# Patient Record
Sex: FEMALE | Race: WHITE | NOT HISPANIC OR LATINO | Employment: OTHER | RURAL
[De-identification: names, ages, dates, MRNs, and addresses within clinical notes are randomized per-mention and may not be internally consistent; named-entity substitution may affect disease eponyms.]

---

## 2020-08-02 NOTE — PROGRESS NOTES
Subjective:  Marta Giraldo is a 55 y.o. female who presents for       Patient Active Problem List   Diagnosis   • Class 1 obesity with serious comorbidity and body mass index (BMI) of 32.0 to 32.9 in adult   • Moderate episode of recurrent major depressive disorder (CMS/HCC)           Current Outpatient Medications:   •  amitriptyline (ELAVIL) 25 MG tablet, Take 25 mg by mouth Every Night., Disp: , Rfl:   •  atorvastatin (LIPITOR) 20 MG tablet, Take 20 mg by mouth Every Night., Disp: , Rfl:   •  exenatide er (BYDUREON) 2 MG/0.85ML auto-injector injection, Inject 2 mg under the skin into the appropriate area as directed 1 (One) Time Per Week., Disp: , Rfl:   •  gabapentin (NEURONTIN) 800 MG tablet, Take 800 mg by mouth 3 (Three) Times a Day., Disp: , Rfl:   •  HYDROcodone-acetaminophen (Lortab) 7.5-325 MG per tablet, Take 1 tablet by mouth Every 12 (Twelve) Hours., Disp: , Rfl:   •  insulin detemir (LEVEMIR) 100 UNIT/ML injection, Inject 20 Units under the skin into the appropriate area as directed Every Night., Disp: , Rfl:   •  insulin detemir (LEVEMIR) 100 UNIT/ML injection, Inject 40 Units under the skin into the appropriate area as directed Daily., Disp: , Rfl:   •  lisinopril (PRINIVIL,ZESTRIL) 10 MG tablet, Take 10 mg by mouth Daily., Disp: , Rfl:   •  methocarbamol (ROBAXIN) 750 MG tablet, Take 750 mg by mouth Every 8 (Eight) Hours., Disp: , Rfl:   •  sertraline (ZOLOFT) 100 MG tablet, Take 1 tablet by mouth 2 (two) times a day., Disp: 60 tablet, Rfl: 0    Pt is 56 yo female with management of obesity IDDM, HLP, diabetic neuropathy,  HTN, major depression  type 2, former tobacco smoker, sp below right  knee amputation, sp appendectomy, sp bladder surgery,diabetic retinopathy     8/10/20 Pt is here to establish and has a CMH of obesity and for IDDM type 2 pt is on levemir 40 unites daily and 20 units at bedtime. Pt is vague historian  She moved here from [place] a few months ago.  She lives currently  alone  She also lived in [place]. She is with sister today  She is seeing a PCP out of [place]  That comes once a week. She has been seeing her a few times   Pt also takes bydureon 2 mg subq weekly. For HLP pt is on lipitor 20 mg PO qhs. For HTN pt is on lisinopril 10 mg daily.  Pt also takes gabapenting 800 mg PO TID for diabetic neuropathy and Norco 7.5/325 mg every 12 hours for her chronic pain Pt is . She is a former tobacco smoker . She does not drink alcohol no illict drug She takes Norco for chronic pain. In legs and arms. Do not have records here currently.  She does not check her sugars regularly  Her last hga1c was 9.0. Pt is wheelchair bound      Diabetes   She presents for her initial diabetic visit. She has type 2 diabetes mellitus. Her disease course has been stable. Pertinent negatives for hypoglycemia include no confusion, dizziness, headaches, hunger, sleepiness, speech difficulty, sweats or tremors. Associated symptoms include fatigue and weakness. Pertinent negatives for diabetes include no blurred vision, no chest pain, no foot paresthesias, no foot ulcerations, no polydipsia, no polyphagia and no polyuria. Symptoms are stable.   Obesity   This is a chronic problem. The problem occurs constantly. The problem has been unchanged. Associated symptoms include arthralgias, fatigue, numbness and weakness. Pertinent negatives include no chest pain, chills, congestion, coughing, diaphoresis, fever, headaches, nausea, sore throat or vomiting. Nothing aggravates the symptoms. She has tried nothing for the symptoms. The treatment provided no relief.          Review of Systems  Review of Systems   Constitutional: Positive for activity change and fatigue. Negative for appetite change, chills, diaphoresis and fever.   HENT: Negative for congestion, postnasal drip, rhinorrhea, sinus pressure, sinus pain, sneezing, sore throat, trouble swallowing and voice change.    Eyes: Negative for blurred vision.    Respiratory: Negative for cough, choking, chest tightness, shortness of breath, wheezing and stridor.    Cardiovascular: Negative for chest pain.   Gastrointestinal: Negative for diarrhea, nausea and vomiting.   Endocrine: Negative for polydipsia, polyphagia and polyuria.   Musculoskeletal: Positive for arthralgias.   Neurological: Positive for weakness and numbness. Negative for dizziness, tremors, speech difficulty and headaches.   Psychiatric/Behavioral: Negative for confusion.       Patient Active Problem List   Diagnosis   • Class 1 obesity with serious comorbidity and body mass index (BMI) of 32.0 to 32.9 in adult   • Moderate episode of recurrent major depressive disorder (CMS/HCC)     Past Surgical History:   Procedure Laterality Date   • APPENDECTOMY     • BELOW KNEE AMPUTATION     • BLADDER SURGERY       Social History     Socioeconomic History   • Marital status:      Spouse name: Not on file   • Number of children: Not on file   • Years of education: Not on file   • Highest education level: Not on file   Tobacco Use   • Smoking status: Former Smoker     Last attempt to quit: 2016     Years since quittin.6   • Smokeless tobacco: Never Used   Substance and Sexual Activity   • Alcohol use: Not Currently   • Drug use: Never   • Sexual activity: Defer     Family History   Problem Relation Age of Onset   • Diabetes Other    • Hyperlipidemia Other    • Hypertension Other    • Stroke Other    • Heart disease Other    • Other Other      No results found for any previous visit.      No image results found.    @[email or web address]@    There is no immunization history on file for this patient.    The following portions of the patient's history were reviewed and updated as appropriate: allergies, current medications, past family history, past medical history, past social history, past surgical history and problem list.        Physical Exam  /68 (BP Location: Right arm, Patient Position: Sitting, Cuff Size:  "Large Adult)   Pulse 80   Temp 99 °F (37.2 °C) Comment: per screener  Ht 176.5 cm (69.5\")   Wt 99.8 kg (220 lb) Comment: given by pt.  SpO2 99%   BMI 32.02 kg/m²     Physical Exam   Constitutional: She is oriented to person, place, and time. She appears well-developed and well-nourished.   HENT:   Head: Normocephalic and atraumatic.   Right Ear: External ear normal.   Eyes: Pupils are equal, round, and reactive to light. Conjunctivae and EOM are normal.   Neck: Normal range of motion. Neck supple.   Cardiovascular: Normal rate, regular rhythm and normal heart sounds.   No murmur heard.  Pulmonary/Chest: Effort normal and breath sounds normal. No respiratory distress.   Abdominal: Soft. Bowel sounds are normal. She exhibits no distension. There is no tenderness.   Obese abdomen    Musculoskeletal: Normal range of motion. She exhibits no edema or deformity.   Wheelchair bound    Neurological: She is alert and oriented to person, place, and time. No cranial nerve deficit.   Get up and go test >10 seconds    Gait instability    Skin: Skin is warm. No rash noted. She is not diaphoretic. No erythema. No pallor.   Psychiatric: She has a normal mood and affect. Her behavior is normal.   Nursing note and vitals reviewed.      Assessment/Plan    Diagnosis Plan   1. Encounter for screening colonoscopy  Ambulatory Referral For Screening Colonoscopy   2. Uncontrolled type 2 diabetes mellitus with hyperglycemia (CMS/HCC)  CBC Auto Differential    Comprehensive Metabolic Panel    Hemoglobin A1c    Lipid Panel    TSH    T4, Free    T3, Free    Vitamin D 25 Hydroxy    Vitamin B12    Hepatitis C antibody    Microalbumin / Creatinine Urine Ratio - Urine, Clean Catch   3. General medical examination     4. Annual physical exam     5. Encounter for screening for endocrine disorder     6. Need for hepatitis C screening test     7. Class 1 obesity with serious comorbidity and body mass index (BMI) of 32.0 to 32.9 in adult, " unspecified obesity type     8. Chronic pain of both lower extremities  Ambulatory Referral to Pain Management   9. S/P below knee amputation, right (CMS/HCC)  Ambulatory Referral to Pain Management   10. Screening mammogram, encounter for  Mammo Screening Bilateral With CAD   11. Moderate episode of recurrent major depressive disorder (CMS/HCC)            -recommend labwork  -will get records from previous PCP   -recommend mammogram screening -refer to imaging center   -recommend colonoscopy screening   -DM type 2  - on bydureon 2 mg subq weekly. On levemir 20 units at bedtime and 40 units daily  -HTN - on lisinopril 10 mg daily.   -HLP - on lipitor 20 mg PO qhs.  -major depression - on zoloft 100 mg PO BId   -obesity - counseled weight loss >5 minutes BMI at 32.0   -diabetic neuropathy - on neurontin 800 mg PO TID  -chronic pain on Norco 7.5/325 mg every 12 hours PRN. Will refer to Pain Management.        -recommend pap smear  -annual physical exam today  -advised pt to be safe and call with questions and concerns  -advised pt to go to ER or call 911 if symptoms worrisome or severe  -advised pt to followup with specialist and referrals  -advised pt to be safe during COVID-19 pandemic  -total time with pt >25 minutes   -recheck in 2 weeks         This document has been electronically signed by Gato Crane MD on August 10, 2020 16:08

## 2020-08-10 ENCOUNTER — OFFICE VISIT (OUTPATIENT)
Dept: FAMILY MEDICINE CLINIC | Facility: CLINIC | Age: 55
End: 2020-08-10

## 2020-08-10 VITALS
OXYGEN SATURATION: 99 % | TEMPERATURE: 99 F | WEIGHT: 220 LBS | DIASTOLIC BLOOD PRESSURE: 68 MMHG | SYSTOLIC BLOOD PRESSURE: 130 MMHG | HEART RATE: 80 BPM | HEIGHT: 70 IN | BODY MASS INDEX: 31.5 KG/M2

## 2020-08-10 DIAGNOSIS — E66.9 CLASS 1 OBESITY WITH SERIOUS COMORBIDITY AND BODY MASS INDEX (BMI) OF 32.0 TO 32.9 IN ADULT, UNSPECIFIED OBESITY TYPE: ICD-10-CM

## 2020-08-10 DIAGNOSIS — Z12.11 ENCOUNTER FOR SCREENING COLONOSCOPY: ICD-10-CM

## 2020-08-10 DIAGNOSIS — E11.65 UNCONTROLLED TYPE 2 DIABETES MELLITUS WITH HYPERGLYCEMIA (HCC): ICD-10-CM

## 2020-08-10 DIAGNOSIS — Z89.511 S/P BELOW KNEE AMPUTATION, RIGHT (HCC): ICD-10-CM

## 2020-08-10 DIAGNOSIS — Z12.31 SCREENING MAMMOGRAM, ENCOUNTER FOR: ICD-10-CM

## 2020-08-10 DIAGNOSIS — Z13.29 ENCOUNTER FOR SCREENING FOR ENDOCRINE DISORDER: ICD-10-CM

## 2020-08-10 DIAGNOSIS — Z00.00 GENERAL MEDICAL EXAMINATION: ICD-10-CM

## 2020-08-10 DIAGNOSIS — Z02.83 ENCOUNTER FOR DRUG SCREENING: Primary | ICD-10-CM

## 2020-08-10 DIAGNOSIS — M79.604 CHRONIC PAIN OF BOTH LOWER EXTREMITIES: ICD-10-CM

## 2020-08-10 DIAGNOSIS — Z00.00 ANNUAL PHYSICAL EXAM: ICD-10-CM

## 2020-08-10 DIAGNOSIS — Z11.59 NEED FOR HEPATITIS C SCREENING TEST: ICD-10-CM

## 2020-08-10 DIAGNOSIS — G89.29 CHRONIC PAIN OF BOTH LOWER EXTREMITIES: ICD-10-CM

## 2020-08-10 DIAGNOSIS — M79.605 CHRONIC PAIN OF BOTH LOWER EXTREMITIES: ICD-10-CM

## 2020-08-10 DIAGNOSIS — F33.1 MODERATE EPISODE OF RECURRENT MAJOR DEPRESSIVE DISORDER (HCC): ICD-10-CM

## 2020-08-10 PROCEDURE — 99204 OFFICE O/P NEW MOD 45 MIN: CPT | Performed by: FAMILY MEDICINE

## 2020-08-10 RX ORDER — GABAPENTIN 800 MG/1
800 TABLET ORAL 3 TIMES DAILY
COMMUNITY
End: 2020-08-27 | Stop reason: SDUPTHER

## 2020-08-10 RX ORDER — SERTRALINE HYDROCHLORIDE 100 MG/1
100 TABLET, FILM COATED ORAL 2 TIMES DAILY
Qty: 60 TABLET | Refills: 0 | Status: SHIPPED | OUTPATIENT
Start: 2020-08-10 | End: 2020-08-27 | Stop reason: SDUPTHER

## 2020-08-10 RX ORDER — METHOCARBAMOL 750 MG/1
750 TABLET, FILM COATED ORAL EVERY 8 HOURS
COMMUNITY
End: 2020-08-27 | Stop reason: SDUPTHER

## 2020-08-10 RX ORDER — AMITRIPTYLINE HYDROCHLORIDE 25 MG/1
25 TABLET, FILM COATED ORAL NIGHTLY
COMMUNITY
End: 2020-08-27 | Stop reason: SDUPTHER

## 2020-08-10 RX ORDER — SERTRALINE HYDROCHLORIDE 100 MG/1
100 TABLET, FILM COATED ORAL 2 TIMES DAILY
COMMUNITY
End: 2020-08-10 | Stop reason: SDUPTHER

## 2020-08-10 RX ORDER — HYDROCODONE BITARTRATE AND ACETAMINOPHEN 7.5; 325 MG/1; MG/1
1 TABLET ORAL EVERY 12 HOURS
COMMUNITY
End: 2020-08-24

## 2020-08-10 RX ORDER — ATORVASTATIN CALCIUM 20 MG/1
20 TABLET, FILM COATED ORAL NIGHTLY
COMMUNITY
End: 2020-08-17 | Stop reason: DRUGHIGH

## 2020-08-10 RX ORDER — LISINOPRIL 10 MG/1
10 TABLET ORAL DAILY
COMMUNITY
End: 2020-08-27

## 2020-08-11 ENCOUNTER — LAB (OUTPATIENT)
Dept: LAB | Facility: HOSPITAL | Age: 55
End: 2020-08-11

## 2020-08-11 DIAGNOSIS — E11.65 UNCONTROLLED TYPE 2 DIABETES MELLITUS WITH HYPERGLYCEMIA (HCC): ICD-10-CM

## 2020-08-11 DIAGNOSIS — Z02.83 ENCOUNTER FOR DRUG SCREENING: ICD-10-CM

## 2020-08-11 LAB
25(OH)D3 SERPL-MCNC: 15.4 NG/ML (ref 30–100)
ALBUMIN SERPL-MCNC: 3.8 G/DL (ref 3.5–5.2)
ALBUMIN UR-MCNC: 6 MG/DL
ALBUMIN/GLOB SERPL: 1.2 G/DL
ALP SERPL-CCNC: 64 U/L (ref 39–117)
ALT SERPL W P-5'-P-CCNC: 13 U/L (ref 1–33)
AMPHET+METHAMPHET UR QL: NEGATIVE
AMPHETAMINES UR QL: NEGATIVE
ANION GAP SERPL CALCULATED.3IONS-SCNC: 12 MMOL/L (ref 5–15)
AST SERPL-CCNC: 11 U/L (ref 1–32)
BARBITURATES UR QL SCN: NEGATIVE
BASOPHILS # BLD AUTO: 0.07 10*3/MM3 (ref 0–0.2)
BASOPHILS NFR BLD AUTO: 0.7 % (ref 0–1.5)
BENZODIAZ UR QL SCN: NEGATIVE
BILIRUB SERPL-MCNC: 0.2 MG/DL (ref 0–1.2)
BUN SERPL-MCNC: 19 MG/DL (ref 6–20)
BUN/CREAT SERPL: 27.1 (ref 7–25)
BUPRENORPHINE SERPL-MCNC: NEGATIVE NG/ML
CALCIUM SPEC-SCNC: 8.9 MG/DL (ref 8.6–10.5)
CANNABINOIDS SERPL QL: NEGATIVE
CHLORIDE SERPL-SCNC: 100 MMOL/L (ref 98–107)
CHOLEST SERPL-MCNC: 172 MG/DL (ref 0–200)
CO2 SERPL-SCNC: 21 MMOL/L (ref 22–29)
COCAINE UR QL: NEGATIVE
CREAT SERPL-MCNC: 0.7 MG/DL (ref 0.57–1)
CREAT UR-MCNC: 122.3 MG/DL
DEPRECATED RDW RBC AUTO: 37.9 FL (ref 37–54)
EOSINOPHIL # BLD AUTO: 0.25 10*3/MM3 (ref 0–0.4)
EOSINOPHIL NFR BLD AUTO: 2.5 % (ref 0.3–6.2)
ERYTHROCYTE [DISTWIDTH] IN BLOOD BY AUTOMATED COUNT: 13.2 % (ref 12.3–15.4)
GFR SERPL CREATININE-BSD FRML MDRD: 87 ML/MIN/1.73
GLOBULIN UR ELPH-MCNC: 3.1 GM/DL
GLUCOSE SERPL-MCNC: 267 MG/DL (ref 65–99)
HBA1C MFR BLD: 10 % (ref 4.8–5.6)
HCT VFR BLD AUTO: 30.9 % (ref 34–46.6)
HCV AB SER DONR QL: NORMAL
HDLC SERPL-MCNC: 47 MG/DL (ref 40–60)
HGB BLD-MCNC: 10.3 G/DL (ref 12–15.9)
IMM GRANULOCYTES # BLD AUTO: 0.06 10*3/MM3 (ref 0–0.05)
IMM GRANULOCYTES NFR BLD AUTO: 0.6 % (ref 0–0.5)
LDLC SERPL CALC-MCNC: 105 MG/DL (ref 0–100)
LDLC/HDLC SERPL: 2.23 {RATIO}
LYMPHOCYTES # BLD AUTO: 3.6 10*3/MM3 (ref 0.7–3.1)
LYMPHOCYTES NFR BLD AUTO: 36.7 % (ref 19.6–45.3)
MCH RBC QN AUTO: 26.8 PG (ref 26.6–33)
MCHC RBC AUTO-ENTMCNC: 33.3 G/DL (ref 31.5–35.7)
MCV RBC AUTO: 80.3 FL (ref 79–97)
METHADONE UR QL SCN: NEGATIVE
MICROALBUMIN/CREAT UR: 49.1 MG/G
MONOCYTES # BLD AUTO: 0.41 10*3/MM3 (ref 0.1–0.9)
MONOCYTES NFR BLD AUTO: 4.2 % (ref 5–12)
NEUTROPHILS NFR BLD AUTO: 5.42 10*3/MM3 (ref 1.7–7)
NEUTROPHILS NFR BLD AUTO: 55.3 % (ref 42.7–76)
NRBC BLD AUTO-RTO: 0 /100 WBC (ref 0–0.2)
OPIATES UR QL: POSITIVE
OXYCODONE UR QL SCN: NEGATIVE
PCP UR QL SCN: NEGATIVE
PLATELET # BLD AUTO: 242 10*3/MM3 (ref 140–450)
PMV BLD AUTO: 12.2 FL (ref 6–12)
POTASSIUM SERPL-SCNC: 4.4 MMOL/L (ref 3.5–5.2)
PROPOXYPH UR QL: NEGATIVE
PROT SERPL-MCNC: 6.9 G/DL (ref 6–8.5)
RBC # BLD AUTO: 3.85 10*6/MM3 (ref 3.77–5.28)
SODIUM SERPL-SCNC: 133 MMOL/L (ref 136–145)
T3FREE SERPL-MCNC: 2.22 PG/ML (ref 2–4.4)
T4 FREE SERPL-MCNC: 0.81 NG/DL (ref 0.93–1.7)
TRICYCLICS UR QL SCN: POSITIVE
TRIGL SERPL-MCNC: 102 MG/DL (ref 0–150)
TSH SERPL DL<=0.05 MIU/L-ACNC: 2.31 UIU/ML (ref 0.27–4.2)
VIT B12 BLD-MCNC: 555 PG/ML (ref 211–946)
VLDLC SERPL-MCNC: 20.4 MG/DL (ref 5–40)
WBC # BLD AUTO: 9.81 10*3/MM3 (ref 3.4–10.8)

## 2020-08-11 PROCEDURE — 84481 FREE ASSAY (FT-3): CPT

## 2020-08-11 PROCEDURE — 80061 LIPID PANEL: CPT

## 2020-08-11 PROCEDURE — 82043 UR ALBUMIN QUANTITATIVE: CPT

## 2020-08-11 PROCEDURE — 84439 ASSAY OF FREE THYROXINE: CPT

## 2020-08-11 PROCEDURE — 86803 HEPATITIS C AB TEST: CPT

## 2020-08-11 PROCEDURE — 83036 HEMOGLOBIN GLYCOSYLATED A1C: CPT

## 2020-08-11 PROCEDURE — 80053 COMPREHEN METABOLIC PANEL: CPT

## 2020-08-11 PROCEDURE — 82607 VITAMIN B-12: CPT

## 2020-08-11 PROCEDURE — 82570 ASSAY OF URINE CREATININE: CPT

## 2020-08-11 PROCEDURE — 82306 VITAMIN D 25 HYDROXY: CPT

## 2020-08-11 PROCEDURE — 85025 COMPLETE CBC W/AUTO DIFF WBC: CPT

## 2020-08-11 PROCEDURE — 80306 DRUG TEST PRSMV INSTRMNT: CPT

## 2020-08-11 PROCEDURE — 84443 ASSAY THYROID STIM HORMONE: CPT

## 2020-08-13 NOTE — PROGRESS NOTES
Subjective:  Marta Giraldo is a 55 y.o. female who presents for       Patient Active Problem List   Diagnosis   • Class 1 obesity with serious comorbidity and body mass index (BMI) of 32.0 to 32.9 in adult   • Moderate episode of recurrent major depressive disorder (CMS/HCC)   • Encounter for screening for malignant neoplasm of colon   • Gastroesophageal reflux disease   • Hypothyroidism   • Microcytic anemia   • Vitamin D deficiency   • Uncontrolled type 2 diabetes mellitus with hyperglycemia (CMS/HCC)   • Class 1 obesity with body mass index (BMI) of 32.0 to 32.9 in adult           Current Outpatient Medications:   •  amitriptyline (ELAVIL) 25 MG tablet, Take 25 mg by mouth Every Night., Disp: , Rfl:   •  atorvastatin (Lipitor) 40 MG tablet, Take 1 tablet by mouth Daily., Disp: 30 tablet, Rfl: 3  •  Blood Glucose Monitoring Suppl w/Device kit, Use for E11.65 diabetes to check sugars 4 times a day., Disp: 1 each, Rfl: 0  •  Empagliflozin 25 MG tablet, Take 25 mg by mouth Daily., Disp: 30 tablet, Rfl: 3  •  exenatide er (BYDUREON) 2 MG/0.85ML auto-injector injection, Inject 0.85 mL under the skin into the appropriate area as directed 1 (One) Time Per Week., Disp: 4 pen, Rfl: 3  •  ferrous sulfate (FerrouSul) 325 (65 FE) MG tablet, Take 1 tablet by mouth Daily With Breakfast., Disp: 30 tablet, Rfl: 3  •  gabapentin (NEURONTIN) 800 MG tablet, Take 800 mg by mouth 3 (Three) Times a Day., Disp: , Rfl:   •  glucose blood test strip, Use as instructed, Disp: 200 each, Rfl: 12  •  glucose blood test strip, Use as instructed, Disp: 200 each, Rfl: 12  •  insulin detemir (LEVEMIR) 100 UNIT/ML injection, Inject 20 Units under the skin into the appropriate area as directed Every Night., Disp: , Rfl:   •  insulin detemir (LEVEMIR) 100 UNIT/ML injection, Inject 40 Units under the skin into the appropriate area as directed Daily., Disp: , Rfl:   •  Lancets misc, Use for E11.65 diabetes to check sugars 4 times a day., Disp: 200  each, Rfl: 3  •  levothyroxine (SYNTHROID, LEVOTHROID) 25 MCG tablet, Take 1 tablet by mouth Daily., Disp: 30 tablet, Rfl: 3  •  lisinopril (PRINIVIL,ZESTRIL) 10 MG tablet, Take 10 mg by mouth Daily., Disp: , Rfl:   •  methocarbamol (ROBAXIN) 750 MG tablet, Take 750 mg by mouth Every 8 (Eight) Hours., Disp: , Rfl:   •  omeprazole (PrilOSEC) 40 MG capsule, Take 1 capsule by mouth Daily., Disp: 30 capsule, Rfl: 11  •  sertraline (ZOLOFT) 100 MG tablet, Take 1 tablet by mouth 2 (two) times a day., Disp: 60 tablet, Rfl: 0      Pt is 54 yo female with management of obesity IDDM, HLP, diabetic neuropathy,  HTN, major depression  type 2, former tobacco smoker, sp below right  knee amputation of right leg , sp appendectomy, sp bladder surgery,diabetic retinopathy, vitamin D deficiency, hypothryoidism, microcytic anemia      8/10/20 Pt is here to establish and has a CMH of obesity and for IDDM type 2 pt is on levemir 40 unites daily and 20 units at bedtime. Pt is vague historian  She moved here from [place] a few months ago.  She lives currently alone  She also lived in [place]. She is with sister today  She is seeing a PCP out of [place]  That comes once a week. She has been seeing her a few times   Pt also takes bydureon 2 mg subq weekly. For HLP pt is on lipitor 20 mg PO qhs. For HTN pt is on lisinopril 10 mg daily.  Pt also takes gabapenting 800 mg PO TID for diabetic neuropathy and Norco 7.5/325 mg every 12 hours for her chronic pain Pt is . She is a former tobacco smoker . She does not drink alcohol no illict drug She takes Norco for chronic pain. In legs and arms. Do not have records here currently.  She does not check her sugars regularly  Her last hga1c was 9.0. Pt is wheelchair bound      8/27/20 in office visit for recheck for pt's above medical issues.  Had labwork done on 8/11/20. UDS showed positive for tricyclic antidepressants and opids.  Hep C antibody test negative. Vitamin B12 levels normal.  Vitamin D levels low. On thyroid studies TSH normal T4 at 0.81 and T3 normal lipid panel showed LDL at 105 and total cholesterl at 172.  hga1c at 10.0. CMP shoed glucose at 267 with sodium at 133 and GFR at 87. BUN/CR ratio at 27.1 CBC showed hemoglobin at 10.3. Pt was started on synthroid 25 mcg PO q daily for her hypothryoidsm. In addition pt was started on ferrous sulfate 325 mg PO q daily for microcytic anemia. For DM Type 2 pt was started on jardiance 25 mg Po q daily.      Hypothyroidism   This is a new problem. The current episode started more than 1 year ago. The problem occurs constantly. The problem has been unchanged. Associated symptoms include arthralgias, fatigue, numbness and weakness. Pertinent negatives include no abdominal pain, anorexia, chest pain, chills, congestion, coughing, diaphoresis, fever, headaches, nausea, sore throat or vomiting. Nothing aggravates the symptoms. She has tried nothing for the symptoms. The treatment provided no relief.   Anemia   Presents for initial visit. There has been no abdominal pain, anorexia, bruising/bleeding easily, confusion, fever, leg swelling, light-headedness, malaise/fatigue, pallor, palpitations, paresthesias, pica or weight loss. Signs of blood loss that are not present include hematemesis, hematochezia, melena, menorrhagia and vaginal bleeding. Past medical history includes hypothyroidism. There is no history of alcohol abuse, cancer, chronic liver disease, chronic renal disease, clotting disorder, dementia, heart failure, hemoglobinopathy, HIV/AIDS, inflammatory bowel disease, malabsorption, malnutrition, neuropathy, recent illness, recent surgery, recent trauma or rheumatic disease. There is no past history of bone marrow exam, colonoscopy, EGD or FOBT.   Diabetes   She presents for her initial diabetic visit. She has type 2 diabetes mellitus. Her disease course has been stable. Pertinent negatives for hypoglycemia include no confusion, dizziness,  headaches, hunger, sleepiness, speech difficulty, sweats or tremors. Associated symptoms include fatigue and weakness. Pertinent negatives for diabetes include no blurred vision, no chest pain, no foot paresthesias, no foot ulcerations, no polydipsia, no polyphagia and no polyuria. Symptoms are stable.   Obesity   This is a chronic problem. The problem occurs constantly. The problem has been unchanged. Associated symptoms include arthralgias, fatigue, numbness and weakness. Pertinent negatives include no chest pain, chills, congestion, coughing, diaphoresis, fever, headaches, nausea, sore throat or vomiting. Nothing aggravates the symptoms. She has tried nothing for the symptoms. The treatment provided no relief.          Review of Systems  Review of Systems   Constitutional: Positive for activity change and fatigue. Negative for appetite change, chills, diaphoresis, fever, malaise/fatigue and weight loss.   HENT: Negative for congestion, postnasal drip, rhinorrhea, sinus pressure, sinus pain, sneezing, sore throat, trouble swallowing and voice change.    Respiratory: Negative for cough, choking, chest tightness, shortness of breath, wheezing and stridor.    Cardiovascular: Negative for chest pain and palpitations.   Gastrointestinal: Negative for abdominal pain, anorexia, diarrhea, hematemesis, hematochezia, melena, nausea and vomiting.   Genitourinary: Negative for menorrhagia and vaginal bleeding.   Musculoskeletal: Positive for arthralgias and gait problem.   Skin: Negative for pallor.   Neurological: Positive for weakness and numbness. Negative for light-headedness, headaches and paresthesias.   Hematological: Does not bruise/bleed easily.   Psychiatric/Behavioral: Negative for confusion.       Patient Active Problem List   Diagnosis   • Class 1 obesity with serious comorbidity and body mass index (BMI) of 32.0 to 32.9 in adult   • Moderate episode of recurrent major depressive disorder (CMS/HCC)   • Encounter  for screening for malignant neoplasm of colon   • Gastroesophageal reflux disease   • Hypothyroidism   • Microcytic anemia   • Vitamin D deficiency   • Uncontrolled type 2 diabetes mellitus with hyperglycemia (CMS/HCC)   • Class 1 obesity with body mass index (BMI) of 32.0 to 32.9 in adult     Past Surgical History:   Procedure Laterality Date   • APPENDECTOMY     • BELOW KNEE AMPUTATION     • BLADDER SURGERY       Social History     Socioeconomic History   • Marital status:      Spouse name: Not on file   • Number of children: Not on file   • Years of education: Not on file   • Highest education level: Not on file   Tobacco Use   • Smoking status: Former Smoker     Last attempt to quit: 2016     Years since quittin.6   • Smokeless tobacco: Never Used   Substance and Sexual Activity   • Alcohol use: Not Currently   • Drug use: Never   • Sexual activity: Defer     Family History   Problem Relation Age of Onset   • Diabetes Other    • Hyperlipidemia Other    • Hypertension Other    • Stroke Other    • Heart disease Other    • Other Other      Lab on 2020   Component Date Value Ref Range Status   • WBC 2020 9.81  3.40 - 10.80 10*3/mm3 Final   • RBC 2020 3.85  3.77 - 5.28 10*6/mm3 Final   • Hemoglobin 2020 10.3* 12.0 - 15.9 g/dL Final   • Hematocrit 2020 30.9* 34.0 - 46.6 % Final   • MCV 2020 80.3  79.0 - 97.0 fL Final   • MCH 2020 26.8  26.6 - 33.0 pg Final   • MCHC 2020 33.3  31.5 - 35.7 g/dL Final   • RDW 2020 13.2  12.3 - 15.4 % Final   • RDW-SD 2020 37.9  37.0 - 54.0 fl Final   • MPV 2020 12.2* 6.0 - 12.0 fL Final   • Platelets 2020 242  140 - 450 10*3/mm3 Final   • Neutrophil % 2020 55.3  42.7 - 76.0 % Final   • Lymphocyte % 2020 36.7  19.6 - 45.3 % Final   • Monocyte % 2020 4.2* 5.0 - 12.0 % Final   • Eosinophil % 2020 2.5  0.3 - 6.2 % Final   • Basophil % 2020 0.7  0.0 - 1.5 % Final   • Immature Grans  % 08/11/2020 0.6* 0.0 - 0.5 % Final   • Neutrophils, Absolute 08/11/2020 5.42  1.70 - 7.00 10*3/mm3 Final   • Lymphocytes, Absolute 08/11/2020 3.60* 0.70 - 3.10 10*3/mm3 Final   • Monocytes, Absolute 08/11/2020 0.41  0.10 - 0.90 10*3/mm3 Final   • Eosinophils, Absolute 08/11/2020 0.25  0.00 - 0.40 10*3/mm3 Final   • Basophils, Absolute 08/11/2020 0.07  0.00 - 0.20 10*3/mm3 Final   • Immature Grans, Absolute 08/11/2020 0.06* 0.00 - 0.05 10*3/mm3 Final   • nRBC 08/11/2020 0.0  0.0 - 0.2 /100 WBC Final   • Glucose 08/11/2020 267* 65 - 99 mg/dL Final   • BUN 08/11/2020 19  6 - 20 mg/dL Final   • Creatinine 08/11/2020 0.70  0.57 - 1.00 mg/dL Final   • Sodium 08/11/2020 133* 136 - 145 mmol/L Final   • Potassium 08/11/2020 4.4  3.5 - 5.2 mmol/L Final    Specimen hemolyzed.  Results may be affected.   • Chloride 08/11/2020 100  98 - 107 mmol/L Final   • CO2 08/11/2020 21.0* 22.0 - 29.0 mmol/L Final   • Calcium 08/11/2020 8.9  8.6 - 10.5 mg/dL Final   • Total Protein 08/11/2020 6.9  6.0 - 8.5 g/dL Final   • Albumin 08/11/2020 3.80  3.50 - 5.20 g/dL Final   • ALT (SGPT) 08/11/2020 13  1 - 33 U/L Final   • AST (SGOT) 08/11/2020 11  1 - 32 U/L Final   • Alkaline Phosphatase 08/11/2020 64  39 - 117 U/L Final   • Total Bilirubin 08/11/2020 0.2  0.0 - 1.2 mg/dL Final   • eGFR Non African Amer 08/11/2020 87  >60 mL/min/1.73 Final   • Globulin 08/11/2020 3.1  gm/dL Final   • A/G Ratio 08/11/2020 1.2  g/dL Final   • BUN/Creatinine Ratio 08/11/2020 27.1* 7.0 - 25.0 Final   • Anion Gap 08/11/2020 12.0  5.0 - 15.0 mmol/L Final   • Hemoglobin A1C 08/11/2020 10.00* 4.80 - 5.60 % Final   • Total Cholesterol 08/11/2020 172  0 - 200 mg/dL Final   • Triglycerides 08/11/2020 102  0 - 150 mg/dL Final   • HDL Cholesterol 08/11/2020 47  40 - 60 mg/dL Final   • LDL Cholesterol  08/11/2020 105* 0 - 100 mg/dL Final   • VLDL Cholesterol 08/11/2020 20.4  5 - 40 mg/dL Final   • LDL/HDL Ratio 08/11/2020 2.23   Final   • TSH 08/11/2020 2.310  0.270 -  "4.200 uIU/mL Final   • Free T4 08/11/2020 0.81* 0.93 - 1.70 ng/dL Final   • T3, Free 08/11/2020 2.22  2.00 - 4.40 pg/mL Final   • 25 Hydroxy, Vitamin D 08/11/2020 15.4* 30.0 - 100.0 ng/ml Final   • Vitamin B-12 08/11/2020 555  211 - 946 pg/mL Final   • Hepatitis C Ab 08/11/2020 Non-Reactive  Non-Reactive Final   • Microalbumin/Creatinine Ratio 08/11/2020 49.1  mg/g Final   • Creatinine, Urine 08/11/2020 122.3  mg/dL Final   • Microalbumin, Urine 08/11/2020 6.0  mg/dL Final   • THC, Screen, Urine 08/11/2020 Negative  Negative Final   • Phencyclidine (PCP), Urine 08/11/2020 Negative  Negative Final   • Cocaine Screen, Urine 08/11/2020 Negative  Negative Final   • Methamphetamine, Ur 08/11/2020 Negative  Negative Final   • Opiate Screen 08/11/2020 Positive* Negative Final   • Amphetamine Screen, Urine 08/11/2020 Negative  Negative Final   • Benzodiazepine Screen, Urine 08/11/2020 Negative  Negative Final   • Tricyclic Antidepressants Screen 08/11/2020 Positive* Negative Final   • Methadone Screen, Urine 08/11/2020 Negative  Negative Final   • Barbiturates Screen, Urine 08/11/2020 Negative  Negative Final   • Oxycodone Screen, Urine 08/11/2020 Negative  Negative Final   • Propoxyphene Screen 08/11/2020 Negative  Negative Final   • Buprenorphine, Screen, Urine 08/11/2020 Negative  Negative Final      No image results found.    @[email or web address]@    There is no immunization history on file for this patient.    The following portions of the patient's history were reviewed and updated as appropriate: allergies, current medications, past family history, past medical history, past social history, past surgical history and problem list.        Physical Exam  /66 (BP Location: Right arm, Patient Position: Sitting, Cuff Size: Adult)   Pulse 77   Temp 97.8 °F (36.6 °C) (Infrared)   Ht 175.3 cm (69\")   SpO2 97%   BMI 32.49 kg/m²     Physical Exam   Constitutional: She is oriented to person, place, and time. She appears " well-developed and well-nourished.   HENT:   Head: Normocephalic and atraumatic.   Right Ear: External ear normal.   Eyes: Pupils are equal, round, and reactive to light. Conjunctivae and EOM are normal.   Neck: Normal range of motion. Neck supple.   Cardiovascular: Normal rate, regular rhythm and normal heart sounds.   No murmur heard.  Pulmonary/Chest: Effort normal and breath sounds normal. No respiratory distress.   Abdominal: Soft. Bowel sounds are normal. She exhibits no distension. There is no tenderness.   Obese abdomen    Musculoskeletal: Normal range of motion. She exhibits tenderness. She exhibits no edema or deformity.   Below right knee amputation of right leg    Pt wheelchair bound    Neurological: She is alert and oriented to person, place, and time. No cranial nerve deficit.   Skin: Skin is warm. No rash noted. She is not diaphoretic. No erythema. No pallor.   Psychiatric: She has a normal mood and affect. Her behavior is normal.   Nursing note and vitals reviewed.      Assessment/Plan    Diagnosis Plan   1. Hypothyroidism, unspecified type     2. Microcytic anemia     3. Vitamin D deficiency     4. Moderate episode of recurrent major depressive disorder (CMS/HCC)     5. Uncontrolled type 2 diabetes mellitus with hyperglycemia (CMS/HCC)     6. Class 1 obesity with body mass index (BMI) of 32.0 to 32.9 in adult, unspecified obesity type, unspecified whether serious comorbidity present             -recommend labwork   -recommend influenza vaccination   -will get records from previous PCP   -recommend diabetic ey exam   -recommend mammogram screening -refer to imaging center   -recommend colonoscopy screening - has upcoming EGD/colonoscopy on 9/2/20 with GI at [facility]   -vitamin D deficiency -vitamin D once a week  -hypothyroidism - on synthroid 25 mcg PO q daily.   -DM type 2  - on bydureon 2 mg subq weekly.on levemir  40 units daily and 20 units at bedtime   Start on basaglar 25 units at bedtime. Go up by  3 units every 3 days until morning sugars running     -microcytic anemia - on ferrous sulfate 325 mg PO q daily. Has upcoming appt with Gastroenteorlogy on 9/2/20 for colonoscopy and EGD   -HTN - on lisinopril 10 mg daily go up  On lisinopril from 10 to 20 mg PO q daily.  -HLP - on lipitor 20 mg PO qhs.  -major depression - on zoloft 100 mg PO BID. Will taper off elavil.  Stop zoloft and switch to viibryd starting pack samples given today. Also recommend counseling but pt declined   -obesity - counseled weight loss >5 minutes BMI at 32.0   -diabetic neuropathy - on neurontin 800 mg PO TID stop elavil but taper down every other day for 1 week then every other 2 days for 1 week then stop . Stop neurontin and start on lyrica 50 mg PO TID. UDS completed. MICHAEL printed and on file. Also pt will sign pain contrc  -chronic pain  Was on Norco 7.5/325 mg every 12 hours PRN. Will refer to Pain Management.   on robaxin TID     -recommend pap smear  -annual physical exam today  -advised pt to be safe and call with questions and concerns  -advised pt to go to ER or call 911 if symptoms worrisome or severe  -advised pt to followup with specialist and referrals  -advised pt to be safe during COVID-19 pandemic  -total time with pt >25 minutes   -recheck in 2 weeks         This document has been electronically signed by Gato Crane MD on August 27, 2020 15:55

## 2020-08-14 NOTE — TELEPHONE ENCOUNTER
Patient states that she is out of her diabetes shot and her blood sugar has been high. States that she is needing a refill onexenatide er (BYDUREON) 2 MG/0.85ML auto-injector injection  2 mg, Weekly     To be sent to the  [place] [address] [address] AT [facility] [address] - [phone]  - [phone]   [phone]

## 2020-08-17 ENCOUNTER — TELEPHONE (OUTPATIENT)
Dept: FAMILY MEDICINE CLINIC | Facility: CLINIC | Age: 55
End: 2020-08-17

## 2020-08-17 RX ORDER — FERROUS SULFATE 325(65) MG
325 TABLET ORAL
Qty: 30 TABLET | Refills: 3 | Status: SHIPPED | OUTPATIENT
Start: 2020-08-17 | End: 2020-11-06

## 2020-08-17 RX ORDER — ATORVASTATIN CALCIUM 40 MG/1
40 TABLET, FILM COATED ORAL DAILY
Qty: 30 TABLET | Refills: 3 | Status: SHIPPED | OUTPATIENT
Start: 2020-08-17 | End: 2020-11-06

## 2020-08-17 RX ORDER — LEVOTHYROXINE SODIUM 0.03 MG/1
25 TABLET ORAL DAILY
Qty: 30 TABLET | Refills: 3 | Status: SHIPPED | OUTPATIENT
Start: 2020-08-17 | End: 2020-11-06

## 2020-08-17 NOTE — TELEPHONE ENCOUNTER
----- Message from Gato Crane MD sent at 8/15/2020 11:12 AM CDT -----      Please call pt    On thyroid studies TSH is normal but T4 is low. Recommend pt start taking synthroid 25 mcg daily to help with energy and fatigue give 30 pills and 3 refills     On CMP sugars high at 267 with sodium at 133     Liver enzymes and kidney function stable BUN/CR ratio elevated. Recommend more water intake     On lipid panel shows LDL at 105 and recommend pt go up on lipitor from 20 to 40 mg PO qhs. Give 30 pills and 3 refills. Goal <100. Recommend DASH diet heart healthy diet with less sugar and cars. More vegetables and lean protein    hga1c at 10.00 and goal less than <7.00. Very important that pt bring sugar readings next visit before breakfast and 2 hours after meal. Recommend pt start taking jardiance 25 mg PO q daily. Give 30 pills and 3 refills. Will help pt urinate sugars and reduce risk of cardiovascular death. Needs to drink a lot of water while on medication.  May cause UTI and yeast infection.      On CBC hemoglobin at 10.00 with MCV at 80. Possible iron deficiency anemia.  Recommend pt start taking iron supplement ferrous sulfate 325 mg pO q daily. Give 30 pills and 3 refills may cause constipation and black stools take with vitamin C for better absorption    Recheck on next visit. Thanks

## 2020-08-17 NOTE — TELEPHONE ENCOUNTER
Gave results and recommendations to sister. She voiced understanding and is agreeable to new medications.

## 2020-08-19 ENCOUNTER — TELEPHONE (OUTPATIENT)
Dept: FAMILY MEDICINE CLINIC | Facility: CLINIC | Age: 55
End: 2020-08-19

## 2020-08-19 RX ORDER — LANCETS 30 GAUGE
EACH MISCELLANEOUS
Qty: 200 EACH | Refills: 3 | Status: SHIPPED | OUTPATIENT
Start: 2020-08-19 | End: 2021-04-07 | Stop reason: SDUPTHER

## 2020-08-19 NOTE — TELEPHONE ENCOUNTER
PATIENT CALLED IN REQUESTING NEW GLUCOSE MONITOR. PLEASE SEND TO PHARMACY AND INCLUDE, TESTING STRIPS AND NEEDLES.   CALL BACK NUMBER: [phone]  PHARMACY: [facility] [address] [place] [address] [address] AT [facility] [address] - [phone] [facility] [phone] FX

## 2020-08-24 ENCOUNTER — OFFICE VISIT (OUTPATIENT)
Dept: GASTROENTEROLOGY | Facility: CLINIC | Age: 55
End: 2020-08-24

## 2020-08-24 ENCOUNTER — TELEPHONE (OUTPATIENT)
Dept: FAMILY MEDICINE CLINIC | Facility: CLINIC | Age: 55
End: 2020-08-24

## 2020-08-24 VITALS
DIASTOLIC BLOOD PRESSURE: 60 MMHG | BODY MASS INDEX: 32.49 KG/M2 | HEIGHT: 69 IN | SYSTOLIC BLOOD PRESSURE: 112 MMHG | HEART RATE: 78 BPM

## 2020-08-24 DIAGNOSIS — K21.9 GASTROESOPHAGEAL REFLUX DISEASE, ESOPHAGITIS PRESENCE NOT SPECIFIED: ICD-10-CM

## 2020-08-24 DIAGNOSIS — Z12.11 ENCOUNTER FOR SCREENING FOR MALIGNANT NEOPLASM OF COLON: Primary | ICD-10-CM

## 2020-08-24 PROCEDURE — 99203 OFFICE O/P NEW LOW 30 MIN: CPT | Performed by: INTERNAL MEDICINE

## 2020-08-24 RX ORDER — OMEPRAZOLE 40 MG/1
40 CAPSULE, DELAYED RELEASE ORAL DAILY
Qty: 30 CAPSULE | Refills: 11 | Status: SHIPPED | OUTPATIENT
Start: 2020-08-24 | End: 2021-03-10 | Stop reason: SDUPTHER

## 2020-08-24 RX ORDER — DEXTROSE AND SODIUM CHLORIDE 5; .45 G/100ML; G/100ML
30 INJECTION, SOLUTION INTRAVENOUS CONTINUOUS PRN
Status: CANCELLED | OUTPATIENT
Start: 2020-09-02

## 2020-08-24 NOTE — TELEPHONE ENCOUNTER
----- Message from Gato Crane MD sent at 8/24/2020  9:36 AM CDT -----  Regarding: mammogram screening   Please call pt    Mammogram screening normal    Repeat in 1 year. Thanks

## 2020-08-24 NOTE — PATIENT INSTRUCTIONS

## 2020-08-24 NOTE — PROGRESS NOTES
[facility]      Chief Complaint:   Chief Complaint   Patient presents with   • Heartburn   • Colon Cancer Screening       Subjective     HPI:   Ms. Giraldo is a 55-year-old  female with past medical history of depression, diabetes mellitus, hyperlipidemia, hypertension, neuropathy, CVA, appendectomy presenting for evaluation for colorectal cancer screening.  She has heartburn for past several years.  Has been worse for past month.  Taking Tums on as-needed basis without much help.  Denied abdominal pain, nausea, vomiting, diarrhea, constipation, rectal bleeding or weight loss.  Due for colorectal cancer screening.    Past Medical History:   Past Medical History:   Diagnosis Date   • Depression    • Diabetes mellitus (CMS/HCC)    • Hyperlipidemia    • Hypertension    • Neuropathy    • Stroke (CMS/HCC)        Past Surgical History:  Past Surgical History:   Procedure Laterality Date   • APPENDECTOMY     • BELOW KNEE AMPUTATION     • BLADDER SURGERY         Family History:  Family History   Problem Relation Age of Onset   • Diabetes Other    • Hyperlipidemia Other    • Hypertension Other    • Stroke Other    • Heart disease Other    • Other Other        Social History:   reports that she quit smoking about 4 years ago. She has never used smokeless tobacco. She reports that she drank alcohol. She reports that she does not use drugs.    Medications:   Prior to Admission medications    Medication Sig Start Date End Date Taking? Authorizing Provider   amitriptyline (ELAVIL) 25 MG tablet Take 25 mg by mouth Every Night.   Yes Provider, MD David   atorvastatin (Lipitor) 40 MG tablet Take 1 tablet by mouth Daily. 8/17/20  Yes Gato Crane MD   Blood Glucose Monitoring Suppl w/Device kit Use for E11.65 diabetes to check sugars 4 times a day. 8/19/20  Yes Gato Crane MD   Empagliflozin 25 MG tablet Take 25 mg by mouth Daily. 8/17/20  Yes Gato Crane MD   exenatide er  (BYDUREON) 2 MG/0.85ML auto-injector injection Inject 0.85 mL under the skin into the appropriate area as directed 1 (One) Time Per Week. 8/14/20  Yes Gato Crane MD   ferrous sulfate (FerrouSul) 325 (65 FE) MG tablet Take 1 tablet by mouth Daily With Breakfast. 8/17/20  Yes Gato Crane MD   gabapentin (NEURONTIN) 800 MG tablet Take 800 mg by mouth 3 (Three) Times a Day.   Yes David Vizcaino MD   glucose blood test strip Use as instructed 8/17/20  Yes Gato Crane MD   glucose blood test strip Use as instructed 8/19/20  Yes Gato Crane MD   insulin detemir (LEVEMIR) 100 UNIT/ML injection Inject 20 Units under the skin into the appropriate area as directed Every Night.   Yes David Vizcaino MD   insulin detemir (LEVEMIR) 100 UNIT/ML injection Inject 40 Units under the skin into the appropriate area as directed Daily.   Yes David Vizcaino MD   Lancets misc Use for E11.65 diabetes to check sugars 4 times a day. 8/19/20  Yes Gato Crane MD   levothyroxine (SYNTHROID, LEVOTHROID) 25 MCG tablet Take 1 tablet by mouth Daily. 8/17/20  Yes Gato Crane MD   lisinopril (PRINIVIL,ZESTRIL) 10 MG tablet Take 10 mg by mouth Daily.   Yes David Vizcaino MD   methocarbamol (ROBAXIN) 750 MG tablet Take 750 mg by mouth Every 8 (Eight) Hours.   Yes David Vizcaino MD   sertraline (ZOLOFT) 100 MG tablet Take 1 tablet by mouth 2 (two) times a day. 8/10/20  Yes Gato Crane MD   omeprazole (PrilOSEC) 40 MG capsule Take 1 capsule by mouth Daily. 8/24/20   Ashli Gonzalez MD   HYDROcodone-acetaminophen (Lortab) 7.5-325 MG per tablet Take 1 tablet by mouth Every 12 (Twelve) Hours.  8/24/20  David Vizcaino MD       Allergies:  Compazine [prochlorperazine edisylate] and Penicillins    ROS:    Review of Systems   Constitutional: Negative for chills, fatigue, fever and unexpected weight change.   HENT: Negative for congestion, ear discharge, hearing loss, nosebleeds and  "sore throat.    Eyes: Negative for pain, discharge and redness.   Respiratory: Negative for cough, chest tightness, shortness of breath and wheezing.    Cardiovascular: Negative for chest pain and palpitations.   Gastrointestinal: Negative for abdominal distention, abdominal pain, blood in stool, constipation, diarrhea, nausea and vomiting.   Endocrine: Negative for cold intolerance, polydipsia, polyphagia and polyuria.   Genitourinary: Negative for dysuria, flank pain, frequency, hematuria and urgency.   Musculoskeletal: Negative for arthralgias, back pain, joint swelling and myalgias.   Skin: Negative for color change, pallor and rash.   Neurological: Negative for tremors, seizures, syncope, weakness and headaches.   Hematological: Negative for adenopathy. Does not bruise/bleed easily.   Psychiatric/Behavioral: Negative for behavioral problems, confusion, dysphoric mood, hallucinations and suicidal ideas. The patient is not nervous/anxious.    Has GERD.  Objective     Blood pressure 112/60, pulse 78, height 175.3 cm (69\").    Physical Exam   Constitutional: She is oriented to person, place, and time. She appears well-developed and well-nourished.   HENT:   Head: Normocephalic and atraumatic.   Mouth/Throat: Oropharynx is clear and moist.   Eyes: Pupils are equal, round, and reactive to light. Conjunctivae and EOM are normal.   Neck: Normal range of motion. Neck supple. No thyromegaly present.   Cardiovascular: Normal rate, regular rhythm and normal heart sounds.   No murmur heard.  Pulmonary/Chest: Effort normal and breath sounds normal. She has no wheezes.   Abdominal: Soft. Bowel sounds are normal. She exhibits no distension and no mass. There is no tenderness. No hernia.   Genitourinary:   Genitourinary Comments: No lesions noted   Musculoskeletal: Normal range of motion. She exhibits no edema or tenderness.   Lymphadenopathy:     She has no cervical adenopathy.   Neurological: She is alert and oriented to " person, place, and time. No cranial nerve deficit.   Skin: Skin is warm and dry. No rash noted.   Psychiatric: She has a normal mood and affect. Thought content normal.      Extremities: No edema, cyanosis or clubbing.    Assessment/Plan    1.  Colorectal cancer screening, average risk.  Schedule colonoscopy.  2.  GERD, with continued symptoms off medications.  Taking Tums as needed.  Add Prilosec.  Antireflux lifestyle.  Proceed with EGD for [facility] screening.  3.  Obesity, recommend exercise and diet control.  Marta was seen today for heartburn and colon cancer screening.    Diagnoses and all orders for this visit:    Encounter for screening for malignant neoplasm of colon  -     Case Request; Standing  -     Case Request    Gastroesophageal reflux disease, esophagitis presence not specified  -     Case Request; Standing  -     Case Request    Other orders  -     Follow Anesthesia Guidelines / Standing Orders; Future  -     Obtain Informed Consent; Future  -     omeprazole (PrilOSEC) 40 MG capsule; Take 1 capsule by mouth Daily.        ESOPHAGOGASTRODUODENOSCOPY (N/A), COLONOSCOPY (N/A)     Diagnosis Plan   1. Encounter for screening for malignant neoplasm of colon  Case Request    dextrose 5 % and sodium chloride 0.45 % infusion    Case Request   2. Gastroesophageal reflux disease, esophagitis presence not specified  Case Request    dextrose 5 % and sodium chloride 0.45 % infusion    Case Request       Anticipated Surgical Procedure:  Orders Placed This Encounter   Procedures   • Follow Anesthesia Guidelines / Standing Orders     Standing Status:   Future   • Obtain Informed Consent     Standing Status:   Future     Order Specific Question:   Informed Consent Given For     Answer:   egd and colonoscopy       The risks, benefits, and alternatives of this procedure have been discussed with the patient or the responsible party- the patient understands and agrees to proceed.            This document has been  electronically signed by Ashli Gonzalez MD on August 24, 2020 15:21

## 2020-08-25 PROBLEM — E55.9 VITAMIN D DEFICIENCY: Status: ACTIVE | Noted: 2020-08-25

## 2020-08-25 PROBLEM — D50.9 MICROCYTIC ANEMIA: Status: ACTIVE | Noted: 2020-08-25

## 2020-08-25 PROBLEM — E03.9 HYPOTHYROIDISM: Status: ACTIVE | Noted: 2020-08-25

## 2020-08-27 ENCOUNTER — OFFICE VISIT (OUTPATIENT)
Dept: FAMILY MEDICINE CLINIC | Facility: CLINIC | Age: 55
End: 2020-08-27

## 2020-08-27 VITALS
OXYGEN SATURATION: 97 % | HEIGHT: 69 IN | TEMPERATURE: 97.8 F | DIASTOLIC BLOOD PRESSURE: 66 MMHG | BODY MASS INDEX: 32.49 KG/M2 | SYSTOLIC BLOOD PRESSURE: 138 MMHG | HEART RATE: 77 BPM

## 2020-08-27 DIAGNOSIS — E11.65 UNCONTROLLED TYPE 2 DIABETES MELLITUS WITH HYPERGLYCEMIA (HCC): ICD-10-CM

## 2020-08-27 DIAGNOSIS — E11.49 OTHER DIABETIC NEUROLOGICAL COMPLICATION ASSOCIATED WITH TYPE 2 DIABETES MELLITUS (HCC): Primary | ICD-10-CM

## 2020-08-27 DIAGNOSIS — D50.9 MICROCYTIC ANEMIA: ICD-10-CM

## 2020-08-27 DIAGNOSIS — E03.9 HYPOTHYROIDISM, UNSPECIFIED TYPE: ICD-10-CM

## 2020-08-27 DIAGNOSIS — E55.9 VITAMIN D DEFICIENCY: ICD-10-CM

## 2020-08-27 DIAGNOSIS — E66.9 CLASS 1 OBESITY WITH BODY MASS INDEX (BMI) OF 32.0 TO 32.9 IN ADULT, UNSPECIFIED OBESITY TYPE, UNSPECIFIED WHETHER SERIOUS COMORBIDITY PRESENT: ICD-10-CM

## 2020-08-27 DIAGNOSIS — F33.1 MODERATE EPISODE OF RECURRENT MAJOR DEPRESSIVE DISORDER (HCC): ICD-10-CM

## 2020-08-27 PROCEDURE — 99214 OFFICE O/P EST MOD 30 MIN: CPT | Performed by: FAMILY MEDICINE

## 2020-08-27 RX ORDER — PREGABALIN 50 MG/1
50 CAPSULE ORAL 3 TIMES DAILY
Qty: 90 CAPSULE | Refills: 2 | Status: SHIPPED | OUTPATIENT
Start: 2020-08-27 | End: 2020-10-09

## 2020-08-27 RX ORDER — AMITRIPTYLINE HYDROCHLORIDE 25 MG/1
25 TABLET, FILM COATED ORAL NIGHTLY
Status: CANCELLED | OUTPATIENT
Start: 2020-08-27

## 2020-08-27 RX ORDER — AMITRIPTYLINE HYDROCHLORIDE 25 MG/1
TABLET, FILM COATED ORAL
Qty: 10 TABLET | Refills: 0 | Status: SHIPPED | OUTPATIENT
Start: 2020-08-27 | End: 2020-09-14

## 2020-08-27 RX ORDER — ERGOCALCIFEROL 1.25 MG/1
50000 CAPSULE ORAL
Qty: 4 CAPSULE | Refills: 3 | Status: SHIPPED | OUTPATIENT
Start: 2020-08-27 | End: 2020-11-30 | Stop reason: SDUPTHER

## 2020-08-27 RX ORDER — LISINOPRIL 20 MG/1
20 TABLET ORAL DAILY
Qty: 30 TABLET | Refills: 3 | Status: SHIPPED | OUTPATIENT
Start: 2020-08-27 | End: 2021-03-10 | Stop reason: SDUPTHER

## 2020-08-27 RX ORDER — GABAPENTIN 800 MG/1
800 TABLET ORAL 3 TIMES DAILY
Qty: 90 TABLET | Refills: 2 | Status: SHIPPED | OUTPATIENT
Start: 2020-08-27 | End: 2020-08-27

## 2020-08-27 RX ORDER — LISINOPRIL 10 MG/1
10 TABLET ORAL DAILY
Status: CANCELLED | OUTPATIENT
Start: 2020-08-27

## 2020-08-27 RX ORDER — VILAZODONE HYDROCHLORIDE 20 MG/1
20 TABLET ORAL DAILY
Qty: 30 TABLET | Refills: 3 | Status: SHIPPED | OUTPATIENT
Start: 2020-08-27 | End: 2020-08-31 | Stop reason: SDUPTHER

## 2020-08-27 RX ORDER — METHOCARBAMOL 750 MG/1
750 TABLET, FILM COATED ORAL 3 TIMES DAILY
Qty: 90 TABLET | Refills: 3 | Status: SHIPPED | OUTPATIENT
Start: 2020-08-27 | End: 2020-11-30 | Stop reason: SDUPTHER

## 2020-08-27 RX ORDER — SERTRALINE HYDROCHLORIDE 100 MG/1
100 TABLET, FILM COATED ORAL 2 TIMES DAILY
Qty: 60 TABLET | Refills: 3 | Status: SHIPPED | OUTPATIENT
Start: 2020-08-27 | End: 2020-08-27

## 2020-08-27 NOTE — PATIENT INSTRUCTIONS
For pharamcy  Taper off elavil for 2 weeks  Stop zoloft or setraline start on viibryd for depression  Stop neurontin or gabapentin Start on lyrica 50 mg twice a day   Lisinopril from 10 to 20 mg daily.for blood pressure   Continue on synthroid 25 mcg daily  Continue on jardiance 25 mg dailys  Robaxin TID       Stop elavil/amitriptyline  but take every other day for 1 week then every other 2 days for 1 week then stop  Medication may interact with neurontin and zoloft     Continue on synthroid for thyroid issues    Continue on jardiance for diabetes type 2  Bring sugar readings before breakfast and 2 hours after lunch or dinner. On paper on grid sheet     Vitamin D once a week for deficiency.       For depression stop zoloft or setraline and switch to viibryd starting pack then 20 mg daily.     Go up on lisionpril from 10 to 20 mg daily. For blood pressure.       STOP neurontin/gabapentin and start on lyrica 50 mg three times a day       Iron-Rich Diet    Iron is a mineral that helps your body to produce hemoglobin. Hemoglobin is a protein in red blood cells that carries oxygen to your body's tissues. Eating too little iron may cause you to feel weak and tired, and it can increase your risk of infection. Iron is naturally found in many foods, and many foods have iron added to them (iron-fortified foods).  You may need to follow an iron-rich diet if you do not have enough iron in your body due to certain medical conditions. The amount of iron that you need each day depends on your age, your sex, and any medical conditions you have. Follow instructions from your health care provider or a diet and nutrition specialist (dietitian) about how much iron you should eat each day.  What are tips for following this plan?  Reading food labels  · Check food labels to see how many milligrams (mg) of iron are in each serving.  Cooking  · Cook foods in pots and pans that are made from iron.  · Take these steps to make it easier  for your body to absorb iron from certain foods:  ? Soak beans overnight before cooking.  ? Soak whole grains overnight and drain them before using.  ? Ferment flours before baking, such as by using yeast in bread dough.  Meal planning  · When you eat foods that contain iron, you should eat them with foods that are high in vitamin C. These include oranges, peppers, tomatoes, potatoes, and jerardo. Vitamin C helps your body to absorb iron.  General information  · Take iron supplements only as told by your health care provider. An overdose of iron can be life-threatening. If you were prescribed iron supplements, take them with orange juice or a vitamin C supplement.  · When you eat iron-fortified foods or take an iron supplement, you should also eat foods that naturally contain iron, such as meat, poultry, and fish. Eating naturally iron-rich foods helps your body to absorb the iron that is added to other foods or contained in a supplement.  · Certain foods and drinks prevent your body from absorbing iron properly. Avoid eating these foods in the same meal as iron-rich foods or with iron supplements. These foods include:  ? Coffee, black tea, and red wine.  ? Milk, dairy products, and foods that are high in calcium.  ? Beans and soybeans.  ? Whole grains.  What foods should I eat?  Fruits  Prunes. Raisins.  Eat fruits high in vitamin C, such as oranges, grapefruits, and strawberries, alongside iron-rich foods.  Vegetables  Spinach (cooked). Green peas. Broccoli. Fermented vegetables.  Eat vegetables high in vitamin C, such as leafy greens, potatoes, bell peppers, and tomatoes, alongside iron-rich foods.  Grains  Iron-fortified breakfast cereal. Iron-fortified whole-wheat bread. Enriched rice. Sprouted grains.  Meats and other proteins  Beef liver. Oysters. Beef. Shrimp. Turkey. Chicken. Tuna. Sardines. Chickpeas. Nuts. Tofu. Pumpkin seeds.  Beverages  Tomato juice. Fresh orange juice. Prune juice. Hibiscus tea.  Fortified instant breakfast shakes.  Sweets and desserts  Blackstrap molasses.  Seasonings and condiments  Tahini. Fermented soy sauce.  Other foods  Wheat germ.  The items listed above may not be a complete list of recommended foods and beverages. Contact a dietitian for more information.  What foods should I avoid?  Grains  Whole grains. Bran cereal. Bran flour. Oats.  Meats and other proteins  Soybeans. Products made from soy protein. Black beans. Lentils. Mung beans. Split peas.  Dairy  Milk. Cream. Cheese. Yogurt. Cottage cheese.  Beverages  Coffee. Black tea. Red wine.  Sweets and desserts  Cocoa. Chocolate. Ice cream.  Other foods  Basil. Oregano. Large amounts of parsley.  The items listed above may not be a complete list of foods and beverages to avoid. Contact a dietitian for more information.  Summary  · Iron is a mineral that helps your body to produce hemoglobin. Hemoglobin is a protein in red blood cells that carries oxygen to your body's tissues.  · Iron is naturally found in many foods, and many foods have iron added to them (iron-fortified foods).  · When you eat foods that contain iron, you should eat them with foods that are high in vitamin C. Vitamin C helps your body to absorb iron.  · Certain foods and drinks prevent your body from absorbing iron properly, such as whole grains and dairy products. You should avoid eating these foods in the same meal as iron-rich foods or with iron supplements.  This information is not intended to replace advice given to you by your health care provider. Make sure you discuss any questions you have with your health care provider.  Document Released: 08/01/2006 Document Revised: 11/30/2018 Document Reviewed: 11/13/2018  [employer] Patient Education © 2020 [employer]    Iron Deficiency Anemia, Adult  Iron deficiency anemia is a condition in which the concentration of red blood cells or hemoglobin in the blood is below normal because of too little iron. Hemoglobin  is a substance in red blood cells that carries oxygen to the body's tissues. When the concentration of red blood cells or hemoglobin is too low, not enough oxygen reaches these tissues.  Iron deficiency anemia is usually long-lasting (chronic) and it develops over time. It may or may not cause symptoms. It is a common type of anemia.  What are the causes?  This condition may be caused by:  · Not enough iron in the diet.  · Blood loss caused by bleeding in the intestine.  · Blood loss from a gastrointestinal condition like Crohn disease.  · Frequent blood draws, such as from blood donation.  · Abnormal absorption in the gut.  · Heavy menstrual periods in women.  · Cancers of the gastrointestinal system, such as colon cancer.  What are the signs or symptoms?  Symptoms of this condition may include:  · Fatigue.  · Headache.  · Pale skin, lips, and nail beds.  · Poor appetite.  · Weakness.  · Shortness of breath.  · Dizziness.  · Cold hands and feet.  · Fast or irregular heartbeat.  · Irritability. This is more common in severe anemia.  · Rapid breathing. This is more common in severe anemia.  Mild anemia may not cause any symptoms.  How is this diagnosed?  This condition is diagnosed based on:  · Your medical history.  · A physical exam.  · Blood tests.  You may have additional tests to find the underlying cause of your anemia, such as:  · Testing for blood in the stool (fecal occult blood test).  · A procedure to see inside your colon and rectum (colonoscopy).  · A procedure to see inside your esophagus and stomach (endoscopy).  · A test in which cells are removed from bone marrow (bone marrow aspiration) or fluid is removed from the bone marrow to be examined (biopsy). This is rarely needed.  How is this treated?  This condition is treated by correcting the cause of your iron deficiency. Treatment may involve:  · Adding iron-rich foods to your diet.  · Taking iron supplements. If you are pregnant or breastfeeding,  you may need to take extra iron because your normal diet usually does not provide the amount of iron that you need.  · Increasing vitamin C intake. Vitamin C helps your body absorb iron. Your health care provider may recommend that you take iron supplements along with a glass of orange juice or a vitamin C supplement.  · Medicines to make heavy menstrual flow lighter.  · Surgery.  You may need repeat blood tests to determine whether treatment is working. Depending on the underlying cause, the anemia should be corrected within 2 months of starting treatment. If the treatment does not seem to be working, you may need more testing.  Follow these instructions at home:  Medicines  · Take over-the-counter and prescription medicines only as told by your health care provider. This includes iron supplements and vitamins.  · If you cannot tolerate taking iron supplements by mouth, talk with your health care provider about taking them through a vein (intravenously) or an injection into a muscle.  · For the best iron absorption, you should take iron supplements when your stomach is empty. If you cannot tolerate them on an empty stomach, you may need to take them with food.  · Do not drink milk or take antacids at the same time as your iron supplements. Milk and antacids may interfere with iron absorption.  · Iron supplements can cause constipation. To prevent constipation, include fiber in your diet as told by your health care provider. A stool softener may also be recommended.  Eating and drinking    · Talk with your health care provider before changing your diet. He or she may recommend that you eat foods that contain a lot of iron, such as:  ? Liver.  ? Low-fat (lean) beef.  ? Breads and cereals that have iron added to them (are fortified).  ? Eggs.  ? Dried fruit.  ? Dark green, leafy vegetables.  · To help your body use the iron from iron-rich foods, eat those foods at the same time as fresh fruits and vegetables that are  high in vitamin C. Foods that are high in vitamin C include:  ? Oranges.  ? Peppers.  ? Tomatoes.  ? Mangoes.  · Drink enough fluid to keep your urine clear or pale yellow.  General instructions  · Return to your normal activities as told by your health care provider. Ask your health care provider what activities are safe for you.  · Practice good hygiene. Anemia can make you more prone to illness and infection.  · Keep all follow-up visits as told by your health care provider. This is important.  Contact a health care provider if:  · You feel nauseous or you vomit.  · You feel weak.  · You have unexplained sweating.  · You develop symptoms of constipation, such as:  ? Having fewer than three bowel movements a week.  ? Straining to have a bowel movement.  ? Having stools that are hard, dry, or larger than normal.  ? Feeling full or bloated.  ? Pain in the lower abdomen.  ? Not feeling relief after having a bowel movement.  Get help right away if:  · You faint. If this happens, do not drive yourself to the hospital. Call your local emergency services (911 in the [place]  · You have chest pain.  · You have shortness of breath that:  ? Is severe.  ? Gets worse with physical activity.  · You have a rapid heartbeat.  · You become light-headed when getting up from a sitting or lying down position.  This information is not intended to replace advice given to you by your health care provider. Make sure you discuss any questions you have with your health care provider.  Document Released: 12/15/2001 Document Revised: 11/30/2018 Document Reviewed: 09/06/2017  [employer] Patient Education © 2020 [employer]  Empagliflozin oral tablets  What is this medicine?  EMPAGLIFLOZIN (EM pa gli FLOE zin) helps to treat type 2 diabetes. It helps to control blood sugar. This drug may also reduce the risk of heart attack or stroke if you have type 2 diabetes and risk factors for heart disease. Treatment is combined with diet and  exercise.  This medicine may be used for other purposes; ask your health care provider or pharmacist if you have questions.  COMMON BRAND NAME(S): JARDIANCE  What should I tell my health care provider before I take this medicine?  They need to know if you have any of these conditions:  · dehydration  · diabetic ketoacidosis  · diet low in salt  · eating less due to illness, surgery, dieting, or any other reason  · having surgery  · high cholesterol  · high levels of potassium in the blood  · history of pancreatitis or pancreas problems  · history of yeast infection of the penis or vagina  · if you often drink alcohol  · infections in the bladder, kidneys, or urinary tract  · kidney disease  · liver disease  · low blood pressure  · on hemodialysis  · problems urinating  · type 1 diabetes  · uncircumcised male  · an unusual or allergic reaction to empagliflozin, other medicines, foods, dyes, or preservatives  · pregnant or trying to get pregnant  · breast-feeding  How should I use this medicine?  Take this medicine by mouth with a glass of water. Follow the directions on the prescription label. Take it in the morning, with or without food. Take your dose at the same time each day. Do not take more often than directed. Do not stop taking except on your doctor's advice.  Talk to your pediatrician regarding the use of this medicine in children. Special care may be needed.  Overdosage: If you think you have taken too much of this medicine contact a poison control center or emergency room at once.  NOTE: This medicine is only for you. Do not share this medicine with others.  What if I miss a dose?  If you miss a dose, take it as soon as you can. If it is almost time for your next dose, take only that dose. Do not take double or extra doses.  What may interact with this medicine?  Do not take this medicine with any of the following medications:  · gatifloxacin  This medicine may also interact with the following  medications:  · alcohol  · certain medicines for blood pressure, heart disease  · diuretics  This list may not describe all possible interactions. Give your health care provider a list of all the medicines, herbs, non-prescription drugs, or dietary supplements you use. Also tell them if you smoke, drink alcohol, or use illegal drugs. Some items may interact with your medicine.  What should I watch for while using this medicine?  Visit your doctor or health care professional for regular checks on your progress.  This medicine can cause a serious condition in which there is too much acid in the blood. If you develop nausea, vomiting, stomach pain, unusual tiredness, or breathing problems, stop taking this medicine and call your doctor right away. If possible, use a ketone dipstick to check for ketones in your urine.  A test called the HbA1C (A1C) will be monitored. This is a simple blood test. It measures your blood sugar control over the last 2 to 3 months. You will receive this test every 3 to 6 months.  Learn how to check your blood sugar. Learn the symptoms of low and high blood sugar and how to manage them.  Always carry a quick-source of sugar with you in case you have symptoms of low blood sugar. Examples include hard sugar candy or glucose tablets. Make sure others know that you can choke if you eat or drink when you develop serious symptoms of low blood sugar, such as seizures or unconsciousness. They must get medical help at once.  Tell your doctor or health care professional if you have high blood sugar. You might need to change the dose of your medicine. If you are sick or exercising more than usual, you might need to change the dose of your medicine.  Do not skip meals. Ask your doctor or health care professional if you should avoid alcohol. Many nonprescription cough and cold products contain sugar or alcohol. These can affect blood sugar.  Wear a medical ID bracelet or chain, and carry a card that  describes your disease and details of your medicine and dosage times.  What side effects may I notice from receiving this medicine?  Side effects that you should report to your doctor or health care professional as soon as possible:  · allergic reactions like skin rash, itching or hives, swelling of the face, lips, or tongue  · breathing problems  · dizziness  · feeling faint or lightheaded, falls  · muscle weakness  · nausea, vomiting, unusual stomach upset or pain  · penile discharge, itching, or pain in men  · signs and symptoms of a genital infection, such as fever; tenderness, redness, or swelling in the genitals or area from the genitals to the back of the rectum  · signs and symptoms of low blood sugar such as feeling anxious, confusion, dizziness, increased hunger, unusually weak or tired, sweating, shakiness, cold, irritable, headache, blurred vision, fast heartbeat, loss of consciousness  · signs and symptoms of a urinary tract infection, such as fever, chills, a burning feeling when urinating, blood in the urine, back pain  · trouble passing urine or change in the amount of urine, including an urgent need to urinate more often, in larger amounts, or at night  · unusual tiredness  · vaginal discharge, itching, or odor in women  Side effects that usually do not require medical attention (report to your doctor or health care professional if they continue or are bothersome):  · mild increase in urination  · thirsty  This list may not describe all possible side effects. Call your doctor for medical advice about side effects. You may report side effects to FDA at 3-964-MUQ-2404.  Where should I keep my medicine?  Keep out of the reach of children.  Store at room temperature between 20 and 25 degrees C (68 and 77 degrees F). Throw away any unused medicine after the expiration date.  NOTE: This sheet is a summary. It may not cover all possible information. If you have questions about this medicine, talk to your  doctor, pharmacist, or health care provider.  © 2020 [employer] (2018-08-30 10:25:34)  Insulin Glargine injection  What is this medicine?  INSULIN GLARGINE (IN youssef zia EVA skinner) is a human-made form of insulin. This drug lowers the amount of sugar in your blood. It is a long-acting insulin that is usually given once a day.  This medicine may be used for other purposes; ask your health care provider or pharmacist if you have questions.  COMMON BRAND NAME(S): BASAGLAR, Lantus, Lantus SoloStar, Toujeo Max SoloStar, Toujeo SoloStar  What should I tell my health care provider before I take this medicine?  They need to know if you have any of these conditions:  · episodes of low blood sugar  · eye disease, vision problems  · kidney disease  · liver disease  · an unusual or allergic reaction to insulin, metacresol, other medicines, foods, dyes, or preservatives  · pregnant or trying to get pregnant  · breast-feeding  How should I use this medicine?  This medicine is for injection under the skin. Use this medicine at the same time each day. Use exactly as directed. This insulin should never be mixed in the same syringe with other insulins before injection. Do not vigorously shake before use. You will be taught how to use this medicine and how to adjust doses for activities and illness. Do not use more insulin than prescribed.  Always check the appearance of your insulin before using it. This medicine should be clear and colorless like water. Do not use it if it is cloudy, thickened, colored, or has solid particles in it.  If you use an insulin pen, be sure to take off the outer needle cover before using the dose.  It is important that you put your used needles and syringes in a special sharps container. Do not put them in a trash can. If you do not have a sharps container, call your pharmacist or healthcare provider to get one.  Talk to your pediatrician regarding the use of this medicine in children. While this  drug may be prescribed for children as young as 6 years for selected conditions, precautions do apply.  Overdosage: If you think you have taken too much of this medicine contact a poison control center or emergency room at once.  NOTE: This medicine is only for you. Do not share this medicine with others.  What if I miss a dose?  It is important not to miss a dose. Your health care professional or doctor should discuss a plan for missed doses with you. If you do miss a dose, follow their plan. Do not take double doses.  What may interact with this medicine?  · other medicines for diabetes  Many medications may cause changes in blood sugar, these include:  · alcohol containing beverages  · antiviral medicines for HIV or AIDS  · aspirin and aspirin-like drugs  · certain medicines for blood pressure, heart disease, irregular heart beat  · chromium  · diuretics  · female hormones, such as estrogens or progestins, birth control pills  · fenofibrate  · gemfibrozil  · isoniazid  · lanreotide  · male hormones or anabolic steroids  · MAOIs like Carbex, Eldepryl, Marplan, Nardil, and Parnate  · medicines for weight loss  · medicines for allergies, asthma, cold, or cough  · medicines for depression, anxiety, or psychotic disturbances  · niacin  · nicotine  · NSAIDs, medicines for pain and inflammation, like ibuprofen or naproxen  · octreotide  · pasireotide  · pentamidine  · phenytoin  · probenecid  · quinolone antibiotics such as ciprofloxacin, levofloxacin, ofloxacin  · some herbal dietary supplements  · steroid medicines such as prednisone or cortisone  · sulfamethoxazole; trimethoprim  · thyroid hormones  Some medications can hide the warning symptoms of low blood sugar (hypoglycemia). You may need to monitor your blood sugar more closely if you are taking one of these medications. These include:  · beta-blockers, often used for high blood pressure or heart problems (examples include atenolol, metoprolol,  propranolol)  · clonidine  · guanethidine  · reserpine  This list may not describe all possible interactions. Give your health care provider a list of all the medicines, herbs, non-prescription drugs, or dietary supplements you use. Also tell them if you smoke, drink alcohol, or use illegal drugs. Some items may interact with your medicine.  What should I watch for while using this medicine?  Visit your health care professional or doctor for regular checks on your progress.  Do not drive, use machinery, or do anything that needs mental alertness until you know how this medicine affects you. Alcohol may interfere with the effect of this medicine. Avoid alcoholic drinks.  A test called the HbA1C (A1C) will be monitored. This is a simple blood test. It measures your blood sugar control over the last 2 to 3 months. You will receive this test every 3 to 6 months.  Learn how to check your blood sugar. Learn the symptoms of low and high blood sugar and how to manage them.  Always carry a quick-source of sugar with you in case you have symptoms of low blood sugar. Examples include hard sugar candy or glucose tablets. Make sure others know that you can choke if you eat or drink when you develop serious symptoms of low blood sugar, such as seizures or unconsciousness. They must get medical help at once.  Tell your doctor or health care professional if you have high blood sugar. You might need to change the dose of your medicine. If you are sick or exercising more than usual, you might need to change the dose of your medicine.  Do not skip meals. Ask your doctor or health care professional if you should avoid alcohol. Many nonprescription cough and cold products contain sugar or alcohol. These can affect blood sugar.  Make sure that you have the right kind of syringe for the type of insulin you use. Try not to change the brand and type of insulin or syringe unless your health care professional or doctor tells you to. Switching  insulin brand or type can cause dangerously high or low blood sugar. Always keep an extra supply of insulin, syringes, and needles on hand. Use a syringe one time only. Throw away syringe and needle in a closed container to prevent accidental needle sticks.  Insulin pens and cartridges should never be shared. Even if the needle is changed, sharing may result in passing of viruses like hepatitis or HIV.  Each time you get a new box of pen needles, check to see if they are the same type as the ones you were trained to use. If not, ask your health care professional to show you how to use this new type properly.  Wear a medical ID bracelet or chain, and carry a card that describes your disease and details of your medicine and dosage times.  What side effects may I notice from receiving this medicine?  Side effects that you should report to your doctor or health care professional as soon as possible:  · allergic reactions like skin rash, itching or hives, swelling of the face, lips, or tongue  · breathing problems  · signs and symptoms of high blood sugar such as dizziness, dry mouth, dry skin, fruity breath, nausea, stomach pain, increased hunger or thirst, increased urination  · signs and symptoms of low blood sugar such as feeling anxious, confusion, dizziness, increased hunger, unusually weak or tired, sweating, shakiness, cold, irritable, headache, blurred vision, fast heartbeat, loss of consciousness  Side effects that usually do not require medical attention (report to your doctor or health care professional if they continue or are bothersome):  · increase or decrease in fatty tissue under the skin due to overuse of a particular injection site  · itching, burning, swelling, or rash at site where injected  This list may not describe all possible side effects. Call your doctor for medical advice about side effects. You may report side effects to FDA at 1-914-FDA-3017.  Where should I keep my medicine?  Keep out of  the reach of children.  Unopened Vials:  Lantus vials: Store in a refrigerator between 2 and 8 degrees C (36 and 46 degrees F) or at room temperature below 30 degrees C (86 degrees F). Do not freeze or use if the insulin has been frozen. Protect from light and excessive heat. If stored at room temperature, the vial must be discarded after 28 days. Throw away any unopened and unused medicine that has been stored in the refrigerator after the expiration date.  Unopened Pens:  Basaglar KwikPens: Store in a refrigerator between 2 and 8 degrees C (36 and 46 degrees F) or at room temperature below 30 degrees C (86 degrees F). Do not freeze or use if the insulin has been frozen. Protect from light and excessive heat. If stored at room temperature, the pen must be discarded after 28 days. Throw away any unopened and unused medicine that has been stored in the refrigerator after the expiration date.  Lantus Solostar Pens: Store in a refrigerator between 2 and 8 degrees C (36 and 46 degrees F) or at room temperature below 30 degrees C (86 degrees F). Do not freeze or use if the insulin has been frozen. Protect from light and excessive heat. If stored at room temperature, the pen must be discarded after 28 days. Throw away any unopened and unused medicine that has been stored in the refrigerator after the expiration date.  Toujeo Solostar Pens or Toujeo Max Solostar Pens: Store in a refrigerator between 2 and 8 degrees C (36 and 46 degrees F). Do not freeze or use if the insulin has been frozen. Protect from light and excessive heat. Throw away any unopened and unused medicine that has been stored in the refrigerator after the expiration date.  Vials that you are using:  Lantus vials: Store in a refrigerator or at room temperature below 30 degrees C (86 degrees F). Do not freeze. Keep away from heat and light. Throw the opened vial away after 28 days.  Pens that you are using:  Basaglar KwikPens: Store at room temperature  below 30 degrees C (86 degrees F). Do not refrigerate or freeze. Keep away from heat and light. Throw the pen away after 28 days, even if it still has insulin left in it.  Lantus Solostar Pens: Store at room temperature below 30 degrees C (86 degrees F). Do not refrigerate or freeze. Keep away from heat and light. Throw the pen away after 28 days, even if it still has insulin left in it.  Toujeo Solostar Pens or Toujeo Max Solostar Pens: Store at room temperature below 30 degrees C (86 degrees F). Do not refrigerate or freeze. Keep away from heat and light. Throw the pen away after 56 days, even if it still has insulin left in it.  NOTE: This sheet is a summary. It may not cover all possible information. If you have questions about this medicine, talk to your doctor, pharmacist, or health care provider.  © 2020 [employer] (2020-03-24 12:28:36)    Hypothyroidism    Hypothyroidism is when the thyroid gland does not make enough of certain hormones (it is underactive). The thyroid gland is a small gland located in the lower front part of the neck, just in front of the windpipe (trachea). This gland makes hormones that help control how the body uses food for energy (metabolism) as well as how the heart and brain function. These hormones also play a role in keeping your bones strong. When the thyroid is underactive, it produces too little of the hormones thyroxine (T4) and triiodothyronine (T3).  What are the causes?  This condition may be caused by:  · Hashimoto's disease. This is a disease in which the body's disease-fighting system (immune system) attacks the thyroid gland. This is the most common cause.  · Viral infections.  · Pregnancy.  · Certain medicines.  · Birth defects.  · Past radiation treatments to the head or neck for cancer.  · Past treatment with radioactive iodine.  · Past exposure to radiation in the environment.  · Past surgical removal of part or all of the thyroid.  · Problems with a  gland in the center of the brain (pituitary gland).  · Lack of enough iodine in the diet.  What increases the risk?  You are more likely to develop this condition if:  · You are female.  · You have a family history of thyroid conditions.  · You use a medicine called lithium.  · You take medicines that affect the immune system (immunosuppressants).  What are the signs or symptoms?  Symptoms of this condition include:  · Feeling as though you have no energy (lethargy).  · Not being able to tolerate cold.  · Weight gain that is not explained by a change in diet or exercise habits.  · Lack of appetite.  · Dry skin.  · Coarse hair.  · Menstrual irregularity.  · Slowing of thought processes.  · Constipation.  · Sadness or depression.  How is this diagnosed?  This condition may be diagnosed based on:  · Your symptoms, your medical history, and a physical exam.  · Blood tests.  You may also have imaging tests, such as an ultrasound or MRI.  How is this treated?  This condition is treated with medicine that replaces the thyroid hormones that your body does not make. After you begin treatment, it may take several weeks for symptoms to go away.  Follow these instructions at home:  · Take over-the-counter and prescription medicines only as told by your health care provider.  · If you start taking any new medicines, tell your health care provider.  · Keep all follow-up visits as told by your health care provider. This is important.  ? As your condition improves, your dosage of thyroid hormone medicine may change.  ? You will need to have blood tests regularly so that your health care provider can monitor your condition.  Contact a health care provider if:  · Your symptoms do not get better with treatment.  · You are taking thyroid replacement medicine and you:  ? Sweat a lot.  ? Have tremors.  ? Feel anxious.  ? Lose weight rapidly.  ? Cannot tolerate heat.  ? Have emotional swings.  ? Have diarrhea.  ? Feel weak.  Get help  right away if you have:  · Chest pain.  · An irregular heartbeat.  · A rapid heartbeat.  · Difficulty breathing.  Summary  · Hypothyroidism is when the thyroid gland does not make enough of certain hormones (it is underactive).  · When the thyroid is underactive, it produces too little of the hormones thyroxine (T4) and triiodothyronine (T3).  · The most common cause is Hashimoto's disease, a disease in which the body's disease-fighting system (immune system) attacks the thyroid gland. The condition can also be caused by viral infections, medicine, pregnancy, or past radiation treatment to the head or neck.  · Symptoms may include weight gain, dry skin, constipation, feeling as though you do not have energy, and not being able to tolerate cold.  · This condition is treated with medicine to replace the thyroid hormones that your body does not make.  This information is not intended to replace advice given to you by your health care provider. Make sure you discuss any questions you have with your health care provider.  Document Released: 12/18/2006 Document Revised: 11/30/2018 Document Reviewed: 11/28/2018  [employer] Patient Education © 2020 [employer]    Diabetic Nephropathy    Diabetic nephropathy is kidney disease that is caused by diabetes (diabetes mellitus). Kidneys are organs that filter and clean blood and get rid of body waste products and extra fluid. Diabetes can cause gradual kidney damage over many years. Diabetic nephropathy that continues to get worse can lead to kidney failure.  What are the causes?  This condition is caused by kidney damage from diabetes that is not well controlled with treatment. Having high blood sugar (glucose) for a long time because of diabetes can damage blood vessels in the kidneys and cause them to thicken and become scarred. Those changes prevent the kidneys from functioning normally.  What increases the risk?  This condition is more likely to develop in people with  diabetes who:  · Have had diabetes for many years.  · Have high blood pressure.  · Have high blood glucose levels over a long period of time.  · Have a family history of kidney disease.  · Have a history of tobacco use.  · Have certain genes that are passed from parent to child (inherited).  What are the signs or symptoms?  This condition may not cause symptoms at first. If you do have symptoms, they may include:  · Swelling of your hands, feet, or ankles.  · Weakness.  · Poor appetite.  · Nausea.  · Confusion.  · Tiredness (fatigue).  · Trouble sleeping.  · Dry, itchy skin.  If nephropathy leads to kidney failure, symptoms may include:  · Vomiting.  · Shortness of breath.  · Jerky movements that you cannot control (seizure).  · Coma.  How is this diagnosed?  It is important to diagnose this condition before symptoms develop. You may be screened for diabetic nephropathy at a routine health care visit. Screening tests may include:  · Urine tests. These may be done every year.  · Urine collection over a 24-hour period to measure kidney function.  · Blood tests to measure blood glucose levels and kidney function.  · Regular blood pressure monitoring.  If your health care provider suspects diabetic nephropathy, he or she may:  · Review your medical history and symptoms.  · Do a physical exam.  · Do an ultrasound of your kidneys.  · Perform a procedure to take a sample of kidney tissue for testing (biopsy).  How is this treated?  The goal of treatment is to prevent or slow down any damage to your kidneys by managing your diabetes. To do this, it is important to control:  · Your blood pressure.  ? Your target blood pressure may vary depending on your medical conditions, your age, and other factors.  ? To help control blood pressure, you may be prescribed medicines to lower your blood pressure (ACE inhibitors) or to help your body get rid of excess fluid (diuretics).  · Your A1c (hemoglobin A1c) level. Generally, the goal  of treatment is to maintain an A1c level of less than 7%.  · Your blood glucose level.  · Your blood lipids. If you have high cholesterol, you may need to take lipid-lowering drugs, such as statins.  Other treatments may include:  · Medicines, including insulin injections.  · Lifestyle changes, such as losing weight, quitting smoking, or making changes to your diet.  If your disease progresses to end-stage kidney failure, treatment may include:  · Dialysis. This is a procedure to filter your blood with a machine.  · Kidney transplant.  Follow these instructions at home:  Eating and drinking  · Eat healthy foods, and eat healthy snacks between meals. Follow instructions from your health care provider about eating and drinking restrictions.  · Limit your sodium (salt), protein, or fluid intake as directed.  · If you drink alcohol:  ? Limit how much you use to:  § 0-1 drink a day for nonpregnant women.  § 0-2 drinks a day for men.  ? Be aware of how much alcohol is in your drink. In the [place] one drink equals one 12 oz bottle of beer (355 mL), one 5 oz glass of wine (148 mL), or one 1½ oz glass of hard liquor (44 mL).  Lifestyle  · Maintain a healthy weight. Work with your health care provider to lose weight, if needed.  · Do not use any products that contain nicotine or tobacco, such as cigarettes, e-cigarettes, and chewing tobacco. If you need help quitting, ask your health care provider.  · Be physically active every day. Ask your health care provider what type of exercise is best for you.  · Work with your health care provider to manage your blood pressure.  General instructions         · Follow your diabetes management plan as directed.  ? Check your blood glucose levels as directed by your health care provider.  ? Keep your blood glucose in your target range as directed by your health care provider.  ? Have your A1c level checked two or more times a year, or as often as told by your health care  provider.  · Measure your blood pressure regularly at home, as told by your health care provider.  · Take over-the-counter and prescription medicines only as told by your health care provider. These include insulin and supplements.  · Keep all follow-up visits and routine visits as told by your health care provider. This is important. Make sure you get screening tests as directed.  Where to find more information  American Diabetes Association: www.diabetes.org  Contact a health care provider if:  · You have trouble keeping your blood glucose in your goal range.  · Your blood glucose level is higher than 240 mg/dL (13.3 mmol/L) for 2 days in a row.  · You have swelling in your hands, ankles, or feet.  · You feel weak, tired, or dizzy.  · You have sudden muscle tightening (spasms).  · You have nausea or vomiting.  · You feel tired all the time.  Get help right away if:  · You are very sleepy.  · You faint.  · You have:  ? A seizure.  ? Severe, painful muscle spasms.  ? Shortness of breath.  ? Chest pain.  Summary  · Diabetic nephropathy is kidney disease that is caused by diabetes (diabetes mellitus).  · Keep your blood sugar (glucose) in your target range as directed by your health care provider.  · Work with your health care provider to manage your blood pressure.  · Keep all follow-up visits and routine visits as told by your health care provider. This is important. Make sure you get screening tests as directed.  This information is not intended to replace advice given to you by your health care provider. Make sure you discuss any questions you have with your health care provider.  Document Released: 01/06/2009 Document Revised: 06/01/2020 Document Reviewed: 06/01/2020  [employer] Patient Education © 2020 [employer]  Vilazodone oral tablet  What is this medicine?  VILAZODONE (clarke [place] oh done) is used to treat depression.  This medicine may be used for other purposes; ask your health care provider or pharmacist if  you have questions.  COMMON BRAND NAME(S): VIIBRYD  What should I tell my health care provider before I take this medicine?  They need to know if you have any of these conditions:  · bipolar disorder or a family history of bipolar disorder  · glaucoma  · liver disease  · low levels of sodium in the blood  · receiving electroconvulsive therapy  · seizures (convulsions)  · suicidal thoughts, plans, or attempt by you or a family member  · an unusual or allergic reaction to vilazodone, other medicines, foods, dyes or preservatives  · pregnant or trying to get pregnant  · breast-feeding  How should I use this medicine?  Take this medicine by mouth with a glass of water. Follow the directions on the prescription label. Take this medicine with food. Take your medicine at regular intervals. Do not take your medicine more often than directed. Do not stop taking this medicine suddenly except upon the advice of your doctor. Stopping this medicine too quickly may cause serious side effects or your condition may worsen.  A special MedGuide will be given to you by the pharmacist with each prescription and refill. Be sure to read this information carefully each time.  Overdosage: If you think you have taken too much of this medicine contact a poison control center or emergency room at once.  NOTE: This medicine is only for you. Do not share this medicine with others.  What if I miss a dose?  If you miss a dose, take it as soon as you can. If it is almost time for your next dose, take only that dose. Do not take double or extra doses.  What may interact with this medicine?  Do not take this medicine with any of the following medications:  · linezolid  · MAOIs like Carbex, Eldepryl, Marplan, Nardil, and Parnate  · methylene blue (injected into a vein)  This medicine may also interact with the following medications:  · amphetamines  · aspirin and aspirin-like medicines  · buspirone  · certain diet drugs like dexfenfluramine,  fenfluramine, phentermine, sibutramine  · certain migraine headache medicines like almotriptan, eletriptan, frovatriptan, naratriptan, rizatriptan, sumatriptan, zolmitriptan  · certain medicines that treat or prevent blood clots like warfarin, enoxaparin, and dalteparin  · certain medicines that treat infections like clarithromycin, itraconazole, voriconazole, ketoconazole, rifampin  · certain medicines that treat seizures like carbamazepine and phenytoin  · digoxin  · fentanyl  · lithium  · NSAIDS, medicines for pain and inflammation, like ibuprofen or naproxen  · other medicines for depression, anxiety, or psychotic disturbances  · [place]ort  · tramadol  · tryptophan  This list may not describe all possible interactions. Give your health care provider a list of all the medicines, herbs, non-prescription drugs, or dietary supplements you use. Also tell them if you smoke, drink alcohol, or use illegal drugs. Some items may interact with your medicine.  What should I watch for while using this medicine?  Tell your doctor if your symptoms do not get better or if they get worse. Visit your doctor or health care professional for regular checks on your progress. Because it may take several weeks to see the full effects of this medicine, it is important to continue your treatment as prescribed by your doctor.  Patients and their families should watch out for new or worsening thoughts of suicide or depression. Also watch out for sudden changes in feelings such as feeling anxious, agitated, panicky, irritable, hostile, aggressive, impulsive, severely restless, overly excited and hyperactive, or not being able to sleep. If this happens, especially at the beginning of treatment or after a change in dose, call your health care professional.  You may get drowsy or dizzy. Do not drive, use machinery, or do anything that needs mental alertness until you know how this medicine affects you. Do not stand or sit up quickly,  especially if you are an older patient. This reduces the risk of dizzy or fainting spells. Alcohol may interfere with the effect of this medicine. Avoid alcoholic drinks.  Your mouth may get dry. Chewing sugarless gum or sucking hard candy, and drinking plenty of water may help. Contact your doctor if the problem does not go away or is severe.  What side effects may I notice from receiving this medicine?  Side effects that you should report to your doctor or health care professional as soon as possible:  · allergic reactions like skin rash, itching or hives, swelling of the face, lips, or tongue  · anxious  · black, tarry stools  · changes in vision  · confusion  · elevated mood, decreased need for sleep, racing thoughts, impulsive behavior  · eye pain  · fast, irregular heartbeat  · feeling faint or lightheaded, falls  · feeling agitated, angry, or irritable  · hallucination, loss of contact with reality  · loss of balance or coordination  · loss of memory  · restlessness, pacing, inability to keep still  · seizures  · stiff muscles  · suicidal thoughts or other mood changes  · trouble sleeping  · unusual bleeding or bruising  · unusually weak or tired  · vomiting  Side effects that usually do not require medical attention (report to your doctor or health care professional if they continue or are bothersome):  · change in appetite or weight  · change in sex drive or performance  · diarrhea  · drowsiness  · dry mouth  · increased sweating  · nausea  · tremors  This list may not describe all possible side effects. Call your doctor for medical advice about side effects. You may report side effects to FDA at 5-103-FDA-0649.  Where should I keep my medicine?  Keep out of the reach of children.  Store at room temperature between 15 and 30 degrees C (59 to 86 degrees F). Throw away any unused medicine after the expiration date.  NOTE: This sheet is a summary. It may not cover all possible information. If you have  questions about this medicine, talk to your doctor, pharmacist, or health care provider.  © 2020 [employer] (2017-08-15 12:50:48)  Levothyroxine tablets  What is this medicine?  LEVOTHYROXINE (jose sol KESHIA een) is a thyroid hormone. This medicine can improve symptoms of thyroid deficiency such as slow speech, lack of energy, weight gain, hair loss, dry skin, and feeling cold. It also helps to treat goiter (an enlarged thyroid gland). It is also used to treat some kinds of thyroid cancer along with surgery and other medicines.  This medicine may be used for other purposes; ask your health care provider or pharmacist if you have questions.  COMMON BRAND NAME(S): Estre, Euthyrox, Levo-T, Levothroid, Levoxyl, Synthroid, Thyro-Tabs, Unithroid  What should I tell my health care provider before I take this medicine?  They need to know if you have any of these conditions:  · [place]'s disease or other adrenal gland problem  · angina  · bone problems  · diabetes  · dieting or on a weight loss program  · fertility problems  · heart disease  · pituitary gland problem  · take medicines that treat or prevent blood clots  · an unusual or allergic reaction to levothyroxine, thyroid hormones, other medicines, foods, dyes, or preservatives  · pregnant or trying to get pregnant  · breast-feeding  How should I use this medicine?  Take this medicine by mouth with plenty of water. It is best to take on an empty stomach, at least 30 minutes to one hour before breakfast. Avoid taking antacids containing aluminum or magnesium, simethicone, bile acid sequestrants, calcium carbonate, sodium polystyrene sulfonate, ferrous sulfate, sevelamer, lanthanum, or sucralfate within 4 hours of taking this medicine. Follow the directions on the prescription label. Take at the same time each day. Do not take your medicine more often than directed.  Contact your pediatrician regarding the use of this medicine in children. While this drug  may be prescribed for children and infants as young as a few days of age for selected conditions, precautions do apply. For infants, you may crush the tablet and place in a small amount of (5 to 10 mL or 1 to 2 teaspoonfuls) of water, breast milk, or non-soy based infant formula. Do not mix with soy-based infant formula. Give as directed.  Overdosage: If you think you have taken too much of this medicine contact a poison control center or emergency room at once.  NOTE: This medicine is only for you. Do not share this medicine with others.  What if I miss a dose?  If you miss a dose, take it as soon as you can. If it is almost time for your next dose, take only that dose. Do not take double or extra doses.  What may interact with this medicine?  · amiodarone  · antacids  · anti-thyroid medicines  · calcium supplements  · carbamazepine  · certain medicines for depression  · certain medicines to treat cancer  · cholestyramine  · clofibrate  · colesevelam  · colestipol  · digoxin  · female hormones, like estrogens and birth control pills, patches, rings, or injections  · iron supplements  · ketamine  · lanthanum  · liquid nutrition products like Ensure  · lithium  · medicines for colds and breathing difficulties  · medicines for diabetes  · medicines or dietary supplements for weight loss  · methadone  · niacin  · orlistat  · oxandrolone  · phenobarbital or other barbiturates  · phenytoin  · rifampin  · sevelamer  · simethicone  · sodium polystyrene sulfonate  · soy isoflavones  · steroid medicines like prednisone or cortisone  · sucralfate  · testosterone  · theophylline  · warfarin  This list may not describe all possible interactions. Give your health care provider a list of all the medicines, herbs, non-prescription drugs, or dietary supplements you use. Also tell them if you smoke, drink alcohol, or use illegal drugs. Some items may interact with your medicine.  What should I watch for while using this  medicine?  Be sure to take this medicine with plenty of fluids. Some tablets may cause choking, gagging, or difficulty swallowing from the tablet getting stuck in your throat. Most of these problems disappear if the medicine is taken with the right amount of water or other fluids.  Do not switch brands of this medicine unless your health care professional agrees with the change. Ask questions if you are uncertain.  You will need regular exams and occasional blood tests to check the response to treatment. If you are receiving this medicine for an underactive thyroid, it may be several weeks before you notice an improvement. Check with your doctor or health care professional if your symptoms do not improve.  It may be necessary for you to take this medicine for the rest of your life. Do not stop using this medicine unless your doctor or health care professional advises you to.  This medicine can affect blood sugar levels. If you have diabetes, check your blood sugar as directed.  You may lose some of your hair when you first start treatment. With time, this usually corrects itself.  If you are going to have surgery, tell your doctor or health care professional that you are taking this medicine.  What side effects may I notice from receiving this medicine?  Side effects that you should report to your doctor or health care professional as soon as possible:  · allergic reactions like skin rash, itching or hives, swelling of the face, lips, or tongue  · anxious  · breathing problems  · changes in menstrual periods  · chest pain  · diarrhea  · excessive sweating or intolerance to heat  · fast or irregular heartbeat  · leg cramps  · nervousness  · swelling of ankles, feet, or legs  · tremors  · trouble sleeping  · vomiting  Side effects that usually do not require medical attention (report to your doctor or health care professional if they continue or are bothersome):  · changes in  appetite  · headache  · irritable  · nausea  · weight loss  This list may not describe all possible side effects. Call your doctor for medical advice about side effects. You may report side effects to FDA at 9-651-FTC-1730.  Where should I keep my medicine?  Keep out of the reach of children.  Store at room temperature between 15 and 30 degrees C (59 and 86 degrees F). Protect from light and moisture. Keep container tightly closed. Throw away any unused medicine after the expiration date.  NOTE: This sheet is a summary. It may not cover all possible information. If you have questions about this medicine, talk to your doctor, pharmacist, or health care provider.  © 2020 [employer] (2020-07-24 15:09:06)  Empagliflozin oral tablets  What is this medicine?  EMPAGLIFLOZIN (EM pa gli FLOVIRGIL zin) helps to treat type 2 diabetes. It helps to control blood sugar. This drug may also reduce the risk of heart attack or stroke if you have type 2 diabetes and risk factors for heart disease. Treatment is combined with diet and exercise.  This medicine may be used for other purposes; ask your health care provider or pharmacist if you have questions.  COMMON BRAND NAME(S): JARDIANCE  What should I tell my health care provider before I take this medicine?  They need to know if you have any of these conditions:  · dehydration  · diabetic ketoacidosis  · diet low in salt  · eating less due to illness, surgery, dieting, or any other reason  · having surgery  · high cholesterol  · high levels of potassium in the blood  · history of pancreatitis or pancreas problems  · history of yeast infection of the penis or vagina  · if you often drink alcohol  · infections in the bladder, kidneys, or urinary tract  · kidney disease  · liver disease  · low blood pressure  · on hemodialysis  · problems urinating  · type 1 diabetes  · uncircumcised male  · an unusual or allergic reaction to empagliflozin, other medicines, foods, dyes, or  preservatives  · pregnant or trying to get pregnant  · breast-feeding  How should I use this medicine?  Take this medicine by mouth with a glass of water. Follow the directions on the prescription label. Take it in the morning, with or without food. Take your dose at the same time each day. Do not take more often than directed. Do not stop taking except on your doctor's advice.  Talk to your pediatrician regarding the use of this medicine in children. Special care may be needed.  Overdosage: If you think you have taken too much of this medicine contact a poison control center or emergency room at once.  NOTE: This medicine is only for you. Do not share this medicine with others.  What if I miss a dose?  If you miss a dose, take it as soon as you can. If it is almost time for your next dose, take only that dose. Do not take double or extra doses.  What may interact with this medicine?  Do not take this medicine with any of the following medications:  · gatifloxacin  This medicine may also interact with the following medications:  · alcohol  · certain medicines for blood pressure, heart disease  · diuretics  This list may not describe all possible interactions. Give your health care provider a list of all the medicines, herbs, non-prescription drugs, or dietary supplements you use. Also tell them if you smoke, drink alcohol, or use illegal drugs. Some items may interact with your medicine.  What should I watch for while using this medicine?  Visit your doctor or health care professional for regular checks on your progress.  This medicine can cause a serious condition in which there is too much acid in the blood. If you develop nausea, vomiting, stomach pain, unusual tiredness, or breathing problems, stop taking this medicine and call your doctor right away. If possible, use a ketone dipstick to check for ketones in your urine.  A test called the HbA1C (A1C) will be monitored. This is a simple blood test. It measures  your blood sugar control over the last 2 to 3 months. You will receive this test every 3 to 6 months.  Learn how to check your blood sugar. Learn the symptoms of low and high blood sugar and how to manage them.  Always carry a quick-source of sugar with you in case you have symptoms of low blood sugar. Examples include hard sugar candy or glucose tablets. Make sure others know that you can choke if you eat or drink when you develop serious symptoms of low blood sugar, such as seizures or unconsciousness. They must get medical help at once.  Tell your doctor or health care professional if you have high blood sugar. You might need to change the dose of your medicine. If you are sick or exercising more than usual, you might need to change the dose of your medicine.  Do not skip meals. Ask your doctor or health care professional if you should avoid alcohol. Many nonprescription cough and cold products contain sugar or alcohol. These can affect blood sugar.  Wear a medical ID bracelet or chain, and carry a card that describes your disease and details of your medicine and dosage times.  What side effects may I notice from receiving this medicine?  Side effects that you should report to your doctor or health care professional as soon as possible:  · allergic reactions like skin rash, itching or hives, swelling of the face, lips, or tongue  · breathing problems  · dizziness  · feeling faint or lightheaded, falls  · muscle weakness  · nausea, vomiting, unusual stomach upset or pain  · penile discharge, itching, or pain in men  · signs and symptoms of a genital infection, such as fever; tenderness, redness, or swelling in the genitals or area from the genitals to the back of the rectum  · signs and symptoms of low blood sugar such as feeling anxious, confusion, dizziness, increased hunger, unusually weak or tired, sweating, shakiness, cold, irritable, headache, blurred vision, fast heartbeat, loss of consciousness  · signs  and symptoms of a urinary tract infection, such as fever, chills, a burning feeling when urinating, blood in the urine, back pain  · trouble passing urine or change in the amount of urine, including an urgent need to urinate more often, in larger amounts, or at night  · unusual tiredness  · vaginal discharge, itching, or odor in women  Side effects that usually do not require medical attention (report to your doctor or health care professional if they continue or are bothersome):  · mild increase in urination  · thirsty  This list may not describe all possible side effects. Call your doctor for medical advice about side effects. You may report side effects to FDA at 8-882-LVI-9413.  Where should I keep my medicine?  Keep out of the reach of children.  Store at room temperature between 20 and 25 degrees C (68 and 77 degrees F). Throw away any unused medicine after the expiration date.  NOTE: This sheet is a summary. It may not cover all possible information. If you have questions about this medicine, talk to your doctor, pharmacist, or health care provider.  © 2020 [employer] Standard (2018-08-30 10:25:34)  Pregabalin capsules  What is this medicine?  PREGABALIN (pre SARAHI a zia) is used to treat nerve pain from diabetes, shingles, spinal cord injury, and fibromyalgia. It is also used to control seizures in epilepsy.  This medicine may be used for other purposes; ask your health care provider or pharmacist if you have questions.  COMMON BRAND NAME(S): Lyrica  What should I tell my health care provider before I take this medicine?  They need to know if you have any of these conditions:  · heart disease  · history of drug abuse or alcohol abuse problem  · kidney disease  · lung or breathing disease  · suicidal thoughts, plans, or attempt; a previous suicide attempt by you or a family member  · an unusual or allergic reaction to pregabalin, gabapentin, other medicines, foods, dyes, or preservatives  · pregnant or  trying to get pregnant  · breast-feeding  How should I use this medicine?  Take this medicine by mouth with a glass of water. Follow the directions on the prescription label. You can take it with or without food. If it upsets your stomach, take it with food. Take your medicine at regular intervals. Do not take it more often than directed. Do not stop taking except on your doctor's advice.  A special MedGuide will be given to you by the pharmacist with each prescription and refill. Be sure to read this information carefully each time.  Talk to your pediatrician regarding the use of this medicine in children. While this drug may be prescribed for children as young as 1 month for selected conditions, precautions do apply.  Overdosage: If you think you have taken too much of this medicine contact a poison control center or emergency room at once.  NOTE: This medicine is only for you. Do not share this medicine with others.  What if I miss a dose?  If you miss a dose, take it as soon as you can. If it is almost time for your next dose, take only that dose. Do not take double or extra doses.  What may interact with this medicine?  This medicine may interact with the following medications:  · alcohol  · antihistamines for allergy, cough, and cold  · certain medicines for anxiety or sleep  · certain medicines for depression like amitriptyline, fluoxetine, sertraline  · certain medicines for diabetes  · certain medicines for seizures like phenobarbital, primidone  · general anesthetics like halothane, isoflurane, methoxyflurane, propofol  · local anesthetics like lidocaine, pramoxine, tetracaine  · medicines that relax muscles for surgery  · narcotic medicines for pain  · phenothiazines like chlorpromazine, mesoridazine, prochlorperazine, thioridazine  This list may not describe all possible interactions. Give your health care provider a list of all the medicines, herbs, non-prescription drugs, or dietary supplements you  use. Also tell them if you smoke, drink alcohol, or use illegal drugs. Some items may interact with your medicine.  What should I watch for while using this medicine?  Tell your doctor or healthcare professional if your symptoms do not start to get better or if they get worse. Visit your doctor or health care professional for regular checks on your progress. Do not stop taking except on your doctor's advice. You may develop a severe reaction. Your doctor will tell you how much medicine to take.  Wear a medical identification bracelet or chain if you are taking this medicine for seizures, and carry a card that describes your disease and details of your medicine and dosage times.  You may get drowsy or dizzy. Do not drive, use machinery, or do anything that needs mental alertness until you know how this medicine affects you. Do not stand or sit up quickly, especially if you are an older patient. This reduces the risk of dizzy or fainting spells. Alcohol may interfere with the effect of this medicine. Avoid alcoholic drinks.  If you have a heart condition, like congestive heart failure, and notice that you are retaining water and have swelling in your hands or feet, contact your health care provider immediately.  The use of this medicine may increase the chance of suicidal thoughts or actions. Pay special attention to how you are responding while on this medicine. Any worsening of mood, or thoughts of suicide or dying should be reported to your health care professional right away.  This medicine has caused reduced sperm counts in some men. This may interfere with the ability to father a child. You should talk to your doctor or health care professional if you are concerned about your fertility.  Women who become pregnant while using this medicine for seizures may enroll in the [place] Antiepileptic Drug Pregnancy Registry by calling 1-951.851.5262. This registry collects information about the safety of  antiepileptic drug use during pregnancy.  What side effects may I notice from receiving this medicine?  Side effects that you should report to your doctor or health care professional as soon as possible:  · allergic reactions like skin rash, itching or hives, swelling of the face, lips, or tongue  · breathing problems  · changes in vision  · chest pain  · confusion  · jerking or unusual movements of any part of your body  · loss of memory  · muscle pain, tenderness, or weakness  · suicidal thoughts or other mood changes  · swelling of the ankles, feet, hands  · unusual bruising or bleeding  Side effects that usually do not require medical attention (report to your doctor or health care professional if they continue or are bothersome):  · dizziness  · drowsiness  · dry mouth  · headache  · nausea  · tremors  · trouble sleeping  · weight gain  This list may not describe all possible side effects. Call your doctor for medical advice about side effects. You may report side effects to FDA at 4-565-FDA-7221.  Where should I keep my medicine?  Keep out of the reach of children. This medicine can be abused. Keep your medicine in a safe place to protect it from theft. Do not share this medicine with anyone. Selling or giving away this medicine is dangerous and against the law.  This medicine may cause accidental overdose and death if it taken by other adults, children, or pets. Mix any unused medicine with a substance like cat litter or coffee grounds. Then throw the medicine away in a sealed container like a sealed bag or a coffee can with a lid. Do not use the medicine after the expiration date.  Store at room temperature between 15 and 30 degrees C (59 and 86 degrees F).  NOTE: This sheet is a summary. It may not cover all possible information. If you have questions about this medicine, talk to your doctor, pharmacist, or health care provider.  © 2020 [employer] (2019-12-20 13:15:55)

## 2020-08-28 ENCOUNTER — TELEPHONE (OUTPATIENT)
Dept: FAMILY MEDICINE CLINIC | Facility: CLINIC | Age: 55
End: 2020-08-28

## 2020-08-28 DIAGNOSIS — Z12.31 SCREENING MAMMOGRAM, ENCOUNTER FOR: ICD-10-CM

## 2020-08-28 NOTE — TELEPHONE ENCOUNTER
Patients sister called in stating a PA is needing for the patients pregabalin (Lyrica) 50 MG capsule medication. She requests a call back once complete. Call back number provided is [phone]

## 2020-08-30 ENCOUNTER — LAB (OUTPATIENT)
Dept: LAB | Facility: HOSPITAL | Age: 55
End: 2020-08-30

## 2020-08-30 LAB — SARS-COV-2 N GENE RESP QL NAA+PROBE: NOT DETECTED

## 2020-08-30 PROCEDURE — C9803 HOPD COVID-19 SPEC COLLECT: HCPCS

## 2020-08-30 PROCEDURE — C9803 HOPD COVID-19 SPEC COLLECT: HCPCS | Performed by: INTERNAL MEDICINE

## 2020-08-30 PROCEDURE — 87635 SARS-COV-2 COVID-19 AMP PRB: CPT | Performed by: INTERNAL MEDICINE

## 2020-08-31 RX ORDER — VILAZODONE HYDROCHLORIDE 20 MG/1
20 TABLET ORAL DAILY
Qty: 30 TABLET | Refills: 3 | Status: SHIPPED | OUTPATIENT
Start: 2020-08-31 | End: 2020-10-01 | Stop reason: SDUPTHER

## 2020-08-31 NOTE — TELEPHONE ENCOUNTER
patient calling needs refill on :      insulin detemir (LEVEMIR) 100 UNIT/ML injection  insulin detemir (LEVEMIR) 100 UNIT/ML injection  vilazodone (Viibryd) 20 MG tablet tablet    [place] [address] [address] [facility] [address] - [phone] PH - [phone] FX   P: [phone]    Has just a days worth left

## 2020-09-01 ENCOUNTER — TELEPHONE (OUTPATIENT)
Dept: FAMILY MEDICINE CLINIC | Facility: CLINIC | Age: 55
End: 2020-09-01

## 2020-09-01 NOTE — TELEPHONE ENCOUNTER
Called pharmacy to verify prescriptions were received as faxed in 8/31. Pharmacy was working on the levimir and the viibryd needs a PA.  Let pharmacy know PA would be worked on.  Left voice message for pt as above.

## 2020-09-01 NOTE — TELEPHONE ENCOUNTER
Patient called in stating she had just spoken with the [facility] pharmacy and they advised they have not received any refills for her. Per the patients profile, its appears 3 medications was called in and confirmed by the pharmacy yesterday afternoon. Patient is requesting someone call over to the pharmacy see what the issue may be. Please advise.

## 2020-09-02 ENCOUNTER — HOSPITAL ENCOUNTER (OUTPATIENT)
Facility: HOSPITAL | Age: 55
Setting detail: HOSPITAL OUTPATIENT SURGERY
Discharge: HOME | End: 2020-09-02
Attending: INTERNAL MEDICINE | Admitting: INTERNAL MEDICINE

## 2020-09-02 ENCOUNTER — TELEPHONE (OUTPATIENT)
Dept: FAMILY MEDICINE CLINIC | Facility: CLINIC | Age: 55
End: 2020-09-02

## 2020-09-02 ENCOUNTER — ANESTHESIA EVENT (OUTPATIENT)
Dept: GASTROENTEROLOGY | Facility: HOSPITAL | Age: 55
End: 2020-09-02

## 2020-09-02 ENCOUNTER — ANESTHESIA (OUTPATIENT)
Dept: GASTROENTEROLOGY | Facility: HOSPITAL | Age: 55
End: 2020-09-02

## 2020-09-02 VITALS
SYSTOLIC BLOOD PRESSURE: 144 MMHG | HEIGHT: 69 IN | RESPIRATION RATE: 18 BRPM | BODY MASS INDEX: 33.33 KG/M2 | TEMPERATURE: 96.8 F | OXYGEN SATURATION: 96 % | WEIGHT: 225 LBS | HEART RATE: 76 BPM | DIASTOLIC BLOOD PRESSURE: 59 MMHG

## 2020-09-02 DIAGNOSIS — K21.9 GASTROESOPHAGEAL REFLUX DISEASE, ESOPHAGITIS PRESENCE NOT SPECIFIED: ICD-10-CM

## 2020-09-02 DIAGNOSIS — Z12.11 ENCOUNTER FOR SCREENING FOR MALIGNANT NEOPLASM OF COLON: ICD-10-CM

## 2020-09-02 LAB — GLUCOSE BLDC GLUCOMTR-MCNC: 130 MG/DL (ref 70–130)

## 2020-09-02 PROCEDURE — 82962 GLUCOSE BLOOD TEST: CPT

## 2020-09-02 PROCEDURE — 25010000002 PROPOFOL 10 MG/ML EMULSION: Performed by: NURSE ANESTHETIST, CERTIFIED REGISTERED

## 2020-09-02 PROCEDURE — 25010000002 MIDAZOLAM PER 1 MG: Performed by: NURSE ANESTHETIST, CERTIFIED REGISTERED

## 2020-09-02 PROCEDURE — 43239 EGD BIOPSY SINGLE/MULTIPLE: CPT | Performed by: INTERNAL MEDICINE

## 2020-09-02 PROCEDURE — 45380 COLONOSCOPY AND BIOPSY: CPT | Performed by: INTERNAL MEDICINE

## 2020-09-02 RX ORDER — MIDAZOLAM HYDROCHLORIDE 1 MG/ML
INJECTION INTRAMUSCULAR; INTRAVENOUS AS NEEDED
Status: DISCONTINUED | OUTPATIENT
Start: 2020-09-02 | End: 2020-09-02 | Stop reason: SURG

## 2020-09-02 RX ORDER — LIDOCAINE HYDROCHLORIDE 20 MG/ML
INJECTION, SOLUTION INTRAVENOUS AS NEEDED
Status: DISCONTINUED | OUTPATIENT
Start: 2020-09-02 | End: 2020-09-02 | Stop reason: SURG

## 2020-09-02 RX ORDER — ONDANSETRON 2 MG/ML
4 INJECTION INTRAMUSCULAR; INTRAVENOUS ONCE AS NEEDED
Status: DISCONTINUED | OUTPATIENT
Start: 2020-09-02 | End: 2020-09-02 | Stop reason: HOSPADM

## 2020-09-02 RX ORDER — DEXTROSE AND SODIUM CHLORIDE 5; .45 G/100ML; G/100ML
30 INJECTION, SOLUTION INTRAVENOUS CONTINUOUS PRN
Status: DISCONTINUED | OUTPATIENT
Start: 2020-09-02 | End: 2020-09-02 | Stop reason: HOSPADM

## 2020-09-02 RX ORDER — PROPOFOL 10 MG/ML
VIAL (ML) INTRAVENOUS AS NEEDED
Status: DISCONTINUED | OUTPATIENT
Start: 2020-09-02 | End: 2020-09-02 | Stop reason: SURG

## 2020-09-02 RX ADMIN — LIDOCAINE HYDROCHLORIDE 100 MG: 20 INJECTION, SOLUTION INTRAVENOUS at 11:57

## 2020-09-02 RX ADMIN — DEXTROSE AND SODIUM CHLORIDE 30 ML/HR: 5; 450 INJECTION, SOLUTION INTRAVENOUS at 10:49

## 2020-09-02 RX ADMIN — PROPOFOL 40 MG: 10 INJECTION, EMULSION INTRAVENOUS at 12:02

## 2020-09-02 RX ADMIN — PROPOFOL 20 MG: 10 INJECTION, EMULSION INTRAVENOUS at 12:10

## 2020-09-02 RX ADMIN — MIDAZOLAM HYDROCHLORIDE 2 MG: 2 INJECTION, SOLUTION INTRAMUSCULAR; INTRAVENOUS at 11:54

## 2020-09-02 RX ADMIN — PROPOFOL 30 MG: 10 INJECTION, EMULSION INTRAVENOUS at 12:08

## 2020-09-02 RX ADMIN — PROPOFOL 80 MG: 10 INJECTION, EMULSION INTRAVENOUS at 11:57

## 2020-09-02 RX ADMIN — PROPOFOL 30 MG: 10 INJECTION, EMULSION INTRAVENOUS at 12:05

## 2020-09-02 NOTE — TELEPHONE ENCOUNTER
PATIENT STATES THAT SHE HAS SORE ON HER PANTY LINE AND IS WANTING TO KNOW IF SHE CAN HAVE SOMETHING CALLED IN TO HELP TREAT THIS.

## 2020-09-02 NOTE — NURSING NOTE
Yas Stephens confirmed and updated on pt status. Pt ready for procedure, will continue to monitor.

## 2020-09-02 NOTE — ANESTHESIA POSTPROCEDURE EVALUATION
Patient: Marta Giraldo    Procedure Summary     Date:  09/02/20 Room / Location:  [facility] ENDOSCOPY 4 / [facility] ENDOSCOPY    Anesthesia Start:  1156 Anesthesia Stop:  1214    Procedures:       ESOPHAGOGASTRODUODENOSCOPY (N/A )      COLONOSCOPY (N/A ) Diagnosis:       Encounter for screening for malignant neoplasm of colon      Gastroesophageal reflux disease, esophagitis presence not specified      (Encounter for screening for malignant neoplasm of colon [Z12.11])      (Gastroesophageal reflux disease, esophagitis presence not specified [K21.9])    Surgeon:  Ashli Gonzalez MD Provider:  Nikki Roa CRNA    Anesthesia Type:  MAC ASA Status:  3          Anesthesia Type: MAC    Vitals  No vitals data found for the desired time range.          Post Anesthesia Care and Evaluation    Patient location during evaluation: bedside  Patient participation: complete - patient participated  Level of consciousness: awake  Pain score: 0  Pain management: adequate  Airway patency: patent  Anesthetic complications: No anesthetic complications  PONV Status: none  Cardiovascular status: acceptable  Respiratory status: acceptable  Hydration status: acceptable

## 2020-09-02 NOTE — PROGRESS NOTES
Subjective:  Marta Giraldo is a 55 y.o. female who presents for       Patient Active Problem List   Diagnosis   • Class 1 obesity with serious comorbidity and body mass index (BMI) of 32.0 to 32.9 in adult   • Moderate episode of recurrent major depressive disorder (CMS/HCC)   • Encounter for screening for malignant neoplasm of colon   • Gastroesophageal reflux disease   • Hypothyroidism   • Microcytic anemia   • Vitamin D deficiency   • Uncontrolled type 2 diabetes mellitus with hyperglycemia (CMS/HCC)   • Class 1 obesity with body mass index (BMI) of 32.0 to 32.9 in adult   • Diabetic polyneuropathy associated with type 2 diabetes mellitus (CMS/HCC)   • Gastritis without bleeding   • Class 1 obesity with serious comorbidity and body mass index (BMI) of 33.0 to 33.9 in adult   • At high risk for falls   • Gait instability   • Below knee amputation (CMS/HCC)           Current Outpatient Medications:   •  atorvastatin (Lipitor) 40 MG tablet, Take 1 tablet by mouth Daily., Disp: 30 tablet, Rfl: 3  •  B-D UF III MINI PEN NEEDLES 31G X 5 MM misc, , Disp: , Rfl:   •  Blood Glucose Monitoring Suppl w/Device kit, Use for E11.65 diabetes to check sugars 4 times a day., Disp: 1 each, Rfl: 0  •  Empagliflozin 25 MG tablet, Take 25 mg by mouth Daily., Disp: 30 tablet, Rfl: 3  •  exenatide er (BYDUREON) 2 MG pen-injector injection, Inject 1 pen under the skin into the appropriate area as directed 1 (One) Time Per Week., Disp: 2 pen, Rfl: 3  •  ferrous sulfate (FerrouSul) 325 (65 FE) MG tablet, Take 1 tablet by mouth Daily With Breakfast., Disp: 30 tablet, Rfl: 3  •  glucose blood test strip, Use as instructed, Disp: 200 each, Rfl: 12  •  glucose blood test strip, Use as instructed, Disp: 200 each, Rfl: 12  •  Lancets misc, Use for E11.65 diabetes to check sugars 4 times a day., Disp: 200 each, Rfl: 3  •  levothyroxine (SYNTHROID, LEVOTHROID) 25 MCG tablet, Take 1 tablet by mouth Daily., Disp: 30 tablet, Rfl: 3  •   lisinopril (PRINIVIL,ZESTRIL) 20 MG tablet, Take 1 tablet by mouth Daily., Disp: 30 tablet, Rfl: 3  •  methocarbamol (ROBAXIN) 750 MG tablet, Take 1 tablet by mouth 3 (Three) Times a Day., Disp: 90 tablet, Rfl: 3  •  mupirocin (Bactroban) 2 % cream, Apply  topically to the appropriate area as directed 3 (Three) Times a Day., Disp: 30 g, Rfl: 1  •  mupirocin (BACTROBAN) 2 % ointment, Apply  topically to the appropriate area as directed 3 (Three) Times a Day., Disp: 30 g, Rfl: 1  •  omeprazole (PrilOSEC) 40 MG capsule, Take 1 capsule by mouth Daily., Disp: 30 capsule, Rfl: 11  •  pregabalin (Lyrica) 50 MG capsule, Take 1 capsule by mouth 3 (Three) Times a Day. Chronic pain, Disp: 90 capsule, Rfl: 2  •  vilazodone (Viibryd) 20 MG tablet tablet, Take 1 tablet by mouth Daily., Disp: 30 tablet, Rfl: 3  •  vitamin D (ERGOCALCIFEROL) 1.25 MG (18362 UT) capsule capsule, Take 1 capsule by mouth Every 7 (Seven) Days., Disp: 4 capsule, Rfl: 3  •  Insulin Degludec (Tresiba) 100 UNIT/ML solution injection, Try 30 units at beditme and go up by 3 units every 3 days until mornings sugar ., Disp: 3 vial, Rfl: 3  •  Insulin Pen Needle 32G X 8 MM misc, Use at bedtime, Disp: 100 each, Rfl: 3    HPI     Pt is 54 yo female with management of obesity IDDM, HLP, diabetic neuropathy,  HTN, major depression  type 2, former tobacco smoker, sp below right  knee amputation of right leg , sp appendectomy, sp bladder surgery,diabetic retinopathy, vitamin D deficiency, hypothryoidism, microcytic anemia , colonic polyps, diverticulosis, internal hemorrhoids/GERD with esophagitis, gastritis. Diabetic retinopathy, cataracts    8/27/20 in office visit for recheck for pt's above medical issues.  Had labwork done on 8/11/20. UDS showed positive for tricyclic antidepressants and opids.  Hep C antibody test negative. Vitamin B12 levels normal. Vitamin D levels low. On thyroid studies TSH normal T4 at 0.81 and T3 normal lipid panel showed LDL at 105  and total cholesterl at 172.  hga1c at 10.0. CMP shoed glucose at 267 with sodium at 133 and GFR at 87. BUN/CR ratio at 27.1 CBC showed hemoglobin at 10.3. Pt was started on synthroid 25 mcg PO q daily for her hypothryoidsm. In addition pt was started on ferrous sulfate 325 mg PO q daily for microcytic anemia. For DM Type 2 pt was started on jardiance 25 mg Po q daily.     9/14/20 in office visit for recheck on pt's above medical issues. Since last visit pt had EGD and colonoscopy with Dr. Gonzalez on 9/2/20. Results of procedure showed GERD with esophagitis and gastritis, pt also had colonic polyps, diverticulosis and internal hemorrhoids. Biopsy of rectal polyps were benign. Pt was negative for Willem's esophagus. She continues to take her medications including thyroid medication, ferrous sulfate and jardiance 25 mg daily.  She also is in the process of seeing ophthalmologist. She has history of diabetic retinopathyf and was getting shots in eyes.  Her sugars have been erratic and uncontrolled. It averages around 180- 200.  Pt is also here for a face to face evaluation for power mobility.       Hypothyroidism   This is a new problem. The current episode started more than 1 year ago. The problem occurs constantly. The problem has been unchanged. Associated symptoms include arthralgias, fatigue, numbness and weakness. Pertinent negatives include no abdominal pain, anorexia, chest pain, chills, congestion, coughing, diaphoresis, fever, headaches, nausea, sore throat or vomiting. Nothing aggravates the symptoms. She has tried nothing for the symptoms. The treatment provided no relief.   Anemia   Presents for initial visit. There has been no abdominal pain, anorexia, bruising/bleeding easily, confusion, fever, leg swelling, light-headedness, malaise/fatigue, pallor, palpitations, paresthesias, pica or weight loss. Signs of blood loss that are not present include hematemesis, hematochezia, melena, menorrhagia and vaginal  bleeding. Past medical history includes hypothyroidism. There is no history of alcohol abuse, cancer, chronic liver disease, chronic renal disease, clotting disorder, dementia, heart failure, hemoglobinopathy, HIV/AIDS, inflammatory bowel disease, malabsorption, malnutrition, neuropathy, recent illness, recent surgery, recent trauma or rheumatic disease. There is no past history of bone marrow exam, colonoscopy, EGD or FOBT.   Diabetes   She presents for her initial diabetic visit. She has type 2 diabetes mellitus. Her disease course has been stable. Pertinent negatives for hypoglycemia include no confusion, dizziness, headaches, hunger, sleepiness, speech difficulty, sweats or tremors. Associated symptoms include fatigue and weakness. Pertinent negatives for diabetes include no blurred vision, no chest pain, no foot paresthesias, no foot ulcerations, no polydipsia, no polyphagia and no polyuria. Symptoms are stable.   Obesity   This is a chronic problem. The problem occurs constantly. The problem has been unchanged. Associated symptoms include arthralgias, fatigue, numbness and weakness. Pertinent negatives include no chest pain, chills, congestion, coughing, diaphoresis, fever, headaches, nausea, sore throat or vomiting. Nothing aggravates the symptoms. She has tried nothing for the symptoms. The treatment provided no relief.     Review of Systems  Review of Systems   Constitutional: Positive for activity change and fatigue. Negative for appetite change, chills, diaphoresis and fever.   HENT: Negative for congestion, postnasal drip, rhinorrhea, sinus pressure, sinus pain, sneezing, sore throat, trouble swallowing and voice change.    Respiratory: Negative for cough, choking, chest tightness, shortness of breath, wheezing and stridor.    Cardiovascular: Negative for chest pain.   Gastrointestinal: Negative for diarrhea, nausea and vomiting.   Musculoskeletal: Positive for arthralgias.   Neurological: Positive for  weakness and numbness. Negative for headaches.       Patient Active Problem List   Diagnosis   • Class 1 obesity with serious comorbidity and body mass index (BMI) of 32.0 to 32.9 in adult   • Moderate episode of recurrent major depressive disorder (CMS/HCC)   • Encounter for screening for malignant neoplasm of colon   • Gastroesophageal reflux disease   • Hypothyroidism   • Microcytic anemia   • Vitamin D deficiency   • Uncontrolled type 2 diabetes mellitus with hyperglycemia (CMS/HCC)   • Class 1 obesity with body mass index (BMI) of 32.0 to 32.9 in adult   • Diabetic polyneuropathy associated with type 2 diabetes mellitus (CMS/HCC)   • Gastritis without bleeding   • Class 1 obesity with serious comorbidity and body mass index (BMI) of 33.0 to 33.9 in adult   • At high risk for falls   • Gait instability   • Below knee amputation (CMS/HCC)     Past Surgical History:   Procedure Laterality Date   • APPENDECTOMY     • BELOW KNEE AMPUTATION     • BLADDER SURGERY     • COLONOSCOPY N/A 2020    Procedure: COLONOSCOPY;  Surgeon: Ashli Gonzalez MD;  Location: [facility] ENDOSCOPY;  Service: Gastroenterology;  Laterality: N/A;   • ENDOSCOPY N/A 2020    Procedure: ESOPHAGOGASTRODUODENOSCOPY;  Surgeon: Ashli Gonzalez MD;  Location: [facility] ENDOSCOPY;  Service: Gastroenterology;  Laterality: N/A;     Social History     Socioeconomic History   • Marital status:      Spouse name: Not on file   • Number of children: Not on file   • Years of education: Not on file   • Highest education level: Not on file   Tobacco Use   • Smoking status: Former Smoker     Quit date: 2016     Years since quittin.7   • Smokeless tobacco: Never Used   Substance and Sexual Activity   • Alcohol use: Not Currently   • Drug use: Never   • Sexual activity: Defer     Family History   Problem Relation Age of Onset   • Diabetes Other    • Hyperlipidemia Other    • Hypertension Other    • Stroke Other    • Heart disease Other    • Other  Other      Admission on 09/02/2020, Discharged on 09/02/2020   Component Date Value Ref Range Status   • COVID19 08/30/2020 Not Detected  Not Detected - Ref. Range Final   • Glucose 09/02/2020 130  70 - 130 mg/dL Final    RN NotifiedOperator: [id number] MATTIE Kelly ID: [id number]   • Case Report 09/02/2020    Final                    Value:Surgical Pathology Report                         Case: [id number]                                  Authorizing Provider:  Ashli Gonzalez MD      Collected:           09/02/2020 12:03 PM          Ordering Location:     [facility]             Received:            09/03/2020 07:22 AM                                 [place] [facility]                                                     Pathologist:           Colby Bell MD                                                           Specimens:   1) - Gastric, Antrum, bx                                                                            2) - Esophagus, EG junction bx                                                                      3) - Large Intestine, Rectum, polyps                                                      • Final Diagnosis 09/02/2020    Final                    Value:This result contains rich text formatting which cannot be displayed here.   Lab on 08/11/2020   Component Date Value Ref Range Status   • WBC 08/11/2020 9.81  3.40 - 10.80 10*3/mm3 Final   • RBC 08/11/2020 3.85  3.77 - 5.28 10*6/mm3 Final   • Hemoglobin 08/11/2020 10.3* 12.0 - 15.9 g/dL Final   • Hematocrit 08/11/2020 30.9* 34.0 - 46.6 % Final   • MCV 08/11/2020 80.3  79.0 - 97.0 fL Final   • MCH 08/11/2020 26.8  26.6 - 33.0 pg Final   • MCHC 08/11/2020 33.3  31.5 - 35.7 g/dL Final   • RDW 08/11/2020 13.2  12.3 - 15.4 % Final   • RDW-SD 08/11/2020 37.9  37.0 - 54.0 fl Final   • MPV 08/11/2020 12.2* 6.0 - 12.0 fL Final   • Platelets 08/11/2020 242  140 - 450 10*3/mm3 Final   • Neutrophil % 08/11/2020 55.3  42.7 - 76.0 % Final    • Lymphocyte % 08/11/2020 36.7  19.6 - 45.3 % Final   • Monocyte % 08/11/2020 4.2* 5.0 - 12.0 % Final   • Eosinophil % 08/11/2020 2.5  0.3 - 6.2 % Final   • Basophil % 08/11/2020 0.7  0.0 - 1.5 % Final   • Immature Grans % 08/11/2020 0.6* 0.0 - 0.5 % Final   • Neutrophils, Absolute 08/11/2020 5.42  1.70 - 7.00 10*3/mm3 Final   • Lymphocytes, Absolute 08/11/2020 3.60* 0.70 - 3.10 10*3/mm3 Final   • Monocytes, Absolute 08/11/2020 0.41  0.10 - 0.90 10*3/mm3 Final   • Eosinophils, Absolute 08/11/2020 0.25  0.00 - 0.40 10*3/mm3 Final   • Basophils, Absolute 08/11/2020 0.07  0.00 - 0.20 10*3/mm3 Final   • Immature Grans, Absolute 08/11/2020 0.06* 0.00 - 0.05 10*3/mm3 Final   • nRBC 08/11/2020 0.0  0.0 - 0.2 /100 WBC Final   • Glucose 08/11/2020 267* 65 - 99 mg/dL Final   • BUN 08/11/2020 19  6 - 20 mg/dL Final   • Creatinine 08/11/2020 0.70  0.57 - 1.00 mg/dL Final   • Sodium 08/11/2020 133* 136 - 145 mmol/L Final   • Potassium 08/11/2020 4.4  3.5 - 5.2 mmol/L Final    Specimen hemolyzed.  Results may be affected.   • Chloride 08/11/2020 100  98 - 107 mmol/L Final   • CO2 08/11/2020 21.0* 22.0 - 29.0 mmol/L Final   • Calcium 08/11/2020 8.9  8.6 - 10.5 mg/dL Final   • Total Protein 08/11/2020 6.9  6.0 - 8.5 g/dL Final   • Albumin 08/11/2020 3.80  3.50 - 5.20 g/dL Final   • ALT (SGPT) 08/11/2020 13  1 - 33 U/L Final   • AST (SGOT) 08/11/2020 11  1 - 32 U/L Final   • Alkaline Phosphatase 08/11/2020 64  39 - 117 U/L Final   • Total Bilirubin 08/11/2020 0.2  0.0 - 1.2 mg/dL Final   • eGFR Non African Amer 08/11/2020 87  >60 mL/min/1.73 Final   • Globulin 08/11/2020 3.1  gm/dL Final   • A/G Ratio 08/11/2020 1.2  g/dL Final   • BUN/Creatinine Ratio 08/11/2020 27.1* 7.0 - 25.0 Final   • Anion Gap 08/11/2020 12.0  5.0 - 15.0 mmol/L Final   • Hemoglobin A1C 08/11/2020 10.00* 4.80 - 5.60 % Final   • Total Cholesterol 08/11/2020 172  0 - 200 mg/dL Final   • Triglycerides 08/11/2020 102  0 - 150 mg/dL Final   • HDL Cholesterol  08/11/2020 47  40 - 60 mg/dL Final   • LDL Cholesterol  08/11/2020 105* 0 - 100 mg/dL Final   • VLDL Cholesterol 08/11/2020 20.4  5 - 40 mg/dL Final   • LDL/HDL Ratio 08/11/2020 2.23   Final   • TSH 08/11/2020 2.310  0.270 - 4.200 uIU/mL Final   • Free T4 08/11/2020 0.81* 0.93 - 1.70 ng/dL Final   • T3, Free 08/11/2020 2.22  2.00 - 4.40 pg/mL Final   • 25 Hydroxy, Vitamin D 08/11/2020 15.4* 30.0 - 100.0 ng/ml Final   • Vitamin B-12 08/11/2020 555  211 - 946 pg/mL Final   • Hepatitis C Ab 08/11/2020 Non-Reactive  Non-Reactive Final   • Microalbumin/Creatinine Ratio 08/11/2020 49.1  mg/g Final   • Creatinine, Urine 08/11/2020 122.3  mg/dL Final   • Microalbumin, Urine 08/11/2020 6.0  mg/dL Final   • THC, Screen, Urine 08/11/2020 Negative  Negative Final   • Phencyclidine (PCP), Urine 08/11/2020 Negative  Negative Final   • Cocaine Screen, Urine 08/11/2020 Negative  Negative Final   • Methamphetamine, Ur 08/11/2020 Negative  Negative Final   • Opiate Screen 08/11/2020 Positive* Negative Final   • Amphetamine Screen, Urine 08/11/2020 Negative  Negative Final   • Benzodiazepine Screen, Urine 08/11/2020 Negative  Negative Final   • Tricyclic Antidepressants Screen 08/11/2020 Positive* Negative Final   • Methadone Screen, Urine 08/11/2020 Negative  Negative Final   • Barbiturates Screen, Urine 08/11/2020 Negative  Negative Final   • Oxycodone Screen, Urine 08/11/2020 Negative  Negative Final   • Propoxyphene Screen 08/11/2020 Negative  Negative Final   • Buprenorphine, Screen, Urine 08/11/2020 Negative  Negative Final      No image results found.    [unfilled]  Immunization History   Administered Date(s) Administered   • Flulaval/Fluarix Quad 09/14/2020   • Pneumococcal Polysaccharide (PPSV23) 09/14/2020   • Tdap 09/14/2020       The following portions of the patient's history were reviewed and updated as appropriate: allergies, current medications, past family history, past medical history, past social history, past  "surgical history and problem list.        Physical Exam  /68 (BP Location: Right arm, Patient Position: Sitting, Cuff Size: Large Adult)   Pulse 78   Temp 97.5 °F (36.4 °C)   Ht 175.3 cm (69\")   SpO2 98%   BMI 33.23 kg/m²     Physical Exam   Constitutional: She is oriented to person, place, and time. She appears well-developed.   HENT:   Head: Normocephalic and atraumatic.   Right Ear: External ear normal.   Eyes: Pupils are equal, round, and reactive to light. Conjunctivae are normal.   Neck: Normal range of motion. Neck supple.   Cardiovascular: Normal rate, regular rhythm and normal heart sounds.   No murmur heard.  Pulmonary/Chest: Effort normal and breath sounds normal. No respiratory distress.   Abdominal: Soft. Bowel sounds are normal. She exhibits no distension. There is no abdominal tenderness.   Obese abdomen    Musculoskeletal: Normal range of motion. No deformity.      Comments: Get up and go test >10 seconds    High fall risk    Sp right below knee amputation    Neurological: She is alert and oriented to person, place, and time. No cranial nerve deficit.   Skin: Skin is warm. No rash noted. She is not diaphoretic. No erythema. No pallor.   Psychiatric: Her behavior is normal.   Nursing note and vitals reviewed.      Assessment/Plan    Diagnosis Plan   1. Encounter for Papanicolaou smear of cervix  Ambulatory Referral to Obstetrics / Gynecology   2. Gastroesophageal reflux disease with esophagitis     3. Vitamin D deficiency     4. Uncontrolled type 2 diabetes mellitus with hyperglycemia (CMS/[facility])     5. Hypothyroidism, unspecified type     6. Diabetic polyneuropathy associated with type 2 diabetes mellitus (CMS/[facility])     7. Gastritis without bleeding, unspecified chronicity, unspecified gastritis type     8. Microcytic anemia     9. Class 1 obesity with serious comorbidity and body mass index (BMI) of 33.0 to 33.9 in adult, unspecified obesity type     10. Below knee amputation (CMS/HCC)   "   11. Gait instability     12. At high risk for falls     13. Functional incontinence     14. Need for immunization against influenza  FluLaval Quad >6 Months ([id number])   15. Need for vaccination  Tdap Vaccine Greater Than or Equal To 6yo IM    Pneumococcal Polysaccharide Vaccine 23-Valent (PPSV23) Greater Than or Equal To 1yo Subcutaneous / IM             -went over labwork   -recommend pneumonia vaccination,tdap and shingles vaccination - given tdap and pneumonia vaccination today. 2nd shingles vaccine at pharmacy   -recommend pap smear - refer to OB/GYN   -recommend influenza vaccination -given today   -recommend diabetic eye exam/diabetic retinopathy/catatract  - pt's sister will call Ophthalmologist in [place]   -recommend mammogram screening -schedule [facility]   -gait instability/ high fall risk gave prescription for motorizized  Wheelchair for replacement.  PT has limitation in mobility and cannot walk long distance due to her history of leg amputation.  She cannot walk more than 5 feet without resting.  The patient's home will not accomodate a scotter, pt  Requires assistance  for transfers, use of power wheelchair will improve pt's ability to participate with activities of daily living and give them independence of mobility within their home especially with toileting, grooming making her meals, and self care.  The patient desires to use this and has the mental and physical capabilities to safely use the power wheelchair within their home   -urinary incontinence - wrote prescription for pullups.    -colonic/rectal polyps, internal hemorrhoids/GERD with esophagitis/gastritis  - GI following   -vitamin D deficiency -vitamin D once a week  -hypothyroidism - on synthroid 25 mcg PO q daily.   -DM type 2  - on bydureon 2 mg subq weekly.on levemir  40 units daily and 20 units at bedtime  start on Tresiba 30 unites at bedtime . Go up by 3 units every 3 days until morning sugars running     -microcytic  anemia - on ferrous sulfate 325 mg PO q daily. Has upcoming appt with Gastroenteorlogy on 9/2/20 for colonoscopy and EGD   -HTN - on lisinopril 10 mg daily go up  On lisinopril from 10 to 20 mg PO q daily.  -HLP - on lipitor 20 mg PO qhs.  -major depression - on zoloft 100 mg PO BID. Will taper off elavil.  Stop zoloft and switch to viibryd starting pack samples given today. Also recommend counseling but pt declined   -obesity - counseled weight loss >5 minutes BMI at 33.23   -diabetic neuropathy - on neurontin 800 mg PO TID stop elavil but taper down every other day for 1 week then every other 2 days for 1 week then stop . Stop neurontin and start on lyrica 50 mg PO TID. UDS completed. MICHAEL printed and on file. Also pt will sign pain contrc  -chronic pain  Was on Norco 7.5/325 mg every 12 hours PRN. Will refer to Pain Management.   on robaxin TID     -recommend pap smear  -annual physical exam today  -advised pt to be safe and call with questions and concerns  -advised pt to go to ER or call 911 if symptoms worrisome or severe  -advised pt to followup with specialist and referrals  -advised pt to be safe during COVID-19 pandemic  -total time with pt >25 minutes   -recheck in 2 weeks         This document has been electronically signed by Gato Crane MD on September 15, 2020 07:18 CDT

## 2020-09-02 NOTE — ANESTHESIA PREPROCEDURE EVALUATION
Anesthesia Evaluation     Patient summary reviewed and Nursing notes reviewed   NPO Solid Status: > 8 hours  NPO Liquid Status: > 8 hours           Airway   Mallampati: III  TM distance: >3 FB  Neck ROM: full  No difficulty expected  Dental    (+) edentulous    Pulmonary - normal exam   Cardiovascular - normal exam    (+) hypertension well controlled less than 2 medications, hyperlipidemia,       Neuro/Psych  (+) CVA,     GI/Hepatic/Renal/Endo    (+) morbid obesity, GERD poorly controlled,  diabetes mellitus type 2 well controlled using insulin, thyroid problem hypothyroidism    Musculoskeletal     Abdominal   (+) obese,    Substance History      OB/GYN          Other                        Anesthesia Plan    ASA 3     MAC     intravenous induction     Anesthetic plan, all risks, benefits, and alternatives have been provided, discussed and informed consent has been obtained with: patient.

## 2020-09-02 NOTE — TELEPHONE ENCOUNTER
Please sent bactroban ointment to be applied TID to area.     Give 1 month supply and 1 refill. Thanks     Routing comment

## 2020-09-03 ENCOUNTER — TELEPHONE (OUTPATIENT)
Dept: FAMILY MEDICINE CLINIC | Facility: CLINIC | Age: 55
End: 2020-09-03

## 2020-09-03 RX ORDER — MUPIROCIN CALCIUM 20 MG/G
CREAM TOPICAL 3 TIMES DAILY
Qty: 30 G | Refills: 1 | Status: SHIPPED | OUTPATIENT
Start: 2020-09-03 | End: 2021-04-07 | Stop reason: SDUPTHER

## 2020-09-03 NOTE — TELEPHONE ENCOUNTER
Spoke to sister and made aware of results and that I have not received PA for Viibryd. She voiced understanding.

## 2020-09-03 NOTE — TELEPHONE ENCOUNTER
Patients sister June called in needing to speak with the nurse in regards to PA's needed for the patients medications. Ms. Watkins would also like to discuss the patients mammogram results. Call back number provided is [phone]. Please advise.

## 2020-09-03 NOTE — TELEPHONE ENCOUNTER
Insurance will not cover Bydureon auto injector. Preferred are:    Byetta™ ST, QL   Bydureon® and Bydureon® Pen ST, QL   Victoza® ST, QL

## 2020-09-04 ENCOUNTER — TELEPHONE (OUTPATIENT)
Dept: FAMILY MEDICINE CLINIC | Facility: CLINIC | Age: 55
End: 2020-09-04

## 2020-09-04 LAB
LAB AP CASE REPORT: NORMAL
PATH REPORT.FINAL DX SPEC: NORMAL

## 2020-09-04 NOTE — TELEPHONE ENCOUNTER
Made sister aware that we sent in different medications for the ones that were denied. Made aware that we have not received PA for other medication.

## 2020-09-04 NOTE — TELEPHONE ENCOUNTER
PATIENT CALLING IN TO CHECK THE STATUS OF HER PRIOR AUTHORIZATIONS. STATES THAT SHE NEEDS THESE PRIOR TO HOLIDAY WEEKEND.    PLEASE CONTACT AND ADVISE

## 2020-09-08 ENCOUNTER — TELEPHONE (OUTPATIENT)
Dept: FAMILY MEDICINE CLINIC | Facility: CLINIC | Age: 55
End: 2020-09-08

## 2020-09-08 NOTE — TELEPHONE ENCOUNTER
PATIENT CALLED STATING SHE HAS QUESTIONS ABOUT HER MEDICATIONS.    PLEASE CALL BACK AND ADVISE  [phone]

## 2020-09-14 ENCOUNTER — OFFICE VISIT (OUTPATIENT)
Dept: FAMILY MEDICINE CLINIC | Facility: CLINIC | Age: 55
End: 2020-09-14

## 2020-09-14 VITALS
SYSTOLIC BLOOD PRESSURE: 110 MMHG | BODY MASS INDEX: 33.23 KG/M2 | HEIGHT: 69 IN | OXYGEN SATURATION: 98 % | DIASTOLIC BLOOD PRESSURE: 68 MMHG | HEART RATE: 78 BPM | TEMPERATURE: 97.5 F

## 2020-09-14 DIAGNOSIS — E55.9 VITAMIN D DEFICIENCY: ICD-10-CM

## 2020-09-14 DIAGNOSIS — D50.9 MICROCYTIC ANEMIA: ICD-10-CM

## 2020-09-14 DIAGNOSIS — S88.119A BELOW KNEE AMPUTATION (HCC): ICD-10-CM

## 2020-09-14 DIAGNOSIS — R39.81 FUNCTIONAL INCONTINENCE: ICD-10-CM

## 2020-09-14 DIAGNOSIS — E11.65 UNCONTROLLED TYPE 2 DIABETES MELLITUS WITH HYPERGLYCEMIA (HCC): ICD-10-CM

## 2020-09-14 DIAGNOSIS — K21.00 GASTROESOPHAGEAL REFLUX DISEASE WITH ESOPHAGITIS: ICD-10-CM

## 2020-09-14 DIAGNOSIS — Z23 NEED FOR VACCINATION: ICD-10-CM

## 2020-09-14 DIAGNOSIS — K29.70 GASTRITIS WITHOUT BLEEDING, UNSPECIFIED CHRONICITY, UNSPECIFIED GASTRITIS TYPE: ICD-10-CM

## 2020-09-14 DIAGNOSIS — E66.9 CLASS 1 OBESITY WITH SERIOUS COMORBIDITY AND BODY MASS INDEX (BMI) OF 33.0 TO 33.9 IN ADULT, UNSPECIFIED OBESITY TYPE: ICD-10-CM

## 2020-09-14 DIAGNOSIS — E11.42 DIABETIC POLYNEUROPATHY ASSOCIATED WITH TYPE 2 DIABETES MELLITUS (HCC): ICD-10-CM

## 2020-09-14 DIAGNOSIS — E03.9 HYPOTHYROIDISM, UNSPECIFIED TYPE: ICD-10-CM

## 2020-09-14 DIAGNOSIS — Z12.4 ENCOUNTER FOR PAPANICOLAOU SMEAR OF CERVIX: Primary | ICD-10-CM

## 2020-09-14 DIAGNOSIS — Z91.81 AT HIGH RISK FOR FALLS: ICD-10-CM

## 2020-09-14 DIAGNOSIS — R26.81 GAIT INSTABILITY: ICD-10-CM

## 2020-09-14 DIAGNOSIS — Z23 NEED FOR IMMUNIZATION AGAINST INFLUENZA: ICD-10-CM

## 2020-09-14 PROCEDURE — 90715 TDAP VACCINE 7 YRS/> IM: CPT | Performed by: FAMILY MEDICINE

## 2020-09-14 PROCEDURE — 90471 IMMUNIZATION ADMIN: CPT | Performed by: FAMILY MEDICINE

## 2020-09-14 PROCEDURE — 90472 IMMUNIZATION ADMIN EACH ADD: CPT | Performed by: FAMILY MEDICINE

## 2020-09-14 PROCEDURE — 90686 IIV4 VACC NO PRSV 0.5 ML IM: CPT | Performed by: FAMILY MEDICINE

## 2020-09-14 PROCEDURE — 90732 PPSV23 VACC 2 YRS+ SUBQ/IM: CPT | Performed by: FAMILY MEDICINE

## 2020-09-14 PROCEDURE — 99214 OFFICE O/P EST MOD 30 MIN: CPT | Performed by: FAMILY MEDICINE

## 2020-09-14 RX ORDER — FLURBIPROFEN SODIUM 0.3 MG/ML
SOLUTION/ DROPS OPHTHALMIC
COMMUNITY
Start: 2020-09-09 | End: 2021-04-07 | Stop reason: SDUPTHER

## 2020-09-14 RX ORDER — INSULIN DEGLUDEC INJECTION 100 U/ML
INJECTION, SOLUTION SUBCUTANEOUS
Qty: 3 VIAL | Refills: 3 | Status: SHIPPED | OUTPATIENT
Start: 2020-09-14 | End: 2020-10-01

## 2020-09-14 NOTE — PATIENT INSTRUCTIONS
Continue on jardiance 25 mg daily  Continue on bydureon    Stop levemir 40 units in morning and 20 units at bedtime    Start on tresiba 30 units at bedimet. Can go up by insulin by 3 units eery 3 days until morning sugar running .  2 hours after meal <180    Get shingles vaccination at pharmacy    Call Ophthalmologist in [place] for an appt    Will refer to OB/GYN     Take prilosec 30 minutes separate from other medication     Continue sugar readings before breakfast and 2 hours after meal lunch or dinner    Recheck in 2 weeks

## 2020-09-15 ENCOUNTER — OFFICE VISIT (OUTPATIENT)
Dept: GASTROENTEROLOGY | Facility: CLINIC | Age: 55
End: 2020-09-15

## 2020-09-15 DIAGNOSIS — K21.00 GASTROESOPHAGEAL REFLUX DISEASE WITH ESOPHAGITIS: Primary | ICD-10-CM

## 2020-09-15 PROCEDURE — 99441 PR PHYS/QHP TELEPHONE EVALUATION 5-10 MIN: CPT | Performed by: INTERNAL MEDICINE

## 2020-09-15 NOTE — PROGRESS NOTES
No chief complaint on file.  You have chosen to receive care through a telephone visit. Do you consent to use a telephone visit for your medical care today? Yes    Subjective    Marta Giraldo is a 55 y.o. female.    History of Present Illness    Patient had a 10-minute telephone follow-up visit today due to current pandemic.  She feels well currently.  Heartburn is well controlled.  No GI complaints at this time.  EGD was consistent with esophagitis and gastritis.  Colonoscopy was consistent with rectal polyps and hemorrhoids.  Path was unremarkable.     The following portions of the patient's history were reviewed and updated as appropriate:   Past Medical History:   Diagnosis Date   • Depression    • Diabetes mellitus (CMS/HCC)    • Disease of thyroid gland    • GERD (gastroesophageal reflux disease)    • History of transfusion    • Hyperlipidemia    • Hypertension    • Neuropathy    • Stroke (CMS/HCC)      Past Surgical History:   Procedure Laterality Date   • APPENDECTOMY     • BELOW KNEE AMPUTATION     • BLADDER SURGERY     • COLONOSCOPY N/A 9/2/2020    Procedure: COLONOSCOPY;  Surgeon: Ashli Gonzalez MD;  Location: [facility] ENDOSCOPY;  Service: Gastroenterology;  Laterality: N/A;   • ENDOSCOPY N/A 9/2/2020    Procedure: ESOPHAGOGASTRODUODENOSCOPY;  Surgeon: Ashli Gonzalez MD;  Location: [facility] ENDOSCOPY;  Service: Gastroenterology;  Laterality: N/A;     Family History   Problem Relation Age of Onset   • Diabetes Other    • Hyperlipidemia Other    • Hypertension Other    • Stroke Other    • Heart disease Other    • Other Other      OB History    No obstetric history on file.       Prior to Admission medications    Medication Sig Start Date End Date Taking? Authorizing Provider   atorvastatin (Lipitor) 40 MG tablet Take 1 tablet by mouth Daily. 8/17/20   Gato Crane MD   B-D UF III MINI PEN NEEDLES 31G X 5 MM misc  9/9/20   Provider, MD David   Blood Glucose Monitoring Suppl w/Device kit  Use for E11.65 diabetes to check sugars 4 times a day. 8/19/20   Gato Crane MD   Empagliflozin 25 MG tablet Take 25 mg by mouth Daily. 8/17/20   Gato Crane MD   exenatide er (BYDUREON) 2 MG pen-injector injection Inject 1 pen under the skin into the appropriate area as directed 1 (One) Time Per Week. 9/3/20   Gato Crane MD   ferrous sulfate (FerrouSul) 325 (65 FE) MG tablet Take 1 tablet by mouth Daily With Breakfast. 8/17/20   Gato Crane MD   glucose blood test strip Use as instructed 8/17/20   Gato Crane MD   glucose blood test strip Use as instructed 8/19/20   Gato Crane MD   Insulin Degludec (Tresiba) 100 UNIT/ML solution injection Try 30 units at beditme and go up by 3 units every 3 days until mornings sugar . 9/14/20   Gato Crane MD   Insulin Pen Needle 32G X 8 MM misc Use at bedtime 9/14/20   Gato Crane MD   Lancets misc Use for E11.65 diabetes to check sugars 4 times a day. 8/19/20   Gato Crane MD   levothyroxine (SYNTHROID, LEVOTHROID) 25 MCG tablet Take 1 tablet by mouth Daily. 8/17/20   Gato Crane MD   lisinopril (PRINIVIL,ZESTRIL) 20 MG tablet Take 1 tablet by mouth Daily. 8/27/20   Gato Crane MD   methocarbamol (ROBAXIN) 750 MG tablet Take 1 tablet by mouth 3 (Three) Times a Day. 8/27/20   Gato Crane MD   mupirocin (Bactroban) 2 % cream Apply  topically to the appropriate area as directed 3 (Three) Times a Day. 9/3/20   Gato Crane MD   mupirocin (BACTROBAN) 2 % ointment Apply  topically to the appropriate area as directed 3 (Three) Times a Day. 9/2/20   Gato Craen MD   omeprazole (PrilOSEC) 40 MG capsule Take 1 capsule by mouth Daily. 8/24/20   Ashli Gonzalez MD   pregabalin (Lyrica) 50 MG capsule Take 1 capsule by mouth 3 (Three) Times a Day. Chronic pain 8/27/20   Gato Crane MD   vilazodone (Viibryd) 20 MG tablet tablet Take 1 tablet by mouth Daily. 8/31/20   Gato Crane MD   vitamin D  (ERGOCALCIFEROL) 1.25 MG (88638 UT) capsule capsule Take 1 capsule by mouth Every 7 (Seven) Days. 20   Gato Crane MD     Allergies   Allergen Reactions   • Compazine [Prochlorperazine Edisylate] Unknown - High Severity   • Penicillins Unknown - High Severity     Social History     Socioeconomic History   • Marital status:      Spouse name: Not on file   • Number of children: Not on file   • Years of education: Not on file   • Highest education level: Not on file   Tobacco Use   • Smoking status: Former Smoker     Quit date:      Years since quittin.7   • Smokeless tobacco: Never Used   Substance and Sexual Activity   • Alcohol use: Not Currently   • Drug use: Never   • Sexual activity: Defer       Review of Systems  Review of Systems   Constitutional: Negative for chills, fatigue, fever and unexpected weight change.   HENT: Negative for congestion, ear discharge, hearing loss, nosebleeds and sore throat.    Eyes: Negative for pain, discharge and redness.   Respiratory: Negative for cough, chest tightness, shortness of breath and wheezing.    Cardiovascular: Negative for chest pain and palpitations.   Gastrointestinal: Negative for abdominal distention, abdominal pain, blood in stool, constipation, diarrhea, nausea and vomiting.   Endocrine: Negative for cold intolerance, polydipsia, polyphagia and polyuria.   Genitourinary: Negative for dysuria, flank pain, frequency, hematuria and urgency.   Musculoskeletal: Negative for arthralgias, back pain, joint swelling and myalgias.   Skin: Negative for color change, pallor and rash.   Neurological: Negative for tremors, seizures, syncope, weakness and headaches.   Hematological: Negative for adenopathy. Does not bruise/bleed easily.   Psychiatric/Behavioral: Negative for behavioral problems, confusion, dysphoric mood, hallucinations and suicidal ideas. The patient is not nervous/anxious.         There were no vitals taken for this visit.  Telephone  visit    Objective    Physical Exam telephone visit  Admission on 09/02/2020, Discharged on 09/02/2020   Component Date Value Ref Range Status   • COVID19 08/30/2020 Not Detected  Not Detected - Ref. Range Final   • Glucose 09/02/2020 130  70 - 130 mg/dL Final    RN NotifiedOperator: [id number] MATTIE Kelly ID: [id number]   • Case Report 09/02/2020    Final                    Value:Surgical Pathology Report                         Case: [id number]                                  Authorizing Provider:  Ashli Gonzalez MD      Collected:           09/02/2020 12:03 PM          Ordering Location:     [facility]             Received:            09/03/2020 07:22 AM                                 [place] [facility]                                                     Pathologist:           Colby Bell MD                                                           Specimens:   1) - Gastric, Antrum, bx                                                                            2) - Esophagus, EG junction bx                                                                      3) - Large Intestine, Rectum, polyps                                                      • Final Diagnosis 09/02/2020    Final                    Value:This result contains rich text formatting which cannot be displayed here.     Assessment/Plan    No diagnosis found..   1.  Colorectal cancer screening, repeat colonoscopy in 5 years.  2.  GERD, well controlled with Prilosec.    Continue antireflux lifestyle.    Continue PPI.  3.  Obesity, recommend exercise and diet control.    Orders placed during this encounter include:  No orders of the defined types were placed in this encounter.      * Surgery not found *    Review and/or summary of lab tests, radiology, procedures, medications. Review and summary of old records and obtaining of history. The risks and benefits of my recommendations, as well as other treatment options were  discussed with the patient and family member(s) today. Questions were answered.    No orders of the defined types were placed in this encounter.      Follow-up: No follow-ups on file.               Results for orders placed or performed during the hospital encounter of 09/02/20   COVID-19, [facility] IN-HOUSE, NP SWAB IN TRANSPORT MEDIA 8-10 HR TAT - Swab, Nasopharynx    Specimen: Nasopharynx; Swab   Result Value Ref Range    COVID19 Not Detected Not Detected - Ref. Range   Tissue Pathology Exam    Specimen: A: Gastric, Antrum; Tissue    B: Esophagus; Tissue    C: Large Intestine, Rectum; Polyp   Result Value Ref Range    Case Report       Surgical Pathology Report                         Case: [id number]                                  Authorizing Provider:  Ashli Gonzalez MD      Collected:           09/02/2020 12:03 PM          [facility]             Received:            09/03/2020 07:22 AM                                 [place] [facility]                                                     Pathologist:           Colby Bell MD                                                           Specimens:   1) - Gastric, Antrum, bx                                                                            2) - Esophagus, EG junction bx                                                                      3) - Large Intestine, Rectum, polyps                                                       Final Diagnosis       See Scanned Report       POC Glucose Once    Specimen: Blood   Result Value Ref Range    Glucose 130 70 - 130 mg/dL   Results for orders placed or performed in visit on 08/11/20   CBC Auto Differential    Specimen: Blood   Result Value Ref Range    WBC 9.81 3.40 - 10.80 10*3/mm3    RBC 3.85 3.77 - 5.28 10*6/mm3    Hemoglobin 10.3 (L) 12.0 - 15.9 g/dL    Hematocrit 30.9 (L) 34.0 - 46.6 %    MCV 80.3 79.0 - 97.0 fL    MCH 26.8 26.6 - 33.0 pg    MCHC 33.3 31.5 - 35.7 g/dL     RDW 13.2 12.3 - 15.4 %    RDW-SD 37.9 37.0 - 54.0 fl    MPV 12.2 (H) 6.0 - 12.0 fL    Platelets 242 140 - 450 10*3/mm3    Neutrophil % 55.3 42.7 - 76.0 %    Lymphocyte % 36.7 19.6 - 45.3 %    Monocyte % 4.2 (L) 5.0 - 12.0 %    Eosinophil % 2.5 0.3 - 6.2 %    Basophil % 0.7 0.0 - 1.5 %    Immature Grans % 0.6 (H) 0.0 - 0.5 %    Neutrophils, Absolute 5.42 1.70 - 7.00 10*3/mm3    Lymphocytes, Absolute 3.60 (H) 0.70 - 3.10 10*3/mm3    Monocytes, Absolute 0.41 0.10 - 0.90 10*3/mm3    Eosinophils, Absolute 0.25 0.00 - 0.40 10*3/mm3    Basophils, Absolute 0.07 0.00 - 0.20 10*3/mm3    Immature Grans, Absolute 0.06 (H) 0.00 - 0.05 10*3/mm3    nRBC 0.0 0.0 - 0.2 /100 WBC   Hepatitis C antibody    Specimen: Blood   Result Value Ref Range    Hepatitis C Ab Non-Reactive Non-Reactive   Microalbumin / Creatinine Urine Ratio - Urine, Clean Catch    Specimen: Urine, Clean Catch   Result Value Ref Range    Microalbumin/Creatinine Ratio 49.1 mg/g    Creatinine, Urine 122.3 mg/dL    Microalbumin, Urine 6.0 mg/dL   Urine Drug Screen - Urine, Clean Catch    Specimen: Urine, Clean Catch   Result Value Ref Range    THC, Screen, Urine Negative Negative    Phencyclidine (PCP), Urine Negative Negative    Cocaine Screen, Urine Negative Negative    Methamphetamine, Ur Negative Negative    Opiate Screen Positive (A) Negative    Amphetamine Screen, Urine Negative Negative    Benzodiazepine Screen, Urine Negative Negative    Tricyclic Antidepressants Screen Positive (A) Negative    Methadone Screen, Urine Negative Negative    Barbiturates Screen, Urine Negative Negative    Oxycodone Screen, Urine Negative Negative    Propoxyphene Screen Negative Negative    Buprenorphine, Screen, Urine Negative Negative   Vitamin D 25 Hydroxy    Specimen: Blood   Result Value Ref Range    25 Hydroxy, Vitamin D 15.4 (L) 30.0 - 100.0 ng/ml   T3, Free    Specimen: Blood   Result Value Ref Range    T3, Free 2.22 2.00 - 4.40 pg/mL   TSH    Specimen: Blood   Result  Value Ref Range    TSH 2.310 0.270 - 4.200 uIU/mL   T4, Free    Specimen: Blood   Result Value Ref Range    Free T4 0.81 (L) 0.93 - 1.70 ng/dL   Hemoglobin A1c    Specimen: Blood   Result Value Ref Range    Hemoglobin A1C 10.00 (H) 4.80 - 5.60 %   Vitamin B12    Specimen: Blood   Result Value Ref Range    Vitamin B-12 555 211 - 946 pg/mL   Lipid Panel    Specimen: Blood   Result Value Ref Range    Total Cholesterol 172 0 - 200 mg/dL    Triglycerides 102 0 - 150 mg/dL    HDL Cholesterol 47 40 - 60 mg/dL    LDL Cholesterol  105 (H) 0 - 100 mg/dL    VLDL Cholesterol 20.4 5 - 40 mg/dL    LDL/HDL Ratio 2.23    Comprehensive Metabolic Panel    Specimen: Blood   Result Value Ref Range    Glucose 267 (H) 65 - 99 mg/dL    BUN 19 6 - 20 mg/dL    Creatinine 0.70 0.57 - 1.00 mg/dL    Sodium 133 (L) 136 - 145 mmol/L    Potassium 4.4 3.5 - 5.2 mmol/L    Chloride 100 98 - 107 mmol/L    CO2 21.0 (L) 22.0 - 29.0 mmol/L    Calcium 8.9 8.6 - 10.5 mg/dL    Total Protein 6.9 6.0 - 8.5 g/dL    Albumin 3.80 3.50 - 5.20 g/dL    ALT (SGPT) 13 1 - 33 U/L    AST (SGOT) 11 1 - 32 U/L    Alkaline Phosphatase 64 39 - 117 U/L    Total Bilirubin 0.2 0.0 - 1.2 mg/dL    eGFR Non African Amer 87 >60 mL/min/1.73    Globulin 3.1 gm/dL    A/G Ratio 1.2 g/dL    BUN/Creatinine Ratio 27.1 (H) 7.0 - 25.0    Anion Gap 12.0 5.0 - 15.0 mmol/L         This document has been electronically signed by Ashli Gonzalez MD on September 15, 2020 17:29 CDT

## 2020-09-21 ENCOUNTER — TELEPHONE (OUTPATIENT)
Dept: FAMILY MEDICINE CLINIC | Facility: CLINIC | Age: 55
End: 2020-09-21

## 2020-09-21 NOTE — TELEPHONE ENCOUNTER
Spoke to sister and she was unaware of call and that anything was needed. She stated she will talk to pt and find out what is going on.

## 2020-09-21 NOTE — TELEPHONE ENCOUNTER
PATIENT REQUESTING A CALLBACK    PATIENT ADVISED THAT SHE NEEDS A WHEELCHAIR ORDER AND AN EVALUATION FOR THE WHEELCHAIR    PLEASE CONTACT PATIENT FOR ANY ADDITIONAL QUESTIONS OR CONCERNS.

## 2020-09-23 ENCOUNTER — TELEPHONE (OUTPATIENT)
Dept: FAMILY MEDICINE CLINIC | Facility: CLINIC | Age: 55
End: 2020-09-23

## 2020-09-24 ENCOUNTER — TRANSCRIBE ORDERS (OUTPATIENT)
Dept: PHYSICAL THERAPY | Facility: CLINIC | Age: 55
End: 2020-09-24

## 2020-09-24 DIAGNOSIS — R26.0 STAGGERING GAIT: Primary | ICD-10-CM

## 2020-10-01 ENCOUNTER — OFFICE VISIT (OUTPATIENT)
Dept: FAMILY MEDICINE CLINIC | Facility: CLINIC | Age: 55
End: 2020-10-01

## 2020-10-01 VITALS
SYSTOLIC BLOOD PRESSURE: 130 MMHG | OXYGEN SATURATION: 100 % | BODY MASS INDEX: 33.23 KG/M2 | HEIGHT: 69 IN | DIASTOLIC BLOOD PRESSURE: 70 MMHG | TEMPERATURE: 97.8 F | HEART RATE: 80 BPM

## 2020-10-01 DIAGNOSIS — E55.9 VITAMIN D DEFICIENCY: ICD-10-CM

## 2020-10-01 DIAGNOSIS — E66.9 CLASS 1 OBESITY WITH SERIOUS COMORBIDITY AND BODY MASS INDEX (BMI) OF 33.0 TO 33.9 IN ADULT, UNSPECIFIED OBESITY TYPE: ICD-10-CM

## 2020-10-01 DIAGNOSIS — E11.42 DIABETIC POLYNEUROPATHY ASSOCIATED WITH TYPE 2 DIABETES MELLITUS (HCC): ICD-10-CM

## 2020-10-01 DIAGNOSIS — E11.65 UNCONTROLLED TYPE 2 DIABETES MELLITUS WITH HYPERGLYCEMIA (HCC): Primary | ICD-10-CM

## 2020-10-01 DIAGNOSIS — E03.9 HYPOTHYROIDISM, UNSPECIFIED TYPE: ICD-10-CM

## 2020-10-01 DIAGNOSIS — Z91.81 AT HIGH RISK FOR FALLS: ICD-10-CM

## 2020-10-01 DIAGNOSIS — R26.81 GAIT INSTABILITY: ICD-10-CM

## 2020-10-01 PROCEDURE — 99214 OFFICE O/P EST MOD 30 MIN: CPT | Performed by: FAMILY MEDICINE

## 2020-10-01 RX ORDER — INSULIN DEGLUDEC INJECTION 100 U/ML
INJECTION, SOLUTION SUBCUTANEOUS
Qty: 10 ML | Refills: 3 | Status: SHIPPED | OUTPATIENT
Start: 2020-10-01 | End: 2020-10-06

## 2020-10-01 RX ORDER — VILAZODONE HYDROCHLORIDE 20 MG/1
20 TABLET ORAL DAILY
Qty: 30 TABLET | Refills: 3 | Status: SHIPPED | OUTPATIENT
Start: 2020-10-01 | End: 2020-12-11

## 2020-10-01 NOTE — PATIENT INSTRUCTIONS
INCREASE on lyrica from 50 to 100 mg three times a day can take 2 pills of lyrica 50 mg = 100 mg 3 times a day    START  insulin humalog 5 units to inject 15 minutes before eating meals    Check sugars before breakfast and 2 hours after breakfast lunch and dinner     Goal of sugars before breakfast     2 hours after meal <180     CUT BACKon tresiba from 40 to 35 units  at bedtime      STOP LEVEMIR    Recheck in 2 weeks

## 2020-10-04 NOTE — PROGRESS NOTES
Subjective:  Marta Giraldo is a 55 y.o. female who presents for       Patient Active Problem List   Diagnosis   • Class 1 obesity with serious comorbidity and body mass index (BMI) of 32.0 to 32.9 in adult   • Moderate episode of recurrent major depressive disorder (CMS/HCC)   • Encounter for screening for malignant neoplasm of colon   • Gastroesophageal reflux disease   • Hypothyroidism   • Microcytic anemia   • Vitamin D deficiency   • Uncontrolled type 2 diabetes mellitus with hyperglycemia (CMS/HCC)   • Class 1 obesity with body mass index (BMI) of 32.0 to 32.9 in adult   • Diabetic polyneuropathy associated with type 2 diabetes mellitus (CMS/HCC)   • Gastritis without bleeding   • Class 1 obesity with serious comorbidity and body mass index (BMI) of 33.0 to 33.9 in adult   • At high risk for falls   • Gait instability   • Below knee amputation (CMS/HCC)           Current Outpatient Medications:   •  atorvastatin (Lipitor) 40 MG tablet, Take 1 tablet by mouth Daily., Disp: 30 tablet, Rfl: 3  •  B-D UF III MINI PEN NEEDLES 31G X 5 MM misc, , Disp: , Rfl:   •  Blood Glucose Monitoring Suppl w/Device kit, Use for E11.65 diabetes to check sugars 4 times a day., Disp: 1 each, Rfl: 0  •  clotrimazole-betamethasone (Lotrisone) 1-0.05 % cream, Apply  topically to the appropriate area as directed 2 (Two) Times a Day., Disp: 1 each, Rfl: 3  •  Empagliflozin 25 MG tablet, Take 25 mg by mouth Daily., Disp: 30 tablet, Rfl: 3  •  exenatide er (BYDUREON) 2 MG pen-injector injection, Inject 1 pen under the skin into the appropriate area as directed 1 (One) Time Per Week., Disp: 2 pen, Rfl: 3  •  ferrous sulfate (FerrouSul) 325 (65 FE) MG tablet, Take 1 tablet by mouth Daily With Breakfast., Disp: 30 tablet, Rfl: 3  •  fluconazole (Diflucan) 150 MG tablet, Take 1 tablet by mouth today and repeat in 4 days., Disp: 2 tablet, Rfl: 0  •  glucose (DEX4) 4 GM chewable tablet, Chew 4 tablets As Needed for Low Blood Sugar.,  Disp: 90 tablet, Rfl: 3  •  glucose blood test strip, Use as instructed, Disp: 200 each, Rfl: 12  •  glucose blood test strip, Use as instructed, Disp: 200 each, Rfl: 12  •  Insulin Glargine (BASAGLAR KWIKPEN) 100 UNIT/ML injection pen, Inject 35 Units under the skin into the appropriate area as directed Every Night., Disp: 9 mL, Rfl: 3  •  insulin lispro (HumaLOG) 100 UNIT/ML injection, 5 unit before meals, Disp: 10 mL, Rfl: 3  •  Insulin Pen Needle 32G X 8 MM misc, Use at bedtime, Disp: 100 each, Rfl: 3  •  Insulin Pen Needle 32G X 8 MM misc, Use with humalog TID, Disp: 100 each, Rfl: 3  •  Lancets misc, Use for E11.65 diabetes to check sugars 4 times a day., Disp: 200 each, Rfl: 3  •  levothyroxine (SYNTHROID, LEVOTHROID) 25 MCG tablet, Take 1 tablet by mouth Daily., Disp: 30 tablet, Rfl: 3  •  lisinopril (PRINIVIL,ZESTRIL) 20 MG tablet, Take 1 tablet by mouth Daily., Disp: 30 tablet, Rfl: 3  •  methocarbamol (ROBAXIN) 750 MG tablet, Take 1 tablet by mouth 3 (Three) Times a Day., Disp: 90 tablet, Rfl: 3  •  mupirocin (Bactroban) 2 % cream, Apply  topically to the appropriate area as directed 3 (Three) Times a Day., Disp: 30 g, Rfl: 1  •  mupirocin (BACTROBAN) 2 % ointment, Apply  topically to the appropriate area as directed 3 (Three) Times a Day., Disp: 30 g, Rfl: 1  •  omeprazole (PrilOSEC) 40 MG capsule, Take 1 capsule by mouth Daily., Disp: 30 capsule, Rfl: 11  •  pregabalin (Lyrica) 100 MG capsule, Take 1 capsule by mouth 3 (Three) Times a Day., Disp: 90 capsule, Rfl: 0  •  vilazodone (Viibryd) 20 MG tablet tablet, Take 1 tablet by mouth Daily., Disp: 30 tablet, Rfl: 3  •  vitamin D (ERGOCALCIFEROL) 1.25 MG (63372 UT) capsule capsule, Take 1 capsule by mouth Every 7 (Seven) Days., Disp: 4 capsule, Rfl: 3    HPI     Pt is 56 yo female with management of obesity IDDM, HLP, diabetic neuropathy,  HTN, major depression  type 2, former tobacco smoker, sp below right  knee amputation of right leg , sp appendectomy,  sp bladder surgery,diabetic retinopathy, vitamin D deficiency, hypothryoidism, microcytic anemia , colonic polyps, diverticulosis, internal hemorrhoids/GERD with esophagitis, gastritis. Diabetic retinopathy, cataracts      10/1/20 in office visit for recheck on pt's above medical issues. Since last visit pt saw GI on 9/15/20 for her followup on colonoscopy results and GERD Next colonoscopy to be in 2025. She is doing well overall sugars in morning running  now but on lower side. She is taking Tresiba 40 units at bedtime. Did have some low sugar readings in the morning  Has some high readings on sugar before meals.  She states her neuropathy improved with Lyrica 50 units TID but still has some pain.  No chest pain no dizziness. Did go to [facility] last week after suffering a fall. Pt denies syncope.  She is not hurting currently     10/19/20 in office visit for recheck on pt's above medical issues. On last visit pt had lyrica increassd from 50 to 100 mg PO TID.  Pt had recent Pap Smear that was normal and negatigve HPV next cotesting in 5 years. She is doing well overall. Her sugars have been better with humalog 5 units before meals continue Basaglar 35 unites at bedtime. Also is taking bydureon and jardiance. Also needs diabetic shoes      Diabetes  She presents for her follow-up diabetic visit. She has type 2 diabetes mellitus. Her disease course has been stable. Pertinent negatives for hypoglycemia include no confusion, dizziness, headaches, hunger, mood changes, nervousness/anxiousness, pallor or seizures. Associated symptoms include fatigue and weakness. Pertinent negatives for diabetes include no blurred vision, no chest pain, no foot paresthesias, no foot ulcerations, no polydipsia, no polyphagia and no polyuria. Pertinent negatives for hypoglycemia complications include no blackouts, no hospitalization, no nocturnal hypoglycemia and no required assistance. Symptoms are stable. Pertinent negatives for  diabetic complications include no autonomic neuropathy, CVA, heart disease, impotence, nephropathy or peripheral neuropathy. Risk factors for coronary artery disease include diabetes mellitus and dyslipidemia. Current diabetic treatment includes insulin injections and oral agent (dual therapy). She is compliant with treatment most of the time. Her weight is stable. She has not had a previous visit with a dietitian. She never participates in exercise. Her home blood glucose trend is decreasing steadily. An ACE inhibitor/angiotensin II receptor blocker is being taken. She does not see a podiatrist.Eye exam is not current.   Hypothyroidism   This is a new problem. The current episode started more than 1 year ago. The problem occurs constantly. The problem has been improved Associated symptoms include arthralgias, fatigue, numbness and weakness. Pertinent negatives include no abdominal pain, anorexia, chest pain, chills, congestion, coughing, diaphoresis, fever, headaches, nausea, sore throat or vomiting. Nothing aggravates the symptoms. She has tried nothing for the symptoms. The treatment provided no relief.    Obesity   This is a chronic problem. The problem occurs constantly. The problem has been unchanged. Associated symptoms include arthralgias, fatigue, numbness and weakness. Pertinent negatives include no chest pain, chills, congestion, coughing, diaphoresis, fever, headaches, nausea, sore throat or vomiting. Nothing aggravates the symptoms. She has tried nothing for the symptoms. The treatment provided no relief    Review of Systems  Review of Systems   Constitutional: Positive for activity change and fatigue.   Musculoskeletal: Positive for arthralgias and gait problem.   Neurological: Positive for weakness and numbness.   Psychiatric/Behavioral: The patient is nervous/anxious.         Depressed mood        Patient Active Problem List   Diagnosis   • Class 1 obesity with serious comorbidity and body mass index  (BMI) of 32.0 to 32.9 in adult   • Moderate episode of recurrent major depressive disorder (CMS/HCC)   • Encounter for screening for malignant neoplasm of colon   • Gastroesophageal reflux disease   • Hypothyroidism   • Microcytic anemia   • Vitamin D deficiency   • Uncontrolled type 2 diabetes mellitus with hyperglycemia (CMS/HCC)   • Class 1 obesity with body mass index (BMI) of 32.0 to 32.9 in adult   • Diabetic polyneuropathy associated with type 2 diabetes mellitus (CMS/HCC)   • Gastritis without bleeding   • Class 1 obesity with serious comorbidity and body mass index (BMI) of 33.0 to 33.9 in adult   • At high risk for falls   • Gait instability   • Below knee amputation (CMS/HCC)     Past Surgical History:   Procedure Laterality Date   • APPENDECTOMY     • BELOW KNEE AMPUTATION     • BLADDER SURGERY     • COLONOSCOPY N/A 2020    Procedure: COLONOSCOPY;  Surgeon: Ashli Gonzalez MD;  Location: [facility] ENDOSCOPY;  Service: Gastroenterology;  Laterality: N/A;   • ENDOSCOPY N/A 2020    Procedure: ESOPHAGOGASTRODUODENOSCOPY;  Surgeon: Ashli Gonzalez MD;  Location: [facility] ENDOSCOPY;  Service: Gastroenterology;  Laterality: N/A;     Social History     Socioeconomic History   • Marital status:      Spouse name: Not on file   • Number of children: Not on file   • Years of education: Not on file   • Highest education level: Not on file   Tobacco Use   • Smoking status: Former Smoker     Quit date: 2016     Years since quittin.8   • Smokeless tobacco: Never Used   Substance and Sexual Activity   • Alcohol use: Not Currently   • Drug use: Never   • Sexual activity: Defer     Family History   Problem Relation Age of Onset   • Diabetes Other    • Hyperlipidemia Other    • Hypertension Other    • Stroke Other    • Heart disease Other    • Other Other      Procedure visit on 10/05/2020   Component Date Value Ref Range Status   • Case Report 10/05/2020    Final                    Value:Gynecologic  Cytology Report                       Case: [id number]                                  Authorizing Provider:  Morena Sood APRN   Collected:           10/05/2020 10:27 AM          Ordering Location:     [facility]     Received:            10/05/2020 11:59 AM                                 [facility]                                                                 First Screen:          Alexander Contreras                                                          Specimen:    Sendout to [facility], Cervix                                                                    • Interpretation 10/05/2020 See attached report    Final   • RANDA ALBICANS 10/05/2020 Positive   Final   • GARDNERELLA VAGINALIS 10/05/2020 Negative   Final   • TRICHOMONAS VAGINALIS 10/05/2020 Negative   Final   Admission on 09/02/2020, Discharged on 09/02/2020   Component Date Value Ref Range Status   • COVID19 08/30/2020 Not Detected  Not Detected - Ref. Range Final   • Glucose 09/02/2020 130  70 - 130 mg/dL Final    RN NotifiedOperator: [id number] MATTIE Kelly ID: [id number]   • Case Report 09/02/2020    Final                    Value:Surgical Pathology Report                         Case: [id number]                                  Authorizing Provider:  Ashli Gonzalez MD      Collected:           09/02/2020 12:03 PM          Ordering Location:     [facility]             Received:            09/03/2020 07:22 AM                                 [facility]                                                     Pathologist:           Colby Bell MD                                                           Specimens:   1) - Gastric, Antrum, bx                                                                            2) - Esophagus, EG junction bx                                                                      3) - Large Intestine, Rectum, polyps                                                      •  Final Diagnosis 09/02/2020    Final                    Value:This result contains rich text formatting which cannot be displayed here.   Lab on 08/11/2020   Component Date Value Ref Range Status   • WBC 08/11/2020 9.81  3.40 - 10.80 10*3/mm3 Final   • RBC 08/11/2020 3.85  3.77 - 5.28 10*6/mm3 Final   • Hemoglobin 08/11/2020 10.3* 12.0 - 15.9 g/dL Final   • Hematocrit 08/11/2020 30.9* 34.0 - 46.6 % Final   • MCV 08/11/2020 80.3  79.0 - 97.0 fL Final   • MCH 08/11/2020 26.8  26.6 - 33.0 pg Final   • MCHC 08/11/2020 33.3  31.5 - 35.7 g/dL Final   • RDW 08/11/2020 13.2  12.3 - 15.4 % Final   • RDW-SD 08/11/2020 37.9  37.0 - 54.0 fl Final   • MPV 08/11/2020 12.2* 6.0 - 12.0 fL Final   • Platelets 08/11/2020 242  140 - 450 10*3/mm3 Final   • Neutrophil % 08/11/2020 55.3  42.7 - 76.0 % Final   • Lymphocyte % 08/11/2020 36.7  19.6 - 45.3 % Final   • Monocyte % 08/11/2020 4.2* 5.0 - 12.0 % Final   • Eosinophil % 08/11/2020 2.5  0.3 - 6.2 % Final   • Basophil % 08/11/2020 0.7  0.0 - 1.5 % Final   • Immature Grans % 08/11/2020 0.6* 0.0 - 0.5 % Final   • Neutrophils, Absolute 08/11/2020 5.42  1.70 - 7.00 10*3/mm3 Final   • Lymphocytes, Absolute 08/11/2020 3.60* 0.70 - 3.10 10*3/mm3 Final   • Monocytes, Absolute 08/11/2020 0.41  0.10 - 0.90 10*3/mm3 Final   • Eosinophils, Absolute 08/11/2020 0.25  0.00 - 0.40 10*3/mm3 Final   • Basophils, Absolute 08/11/2020 0.07  0.00 - 0.20 10*3/mm3 Final   • Immature Grans, Absolute 08/11/2020 0.06* 0.00 - 0.05 10*3/mm3 Final   • nRBC 08/11/2020 0.0  0.0 - 0.2 /100 WBC Final   • Glucose 08/11/2020 267* 65 - 99 mg/dL Final   • BUN 08/11/2020 19  6 - 20 mg/dL Final   • Creatinine 08/11/2020 0.70  0.57 - 1.00 mg/dL Final   • Sodium 08/11/2020 133* 136 - 145 mmol/L Final   • Potassium 08/11/2020 4.4  3.5 - 5.2 mmol/L Final    Specimen hemolyzed.  Results may be affected.   • Chloride 08/11/2020 100  98 - 107 mmol/L Final   • CO2 08/11/2020 21.0* 22.0 - 29.0 mmol/L Final   • Calcium 08/11/2020 8.9   8.6 - 10.5 mg/dL Final   • Total Protein 08/11/2020 6.9  6.0 - 8.5 g/dL Final   • Albumin 08/11/2020 3.80  3.50 - 5.20 g/dL Final   • ALT (SGPT) 08/11/2020 13  1 - 33 U/L Final   • AST (SGOT) 08/11/2020 11  1 - 32 U/L Final   • Alkaline Phosphatase 08/11/2020 64  39 - 117 U/L Final   • Total Bilirubin 08/11/2020 0.2  0.0 - 1.2 mg/dL Final   • eGFR Non African Amer 08/11/2020 87  >60 mL/min/1.73 Final   • Globulin 08/11/2020 3.1  gm/dL Final   • A/G Ratio 08/11/2020 1.2  g/dL Final   • BUN/Creatinine Ratio 08/11/2020 27.1* 7.0 - 25.0 Final   • Anion Gap 08/11/2020 12.0  5.0 - 15.0 mmol/L Final   • Hemoglobin A1C 08/11/2020 10.00* 4.80 - 5.60 % Final   • Total Cholesterol 08/11/2020 172  0 - 200 mg/dL Final   • Triglycerides 08/11/2020 102  0 - 150 mg/dL Final   • HDL Cholesterol 08/11/2020 47  40 - 60 mg/dL Final   • LDL Cholesterol  08/11/2020 105* 0 - 100 mg/dL Final   • VLDL Cholesterol 08/11/2020 20.4  5 - 40 mg/dL Final   • LDL/HDL Ratio 08/11/2020 2.23   Final   • TSH 08/11/2020 2.310  0.270 - 4.200 uIU/mL Final   • Free T4 08/11/2020 0.81* 0.93 - 1.70 ng/dL Final   • T3, Free 08/11/2020 2.22  2.00 - 4.40 pg/mL Final   • 25 Hydroxy, Vitamin D 08/11/2020 15.4* 30.0 - 100.0 ng/ml Final   • Vitamin B-12 08/11/2020 555  211 - 946 pg/mL Final   • Hepatitis C Ab 08/11/2020 Non-Reactive  Non-Reactive Final   • Microalbumin/Creatinine Ratio 08/11/2020 49.1  mg/g Final   • Creatinine, Urine 08/11/2020 122.3  mg/dL Final   • Microalbumin, Urine 08/11/2020 6.0  mg/dL Final   • THC, Screen, Urine 08/11/2020 Negative  Negative Final   • Phencyclidine (PCP), Urine 08/11/2020 Negative  Negative Final   • Cocaine Screen, Urine 08/11/2020 Negative  Negative Final   • Methamphetamine, Ur 08/11/2020 Negative  Negative Final   • Opiate Screen 08/11/2020 Positive* Negative Final   • Amphetamine Screen, Urine 08/11/2020 Negative  Negative Final   • Benzodiazepine Screen, Urine 08/11/2020 Negative  Negative Final   • Tricyclic  "Antidepressants Screen 08/11/2020 Positive* Negative Final   • Methadone Screen, Urine 08/11/2020 Negative  Negative Final   • Barbiturates Screen, Urine 08/11/2020 Negative  Negative Final   • Oxycodone Screen, Urine 08/11/2020 Negative  Negative Final   • Propoxyphene Screen 08/11/2020 Negative  Negative Final   • Buprenorphine, Screen, Urine 08/11/2020 Negative  Negative Final      No image results found.    [unfilled]  Immunization History   Administered Date(s) Administered   • Flulaval/Fluarix/Fluzone Quad 09/14/2020   • Pneumococcal Polysaccharide (PPSV23) 09/14/2020   • Shingrix 09/15/2020   • Tdap 09/14/2020       The following portions of the patient's history were reviewed and updated as appropriate: allergies, current medications, past family history, past medical history, past social history, past surgical history and problem list.        Physical Exam  /52 (BP Location: Left arm, Patient Position: Sitting, Cuff Size: Large Adult)   Pulse 76   Temp 97.7 °F (36.5 °C)   Ht 175.3 cm (69\")   SpO2 99%   BMI 33.23 kg/m²     Physical Exam  Vitals signs and nursing note reviewed.   Constitutional:       Appearance: She is well-developed. She is not diaphoretic.   HENT:      Head: Normocephalic and atraumatic.      Right Ear: External ear normal.   Eyes:      Conjunctiva/sclera: Conjunctivae normal.      Pupils: Pupils are equal, round, and reactive to light.   Neck:      Musculoskeletal: Normal range of motion and neck supple.   Cardiovascular:      Rate and Rhythm: Normal rate and regular rhythm.      Heart sounds: Normal heart sounds. No murmur.   Pulmonary:      Effort: Pulmonary effort is normal. No respiratory distress.      Breath sounds: Normal breath sounds.   Abdominal:      General: Bowel sounds are normal. There is no distension.      Palpations: Abdomen is soft.      Tenderness: There is no abdominal tenderness.      Comments: Obese adomen    Musculoskeletal: Normal range of motion.    "      General: Tenderness present. No deformity.      Comments: Get up and go test >10 seconds     Below knee amputation on right leg    Skin:     General: Skin is warm.      Coloration: Skin is not pale.      Findings: No erythema or rash.   Neurological:      Mental Status: She is alert and oriented to person, place, and time.      Cranial Nerves: No cranial nerve deficit.   Psychiatric:         Behavior: Behavior normal.         Assessment/Plan    Diagnosis Plan   1. Hypothyroidism, unspecified type     2. Class 1 obesity with body mass index (BMI) of 32.0 to 32.9 in adult, unspecified obesity type, unspecified whether serious comorbidity present     3. Diabetic polyneuropathy associated with type 2 diabetes mellitus (CMS/HCC)     4. At high risk for falls     5. Below knee amputation (CMS/[facility])     6. Gait instability          -went over labwork   -will get last [facility] ER report   -recommend diabetic eye exam/diabetic retinopathy/catatract  - pt's sister will call Ophthalmologist in [place]   -recommend mammogram screening -schedule maging center   -gait instability/ high fall risk gave prescription for motorizized  Wheelchair for replacement  -urinary incontinence - wrote prescription for pullups.    -colonic/rectal polyps, internal hemorrhoids/GERD with esophagitis/gastritis  - GI following  Next colonsocopy in 2025   -vitamin D deficiency -vitamin D once a week  -hypothyroidism - on synthroid 25 mcg PO q daily.   -DM type 2  - on bydureon 2 mg subq weekly. On jardiance 25 mg daily.  Cut back on Tresiba from 40 to 35 units at bedtime  Start on humalog 5 units before meals. Adivsed sister to check on pt daily since adjusting her insulin.  Continue with checking sugars before breakfast and 2 hours after meal . Gave prescription for diabetic shoes. It is important to use this to prevent any injury to feet and any ulcerations from occurring   -microcytic anemia - on ferrous sulfate 325 mg PO q daily. Has upcoming appt  with Gastroenteorlogy on 9/2/20 for colonoscopy and EGD   -gait instabiliy/high fall risk -refer to PT/OT. Uses walker and wheelchair to ambulate   -HTN -  On lisinopril  20 mg PO q daily.  -HLP - on lipitor 20 mg PO qhs.  -major depression - on zoloft 100 mg PO BID. Will taper off elavil.  Stop zoloft and switch to viibryd starting pack samples given today. Also recommend counseling but pt declined   -obesity - counseled weight loss >5 minutes BMI at 33.23   -diabetic neuropathy - go up on lyrica from 50 to 100 mg three times a day.    -chronic pain  Was on Norco 7.5/325 mg every 12 hours PRN. Will refer to Pain Management.   on robaxin TID     -recommend pap smear  -annual physical exam today  -advised pt to be safe and call with questions and concerns  -advised pt to go to ER or call 911 if symptoms worrisome or severe  -advised pt to followup with specialist and referrals  -advised pt to be safe during COVID-19 pandemic  -total time with pt >25 minutes   -recheck in 6 weeks         This document has been electronically signed by Gato Crane MD on October 19, 2020 16:00 CDT

## 2020-10-05 ENCOUNTER — LAB (OUTPATIENT)
Dept: LAB | Facility: HOSPITAL | Age: 55
End: 2020-10-05

## 2020-10-05 ENCOUNTER — TELEPHONE (OUTPATIENT)
Dept: FAMILY MEDICINE CLINIC | Facility: CLINIC | Age: 55
End: 2020-10-05

## 2020-10-05 ENCOUNTER — PROCEDURE VISIT (OUTPATIENT)
Dept: OBSTETRICS AND GYNECOLOGY | Facility: CLINIC | Age: 55
End: 2020-10-05

## 2020-10-05 VITALS — DIASTOLIC BLOOD PRESSURE: 70 MMHG | HEIGHT: 69 IN | BODY MASS INDEX: 33.23 KG/M2 | SYSTOLIC BLOOD PRESSURE: 124 MMHG

## 2020-10-05 DIAGNOSIS — Z01.411 ENCOUNTER FOR WELL WOMAN EXAM WITH ABNORMAL FINDINGS: Primary | ICD-10-CM

## 2020-10-05 DIAGNOSIS — B37.31 YEAST INFECTION INVOLVING THE VAGINA AND SURROUNDING AREA: ICD-10-CM

## 2020-10-05 DIAGNOSIS — N89.8 VAGINAL IRRITATION: ICD-10-CM

## 2020-10-05 DIAGNOSIS — N84.1 CERVICAL POLYP: ICD-10-CM

## 2020-10-05 DIAGNOSIS — Z12.4 ENCOUNTER FOR PAPANICOLAOU SMEAR FOR CERVICAL CANCER SCREENING: ICD-10-CM

## 2020-10-05 LAB
CANDIDA ALBICANS: POSITIVE
GARDNERELLA VAGINALIS: NEGATIVE
T VAGINALIS DNA VAG QL PROBE+SIG AMP: NEGATIVE

## 2020-10-05 PROCEDURE — 87624 HPV HI-RISK TYP POOLED RSLT: CPT | Performed by: NURSE PRACTITIONER

## 2020-10-05 PROCEDURE — 87510 GARDNER VAG DNA DIR PROBE: CPT | Performed by: NURSE PRACTITIONER

## 2020-10-05 PROCEDURE — 87660 TRICHOMONAS VAGIN DIR PROBE: CPT | Performed by: NURSE PRACTITIONER

## 2020-10-05 PROCEDURE — 99386 PREV VISIT NEW AGE 40-64: CPT | Performed by: NURSE PRACTITIONER

## 2020-10-05 PROCEDURE — 87480 CANDIDA DNA DIR PROBE: CPT | Performed by: NURSE PRACTITIONER

## 2020-10-05 RX ORDER — CLOTRIMAZOLE AND BETAMETHASONE DIPROPIONATE 10; .64 MG/G; MG/G
CREAM TOPICAL 2 TIMES DAILY
Qty: 1 EACH | Refills: 3 | Status: SHIPPED | OUTPATIENT
Start: 2020-10-05

## 2020-10-05 RX ORDER — FLUCONAZOLE 150 MG/1
TABLET ORAL
Qty: 2 TABLET | Refills: 0 | Status: SHIPPED | OUTPATIENT
Start: 2020-10-05 | End: 2020-10-26 | Stop reason: SDUPTHER

## 2020-10-05 NOTE — PROGRESS NOTES
Subjective   Marta Giraldo is a 55 y.o. here for gyn annual and rash    Postmenopausal  Unsure of last Pap smear  Mammo: 2020  Debbie George, Linda Lozano is a  who presents today for a gyn annual and c/o of vaginal rash X 3 days or possibly longer. She has type 2 diabetes. Pt is in a wheelchair with a right leg prosthetic.     Gynecologic Exam  The patient's primary symptoms include genital itching and a genital rash. The patient's pertinent negatives include no genital lesions, genital odor, missed menses, pelvic pain, vaginal bleeding or vaginal discharge. This is a new problem. The current episode started in the past 7 days. The problem occurs constantly. The problem has been unchanged. The pain is mild. Pertinent negatives include no abdominal pain, chills, constipation, diarrhea, dysuria, fever, frequency, nausea, rash or sore throat. Nothing aggravates the symptoms. She has tried nothing for the symptoms. She is not sexually active. She is postmenopausal.       The following portions of the patient's history were reviewed and updated as appropriate: allergies, current medications, past family history, past medical history, past social history, past surgical history and problem list.    Review of Systems   Constitutional: Negative for chills, fatigue, fever, unexpected weight gain and unexpected weight loss.   HENT: Negative for sneezing and sore throat.    Respiratory: Negative for shortness of breath.    Cardiovascular: Negative for chest pain and palpitations.   Gastrointestinal: Negative for abdominal pain, constipation, diarrhea and nausea.   Endocrine: Negative for cold intolerance and heat intolerance.   Genitourinary: Negative for amenorrhea, breast discharge, breast lump, breast pain, difficulty urinating, dysuria, frequency, menstrual problem, missed menses, pelvic pain, pelvic pressure, urinary incontinence, vaginal bleeding, vaginal discharge and vaginal pain.   Skin: Negative for  rash.   Neurological: Negative for weakness and headache.   Psychiatric/Behavioral: Negative for sleep disturbance, depressed mood and stress.       Objective   Physical Exam  Vitals signs and nursing note reviewed. Exam conducted with a chaperone present.   Constitutional:       Appearance: She is well-developed. She is obese.   HENT:      Head: Normocephalic and atraumatic.   Eyes:      General:         Right eye: No discharge.      Conjunctiva/sclera: Conjunctivae normal.   Neck:      Musculoskeletal: Normal range of motion and neck supple.      Thyroid: No thyromegaly.   Cardiovascular:      Rate and Rhythm: Normal rate and regular rhythm.      Heart sounds: Normal heart sounds.   Pulmonary:      Effort: Pulmonary effort is normal. No respiratory distress.      Breath sounds: Normal breath sounds.   Chest:      Breasts:         Right: Normal. No swelling, bleeding, inverted nipple, mass, nipple discharge, skin change or tenderness.         Left: Normal. No swelling, bleeding, inverted nipple, mass, nipple discharge, skin change or tenderness.   Abdominal:      General: There is no distension.      Palpations: Abdomen is soft.      Tenderness: There is no abdominal tenderness.   Genitourinary:     Pubic Area: No rash.       Labia:         Right: Rash and tenderness present. No lesion or injury.         Left: Rash and tenderness present. No lesion or injury.       Urethra: No prolapse, urethral pain or urethral lesion.      Vagina: No signs of injury and foreign body. No vaginal discharge, erythema, tenderness, bleeding, lesions or prolapsed vaginal walls.      Cervix: Friability present. No cervical motion tenderness, discharge, lesion, erythema, cervical bleeding or eversion.      Uterus: Normal.       Rectum: No external hemorrhoid.            Comments: Pap cotesting and Vag panel obtained. Spotting from polyp noted with obtaining Pap test. Adnexa nonpalpable due body habitus.   Musculoskeletal: Normal range of  motion.   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      Comments: Pt with a right leg prosthetic leg.   Lymphadenopathy:      Upper Body:      Right upper body: No supraclavicular or axillary adenopathy.      Left upper body: No supraclavicular or axillary adenopathy.   Skin:     General: Skin is warm and dry.   Neurological:      Mental Status: She is alert and oriented to person, place, and time.   Psychiatric:         Behavior: Behavior normal.           Assessment/Plan   Marta was seen today for gynecologic exam.    Diagnoses and all orders for this visit:    Encounter for well woman exam with abnormal findings    Encounter for Papanicolaou smear for cervical cancer screening  -     Liquid-based Pap Smear, Screening    Vaginal irritation  -     Gardnerella vaginalis, Trichomonas vaginalis, Candida albicans, DNA - Swab, Vagina    Cervical polyp    Yeast infection involving the vagina and surrounding area    Other orders  -     fluconazole (Diflucan) 150 MG tablet; Take 1 tablet by mouth today and repeat in 4 days.  -     clotrimazole-betamethasone (Lotrisone) 1-0.05 % cream; Apply  topically to the appropriate area as directed 2 (Two) Times a Day.        Reviewed cytology and mammogram guidelines.  Informed pt that she has a small, cervical polyp and we don't usually do anything for it unless it is bothering her. Pt has not has any bleeding from it. Vulvar rash appears to be chronic yeast infection likely related to pt's type 2 diabetes.Will try Diflucan and Lotrisone cream; pt instructed to return in 1 month for follow up.

## 2020-10-05 NOTE — TELEPHONE ENCOUNTER
CALLER IS INQUIRING ABOUT STATUS OF INSULIN THAT WAS TO BE CALLED INTO THE PHARMACY ON 10/01/2020.    CALLBACK NUMBER IS   [phone]    [facility] [place] [id number] [address]  [phone]  - [phone] FX

## 2020-10-06 RX ORDER — INSULIN GLARGINE 100 [IU]/ML
35 INJECTION, SOLUTION SUBCUTANEOUS NIGHTLY
Qty: 9 ML | Refills: 3 | Status: SHIPPED | OUTPATIENT
Start: 2020-10-06 | End: 2020-11-06 | Stop reason: SDUPTHER

## 2020-10-07 ENCOUNTER — TREATMENT (OUTPATIENT)
Dept: PHYSICAL THERAPY | Facility: CLINIC | Age: 55
End: 2020-10-07

## 2020-10-07 DIAGNOSIS — E11.42 DIABETIC POLYNEUROPATHY ASSOCIATED WITH TYPE 2 DIABETES MELLITUS (HCC): ICD-10-CM

## 2020-10-07 DIAGNOSIS — Z91.81 AT HIGH RISK FOR FALLS: ICD-10-CM

## 2020-10-07 DIAGNOSIS — S88.119A BELOW KNEE AMPUTATION (HCC): ICD-10-CM

## 2020-10-07 DIAGNOSIS — F33.1 MODERATE EPISODE OF RECURRENT MAJOR DEPRESSIVE DISORDER (HCC): ICD-10-CM

## 2020-10-07 DIAGNOSIS — R26.81 GAIT INSTABILITY: Primary | ICD-10-CM

## 2020-10-07 PROCEDURE — 97162 PT EVAL MOD COMPLEX 30 MIN: CPT | Performed by: PHYSICAL THERAPIST

## 2020-10-07 NOTE — PROGRESS NOTES
Outpatient Physical Therapy Neuro Initial Evaluation/Discharge       Patient Name: Marta Giraldo  : 1965  MRN: [id number]  Today's Date: 10/7/2020      Visit Date: 10/07/2020    Patient Active Problem List   Diagnosis   • Class 1 obesity with serious comorbidity and body mass index (BMI) of 32.0 to 32.9 in adult   • Moderate episode of recurrent major depressive disorder (CMS/HCC)   • Encounter for screening for malignant neoplasm of colon   • Gastroesophageal reflux disease   • Hypothyroidism   • Microcytic anemia   • Vitamin D deficiency   • Uncontrolled type 2 diabetes mellitus with hyperglycemia (CMS/HCC)   • Class 1 obesity with body mass index (BMI) of 32.0 to 32.9 in adult   • Diabetic polyneuropathy associated with type 2 diabetes mellitus (CMS/HCC)   • Gastritis without bleeding   • Class 1 obesity with serious comorbidity and body mass index (BMI) of 33.0 to 33.9 in adult   • At high risk for falls   • Gait instability   • Below knee amputation (CMS/HCC)        Past Medical History:   Diagnosis Date   • Anemia    • Anxiety    • Arthritis    • Cataract    • Depression    • Diabetes mellitus (CMS/HCC)    • Disease of thyroid gland    • GERD (gastroesophageal reflux disease)    • Headache    • History of transfusion    • Hyperlipidemia    • Hypertension    • Neuropathy    • Stroke (CMS/HCC) 2019   • Urinary tract infection         Past Surgical History:   Procedure Laterality Date   • APPENDECTOMY     • BELOW KNEE AMPUTATION     • BLADDER SURGERY     • COLONOSCOPY N/A 2020    Procedure: COLONOSCOPY;  Surgeon: Ashli Gonzalez MD;  Location: [facility] ENDOSCOPY;  Service: Gastroenterology;  Laterality: N/A;   • ENDOSCOPY N/A 2020    Procedure: ESOPHAGOGASTRODUODENOSCOPY;  Surgeon: Ashli Gonzalez MD;  Location: [facility] ENDOSCOPY;  Service: Gastroenterology;  Laterality: N/A;     Functional Questionnaire: LEFS Score     Visit Dx:     ICD-10-CM ICD-9-CM   1. Gait instability  R26.81  781.2   2. Below knee amputation (CMS/[facility])  S88.119A 897.0   3. At high risk for falls  Z91.81 V15.88   4. Diabetic polyneuropathy associated with type 2 diabetes mellitus (CMS/[facility])  E11.42 250.60     357.2   5. Moderate episode of recurrent major depressive disorder (CMS/[facility])  F33.1 296.32       Patient History     Row Name 10/07/20 1000             History    Chief Complaint  Difficulty Walking;Difficulty with daily activities;Falls/history of falls;Fatigue/poor endurance;Muscle weakness;Pain  -AJ      Type of Pain  Lower Extremity / Leg  -AJ      Date Current Problem(s) Began  -- 2018  -AJ      Brief Description of Current Complaint  Patient reprots she has had her current chair for about 2 years. She reports she got iit in [place]. She repotrs she is having issues with it.  R TTA amputation due to diabetes then a stroke a year later with L sided weakness.   -AJ      Patient/Caregiver Goals  Improve mobility  -AJ      Current Tobacco Use  None currently  -AJ      Smoking Status  Former Smoker  -AJ      Patient's Rating of General Health  Fair  -AJ      Hand Dominance  right-handed  -AJ      Occupation/sports/leisure activities  Occupation: Unemployed; Hobbies: watch TV  -AJ      Are you or can you be pregnant  No  -AJ         Pain     Pain Location  Leg;Hand  -AJ      Pain at Present  5  -AJ      Pain at Best  5  -AJ      Pain at Worst  10  -AJ      Pain Frequency  Constant/continuous  -AJ      Pain Description  Pins and needles;Aching;Burning  -AJ         Fall Risk Assessment    Any falls in the past year:  Yes  -AJ      Number of falls reported in the last 12 months  12+ per year, usually about 1-2 /month  -AJ      Factors that contributed to the fall:  Lost balance;Fatigue  -AJ      Does patient have a fear of falling  Yes (comment) Worried about getting hurt  -AJ        User Key  (r) = Recorded By, (t) = Taken By, (c) = Cosigned By    Initials Name Provider Type    AJ hCrista Abbott, PT DPT Physical  Therapist              PT Neuro     Row Name 10/07/20 1000             Precautions and Contraindications    Precautions/Limitations  fall precautions  -AJ         Subjective Pain    Able to rate subjective pain?  yes  -AJ      Pre-Treatment Pain Level  5  -AJ      Post-Treatment Pain Level  5  -AJ         Home Living    Current Living Arrangements  home/apartment/condo Lives alone  -AJ      Home Accessibility  wheelchair accessible  -AJ      Home Equipment  Wheelchair-electric;Walker;Reacher;Grab bars  -AJ         Vision-Basic Assessment    Current Vision  Does not wear glasses  -AJ      Visual History  Cataracts Retinopothy  -AJ      Patient Visual Report  Blurring of print when reading;Unable to keep objects in focus;Balance difficulty  -AJ         Cognition    Overall Cognitive Status  WFL  -AJ      Arousal/Alertness  Appropriate responses to stimuli  -AJ      Memory  Decreased long term memory;Appears intact  -AJ      Orientation Level  Oriented X4  -AJ      Safety Judgment  Good awareness of safety precautions  -AJ      Deficits  Fully aware of deficits  -AJ         Sensation    Light Touch  Partial deficits in the RUE;Partial deficits in the LUE;Partial deficits in the RLE;Partial deficits in the LLE  -AJ      Sharp/Dull  Severe deficits in the LLE;Partial deficits in the RUE;Partial deficits in the LUE  -AJ         Proprioception    Proprioception  Decreased due to amputation and due to diabetic neuropathy  -AJ         Perception    Perception WNL?  WFL  -AJ         Posture/Observations    Forward Head  Mild;Moderate;Increased;Sitting posture  -AJ      Thoracic Kyphosis  Mild;Moderate;Increased;Sitting posture  -AJ      Rounded Shoulders  Bilateral:;Mild;Moderate;Increased;Sitting posture  -AJ      Posture/Observations Comments  Poorly postured in current power wheelchair, seat is too short, armrests too short, seat not wide enough, feet do not fit on footplate, back is too short and headrest not at head at  the back of her shoulders.  -AJ         Coordination    Rapid Alternating  Bilteral:;Intact  -AJ      Finger to Nose Eyes Open  Bilteral:;Intact  -AJ      Finger to Nose Eyes Closed  Bilteral:;Impaired  -AJ      Crossing Midline  Bilteral:;Intact  -AJ      Bilateral Integration  Bilteral:;Intact  -AJ      Dexterity  Bilteral:;Impaired  -AJ         Transfers    Sit-Stand Schwertner (Transfers)  modified independence  -AJ      Stand-Sit Schwertner (Transfers)  modified independence  -AJ      Transfers, Sit-Stand-Sit, Assist Device  rolling walker;prosthesis  -AJ      Transfer, Safety Issues  balance decreased during turns;step length decreased;weight-shifting ability decreased  -AJ      Transfer, Impairments  sensation decreased;strength decreased;impaired balance;sensory feedback impaired;impaired vision;pain  -AJ      Comment (Transfers)  Rewquires use of B UE for sit to/from stand, unable to without  -AJ         Gait/Stairs (Locomotion)    Schwertner Level (Gait)  modified independence  -AJ      Assistive Device (Gait)  walker, front-wheeled  -AJ      Distance in Feet (Gait)  12  -AJ      Pattern (Gait)  3-point;step-to  -AJ      Deviations/Abnormal Patterns (Gait)  right sided deviations;antalgic;base of support, wide;festinating/shuffling;gait speed decreased;stride length decreased;weight shifting decreased  -AJ      Bilateral Gait Deviations  knee buckling, bilateral  -AJ         Balance Skills Training    Sitting-Level of Assistance  Independent  -AJ      Sitting-Balance Support  Feet supported  -AJ      Sitting-Balance Activities  Lateral lean;Forward lean;Reaching for objects;Reaching across midline  -AJ        User Key  (r) = Recorded By, (t) = Taken By, (c) = Cosigned By    Initials Name Provider Type    Christa Duff, PT DPT Physical Therapist          Patient is single.      Medications:          atorvastatin (Lipitor) 40 MG tablet       Take 1 tablet by mouth Daily.   B-D UF III MINI  PEN NEEDLES 31G X 5 MM misc       Blood Glucose Monitoring Suppl w/Device kit       Use for E11.65 diabetes to check sugars 4 times a day.   clotrimazole-betamethasone (Lotrisone) 1-0.05 % cream       Apply  topically to the appropriate area as directed 2 (Two) Times a Day.   Empagliflozin 25 MG tablet       Take 25 mg by mouth Daily.   exenatide er (BYDUREON) 2 MG pen-injector injection       Inject 1 pen under the skin into the appropriate area as directed 1 (One) Time Per Week.   ferrous sulfate (FerrouSul) 325 (65 FE) MG tablet       Take 1 tablet by mouth Daily With Breakfast.   fluconazole (Diflucan) 150 MG tablet       Take 1 tablet by mouth today and repeat in 4 days.   glucose (DEX4) 4 GM chewable tablet       Chew 4 tablets As Needed for Low Blood Sugar.   glucose blood test strip       Use as instructed   glucose blood test strip       Use as instructed   Insulin Glargine (BASAGLAR KWIKPEN) 100 UNIT/ML injection pen       Inject 35 Units under the skin into the appropriate area as directed Every Night.   insulin lispro (HumaLOG) 100 UNIT/ML injection       5 unit before meals   Insulin Pen Needle 32G X 8 MM misc       Use at bedtime   Insulin Pen Needle 32G X 8 MM misc       Use with humalog TID   Lancets misc       Use for E11.65 diabetes to check sugars 4 times a day.   levothyroxine (SYNTHROID, LEVOTHROID) 25 MCG tablet       Take 1 tablet by mouth Daily.   lisinopril (PRINIVIL,ZESTRIL) 20 MG tablet       Take 1 tablet by mouth Daily.   methocarbamol (ROBAXIN) 750 MG tablet       Take 1 tablet by mouth 3 (Three) Times a Day.   mupirocin (Bactroban) 2 % cream       Apply  topically to the appropriate area as directed 3 (Three) Times a Day.   mupirocin (BACTROBAN) 2 % ointment       Apply  topically to the appropriate area as directed 3 (Three) Times a Day.   omeprazole (PrilOSEC) 40 MG capsule       Take 1 capsule by mouth Daily.   pregabalin (Lyrica) 50 MG capsule       Take 1 capsule by mouth 3  (Three) Times a Day. Chronic pain   vilazodone (Viibryd) 20 MG tablet tablet       Take 1 tablet by mouth Daily.   vitamin D (ERGOCALCIFEROL) 1.25 MG (69963 UT) capsule capsule       Take 1 capsule by mouth Every 7 (Seven) Days.         Allergies:   Compazine [Prochlorperazine Edisylate]     Penicillins           Current Wheelchair System:    and Model: Unknown  Age: 1 years old  Accessibility issues with current system: None, just ill fitting, see above.    Has the patient tried any other types of wheelchairs or scooters?: No   If so, what types? N/A   Problems with other types: N/A    Current Cushion:Standard seat cushion  Will the patient be able to properly maintain the cushion Independantly?: Yes  Has the patient tried any other types of cushions?: No  Any issues with other types?: N/A    Cardio-Respiratory Status: Intact    COGNITIVE VISUAL STATUS:   Memory: Impaired, long term memory issues   Problem Solving: Intact Impaired   Judgement: Intact   Attention/Concentration: Intact   Vision: Impaired   Hearing: Impaired    SENSATION:   Impaired  Does the patient have any current pressure sores? Yes  If so, where?: B feet, R foot lead to amputation  Does the patient have a history of pressure sores?: Yes  At Risk?: Yes      ADL/IADL STATUS:  Dressing: Independent with use of bed or wheelchait  Bathing: Independent with use of shower chair/bench  Feeding: Independent   Grooming/Hygeine: Independent  Toileting: Independent   Meal Prep: With assistance  Home management: With assistance from family and wheelchair  Bowel management: Continent  Bladder Management: Independent; Incontinent    Manual W/C propulsion:  Unable due to neuropathy in hands and lack of upper body strength and missing LE limb to be able to use lower body. Also has lack of sensation in the hands due to diabetic neuropathy.    Operate power W/C with standard joystick: Independent   Operate power W/C with alternate controls: Independent  "    Able to perform weight shifts: Independent at times, unable at other times. Some days not able to don leg due to pain and edema and unable to perform weight shifts fully.  Hours spent sitting in Wheelchair each day: 6 hours per day for appointments and for shopping    MISC. HOME  1) Will the patient be driving? No  2) What type of vehicle will be used? Public transportation  3) If a car or truck, will the patient load & unload the w/c independently? No How? Lift on public transportation van  4) Does the patient work or go to school? No  5) Home Accessibility?  a. Ramp into house? Yes  i. Can the patient independently propel w/c on ramps? No  ii. Comments: Unable due to neuropathy in hands and lack of upper body strength and missing LE limb to be able to use lower body. Also has lack of sensation in the hands due to diabetic neuropathy.  b. Doorways accessible? Yes  c. Bathroom accessible? Yes  d. One/Two story?  One  e. Basement? No  f. If two is the patient required to go upstairs/downstairs? N/A  g. Cralitos in the home? Carpet and tile  i. Does/Would the patient have difficulty propelling on carpet? Yes  ii. Comments: Unable due to neuropathy in hands and lack of upper body strength and missing LE limb to be able to use lower body. Also has lack of sensation in the hands due to diabetic neuropathy.  h. Driveway? concrete  i. Does/Would the patient have difficulty propelling in grass/gravel? Yes  ii. Comments: Unable due to neuropathy in hands and lack of upper body strength and missing LE limb to be able to use lower body. Also has lack of sensation in the hands due to diabetic neuropathy.    Client’s Measurements: See attached DME provider handout  Across hips:  Hip to knee:   Knee to Foot  Hip to inferior angle of scapula:  Hip to top of shoulder:  Hip to top of head:  Hip to forearm with elbow bent:  Shoulder width:  Chest width:  Chest Depth:    Shoe size: 10 Women's  Height: 5' 9\"  Weight: " 220#  Orthopedic deformities: R TTA  Does the patient suffer from spasms? No  Will lateral supports be needed: No for the trunk, yes for the LEs  Will a positioning belt needed? No, standard seat belt    MAT/TABLE/CHAIR ASSESSMENT:  Head and Neck: Function and Control: AROM and strength for the neck and head all WFL.    Shoulders and Arms: Function/Control and ROM: AROM for the B shoulders all WFL, patient is able ot reach overhead in both flexion and abduction as well as behind the back and behind the head for IR/ER. R shoulder flexion and abduction strength 4+/5, L 4/5. R bicep/tricep strength 4/5, L 4-/5.    Wrists and Hands: Function/Control and ROM: AROM for B forearms, wrists and hands WFL.  at the #2 setting R 26#, L 32#    Trunk: Increased thoracic kyphosis, normal lumbar lordosis, slumped sitting posture in current power wheelchair    Function and Control: Control and function WFL    Pelvis: No rotation noted   Function and Control: Control and function WFL    Hips: Sits with legs windswept and Abducted   Function and Control: B hips 4/5 in all planes.    Knees and Feet: Function/Control and ROM: AROM for B knees WFL, Full extension B knees, but tightness in B hamstrings. R Quads and hamstrings 4/5, L 4-/5    Foot Positioning: ER at the L LE, Keeps R leg with prosthetic kicked out off footplate due to ill fitting current power wheelchair.    Tone: Normal UE/LE and core tone.    Balance (Sitting and Standing): Good sitting balance with LEs supported, L>R support, tends to keep R LE kicked out. Standing balance very poor not tolerating even small perturbations.    Coordination:  Good eye hand coordination to adequately control a joystick power wheelchair. Does well with her current power wheelchair joystick control.    Other: R TTA, not always able to wear her prostetic during the day due to edema or pain. Some days can wear it all day, other days not at all.    Coverage  Mobility Assessment Basic  Coverage: The patient shows to have limitations of mobility that impair the patient's ability to participate in mobility related activities of daily living and instrumental activities of daily living to include but not limited to feeding, grooming, toileting, dressing, meal preparation, light housekeeping, and bathing either:  o Prevents the patient from accomplishing an ADL/IADL entirely, and  o Patient can accomplish but with risk to safety, and  o Patient can accomplish but not within a reasonable amount of time.    Considering Cane or Walker: Will a cane/walker allow the patient to participate in ADLs/IADLs safely and in a timely manner? No    Considering a manual wheelchair: Does the patient have sufficient upper extremity strength or endurance necessary to self propel an optimally configured manual wheelchair? No    Considering a scooter/POV: Scooter requires the patient to have sufficient trunk strength, hand , and upper extremity function, balance to sit upright, requires ability to stand and pivot and may require more space in the home to maneuver. Given these requirements, is the assessment of the patient and their living environment, is a scooter appropriate? No    SPECIFIC PMD COVERAGE CRITERIA: Considering a power wheelchair?  Is the patient willing or do they have the cognition, judgment, and/or vision to participate in ALD/IALDs? Yes  Does the patient have the functional ability to consistently access a drive control and judgment and visual ability to safely operate a power wheelchair to participate in ADL/IADLs with the home environment? Yes  Is the patient able to safely operate a power wheelchair? Yes  Is the patient’s weight is less than or equal to the weight capacity on the power wheelchair? Yes  Does the patient’s home provides adequate access between rooms, maneuvering space, and surfaces for the operation of the power wheelchair? Yes  Will use of the power wheelchair significantly improve  the patient’s ability to participate in ADLs/IADLs and will the patient use it thin the home? Yes        GOALS: Patient Goals (4 weeks):  1) To increase mobility and independence with ADLs/IADLs.   Short Term Goals (2 weeks):  1) Pt. to come for seating and mobility evaluation  2) Pt to show understanding of POC  3) Pt. to follow up with medical supply company for obtaining a new seating system   Long Term Goal (4 weeks):  1) Pt. to make return phone call if there is a concern or problem obtaining new seating system.  2) Pt. to return for further evaluation &/or care if warranted.           PT Assessment/Plan     Row Name 10/07/20 1000          PT Assessment    Functional Limitations  Decreased safety during functional activities;Impaired gait;Impaired locomotion;Limitation in home management;Limitations in community activities;Performance in leisure activities;Performance in self-care ADL  -     Impairments  Balance;Dexterity;Endurance;Gait;Impaired flexibility;Impaired muscle endurance;Impaired muscle length;Impaired neuromotor development;Impaired sensory integrity;Joint integrity;Locomotion  -     Assessment Comments  Patient has an ill fitting current power wheelchair that is falling apart and not an apporpriate fir for the patient. She would benefit form the recommended chair below.  -     Rehab Potential  Excellent for power wheelchair  -     Patient/caregiver participated in establishment of treatment plan and goals  Yes  -        PT Plan    PT Frequency  One time visit  -AJ     Planned CPT's?  PT EVAL MOD COMPLELITY: 16087  -     Physical Therapy Interventions (Optional Details)  -- Wheelchair/Mobility assessment  -     PT Plan Comments  Patient to follow up with DME provider for obtaining her new power wheelchair.  -       User Key  (r) = Recorded By, (t) = Taken By, (c) = Cosigned By    Initials Name Provider Type    Christa Duff, PT DPT Physical Therapist                    RECOMMENDATIONS AND JUSTIFICATIONS:  The current recommended seating system for this patient is a power wheelchair seating system. Her home is accesable and she can safely and willingly operate a power wheelchair. The use of a power wheelchair will significantly improve ADL/IADL participation and safety. The patient is currently unsafe ambulatory also. Her home will not accomodate a scooter.    She will need a milti-functional wheelchair control to power tilt and recline and is capable of operating multiple functions. It will also need to allow for operational functions.    She will need an upgraded electronic controller and harness to allow input device to communicate between joystick and seating functions.    She will need a 34 gel battery power the motor on the power wheelchair.    She will need power tilt to change positions of gravitational forces on head and shoulders and change in position for pressure redistribution. She is in need of this function due to weakness from her CVA and her TTA which on some days she is unable to don her prostetic due to edema and pain and so is therefore unable to adequately perform weight shifts and is at high risk for pressure ulcers. She has significant UE and LE weakness and is unable to stand safely with an assistive device without her prosthetic donned. She is at high risk for development of a pressure ulcer and this will also be able ot maximize her sitting tolerance.    She is also in need of power recline to bring the chair to a full recline for ADL care, safe transfers, clothing and bladder management. It jose also be good for rest periods and due to the fact she is at high risk for pressure ulcers. She is in need of this function due to weakness from her CVA and her TTA which on some days she is unable to don her prostetic due to edema and pain and so is therefore unable to adequately perform weight shifts and is at high risk for pressure ulcers.    She will need a  power seat elevator to help increase independance in transfers and increase independence in ADL/IADL. The raised height allows for better communication and safety in crowded situations.    She will need a center mount articulating elevating leg rests. The is for obtaining optimal foot positioning while seated and adequate foot support when wearing her prosthetic. It will also provides changes in position in her LEs to help with edema control for says she has difficulty donning her prosthetic due to edema. This will also elevate her legs with in tilt/recline.    She will need the transit option which is required to safely tie down during vehicle/public transportation.    She will need a headrest to provide support to the head/neck and to support the head/neck especially when in tilt/recline.    She will need adjustable or swing away mounting hardware for her joystick, head rest, and knee lateral supports. They need ot be swing away or removable required to allow for transfers.    She will need a matrx back to provide posterior trunk support. It will also provide lumbar/sacral support to aid in proiper positioning in her power wheelchair. It will also provide lateral trunk support again to aid in proper positioning. It will also help keep the trunk in midline to help keep normal spinal alignment and proper positioning. This will also aid in support when in tilt/recline to aid in safety.               Christa Abbott, PT DPT, CSCS  10/7/2020

## 2020-10-08 ENCOUNTER — TELEPHONE (OUTPATIENT)
Dept: FAMILY MEDICINE CLINIC | Facility: CLINIC | Age: 55
End: 2020-10-08

## 2020-10-08 NOTE — TELEPHONE ENCOUNTER
PATIENT'S SISTER IS REQUESTING A NEW PRESCRIPTION FOR pregabalin (Lyrica) capsule ON BEHALF OF PATIENT.    CALLBACK NUMBER IS   [phone]    [place] [address] [address]SE [address]UNA  [phone]  - [phone] FX

## 2020-10-09 DIAGNOSIS — E11.40 TYPE 2 DIABETES MELLITUS WITH DIABETIC NEUROPATHY, WITH LONG-TERM CURRENT USE OF INSULIN (HCC): Primary | ICD-10-CM

## 2020-10-09 DIAGNOSIS — Z79.4 TYPE 2 DIABETES MELLITUS WITH DIABETIC NEUROPATHY, WITH LONG-TERM CURRENT USE OF INSULIN (HCC): Primary | ICD-10-CM

## 2020-10-09 LAB
LAB AP CASE REPORT: NORMAL
PATH INTERP SPEC-IMP: NORMAL

## 2020-10-09 RX ORDER — PREGABALIN 100 MG/1
100 CAPSULE ORAL
Qty: 90 CAPSULE | Refills: 0 | Status: SHIPPED | OUTPATIENT
Start: 2020-10-09 | End: 2020-11-06 | Stop reason: SDUPTHER

## 2020-10-19 ENCOUNTER — OFFICE VISIT (OUTPATIENT)
Dept: FAMILY MEDICINE CLINIC | Facility: CLINIC | Age: 55
End: 2020-10-19

## 2020-10-19 VITALS
OXYGEN SATURATION: 99 % | HEIGHT: 69 IN | HEART RATE: 76 BPM | TEMPERATURE: 97.7 F | BODY MASS INDEX: 33.23 KG/M2 | DIASTOLIC BLOOD PRESSURE: 52 MMHG | SYSTOLIC BLOOD PRESSURE: 112 MMHG

## 2020-10-19 DIAGNOSIS — E66.9 CLASS 1 OBESITY WITH SERIOUS COMORBIDITY AND BODY MASS INDEX (BMI) OF 33.0 TO 33.9 IN ADULT, UNSPECIFIED OBESITY TYPE: ICD-10-CM

## 2020-10-19 DIAGNOSIS — Z91.81 AT HIGH RISK FOR FALLS: ICD-10-CM

## 2020-10-19 DIAGNOSIS — E11.42 DIABETIC POLYNEUROPATHY ASSOCIATED WITH TYPE 2 DIABETES MELLITUS (HCC): ICD-10-CM

## 2020-10-19 DIAGNOSIS — S88.119A BELOW KNEE AMPUTATION (HCC): ICD-10-CM

## 2020-10-19 DIAGNOSIS — E66.9 CLASS 1 OBESITY WITH BODY MASS INDEX (BMI) OF 32.0 TO 32.9 IN ADULT, UNSPECIFIED OBESITY TYPE, UNSPECIFIED WHETHER SERIOUS COMORBIDITY PRESENT: ICD-10-CM

## 2020-10-19 DIAGNOSIS — E03.9 HYPOTHYROIDISM, UNSPECIFIED TYPE: ICD-10-CM

## 2020-10-19 DIAGNOSIS — R26.81 GAIT INSTABILITY: Primary | ICD-10-CM

## 2020-10-19 PROCEDURE — 99214 OFFICE O/P EST MOD 30 MIN: CPT | Performed by: FAMILY MEDICINE

## 2020-10-26 ENCOUNTER — TELEPHONE (OUTPATIENT)
Dept: FAMILY MEDICINE CLINIC | Facility: CLINIC | Age: 55
End: 2020-10-26

## 2020-10-26 DIAGNOSIS — N39.0 URINARY TRACT INFECTION WITHOUT HEMATURIA, SITE UNSPECIFIED: Primary | ICD-10-CM

## 2020-10-26 RX ORDER — FLUCONAZOLE 150 MG/1
TABLET ORAL
Qty: 2 TABLET | Refills: 0 | Status: SHIPPED | OUTPATIENT
Start: 2020-10-26 | End: 2021-01-13

## 2020-10-26 RX ORDER — NITROFURANTOIN 25; 75 MG/1; MG/1
100 CAPSULE ORAL 2 TIMES DAILY
Qty: 14 CAPSULE | Refills: 0 | Status: SHIPPED | OUTPATIENT
Start: 2020-10-26 | End: 2020-11-02

## 2020-10-26 NOTE — TELEPHONE ENCOUNTER
PATIENT CALLED STATING SHE FEELS LIKE SHE HAS A KIDNEY INFECTION AND A YEAST INFECTION.  SHE STARTED FEELING BAD YESTERDAY.  SHE WOULD LIKE A CALL BACK IN REGARDS TO GETTING MEDICATION.    PLEASE CALL AND ADVISE  [phone]    [facility] - [place], [place] - [facility] JOSE DELUNA  [phone] [facility] [phone] FX

## 2020-10-27 ENCOUNTER — LAB (OUTPATIENT)
Dept: LAB | Facility: HOSPITAL | Age: 55
End: 2020-10-27

## 2020-10-27 ENCOUNTER — TELEPHONE (OUTPATIENT)
Dept: FAMILY MEDICINE CLINIC | Facility: CLINIC | Age: 55
End: 2020-10-27

## 2020-10-27 DIAGNOSIS — N39.0 URINARY TRACT INFECTION WITHOUT HEMATURIA, SITE UNSPECIFIED: ICD-10-CM

## 2020-10-27 LAB
BACTERIA UR QL AUTO: ABNORMAL /HPF
BILIRUB UR QL STRIP: ABNORMAL
CLARITY UR: CLEAR
COLOR UR: YELLOW
GLUCOSE UR STRIP-MCNC: ABNORMAL MG/DL
HGB UR QL STRIP.AUTO: NEGATIVE
HYALINE CASTS UR QL AUTO: ABNORMAL /LPF
KETONES UR QL STRIP: ABNORMAL
LEUKOCYTE ESTERASE UR QL STRIP.AUTO: ABNORMAL
NITRITE UR QL STRIP: NEGATIVE
PH UR STRIP.AUTO: 5 [PH] (ref 5–8)
PROT UR QL STRIP: ABNORMAL
RBC # UR: ABNORMAL /HPF
REF LAB TEST METHOD: ABNORMAL
SP GR UR STRIP: 1.02 (ref 1–1.03)
SQUAMOUS #/AREA URNS HPF: ABNORMAL /HPF
UROBILINOGEN UR QL STRIP: ABNORMAL
WBC UR QL AUTO: ABNORMAL /HPF

## 2020-10-27 PROCEDURE — 87086 URINE CULTURE/COLONY COUNT: CPT

## 2020-10-27 PROCEDURE — 81015 MICROSCOPIC EXAM OF URINE: CPT

## 2020-10-27 PROCEDURE — 81003 URINALYSIS AUTO W/O SCOPE: CPT

## 2020-10-27 NOTE — TELEPHONE ENCOUNTER
PATIENT CALLING IN REGARDS TO NEEDING A PACK BUS TO GO TO [facility] AND SHE NEEDS THIS AUTHORIZED TODAY.    PATIENT CALLBACK # [phone]

## 2020-10-27 NOTE — TELEPHONE ENCOUNTER
Spoke to pt and made aware that after we receive the fax from [facility] I will give it to PCP and then fax to them. She voiced understanding.

## 2020-10-28 LAB — BACTERIA SPEC AEROBE CULT: NORMAL

## 2020-10-29 ENCOUNTER — TELEPHONE (OUTPATIENT)
Dept: FAMILY MEDICINE CLINIC | Facility: CLINIC | Age: 55
End: 2020-10-29

## 2020-10-29 NOTE — TELEPHONE ENCOUNTER
----- Message from Gato Crane MD sent at 10/28/2020 10:01 AM CDT -----  Urine culture shows mixed amarilys. Likely needs recollection.. Continue with abx until finished. Have pt call back if symptoms not improving thanks

## 2020-10-29 NOTE — TELEPHONE ENCOUNTER
----- Message from Gato Crane MD sent at 10/28/2020  8:08 AM CDT -----  Has 3-5 WBC in urine to suggest UTI. Awaiting urine culture

## 2020-10-30 ENCOUNTER — TREATMENT (OUTPATIENT)
Dept: PHYSICAL THERAPY | Facility: CLINIC | Age: 55
End: 2020-10-30

## 2020-10-30 DIAGNOSIS — Z91.81 AT HIGH RISK FOR FALLS: ICD-10-CM

## 2020-10-30 DIAGNOSIS — R26.81 GAIT INSTABILITY: Primary | ICD-10-CM

## 2020-10-30 DIAGNOSIS — S88.119A BELOW KNEE AMPUTATION (HCC): ICD-10-CM

## 2020-10-30 DIAGNOSIS — I69.393 ATAXIA DUE TO OLD CEREBROVASCULAR ACCIDENT (CVA): ICD-10-CM

## 2020-10-30 PROCEDURE — 97162 PT EVAL MOD COMPLEX 30 MIN: CPT | Performed by: PHYSICAL THERAPIST

## 2020-10-30 NOTE — PROGRESS NOTES
Physical Therapy Initial Evaluation and Plan of Care      Patient: Marta Giraldo   : 1965  Diagnosis/ICD-10 Code:  Gait instability [R26.81]  Referring practitioner: Gato Crane MD  Date of Initial Visit: 10/30/2020  Today's Date: 10/30/2020  Patient seen for 1 sessions    Next MD appt: 2020.  Recertification: 2020          Subjective Questionnaire: LEFS:       Subjective Evaluation    History of Present Illness  Date of surgery: .    Subjective comment: She reports she only had 1 week of PT after she got her amputation. She reports after she got her leg she had just a little bit of PT only because she was in the hospital for something else. She reports phantom pain in the R leg.  Patient Occupation: Unemployed Pain  Current pain ratin  At best pain rating: 3  At worst pain rating: 10  Location: R leg  Quality: discomfort  Alleviating factors: nothing really.    Social Support  Lives in: one-story house (no steps, no ramp)  Lives with: alone    Hand dominance: right    Treatments  No previous or current treatments  Patient Goals  Patient goal: Patient wishes to be able to walk more.           Objective          Static Posture     Knee   Genu valgus.     Strength/Myotome Testing     Left Hip   Planes of Motion   Flexion: 4  Extension: 4  Abduction: 4  Adduction: 4    Right Hip   Planes of Motion   Flexion: 4  Extension: 4  Abduction: 4  Adduction: 4    Left Knee   Flexion: 4-  Extension: 4+    Right Knee   Flexion: 4-  Extension: 4    Left Ankle/Foot   Dorsiflexion: 5  Plantar flexion: 4    Ambulation   Weight-Bearing Status   Weight-Bearing Status (Left): weight-bearing as tolerated   Assistive device used: wheelchair and front-wheeled walker    Additional Weight-Bearing Status Details  Cmes in in power wheelchair, new power wheelchair is on order.  Utilizes front wheeled RW in clinic for measurements and functional tests.      Ambulation: Level Surfaces   Ambulation  "with assistive device: contact guard assist    Ambulation: Stairs     Additional Stairs Ambulation Details  Not assessed.    Observational Gait   Gait: antalgic and asymmetric   Decreased walking speed, stride length, right stance time, left step length and right step length.   Left foot contact pattern: foot flat  Right foot contact pattern: foot flat  Base of support: normal    Quality of Movement During Gait     Knee    Knee (Right): Positive increased flexion during stance.     Comments   Able to ambulate 70' with RW, CGA today in the clinic with above gait pattern and deviations. Step to gait was utilizes versus step through.    Functional Assessment     Comments  30\" sit to/from stand test: 4 reps, no eccentric control, B UE A, missed initial attempt. Had to have chair and RW steadied.  TU\", 45\"; B UE A sit to stand, utilized RW, no eccentric control stand to sit.  Rhomberg EO: 12\", 14\"  Rhomberg EC: 3-5\"            Assessment & Plan     Assessment  Impairments: abnormal coordination, abnormal gait, activity intolerance, impaired balance, impaired physical strength, lacks appropriate home exercise program and safety issue  Assessment details: Patient has ever rally had any PT for gait, balance, and strength/endurance since her amputations. She has goals of walking but lacks in all of these areas. Fatigued toda with just the evaluation. Poor overall strength and eccentric control which hinders safety also. Lack of overall balance also. Discussed today with the patient about starting to build up endurance by even doing sitting alt. marching, alt LAQ, L toe taps/heel raises. Patient's insurance requires authorization prior to treatment beginning. Small HEP was established verbally as stated above.  Prognosis: fair  Prognosis details: Barriers to Rehab: Include significant or possible arthritic/degenerative changes that have occurred within the joint/spine, The chronicity of this issue, The patient's " obesity  The patient's generally deconditioned state.    Safety Issues: Fall risk    Functional Limitations: carrying objects, lifting, walking, uncomfortable because of pain, standing, stooping and unable to perform repetitive tasks  Goals  Plan Goals: Short Term Goals:  1) Patient I with HEP and have additoins/changes by next recertification.    2) Patient able to ambulate 150 feet with RW with good heel to toe gait and = step lengths, step through gait pattern and no LOB.    3) Patient able to perform sit to/from stand with 1 UE A = WB B LE x5 reps, with good eccentric control in 30 seconds.    4) TUG in <= 30 seconds with RW assistive device, good eccentric control and safely with no LOB.     5) Rhomberg EO >= 30 seconds.    6) Rhomberg EC >= 10 seconds.    7) B hips >= 4/5    8) Patient to show understanding of sock use for proper prosthetic fit.    Long Term Goals:  1) B LE 5/5.    2) Patient able to ascend/descend 3 steps with B hand rail assist, no increase in pain x5 reps.    3) Patient able to ambulate with/without least restrictive assistive device non-antalgicaly >= 300 feet.    4) TUG in <= 25 seconds with/without least restrictive assistive device, good eccentric control and safely with no LOB.     5) patient able to perform 20 sit to/from stand with 1 UE A.    6) Rhomberg EO >= 60 seconds.    7) Rhomberg EC >= 30 seconds.    8) Patient able to perform double limb reaching 12 inches from midline body with arm outstretched with no LOB.    9) Patient to be I with final HEP.      Plan  Therapy options: will be seen for skilled physical therapy services  Planned therapy interventions: ADL retraining, balance/weight-bearing training, body mechanics training, flexibility, functional ROM exercises, gait training, home exercise program, IADL retraining, motor coordination training, neuromuscular re-education, postural training, strengthening, stretching, therapeutic activities and transfer training  Duration  in visits: 20  Treatment plan discussed with: patient  Plan details: Progress overall strength, endurance, balance, gait, and educate in mirror therapy and sock wear for proper prosthetic fit.      Other therapeutic activities and/or exercises will be prescribed depending on the patients progress or lack there of.     Visit Diagnoses:    ICD-10-CM ICD-9-CM   1. Gait instability  R26.81 781.2   2. Below knee amputation (CMS/HCC)  S88.119A 897.0   3. At high risk for falls  Z91.81 V15.88   4. 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)  I69.393 438.84       Timed:  Manual Therapy:         mins  21056;  Therapeutic Exercise:         mins  66287;     Aquatic Ther Ex:         mins  06847;     Neuromuscular Cindi:        mins  27820;    Therapeutic Activity:          mins  00417;     Gait Training:           mins  82995;     Ultrasound:          mins  49158;    Paraffin:        mins  60371;  Electrical Stimulation:         mins  22438 ( );    Untimed:  Electrical Stimulation:         mins  64069 ( );  Mechanical Traction:         mins  28588;     Timed Treatment:      mins   Total Treatment:     40   mins    PT SIGNATURE: Christa Abbott, BOGDAN DPT, [facility]   DATE TREATMENT INITIATED: 10/30/2020    Initial Certification  Certification Period: 1/28/2021  I certify that the therapy services are furnished while this patient is under my care.  The services outlined above are required by this patient, and will be reviewed every 90 days.     PHYSICIAN: Gato Crane MD      DATE:     Please sign and return via fax to  .. Thank you, [facility].

## 2020-11-05 ENCOUNTER — TREATMENT (OUTPATIENT)
Dept: PHYSICAL THERAPY | Facility: CLINIC | Age: 55
End: 2020-11-05

## 2020-11-05 DIAGNOSIS — Z91.81 AT HIGH RISK FOR FALLS: ICD-10-CM

## 2020-11-05 DIAGNOSIS — S88.119A BELOW KNEE AMPUTATION (HCC): ICD-10-CM

## 2020-11-05 DIAGNOSIS — I69.393 ATAXIA DUE TO OLD CEREBROVASCULAR ACCIDENT (CVA): ICD-10-CM

## 2020-11-05 DIAGNOSIS — E11.42 DIABETIC POLYNEUROPATHY ASSOCIATED WITH TYPE 2 DIABETES MELLITUS (HCC): ICD-10-CM

## 2020-11-05 DIAGNOSIS — R26.81 GAIT INSTABILITY: Primary | ICD-10-CM

## 2020-11-05 DIAGNOSIS — F33.1 MODERATE EPISODE OF RECURRENT MAJOR DEPRESSIVE DISORDER (HCC): ICD-10-CM

## 2020-11-05 PROCEDURE — 97110 THERAPEUTIC EXERCISES: CPT | Performed by: PHYSICAL THERAPIST

## 2020-11-05 PROCEDURE — 97116 GAIT TRAINING THERAPY: CPT | Performed by: PHYSICAL THERAPIST

## 2020-11-05 PROCEDURE — 97530 THERAPEUTIC ACTIVITIES: CPT | Performed by: PHYSICAL THERAPIST

## 2020-11-05 NOTE — PROGRESS NOTES
Physical Therapy Daily Progress Note    Patient: Marta Giraldo   : 1965  Diagnosis/ICD-10 Code:     Diagnosis Plan   1. Gait instability     2. Below knee amputation (CMS/HCC)     3. At high risk for falls     4. 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 Diabetic polyneuropathy associated with type 2 diabetes mellitus (CMS/HCC)     6. Moderate episode of recurrent major depressive disorder (CMS/HCC)       Referring practitioner: Gato Crane MD  Date of Initial Visit: Type: THERAPY  Noted: 10/30/2020  Today's Date: 2020  Patient seen for 2 sessions      PT Recheck Due: 2020  PT MD Visit: 2020       Marta Giraldo       Subjective Evaluation    History of Present Illness    Subjective comment: Pt states she is doing okay today.  Having some pain in her feet.  Pain  Current pain ratin             Objective   See Exercise, Manual, and Modality Logs for complete treatment.       Assessment & Plan     Assessment  Assessment details: Pt here at incorrect appointment time.  Education on residual limb care, stump socks and proper ply wear for prosthesis.  Gait with CGA and FWW for safety mostly with turns.  Poor safety awareness with transfers/descent from standing to sit. Pt educated on safety with hand placement during transfers and safety with set up for transfers out of wc.  Pt provided with written HEP and verbal instructions .  Fatigues with activity.  Multiple rest breaks to complete 10 min on Pro II .     Plan  Plan details: Continue with strength and endurance work.  Progress gait.  Add mirror therapy for phantom pain next visit.       Progress per Plan of Care and Progress strengthening /stabilization /functional activity            Timed:  Manual Therapy:         mins  91815;  Therapeutic Exercise:    14     mins  76192;   Aquatic Therex :        mins  48670    Neuromuscular Cindi:        mins  20243;    Therapeutic Activity:     18     mins  93979;     Gait  Trainin     mins  49977;     Ultrasound:          mins  68479;    Electrical Stimulation:         mins  57861 ( );    Untimed:  Electrical Stimulation:         mins  71763 ( );  Mechanical Traction:         mins  56436;   Orthotic fit/train:         mins  78374    Timed Treatment:   46   mins   Total Treatment:     46   mins  Simona Marie PTA  Physical Therapist Assistant

## 2020-11-06 DIAGNOSIS — E11.40 TYPE 2 DIABETES MELLITUS WITH DIABETIC NEUROPATHY, WITH LONG-TERM CURRENT USE OF INSULIN (HCC): ICD-10-CM

## 2020-11-06 DIAGNOSIS — Z79.4 TYPE 2 DIABETES MELLITUS WITH DIABETIC NEUROPATHY, WITH LONG-TERM CURRENT USE OF INSULIN (HCC): ICD-10-CM

## 2020-11-06 RX ORDER — ATORVASTATIN CALCIUM 40 MG/1
TABLET, FILM COATED ORAL
Qty: 30 TABLET | Refills: 3 | Status: SHIPPED | OUTPATIENT
Start: 2020-11-06 | End: 2020-12-13 | Stop reason: SDUPTHER

## 2020-11-06 RX ORDER — INSULIN GLARGINE 100 [IU]/ML
35 INJECTION, SOLUTION SUBCUTANEOUS NIGHTLY
Qty: 9 ML | Refills: 3 | Status: SHIPPED | OUTPATIENT
Start: 2020-11-06 | End: 2020-11-30 | Stop reason: SDUPTHER

## 2020-11-06 RX ORDER — PNV NO.95/FERROUS FUM/FOLIC AC 28MG-0.8MG
TABLET ORAL
Qty: 30 TABLET | Refills: 3 | Status: SHIPPED | OUTPATIENT
Start: 2020-11-06 | End: 2020-11-30 | Stop reason: SDUPTHER

## 2020-11-06 RX ORDER — LEVOTHYROXINE SODIUM 0.03 MG/1
TABLET ORAL
Qty: 30 TABLET | Refills: 3 | Status: SHIPPED | OUTPATIENT
Start: 2020-11-06 | End: 2020-12-13 | Stop reason: SDUPTHER

## 2020-11-09 ENCOUNTER — TELEPHONE (OUTPATIENT)
Dept: FAMILY MEDICINE CLINIC | Facility: CLINIC | Age: 55
End: 2020-11-09

## 2020-11-09 ENCOUNTER — TREATMENT (OUTPATIENT)
Dept: PHYSICAL THERAPY | Facility: CLINIC | Age: 55
End: 2020-11-09

## 2020-11-09 DIAGNOSIS — Z91.81 AT HIGH RISK FOR FALLS: ICD-10-CM

## 2020-11-09 DIAGNOSIS — S88.119A BELOW KNEE AMPUTATION (HCC): ICD-10-CM

## 2020-11-09 DIAGNOSIS — R26.81 GAIT INSTABILITY: Primary | ICD-10-CM

## 2020-11-09 DIAGNOSIS — E11.42 DIABETIC POLYNEUROPATHY ASSOCIATED WITH TYPE 2 DIABETES MELLITUS (HCC): ICD-10-CM

## 2020-11-09 DIAGNOSIS — I69.393 ATAXIA DUE TO OLD CEREBROVASCULAR ACCIDENT (CVA): ICD-10-CM

## 2020-11-09 PROCEDURE — 97530 THERAPEUTIC ACTIVITIES: CPT | Performed by: PHYSICAL THERAPIST

## 2020-11-09 PROCEDURE — 97110 THERAPEUTIC EXERCISES: CPT | Performed by: PHYSICAL THERAPIST

## 2020-11-09 PROCEDURE — 97116 GAIT TRAINING THERAPY: CPT | Performed by: PHYSICAL THERAPIST

## 2020-11-09 RX ORDER — PREGABALIN 100 MG/1
100 CAPSULE ORAL
Qty: 90 CAPSULE | Refills: 0 | Status: SHIPPED | OUTPATIENT
Start: 2020-11-09 | End: 2020-11-30

## 2020-11-11 ENCOUNTER — TREATMENT (OUTPATIENT)
Dept: PHYSICAL THERAPY | Facility: CLINIC | Age: 55
End: 2020-11-11

## 2020-11-11 DIAGNOSIS — Z91.81 AT HIGH RISK FOR FALLS: ICD-10-CM

## 2020-11-11 DIAGNOSIS — R26.81 GAIT INSTABILITY: Primary | ICD-10-CM

## 2020-11-11 DIAGNOSIS — E11.42 DIABETIC POLYNEUROPATHY ASSOCIATED WITH TYPE 2 DIABETES MELLITUS (HCC): ICD-10-CM

## 2020-11-11 DIAGNOSIS — I69.393 ATAXIA DUE TO OLD CEREBROVASCULAR ACCIDENT (CVA): ICD-10-CM

## 2020-11-11 DIAGNOSIS — S88.119A BELOW KNEE AMPUTATION (HCC): ICD-10-CM

## 2020-11-11 PROCEDURE — 97530 THERAPEUTIC ACTIVITIES: CPT | Performed by: PHYSICAL THERAPIST

## 2020-11-11 PROCEDURE — 97116 GAIT TRAINING THERAPY: CPT | Performed by: PHYSICAL THERAPIST

## 2020-11-11 PROCEDURE — 97110 THERAPEUTIC EXERCISES: CPT | Performed by: PHYSICAL THERAPIST

## 2020-11-11 NOTE — PROGRESS NOTES
Physical Therapy Daily Progress Note      Patient: Marta Giraldo   : 1965  Referring practitioner: Gato Crane MD  Date of Initial Visit: Type: THERAPY  Noted: 10/30/2020  Today's Date: 2020  Patient seen for 4 sessions    Recert due:  20  MD followup:  20     Marta Giraldo reports:   Subjective Questionnaire:       Subjective  Pt reports doing very little walking at home.  Does some standing for ADLs.  Has B hip pain with walking.     Objective  Presents via power w/c.  CGA with transfers and gait.    See Exercise, Manual, and Modality Logs for complete treatment.       Assessment & Plan     Assessment  Assessment details: Pt fatigues quickly with gait using FWW.  Requires CGA with turns.  Decreased eccentric control with stand to sit.  Stood in // bars x 5 min with LE exercises.  Doesn't utilize prosthesis much at home and relies on power chair for most mobility.  Will need to increase endurance for functional mobility.      Plan  Plan details: Cont gait training and standing tolerance activities.         Visit Diagnoses:    ICD-10-CM ICD-9-CM   1. Gait instability  R26.81 781.2   2. Below knee amputation (CMS/[facility])  S88.119A 897.0   3. At high risk for falls  Z91.81 V15.88   4. 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 Diabetic polyneuropathy associated with type 2 diabetes mellitus (CMS/[facility])  E11.42 250.60     357.2                  Timed:  Manual Therapy:         mins  01707;  Therapeutic Exercise:       mins  79971;     Neuromuscular Cindi:        mins  62697;    Therapeutic Activity:    20    mins  49974;     Gait Training:     15      mins  08015;     Ultrasound:          mins  45164;    Electrical Stimulation:         mins  42715 ( );    Untimed:  Electrical Stimulation:         mins  77173 ( );  Mechanical Traction:         mins  79672;     Timed Treatment      35   mins   Total Treatment:       35    mins  Neela Black  PTA  Physical Therapist Assistant

## 2020-11-16 ENCOUNTER — TREATMENT (OUTPATIENT)
Dept: PHYSICAL THERAPY | Facility: CLINIC | Age: 55
End: 2020-11-16

## 2020-11-16 DIAGNOSIS — R26.81 GAIT INSTABILITY: Primary | ICD-10-CM

## 2020-11-16 DIAGNOSIS — I69.393 ATAXIA DUE TO OLD CEREBROVASCULAR ACCIDENT (CVA): ICD-10-CM

## 2020-11-16 DIAGNOSIS — S88.119A BELOW KNEE AMPUTATION (HCC): ICD-10-CM

## 2020-11-16 PROCEDURE — 97110 THERAPEUTIC EXERCISES: CPT | Performed by: PHYSICAL THERAPIST

## 2020-11-16 PROCEDURE — 97530 THERAPEUTIC ACTIVITIES: CPT | Performed by: PHYSICAL THERAPIST

## 2020-11-16 PROCEDURE — 97116 GAIT TRAINING THERAPY: CPT | Performed by: PHYSICAL THERAPIST

## 2020-11-16 NOTE — PROGRESS NOTES
Physical Therapy Daily Progress Note    Patient: Marta Giraldo   : 1965  Diagnosis/ICD-10 Code:     Diagnosis Plan   1. Gait instability     2. Below knee amputation (CMS/HCC)     3. 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  Having a little pain in her L foot today.  Pain  Current pain rating: 3             Objective   See Exercise, Manual, and Modality Logs for complete treatment.       Assessment & Plan     Assessment  Assessment details: Pt improving with endurance and safety with transfers.  Continues to fatigue quickly with gait and sit to stands.  Attempts single UE use for sit to stand unsuccessful.  Cues with gait for increased hip flexion and knee extension to improve heel strike on RLE.  Pt provided with contact information for prosthetic company to adjust for improved gait pattern ( articulating ankle) .   Pt reports inability to recall HEP.  States she has to use the paper to remember.  Pt states she keeps them by her chair at home and is doing them daily.  Pt encouraged to  kitchen with small activity (cards/magazine etc) to increase LE strength and endurance.  Pt tolerated standing activity this date with lateral weight shifts without increased pain but does fatigue after one round.  Improved tolerance with PRO II activity with less rest breaks.  Continues to requires lengthy rest breaks between gait and standing activity due to mm fatigue. Less cues required this date for safe hand placement for safety with transfers     Goals  Plan Goals:  Short Term Goals:  1) Patient I with HEP and have  "additoins/changes by next recertification. (ongoing)     2) Patient able to ambulate 150 feet with RW with good heel to toe gait and = step lengths, step through gait pattern and no LOB.    3) Patient able to perform sit to/from stand with 1 UE A = WB B LE x5 reps, with good eccentric control in 30 seconds.    4) TUG in <= 30 seconds with RW assistive device, good eccentric control and safely with no LOB.     5) Rhomberg EO >= 30 seconds.    6) Rhomberg EC >= 10 seconds.    7) B hips >= 4/5    8) Patient to show understanding of sock use for proper prosthetic fit. (ongoing)     Long Term Goals:  1) B LE 5/5.    2) Patient able to ascend/descend 3 steps with B hand rail assist, no increase in pain x5 reps.    3) Patient able to ambulate with/without least restrictive assistive device non-antalgicaly >= 300 feet.    4) TUG in <= 25 seconds with/without least restrictive assistive device, good eccentric control and safely with no LOB.     5) patient able to perform 20 sit to/from stand with 1 UE A.    6) Rhomberg EO >= 60 seconds.    7) Rhomberg EC >= 30 seconds.    8) Patient able to perform double limb reaching 12 inches from midline body with arm outstretched with no LOB.    9) Patient to be I with final HEP.    Plan  Plan details: Next visit add step up to 4\" step, continue with LE strengthening and       Progress per Plan of Care and Progress strengthening /stabilization /functional activity            Timed:  Manual Therapy:         mins  67963;  Therapeutic Exercise:    24     mins  08361;   Aquatic Therex :        mins  78862    Neuromuscular Cindi:        mins  72261;    Therapeutic Activity:   10    mins  14514;     Gait Trainin     mins  59793;     Ultrasound:          mins  04560;    Electrical Stimulation:         mins  33661 ( );    Untimed:  Electrical Stimulation:         mins  51539 ( );  Mechanical Traction:         mins  57689;   Orthotic fit/train:         mins  72049    Timed " Treatment:   53   mins   Total Treatment:     53   mins  Simona Marie PTA  Physical Therapist Assistant

## 2020-11-19 ENCOUNTER — TREATMENT (OUTPATIENT)
Dept: PHYSICAL THERAPY | Facility: CLINIC | Age: 55
End: 2020-11-19

## 2020-11-19 DIAGNOSIS — Z91.81 AT HIGH RISK FOR FALLS: ICD-10-CM

## 2020-11-19 DIAGNOSIS — E11.42 DIABETIC POLYNEUROPATHY ASSOCIATED WITH TYPE 2 DIABETES MELLITUS (HCC): ICD-10-CM

## 2020-11-19 DIAGNOSIS — I69.393 ATAXIA DUE TO OLD CEREBROVASCULAR ACCIDENT (CVA): ICD-10-CM

## 2020-11-19 DIAGNOSIS — S88.119A BELOW KNEE AMPUTATION (HCC): ICD-10-CM

## 2020-11-19 DIAGNOSIS — R26.81 GAIT INSTABILITY: Primary | ICD-10-CM

## 2020-11-19 DIAGNOSIS — F33.1 MODERATE EPISODE OF RECURRENT MAJOR DEPRESSIVE DISORDER (HCC): ICD-10-CM

## 2020-11-19 PROCEDURE — 97116 GAIT TRAINING THERAPY: CPT | Performed by: PHYSICAL THERAPIST

## 2020-11-19 PROCEDURE — 97530 THERAPEUTIC ACTIVITIES: CPT | Performed by: PHYSICAL THERAPIST

## 2020-11-19 PROCEDURE — 97112 NEUROMUSCULAR REEDUCATION: CPT | Performed by: PHYSICAL THERAPIST

## 2020-11-19 PROCEDURE — 97110 THERAPEUTIC EXERCISES: CPT | Performed by: PHYSICAL THERAPIST

## 2020-11-19 NOTE — PROGRESS NOTES
Re-Assessment / Re-Certification      Patient: Marta Giraldo   : 1965  Diagnosis/ICD-10 Code:  Gait instability [R26.81]  Referring practitioner: Gato Crane MD  Date of Initial Visit: Type: THERAPY  Noted: 10/30/2020  Today's Date: 2020  Patient seen for 6 sessions    Next MD appt: 2020.  Recertification: 12/15/2020     Subjective:   Marta Giraldo reports: 60% improvement  Subjective Questionnaire: LEFS:   Clinical Progress: improved  Home Program Compliance: Yes  Treatment has included: therapeutic exercise, neuromuscular re-education, therapeutic activity and gait training    Subjective Evaluation    History of Present Illness    Subjective comment: Patient reports her B legs and foot are hurting today. Pain  Current pain rating: 3         Objective          Static Posture     Knee   Genu valgus.     Strength/Myotome Testing     Left Hip   Planes of Motion   Flexion: 4  Extension: 4  Abduction: 4  Adduction: 4    Right Hip   Planes of Motion   Flexion: 4  Extension: 4  Abduction: 4  Adduction: 4    Left Knee   Flexion: 4  Extension: 4+    Right Knee   Flexion: 4  Extension: 4    Left Ankle/Foot   Dorsiflexion: 5  Plantar flexion: 4    Ambulation   Weight-Bearing Status   Weight-Bearing Status (Left): weight-bearing as tolerated   Assistive device used: wheelchair and front-wheeled walker    Additional Weight-Bearing Status Details  Cmes in in new power wheelchair.  Utilizes front wheeled RW in clinic for measurements and functional tests.      Ambulation: Level Surfaces   Ambulation with assistive device: SBA.    Ambulation: Stairs     Additional Stairs Ambulation Details  Not assessed.    Observational Gait   Gait: antalgic and asymmetric   Decreased walking speed, stride length, right stance time, left step length and right step length.   Left foot contact pattern: heel to toe  Right foot contact pattern: toe to heel  Base of support: normal    Quality of Movement During  "Gait     Knee    Knee (Right): Positive increased flexion during stance.     Comments   Able to ambulate 58' and 50'' with RW, SBA today in the clinic with above gait pattern and deviations. Step to gait was utilizes versus step through.    Functional Assessment     Comments  30\" sit to/from stand test: x3.5 reps, fair eccentric control after cueing, RW in front of patient to balance once standing.  TU\", 43\"; B UE A sit to stand, utilized RW, good eccentric control stand to sit.  Stand stance with no UE support: 28\", 28\", 26\"  Rhomberg EO: 13\", 12\", 13\"  Rhomberg EC: not tested        Assessment & Plan     Assessment  Impairments: abnormal coordination, abnormal gait, activity intolerance, impaired balance, impaired physical strength, lacks appropriate home exercise program and safety issue  Assessment details: Patient has been encouraged to contact the prosthetist for examination of prosthetic and possible adjustments as indicated. Patient has full PROM extension of the R knee, but ambulated with knee flexed and toe to heel gait versus heel to toe. Patient still has lack of overall endurance and strength. Still not sure on sock understanding for the patient either. Slowly making progress towards goals.  Prognosis: fair  Prognosis details: Barriers to Rehab: Include significant or possible arthritic/degenerative changes that have occurred within the joint/spine, The chronicity of this issue, The patient's obesity  The patient's generally deconditioned state.    Safety Issues: Fall risk    Functional Limitations: carrying objects, lifting, walking, uncomfortable because of pain, standing, stooping and unable to perform repetitive tasks  Goals  Plan Goals:  Short Term Goals:  1) Patient I with HEP and have additoins/changes by next recertification. (met/ongoing)     2) Patient able to ambulate 150 feet with RW with good heel to toe gait and = step lengths, step through gait pattern and no LOB. " (ongoing/progressing)    3) Patient able to perform sit to/from stand with 1 UE A = WB B LE x5 reps, with good eccentric control in 30 seconds. (ongoing/progressing)    4) TUG in <= 30 seconds with RW assistive device, good eccentric control and safely with no LOB. (ongoing/progressing)    5) Rhomberg EO >= 30 seconds. (ongoing)    6) Rhomberg EC >= 10 seconds. (ongoing)    7) B hips >= 4/5 (partially met/ongoing)    8) Patient to show understanding of sock use for proper prosthetic fit. (ongoing)     9) B quads 5/5. (new)    10) B hamstrings >= 4+/5. (new)      Long Term Goals:  1) B LE 5/5, excepy hips >= 4+/5 (revised)    2) Patient able to ascend/descend 3 steps with B hand rail assist, no increase in pain x5 reps.    3) Patient able to ambulate with/without least restrictive assistive device non-antalgicaly >= 300 feet.    4) TUG in <= 25 seconds with/without least restrictive assistive device, good eccentric control and safely with no LOB.     5) patient able to perform 20 sit to/from stand with 1 UE A.    6) Rhomberg EO >= 30 seconds. (revised)    7) Rhomberg EC >= 15 seconds. (revised)    8) Patient able to perform double limb reaching 12 inches from midline body with arm outstretched with no LOB.    9) Patient to be I with final HEP.    10) Standard Stance no UE support for 60 seconds. (new)    Plan  Therapy options: will be seen for skilled physical therapy services  Planned therapy interventions: ADL retraining, balance/weight-bearing training, body mechanics training, flexibility, functional ROM exercises, gait training, home exercise program, IADL retraining, motor coordination training, neuromuscular re-education, postural training, strengthening, stretching, therapeutic activities and transfer training  Duration in visits: 20  Treatment plan discussed with: patient  Plan details: Progress original POC. Continue to work on standing endurance, overall strength, and gait. Work on pre-gait activities to  see is can encourage a more heel to toe and step through gait versus step to.      Other therapeutic activities and/or exercises will be prescribed depending on the patients progress or lack there of.     Visit Diagnoses:    ICD-10-CM ICD-9-CM   1. Gait instability  R26.81 781.2   2. Below knee amputation (CMS/[facility])  S88.119A 897.0   3. 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 At high risk for falls  Z91.81 V15.88   5. Diabetic polyneuropathy associated with type 2 diabetes mellitus (CMS/[facility])  E11.42 250.60     357.2   6. Moderate episode of recurrent major depressive disorder (CMS/[facility])  F33.1 296.32       Progress toward previous goals: Partially Met        Recommendations: Continue as planned  Timeframe: 2 months  Prognosis to achieve goals: fair    PT Signature: Christa Abbott, PT DPT, CSCS      Based upon review of the patient's progress and continued therapy plan, it is my medical opinion that Marta Giraldo should continue physical therapy treatment at [facility]  [address] [place] [address] [phone].    Signature: __________________________________  Gato Crane MD    Timed:  Manual Therapy:         mins  20841;  Therapeutic Exercise:    15     mins  42902;     Aquatic Ther Ex:         mins  74033;     Neuromuscular Cindi:   10     mins  69262;    Therapeutic Activity:     9     mins  67493;     Gait Training:      15     mins  24357;     Ultrasound:          mins  90740;    Paraffin:        mins  00792;  Electrical Stimulation:         mins  50035 ( );    Untimed:  Electrical Stimulation:         mins  77172 ( );  Mechanical Traction:         mins  02200;     Timed Treatment:   54   mins   Total Treatment:     54   mins

## 2020-11-21 NOTE — PROGRESS NOTES
Subjective:  Marta Giraldo is a 55 y.o. female who presents for diabetes       Patient Active Problem List   Diagnosis   • Class 1 obesity with serious comorbidity and body mass index (BMI) of 32.0 to 32.9 in adult   • Moderate episode of recurrent major depressive disorder (CMS/HCC)   • Encounter for screening for malignant neoplasm of colon   • Gastroesophageal reflux disease   • Hypothyroidism   • Microcytic anemia   • Vitamin D deficiency   • Uncontrolled type 2 diabetes mellitus with hyperglycemia (CMS/HCC)   • Class 1 obesity with body mass index (BMI) of 32.0 to 32.9 in adult   • Diabetic polyneuropathy associated with type 2 diabetes mellitus (CMS/HCC)   • Gastritis without bleeding   • Class 1 obesity with serious comorbidity and body mass index (BMI) of 33.0 to 33.9 in adult   • At high risk for falls   • Gait instability   • Below knee amputation (CMS/HCC)           Current Outpatient Medications:   •  atorvastatin (LIPITOR) 40 MG tablet, TAKE 1 TABLET DAILY., Disp: 30 tablet, Rfl: 3  •  B-D UF III MINI PEN NEEDLES 31G X 5 MM misc, , Disp: , Rfl:   •  Blood Glucose Monitoring Suppl w/Device kit, Use for E11.65 diabetes to check sugars 4 times a day., Disp: 1 each, Rfl: 0  •  clotrimazole-betamethasone (Lotrisone) 1-0.05 % cream, Apply  topically to the appropriate area as directed 2 (Two) Times a Day., Disp: 1 each, Rfl: 3  •  Empagliflozin 25 MG tablet, Take 25 mg by mouth Daily., Disp: 30 tablet, Rfl: 3  •  exenatide er (BYDUREON) 2 MG pen-injector injection, Inject 1 pen under the skin into the appropriate area as directed 1 (One) Time Per Week., Disp: 2 pen, Rfl: 3  •  ferrous sulfate 325 (65 Fe) MG tablet, TAKE 1 TABLET DAILY WITH BREAKFAST, Disp: 30 tablet, Rfl: 3  •  fluconazole (Diflucan) 150 MG tablet, Take 1 tablet by mouth at the onset of symptoms and repeat in 3 days., Disp: 2 tablet, Rfl: 0  •  glucose (DEX4) 4 GM chewable tablet, Chew 4 tablets As Needed for Low Blood Sugar.,  Disp: 90 tablet, Rfl: 3  •  glucose blood test strip, Use as instructed, Disp: 200 each, Rfl: 12  •  glucose blood test strip, Use as instructed, Disp: 200 each, Rfl: 12  •  Insulin Glargine (BASAGLAR KWIKPEN) 100 UNIT/ML injection pen, Inject 35 Units under the skin into the appropriate area as directed Every Night., Disp: 9 mL, Rfl: 3  •  insulin lispro (HumaLOG) 100 UNIT/ML injection, 5 unit before meals, Disp: 10 mL, Rfl: 3  •  Insulin Pen Needle 32G X 8 MM misc, Use at bedtime, Disp: 100 each, Rfl: 3  •  Insulin Pen Needle 32G X 8 MM misc, Use with humalog TID, Disp: 100 each, Rfl: 3  •  Lancets misc, Use for E11.65 diabetes to check sugars 4 times a day., Disp: 200 each, Rfl: 3  •  levothyroxine (SYNTHROID, LEVOTHROID) 25 MCG tablet, TAKE 1 TABLET DAILY., Disp: 30 tablet, Rfl: 3  •  lisinopril (PRINIVIL,ZESTRIL) 20 MG tablet, Take 1 tablet by mouth Daily., Disp: 30 tablet, Rfl: 3  •  methocarbamol (ROBAXIN) 750 MG tablet, Take 1 tablet by mouth 3 (Three) Times a Day., Disp: 90 tablet, Rfl: 3  •  mupirocin (Bactroban) 2 % cream, Apply  topically to the appropriate area as directed 3 (Three) Times a Day., Disp: 30 g, Rfl: 1  •  mupirocin (BACTROBAN) 2 % ointment, Apply  topically to the appropriate area as directed 3 (Three) Times a Day., Disp: 30 g, Rfl: 1  •  omeprazole (PrilOSEC) 40 MG capsule, Take 1 capsule by mouth Daily., Disp: 30 capsule, Rfl: 11  •  pregabalin (Lyrica) 100 MG capsule, Take 1 capsule by mouth 3 (Three) Times a Day., Disp: 90 capsule, Rfl: 0  •  vilazodone (Viibryd) 20 MG tablet tablet, Take 1 tablet by mouth Daily., Disp: 30 tablet, Rfl: 3  •  vitamin D (ERGOCALCIFEROL) 1.25 MG (25076 UT) capsule capsule, Take 1 capsule by mouth Every 7 (Seven) Days., Disp: 4 capsule, Rfl: 3    HPI        Pt is 54 yo female with management of obesity IDDM, HLP, diabetic neuropathy,  HTN, major depression  type 2, former tobacco smoker, sp below right  knee amputation of right leg , sp appendectomy, sp  bladder surgery,diabetic retinopathy, vitamin D deficiency, hypothryoidism, microcytic anemia , colonic polyps, diverticulosis, internal hemorrhoids/GERD with esophagitis, gastritis. Diabetic retinopathy, cataracts     10/1/20 in office visit for recheck on pt's above medical issues. Since last visit pt saw GI on 9/15/20 for her followup on colonoscopy results and GERD Next colonoscopy to be in 2025. She is doing well overall sugars in morning running  now but on lower side. She is taking Tresiba 40 units at bedtime. Did have some low sugar readings in the morning  Has some high readings on sugar before meals.  She states her neuropathy improved with Lyrica 50 units TID but still has some pain.  No chest pain no dizziness. Did go to [facility] ER last week after suffering a fall. Pt denies syncope.  She is not hurting currently     11/30/20 in office visit for recheck on pt's above medical issues. Pot has yet to get labwork ordered on 10/29/20 she is alone today. Her sister is ill with COVID-19 infection. Pt has not been exposed to sister. Doing well on medications. Sugars stable. States that lyrica 100 mg pO TID is not helping and would like to go up on dosage         Diabetes  She presents for her follow-up diabetic visit. She has type 2 diabetes mellitus. Her disease course has been stable. Pertinent negatives for hypoglycemia include no confusion, dizziness, headaches, hunger, mood changes, nervousness/anxiousness, pallor or seizures. Associated symptoms include fatigue and weakness. Pertinent negatives for diabetes include no blurred vision, no chest pain, no foot paresthesias, no foot ulcerations, no polydipsia, no polyphagia and no polyuria. Pertinent negatives for hypoglycemia complications include no blackouts, no hospitalization, no nocturnal hypoglycemia and no required assistance. Symptoms are stable. Pertinent negatives for diabetic complications include no autonomic neuropathy, CVA, heart disease,  impotence, nephropathy or peripheral neuropathy. Risk factors for coronary artery disease include diabetes mellitus and dyslipidemia. Current diabetic treatment includes insulin injections and oral agent (dual therapy). She is compliant with treatment most of the time. Her weight is stable. She has not had a previous visit with a dietitian. She never participates in exercise. Her home blood glucose trend is decreasing steadily. An ACE inhibitor/angiotensin II receptor blocker is being taken. She does not see a podiatrist.Eye exam is not current.   Hypothyroidism   This is a new problem. The current episode started more than 1 year ago. The problem occurs constantly. The problem has been improved Associated symptoms include arthralgias, fatigue, numbness and weakness. Pertinent negatives include no abdominal pain, anorexia, chest pain, chills, congestion, coughing, diaphoresis, fever, headaches, nausea, sore throat or vomiting. Nothing aggravates the symptoms. She has tried nothing for the symptoms. The treatment provided no relief.    Obesity   This is a chronic problem. The problem occurs constantly. The problem has been unchanged. Associated symptoms include arthralgias, fatigue, numbness and weakness. Pertinent negatives include no chest pain, chills, congestion, coughing, diaphoresis, fever, headaches, nausea, sore throat or vomiting. Nothing aggravates the symptoms. She has tried nothing for the symptoms. The treatment provided no relief    Review of Systems  Review of Systems   Constitutional: Positive for activity change and fatigue. Negative for appetite change, chills, diaphoresis and fever.   HENT: Negative for congestion, postnasal drip, rhinorrhea, sinus pressure, sinus pain, sneezing, sore throat, trouble swallowing and voice change.    Respiratory: Negative for cough, choking, chest tightness, shortness of breath, wheezing and stridor.    Cardiovascular: Negative for chest pain.   Gastrointestinal:  Negative for diarrhea, nausea and vomiting.   Musculoskeletal: Positive for arthralgias.   Neurological: Positive for weakness and numbness. Negative for headaches.       Patient Active Problem List   Diagnosis   • Class 1 obesity with serious comorbidity and body mass index (BMI) of 32.0 to 32.9 in adult   • Moderate episode of recurrent major depressive disorder (CMS/HCC)   • Encounter for screening for malignant neoplasm of colon   • Gastroesophageal reflux disease   • Hypothyroidism   • Microcytic anemia   • Vitamin D deficiency   • Uncontrolled type 2 diabetes mellitus with hyperglycemia (CMS/HCC)   • Class 1 obesity with body mass index (BMI) of 32.0 to 32.9 in adult   • Diabetic polyneuropathy associated with type 2 diabetes mellitus (CMS/HCC)   • Gastritis without bleeding   • Class 1 obesity with serious comorbidity and body mass index (BMI) of 33.0 to 33.9 in adult   • At high risk for falls   • Gait instability   • Below knee amputation (CMS/HCC)     Past Surgical History:   Procedure Laterality Date   • APPENDECTOMY     • BELOW KNEE AMPUTATION     • BLADDER SURGERY     • COLONOSCOPY N/A 2020    Procedure: COLONOSCOPY;  Surgeon: Ashli Gonzalez MD;  Location: [facility] ENDOSCOPY;  Service: Gastroenterology;  Laterality: N/A;   • ENDOSCOPY N/A 2020    Procedure: ESOPHAGOGASTRODUODENOSCOPY;  Surgeon: Ashli Gonzalez MD;  Location: [facility] ENDOSCOPY;  Service: Gastroenterology;  Laterality: N/A;     Social History     Socioeconomic History   • Marital status:      Spouse name: Not on file   • Number of children: Not on file   • Years of education: Not on file   • Highest education level: Not on file   Tobacco Use   • Smoking status: Former Smoker     Quit date: 2016     Years since quittin.9   • Smokeless tobacco: Never Used   Substance and Sexual Activity   • Alcohol use: Not Currently   • Drug use: Never   • Sexual activity: Defer     Family History   Problem Relation Age of Onset   •  Diabetes Other    • Hyperlipidemia Other    • Hypertension Other    • Stroke Other    • Heart disease Other    • Other Other      Lab on 10/27/2020   Component Date Value Ref Range Status   • Urine Culture 10/27/2020 50,000 CFU/mL Mixed Cassidy Isolated   Final   • Color, UA 10/27/2020 Yellow  Yellow, Straw Final   • Appearance, UA 10/27/2020 Clear  Clear Final   • pH, UA 10/27/2020 5.0  5.0 - 8.0 Final   • Specific Gravity, UA 10/27/2020 1.020  1.005 - 1.030 Final   • Glucose, UA 10/27/2020 >=1000 mg/dL (3+)* Negative Final   • Ketones, UA 10/27/2020 40 mg/dL (2+)* Negative Final   • Bilirubin, UA 10/27/2020 Moderate (2+)* Negative Final   • Blood, UA 10/27/2020 Negative  Negative Final   • Protein, UA 10/27/2020 Trace* Negative Final   • Leuk Esterase, UA 10/27/2020 Small (1+)* Negative Final   • Nitrite, UA 10/27/2020 Negative  Negative Final   • Urobilinogen, UA 10/27/2020 1.0 E.U./dL  0.2 - 1.0 E.U./dL Final   • RBC, UA 10/27/2020 None Seen  None Seen, 0-2 /HPF Final   • WBC, UA 10/27/2020 3-5* None Seen, 0-2 /HPF Final   • Bacteria, UA 10/27/2020 None Seen  None Seen /HPF Final   • Squamous Epithelial Cells, UA 10/27/2020 0-2  None Seen, 0-2 /HPF Final   • Hyaline Casts, UA 10/27/2020 None Seen  None Seen /LPF Final   • Methodology 10/27/2020 Manual Light Microscopy   Final   Procedure visit on 10/05/2020   Component Date Value Ref Range Status   • Case Report 10/05/2020    Final                    Value:Gynecologic Cytology Report                       Case: [id number]                                  Authorizing Provider:  Morena Sood APRN   Collected:           10/05/2020 10:27 AM          Ordering Location:     [facility]     Received:            10/05/2020 11:59 AM                                 [facility]                                                                 First Screen:          Alexander Contreras                                                          Specimen:     Sendout to [facility], Cervix                                                                    • Interpretation 10/05/2020 See attached report    Final   • RANDA ALBICANS 10/05/2020 Positive   Final   • GARDNERELLA VAGINALIS 10/05/2020 Negative   Final   • TRICHOMONAS VAGINALIS 10/05/2020 Negative   Final   Admission on 09/02/2020, Discharged on 09/02/2020   Component Date Value Ref Range Status   • COVID19 08/30/2020 Not Detected  Not Detected - Ref. Range Final   • Glucose 09/02/2020 130  70 - 130 mg/dL Final    RN NotifiedOperator: [id number] MATTIE Kelly ID: [id number]   • Case Report 09/02/2020    Final                    Value:Surgical Pathology Report                         Case: [id number]                                  Authorizing Provider:  Ashli Gonzalez MD      Collected:           09/02/2020 12:03 PM          Ordering Location:     [facility]             Received:            09/03/2020 07:22 AM                                 [place]                                                     Pathologist:           Colby Bell MD                                                           Specimens:   1) - Gastric, Antrum, bx                                                                            2) - Esophagus, EG junction bx                                                                      3) - Large Intestine, Rectum, polyps                                                      • Final Diagnosis 09/02/2020    Final                    Value:This result contains rich text formatting which cannot be displayed here.   Lab on 08/11/2020   Component Date Value Ref Range Status   • WBC 08/11/2020 9.81  3.40 - 10.80 10*3/mm3 Final   • RBC 08/11/2020 3.85  3.77 - 5.28 10*6/mm3 Final   • Hemoglobin 08/11/2020 10.3* 12.0 - 15.9 g/dL Final   • Hematocrit 08/11/2020 30.9* 34.0 - 46.6 % Final   • MCV 08/11/2020 80.3  79.0 - 97.0 fL Final   • MCH 08/11/2020 26.8  26.6 - 33.0 pg Final    • MCHC 08/11/2020 33.3  31.5 - 35.7 g/dL Final   • RDW 08/11/2020 13.2  12.3 - 15.4 % Final   • RDW-SD 08/11/2020 37.9  37.0 - 54.0 fl Final   • MPV 08/11/2020 12.2* 6.0 - 12.0 fL Final   • Platelets 08/11/2020 242  140 - 450 10*3/mm3 Final   • Neutrophil % 08/11/2020 55.3  42.7 - 76.0 % Final   • Lymphocyte % 08/11/2020 36.7  19.6 - 45.3 % Final   • Monocyte % 08/11/2020 4.2* 5.0 - 12.0 % Final   • Eosinophil % 08/11/2020 2.5  0.3 - 6.2 % Final   • Basophil % 08/11/2020 0.7  0.0 - 1.5 % Final   • Immature Grans % 08/11/2020 0.6* 0.0 - 0.5 % Final   • Neutrophils, Absolute 08/11/2020 5.42  1.70 - 7.00 10*3/mm3 Final   • Lymphocytes, Absolute 08/11/2020 3.60* 0.70 - 3.10 10*3/mm3 Final   • Monocytes, Absolute 08/11/2020 0.41  0.10 - 0.90 10*3/mm3 Final   • Eosinophils, Absolute 08/11/2020 0.25  0.00 - 0.40 10*3/mm3 Final   • Basophils, Absolute 08/11/2020 0.07  0.00 - 0.20 10*3/mm3 Final   • Immature Grans, Absolute 08/11/2020 0.06* 0.00 - 0.05 10*3/mm3 Final   • nRBC 08/11/2020 0.0  0.0 - 0.2 /100 WBC Final   • Glucose 08/11/2020 267* 65 - 99 mg/dL Final   • BUN 08/11/2020 19  6 - 20 mg/dL Final   • Creatinine 08/11/2020 0.70  0.57 - 1.00 mg/dL Final   • Sodium 08/11/2020 133* 136 - 145 mmol/L Final   • Potassium 08/11/2020 4.4  3.5 - 5.2 mmol/L Final    Specimen hemolyzed.  Results may be affected.   • Chloride 08/11/2020 100  98 - 107 mmol/L Final   • CO2 08/11/2020 21.0* 22.0 - 29.0 mmol/L Final   • Calcium 08/11/2020 8.9  8.6 - 10.5 mg/dL Final   • Total Protein 08/11/2020 6.9  6.0 - 8.5 g/dL Final   • Albumin 08/11/2020 3.80  3.50 - 5.20 g/dL Final   • ALT (SGPT) 08/11/2020 13  1 - 33 U/L Final   • AST (SGOT) 08/11/2020 11  1 - 32 U/L Final   • Alkaline Phosphatase 08/11/2020 64  39 - 117 U/L Final   • Total Bilirubin 08/11/2020 0.2  0.0 - 1.2 mg/dL Final   • eGFR Non African Amer 08/11/2020 87  >60 mL/min/1.73 Final   • Globulin 08/11/2020 3.1  gm/dL Final   • A/G Ratio 08/11/2020 1.2  g/dL Final   •  BUN/Creatinine Ratio 08/11/2020 27.1* 7.0 - 25.0 Final   • Anion Gap 08/11/2020 12.0  5.0 - 15.0 mmol/L Final   • Hemoglobin A1C 08/11/2020 10.00* 4.80 - 5.60 % Final   • Total Cholesterol 08/11/2020 172  0 - 200 mg/dL Final   • Triglycerides 08/11/2020 102  0 - 150 mg/dL Final   • HDL Cholesterol 08/11/2020 47  40 - 60 mg/dL Final   • LDL Cholesterol  08/11/2020 105* 0 - 100 mg/dL Final   • VLDL Cholesterol 08/11/2020 20.4  5 - 40 mg/dL Final   • LDL/HDL Ratio 08/11/2020 2.23   Final   • TSH 08/11/2020 2.310  0.270 - 4.200 uIU/mL Final   • Free T4 08/11/2020 0.81* 0.93 - 1.70 ng/dL Final   • T3, Free 08/11/2020 2.22  2.00 - 4.40 pg/mL Final   • 25 Hydroxy, Vitamin D 08/11/2020 15.4* 30.0 - 100.0 ng/ml Final   • Vitamin B-12 08/11/2020 555  211 - 946 pg/mL Final   • Hepatitis C Ab 08/11/2020 Non-Reactive  Non-Reactive Final   • Microalbumin/Creatinine Ratio 08/11/2020 49.1  mg/g Final   • Creatinine, Urine 08/11/2020 122.3  mg/dL Final   • Microalbumin, Urine 08/11/2020 6.0  mg/dL Final   • THC, Screen, Urine 08/11/2020 Negative  Negative Final   • Phencyclidine (PCP), Urine 08/11/2020 Negative  Negative Final   • Cocaine Screen, Urine 08/11/2020 Negative  Negative Final   • Methamphetamine, Ur 08/11/2020 Negative  Negative Final   • Opiate Screen 08/11/2020 Positive* Negative Final   • Amphetamine Screen, Urine 08/11/2020 Negative  Negative Final   • Benzodiazepine Screen, Urine 08/11/2020 Negative  Negative Final   • Tricyclic Antidepressants Screen 08/11/2020 Positive* Negative Final   • Methadone Screen, Urine 08/11/2020 Negative  Negative Final   • Barbiturates Screen, Urine 08/11/2020 Negative  Negative Final   • Oxycodone Screen, Urine 08/11/2020 Negative  Negative Final   • Propoxyphene Screen 08/11/2020 Negative  Negative Final   • Buprenorphine, Screen, Urine 08/11/2020 Negative  Negative Final      No image results found.    [email or web address]  Immunization History   Administered Date(s) Administered   •  "Flulaval/Fluarix/Fluzone Quad 09/14/2020   • Pneumococcal Polysaccharide (PPSV23) 09/14/2020   • Shingrix 09/15/2020   • Tdap 09/14/2020       The following portions of the patient's history were reviewed and updated as appropriate: allergies, current medications, past family history, past medical history, past social history, past surgical history and problem list.        Physical Exam  /60 (BP Location: Right arm, Patient Position: Sitting, Cuff Size: Large Adult)   Pulse 78   Temp 97.3 °F (36.3 °C)   Ht 175.3 cm (69\")   SpO2 98%   BMI 33.23 kg/m²     Physical Exam  Vitals signs and nursing note reviewed.   Constitutional:       Appearance: She is well-developed. She is not diaphoretic.   HENT:      Head: Normocephalic and atraumatic.      Right Ear: External ear normal.   Eyes:      Conjunctiva/sclera: Conjunctivae normal.      Pupils: Pupils are equal, round, and reactive to light.   Neck:      Musculoskeletal: Normal range of motion and neck supple.   Cardiovascular:      Rate and Rhythm: Normal rate and regular rhythm.      Heart sounds: Normal heart sounds. No murmur.   Pulmonary:      Effort: Pulmonary effort is normal. No respiratory distress.      Breath sounds: Normal breath sounds.   Abdominal:      General: Bowel sounds are normal. There is no distension.      Palpations: Abdomen is soft.      Tenderness: There is no abdominal tenderness.      Comments: Obese abdomen    Musculoskeletal: Normal range of motion.         General: No deformity.      Comments: Right below knee amputation    Get up and go test >10 seconds    Wheelchair bound    Skin:     General: Skin is warm.      Coloration: Skin is not pale.      Findings: No erythema or rash.   Neurological:      Mental Status: She is alert and oriented to person, place, and time.      Cranial Nerves: No cranial nerve deficit.   Psychiatric:         Behavior: Behavior normal.         Assessment/Plan    Diagnosis Plan   1. At high risk for falls "     2. Gait instability     3. Uncontrolled type 2 diabetes mellitus with hyperglycemia (CMS/HCC)     4. Vitamin D deficiency     5. Hypothyroidism, unspecified type     6. Below knee amputation (CMS/HCC)     7. Diabetic polyneuropathy associated with type 2 diabetes mellitus (CMS/HCC)     8. Class 1 obesity with serious comorbidity and body mass index (BMI) of 33.0 to 33.9 in adult, unspecified obesity type          -recommend labwork   -will get last [facility] ER report   -recommend pneumonia vaccination,tdap and shingles vaccination - given tdap and pneumonia vaccination today. 2nd shingles vaccine at pharmacy   -recommend pap smear - refer to OB/GYN   -recommend diabetic eye exam/diabetic retinopathy/catatract  - pt's sister will call Ophthalmologist in [place]   -recommend mammogram screening -schedule maging center   -wrote prescirption for bedside commode   -gait instability/ high fall risk gave prescription for motorizized  Wheelchair for replacement  -urinary incontinence - wrote prescription for pullups.    -colonic/rectal polyps, internal hemorrhoids/GERD with esophagitis/gastritis  - GI following  Next colonsocopy in 2025   -vitamin D deficiency -vitamin D once a week  -hypothyroidism - on synthroid 25 mcg PO q daily.   -DM type 2  - on bydureon 2 mg subq weekly. On jardiance 25 mg daily.  Cut back on Tresiba from 40 to 35 units at bedtime  Start on humalog 5 units before meals. Adivsed sister to check on pt daily since adjusting her insulin.  Continue with checking sugars before breakfast and 2 hours after meal    -microcytic anemia - on ferrous sulfate 325 mg PO q daily. Has upcoming appt with Gastroenteorlogy on 9/2/20 for colonoscopy and EGD   -HTN -  On lisinopril  20 mg PO q daily.  -HLP - on lipitor 20 mg PO qhs.  -major depression - on zoloft 100 mg PO BID. Will taper off elavil.  Stop zoloft and switch to viibryd starting pack samples given today. Also recommend counseling but pt declined    -obesity - counseled weight loss >5 minutes BMI at 33.23   -diabetic neuropathy - go up on lyrica from 100 to 150 mg PO TID     -chronic pain  Was on Norco 7.5/325 mg every 12 hours PRN. Will refer to Pain Management.   on robaxin TID     -recommend pap smear  -advised pt to be safe and call with questions and concerns  -advised pt to go to ER or call 911 if symptoms worrisome or severe  -advised pt to followup with specialist and referrals  -advised pt to be safe during COVID-19 pandemic  -total time with pt >25 minutes   Addendum 12/16/20 - the patient reports that the ankle portion of her prosthetic is not working. it is supposed to articulate so it was either robotic or mechanical and it no longer allows for any movement. is completely fixed. we are also concerned that the prosthetic is not fitting appropriately, possibly too small for her at this time. Referred pt to Prosthetics. In or near [place]   -recheck in 6 weeks         This document has been electronically signed by Gato Crane MD on November 30, 2020 16:14 CST

## 2020-11-30 ENCOUNTER — TREATMENT (OUTPATIENT)
Dept: PHYSICAL THERAPY | Facility: CLINIC | Age: 55
End: 2020-11-30

## 2020-11-30 ENCOUNTER — OFFICE VISIT (OUTPATIENT)
Dept: FAMILY MEDICINE CLINIC | Facility: CLINIC | Age: 55
End: 2020-11-30

## 2020-11-30 VITALS
OXYGEN SATURATION: 98 % | SYSTOLIC BLOOD PRESSURE: 110 MMHG | BODY MASS INDEX: 33.23 KG/M2 | HEART RATE: 78 BPM | DIASTOLIC BLOOD PRESSURE: 60 MMHG | HEIGHT: 69 IN | TEMPERATURE: 97.3 F

## 2020-11-30 DIAGNOSIS — I69.393 ATAXIA DUE TO OLD CEREBROVASCULAR ACCIDENT (CVA): ICD-10-CM

## 2020-11-30 DIAGNOSIS — E03.9 HYPOTHYROIDISM, UNSPECIFIED TYPE: ICD-10-CM

## 2020-11-30 DIAGNOSIS — E55.9 VITAMIN D DEFICIENCY: ICD-10-CM

## 2020-11-30 DIAGNOSIS — E11.42 DIABETIC POLYNEUROPATHY ASSOCIATED WITH TYPE 2 DIABETES MELLITUS (HCC): ICD-10-CM

## 2020-11-30 DIAGNOSIS — E11.65 UNCONTROLLED TYPE 2 DIABETES MELLITUS WITH HYPERGLYCEMIA (HCC): ICD-10-CM

## 2020-11-30 DIAGNOSIS — E66.9 CLASS 1 OBESITY WITH SERIOUS COMORBIDITY AND BODY MASS INDEX (BMI) OF 33.0 TO 33.9 IN ADULT, UNSPECIFIED OBESITY TYPE: ICD-10-CM

## 2020-11-30 DIAGNOSIS — Z91.81 AT HIGH RISK FOR FALLS: ICD-10-CM

## 2020-11-30 DIAGNOSIS — E61.1 IRON DEFICIENCY: Primary | ICD-10-CM

## 2020-11-30 DIAGNOSIS — S88.119A BELOW KNEE AMPUTATION (HCC): ICD-10-CM

## 2020-11-30 DIAGNOSIS — R26.81 GAIT INSTABILITY: ICD-10-CM

## 2020-11-30 DIAGNOSIS — R26.81 GAIT INSTABILITY: Primary | ICD-10-CM

## 2020-11-30 PROCEDURE — 97110 THERAPEUTIC EXERCISES: CPT | Performed by: PHYSICAL THERAPIST

## 2020-11-30 PROCEDURE — 99214 OFFICE O/P EST MOD 30 MIN: CPT | Performed by: FAMILY MEDICINE

## 2020-11-30 PROCEDURE — 97116 GAIT TRAINING THERAPY: CPT | Performed by: PHYSICAL THERAPIST

## 2020-11-30 PROCEDURE — 97530 THERAPEUTIC ACTIVITIES: CPT | Performed by: PHYSICAL THERAPIST

## 2020-11-30 RX ORDER — PNV NO.95/FERROUS FUM/FOLIC AC 28MG-0.8MG
1 TABLET ORAL
Qty: 30 TABLET | Refills: 3 | Status: SHIPPED | OUTPATIENT
Start: 2020-11-30 | End: 2021-03-10 | Stop reason: SDUPTHER

## 2020-11-30 RX ORDER — METHOCARBAMOL 750 MG/1
750 TABLET, FILM COATED ORAL 3 TIMES DAILY
Qty: 90 TABLET | Refills: 3 | Status: SHIPPED | OUTPATIENT
Start: 2020-11-30 | End: 2021-01-13 | Stop reason: SDUPTHER

## 2020-11-30 RX ORDER — PREGABALIN 150 MG/1
150 CAPSULE ORAL 3 TIMES DAILY
Qty: 90 CAPSULE | Refills: 0 | Status: SHIPPED | OUTPATIENT
Start: 2020-11-30 | End: 2021-01-07

## 2020-11-30 RX ORDER — ERGOCALCIFEROL 1.25 MG/1
50000 CAPSULE ORAL
Qty: 4 CAPSULE | Refills: 3 | Status: SHIPPED | OUTPATIENT
Start: 2020-11-30 | End: 2020-12-29 | Stop reason: SDUPTHER

## 2020-11-30 RX ORDER — INSULIN GLARGINE 100 [IU]/ML
35 INJECTION, SOLUTION SUBCUTANEOUS NIGHTLY
Qty: 9 ML | Refills: 3 | Status: SHIPPED | OUTPATIENT
Start: 2020-11-30 | End: 2020-12-13 | Stop reason: SDUPTHER

## 2020-11-30 NOTE — PROGRESS NOTES
Physical Therapy Daily Progress Note    Patient: Marta Giraldo   : 1965  Diagnosis/ICD-10 Code:     Diagnosis Plan   1. Gait instability     2. Below knee amputation (CMS/HCC)     3. 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 At high risk for falls       Referring practitioner: Gato Crane MD  Date of Initial Visit: Type: THERAPY  Noted: 10/30/2020  Today's Date: 2020  Patient seen for 7 sessions      PT Recheck Due: 12-  PT MD Visit: 2020       Marta Giraldo      Subjective Evaluation    History of Present Illness  Mechanism of injury: Subjective continued:  Wears her prosthetic about 2-3 hours, maybe sometimes longer if she has gone outside the home    Subjective comment: both legs are hurting. states that she did not bring her socks for her prosthetic today. states that she has not been contacted by prosthetic place and she has not called them because she cannot read the number. the number was printed off very large print today so that she would be able to read the number and she verbalizes that the size of print was visible for her. states that she has vision problems. states that she wears her prosthetic when she goes out. but does wear it some at home too. wears it aboutPain  Current pain ratin             Objective   See Exercise, Manual, and Modality Logs for complete treatment.       Assessment & Plan     Assessment  Assessment details: Patient still has not brought her prosthetic socks with her to treatment. Limited wear of prosthetic per patient verbal report. States that her prosthetic is messed up right now. Ambulates with absent heel strike, foot hits flat and there is no toe off with gait. Patient has poor safety awareness with sit to stand transfers and requires max cueing throughout. At end of treatment patient sits with significant safety concerns by not getting to surface she was going to sit on, not turning to have her legs touching surface,  and her hind end was not over seat. Patient legs go of walker and plops down while turning her body. Patient is instructed in why this is an unsafe way to sit and appropriate outcomes from continued poor safety awareness. Patient is instructed to bring her socks for her prosthetic every visit as well as wearing clothing that gives access to donning and doffing her prosthetic while she is in the clinic. Patient does not achieve TKE with pregait activities and does not appear to get TKE with ambulation. Patient is observed ambulating with right knee slightly remaining in flexed position.     Goals  Plan Goals: Short Term Goals:  1) Patient I with HEP and have additoins/changes by next recertification. (met/ongoing)     2) Patient able to ambulate 150 feet with RW with good heel to toe gait and = step lengths, step through gait pattern and no LOB. (ongoing/progressing)    3) Patient able to perform sit to/from stand with 1 UE A = WB B LE x5 reps, with good eccentric control in 30 seconds. (ongoing/progressing)    4) TUG in <= 30 seconds with RW assistive device, good eccentric control and safely with no LOB. (ongoing/progressing)    5) Rhomberg EO >= 30 seconds. (ongoing)    6) Rhomberg EC >= 10 seconds. (ongoing)    7) B hips >= 4/5 (partially met/ongoing)    8) Patient to show understanding of sock use for proper prosthetic fit. (ongoing)     9) B quads 5/5. (new)    10) B hamstrings >= 4+/5. (new)      Long Term Goals:  1) B LE 5/5, excepy hips >= 4+/5 (revised)    2) Patient able to ascend/descend 3 steps with B hand rail assist, no increase in pain x5 reps.    3) Patient able to ambulate with/without least restrictive assistive device non-antalgicaly >= 300 feet.    4) TUG in <= 25 seconds with/without least restrictive assistive device, good eccentric control and safely with no LOB.     5) patient able to perform 20 sit to/from stand with 1 UE A.    6) Rhomberg EO >= 30 seconds. (revised)    7) Rhomberg EC >= 15  seconds. (revised)    8) Patient able to perform double limb reaching 12 inches from midline body with arm outstretched with no LOB.    9) Patient to be I with final HEP.    10) Standard Stance no UE support for 60 seconds. (new)    Plan  Plan details: Assess AROM right knee next visit for extension, if unable to achieve 0° may consider heel prop for extension      Progress per Plan of Care and Progress strengthening /stabilization /functional activity            Timed:  Manual Therapy:         mins  48837;  Therapeutic Exercise:    20     mins  53480;   Aquatic Therex :        mins  84941    Neuromuscular Cindi:        mins  72885;    Therapeutic Activity:    15      mins  34518;     Gait Training:     15      mins  23026;     Ultrasound:          mins  90152;    Electrical Stimulation:         mins  23769 ( );    Untimed:  Electrical Stimulation:         mins  33537 ( );  Mechanical Traction:         mins  86017;   Orthotic fit/train:         mins  27018    Timed Treatment:   50   mins   Total Treatment:     50   mins  Kayla Castillo PTA  Physical Therapist Assistant

## 2020-11-30 NOTE — PATIENT INSTRUCTIONS
Get labwork fasting next week    Will go up on lyrica from 100 to 150 mg three times a day    Gave prescription for bedside commode    Recheck in 6 weeks

## 2020-12-01 ENCOUNTER — TELEPHONE (OUTPATIENT)
Dept: FAMILY MEDICINE CLINIC | Facility: CLINIC | Age: 55
End: 2020-12-01

## 2020-12-01 NOTE — TELEPHONE ENCOUNTER
PATIENT CALLING IN REQUESTING A CALL BACK, WANTING TO KNOW ABOUT PROSTHETICS.      CALL BACK NUMBER: [phone]

## 2020-12-02 ENCOUNTER — TELEPHONE (OUTPATIENT)
Dept: FAMILY MEDICINE CLINIC | Facility: CLINIC | Age: 55
End: 2020-12-02

## 2020-12-02 NOTE — TELEPHONE ENCOUNTER
PATIENT CALLING IN TO REQUEST WE CALL IN AN AT HOME COMMODE.       [phone]

## 2020-12-04 ENCOUNTER — TELEPHONE (OUTPATIENT)
Dept: FAMILY MEDICINE CLINIC | Facility: CLINIC | Age: 55
End: 2020-12-04

## 2020-12-04 NOTE — TELEPHONE ENCOUNTER
PATIENT CALLED AND SAID THAT SHE NEEDS HER PROSTHETIC LEG WORKED ON. SHE SAID THAT SHE CONTACTED THE FOLLOWING CENTER AND THEY NEED TO SPEAK WITH A DOCTOR BEFORE THEY CAN PROCEED.     [facility]  [phone]

## 2020-12-07 NOTE — TELEPHONE ENCOUNTER
Spoke to them and they need a order faxed along with OV, demographics, and insurance to [phone]. Order should stated evaluate and treat for adjustment of prosthetics.

## 2020-12-09 ENCOUNTER — TREATMENT (OUTPATIENT)
Dept: PHYSICAL THERAPY | Facility: CLINIC | Age: 55
End: 2020-12-09

## 2020-12-09 DIAGNOSIS — S88.119A BELOW KNEE AMPUTATION (HCC): ICD-10-CM

## 2020-12-09 DIAGNOSIS — R26.81 GAIT INSTABILITY: Primary | ICD-10-CM

## 2020-12-09 DIAGNOSIS — Z91.81 AT HIGH RISK FOR FALLS: ICD-10-CM

## 2020-12-09 DIAGNOSIS — I69.393 ATAXIA DUE TO OLD CEREBROVASCULAR ACCIDENT (CVA): ICD-10-CM

## 2020-12-09 PROCEDURE — 97110 THERAPEUTIC EXERCISES: CPT | Performed by: PHYSICAL THERAPIST

## 2020-12-09 PROCEDURE — 97116 GAIT TRAINING THERAPY: CPT | Performed by: PHYSICAL THERAPIST

## 2020-12-09 NOTE — PROGRESS NOTES
Physical Therapy Daily Progress Note    Patient: Marta Giraldo   : 1965  Diagnosis/ICD-10 Code:     Diagnosis Plan   1. Gait instability     2. Below knee amputation (CMS/HCC)     3. 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 At high risk for falls       Referring practitioner: Gato Crane MD  Date of Initial Visit: Type: THERAPY  Noted: 10/30/2020  Today's Date: 2020  Patient seen for 8 sessions      PT Recheck Due: 12-  PT MD Visit: 2021       Marta Giraldo        Subjective Evaluation    History of Present Illness    Subjective comment: states that she was not able to get into see a prosthetist yet, but that she has to have a referral from her family physician and has been uable to contact him at this time. states that she wears her leg daily but unsure for how long.Pain  Current pain ratin             Objective   See Exercise, Manual, and Modality Logs for complete treatment.       Assessment & Plan     Assessment  Assessment details: A message was sent to patient's provider in [employer] (in house doctor) requesting that he sends a referral for her to see a prosthetist regarding fit and function of her current prosthetic. Patient is unable to provide the name of the company/place that she will be going, but is able to provide the phone number which was also provided to her family provider. Patient is presented with written copies in bold and extra large print of current HEP issued all consisting of all exercises completed in treatment today. Patient verbalizes that this is easier to see secondary to her sight issues.  Patient brings her socks for her prosthetic. Has a difficult time getting her prosthetic off secondary to it being tight fitting. Is not currently wearing any socks with her sleeve. Has difficulty getting her leg back on. There are no reddened areas or broken skin noted along residual limb. Leg appears to be ill fitting and too tight with legs  appearing different lengths when prosthetic is donned.   Patient has difficulty with prone TKE's with the R LE not being able to lift from the bed when completing , though a slight contraction of the quad is palpated.   With gait patient is unable to obtain knee extension and is unable to heel strike during gait despite multiple cueing. Patient tends to place foot flat with a minimal knee flexion during gait.     Goals  Plan Goals: Short Term Goals:  1) Patient I with HEP and have additoins/changes by next recertification. (met/ongoing)     2) Patient able to ambulate 150 feet with RW with good heel to toe gait and = step lengths, step through gait pattern and no LOB. (ongoing/progressing)    3) Patient able to perform sit to/from stand with 1 UE A = WB B LE x5 reps, with good eccentric control in 30 seconds. (ongoing/progressing)    4) TUG in <= 30 seconds with RW assistive device, good eccentric control and safely with no LOB. (ongoing/progressing)    5) Rhomberg EO >= 30 seconds. (ongoing)    6) Rhomberg EC >= 10 seconds. (ongoing)    7) B hips >= 4/5 (partially met/ongoing)    8) Patient to show understanding of sock use for proper prosthetic fit. (ongoing)     9) B quads 5/5. (new)    10) B hamstrings >= 4+/5. (new)      Long Term Goals:  1) B LE 5/5, excepy hips >= 4+/5 (revised)    2) Patient able to ascend/descend 3 steps with B hand rail assist, no increase in pain x5 reps.    3) Patient able to ambulate with/without least restrictive assistive device non-antalgicaly >= 300 feet.    4) TUG in <= 25 seconds with/without least restrictive assistive device, good eccentric control and safely with no LOB.     5) patient able to perform 20 sit to/from stand with 1 UE A.    6) Rhomberg EO >= 30 seconds. (revised)    7) Rhomberg EC >= 15 seconds. (revised)    8) Patient able to perform double limb reaching 12 inches from midline body with arm outstretched with no LOB.    9) Patient to be I with final HEP.    10)  Standard Stance no UE support for 60 seconds. (new)    Plan  Plan details: Continue to focus on LE strengthening as well as endurance while patient is attempting to obtain an appointment with prosthetist in order to get a better fitting and better operational prosthetic limb.       Progress per Plan of Care and Progress strengthening /stabilization /functional activity            Timed:  Manual Therapy:         mins  85316;  Therapeutic Exercise:    45     mins  19048;   Aquatic Therex :        mins  25846    Neuromuscular Cindi:        mins  11074;    Therapeutic Activity:          mins  99353;     Gait Training:      10     mins  96098;     Ultrasound:          mins  30422;    Electrical Stimulation:         mins  14669 ( );    Untimed:  Electrical Stimulation:         mins  48199 ( );  Mechanical Traction:         mins  13576;   Orthotic fit/train:         mins  85118    Timed Treatment:   55   mins   Total Treatment:     55   mins  Kayla Castillo PTA  Physical Therapist Assistant

## 2020-12-11 RX ORDER — VILAZODONE HYDROCHLORIDE 20 MG/1
TABLET ORAL
Qty: 30 TABLET | Refills: 0 | Status: SHIPPED | OUTPATIENT
Start: 2020-12-11 | End: 2021-02-12

## 2020-12-14 ENCOUNTER — LAB (OUTPATIENT)
Dept: LAB | Facility: HOSPITAL | Age: 55
End: 2020-12-14

## 2020-12-14 DIAGNOSIS — R26.81 GAIT INSTABILITY: ICD-10-CM

## 2020-12-14 DIAGNOSIS — S88.119A BELOW-KNEE AMPUTATION (HCC): Primary | ICD-10-CM

## 2020-12-14 DIAGNOSIS — E66.9 CLASS 1 OBESITY WITH BODY MASS INDEX (BMI) OF 32.0 TO 32.9 IN ADULT, UNSPECIFIED OBESITY TYPE, UNSPECIFIED WHETHER SERIOUS COMORBIDITY PRESENT: ICD-10-CM

## 2020-12-14 DIAGNOSIS — E11.42 DIABETIC POLYNEUROPATHY ASSOCIATED WITH TYPE 2 DIABETES MELLITUS (HCC): ICD-10-CM

## 2020-12-14 DIAGNOSIS — E61.1 IRON DEFICIENCY: ICD-10-CM

## 2020-12-14 DIAGNOSIS — S88.119A BELOW KNEE AMPUTATION (HCC): ICD-10-CM

## 2020-12-14 DIAGNOSIS — E03.9 HYPOTHYROIDISM, UNSPECIFIED TYPE: ICD-10-CM

## 2020-12-14 DIAGNOSIS — Z91.81 AT HIGH RISK FOR FALLS: ICD-10-CM

## 2020-12-14 DIAGNOSIS — E66.9 CLASS 1 OBESITY WITH SERIOUS COMORBIDITY AND BODY MASS INDEX (BMI) OF 33.0 TO 33.9 IN ADULT, UNSPECIFIED OBESITY TYPE: ICD-10-CM

## 2020-12-14 LAB
25(OH)D3 SERPL-MCNC: 28.5 NG/ML (ref 30–100)
ALBUMIN SERPL-MCNC: 4.2 G/DL (ref 3.5–5.2)
ALBUMIN/GLOB SERPL: 1.3 G/DL
ALP SERPL-CCNC: 71 U/L (ref 39–117)
ALT SERPL W P-5'-P-CCNC: 16 U/L (ref 1–33)
ANION GAP SERPL CALCULATED.3IONS-SCNC: 7.5 MMOL/L (ref 5–15)
AST SERPL-CCNC: 15 U/L (ref 1–32)
BILIRUB SERPL-MCNC: 0.2 MG/DL (ref 0–1.2)
BUN SERPL-MCNC: 14 MG/DL (ref 6–20)
BUN/CREAT SERPL: 14.7 (ref 7–25)
CALCIUM SPEC-SCNC: 9.3 MG/DL (ref 8.6–10.5)
CHLORIDE SERPL-SCNC: 103 MMOL/L (ref 98–107)
CHOLEST SERPL-MCNC: 149 MG/DL (ref 0–200)
CO2 SERPL-SCNC: 27.5 MMOL/L (ref 22–29)
CREAT SERPL-MCNC: 0.95 MG/DL (ref 0.57–1)
DEPRECATED RDW RBC AUTO: 38.7 FL (ref 37–54)
EOSINOPHIL # BLD MANUAL: 0.39 10*3/MM3 (ref 0–0.4)
EOSINOPHIL NFR BLD MANUAL: 3 % (ref 0.3–6.2)
ERYTHROCYTE [DISTWIDTH] IN BLOOD BY AUTOMATED COUNT: 13.2 % (ref 12.3–15.4)
FERRITIN SERPL-MCNC: 61.6 NG/ML (ref 13–150)
GFR SERPL CREATININE-BSD FRML MDRD: 61 ML/MIN/1.73
GLOBULIN UR ELPH-MCNC: 3.3 GM/DL
GLUCOSE SERPL-MCNC: 133 MG/DL (ref 65–99)
HBA1C MFR BLD: 9.6 % (ref 4.8–5.6)
HCT VFR BLD AUTO: 41.3 % (ref 34–46.6)
HDLC SERPL-MCNC: 46 MG/DL (ref 40–60)
HGB BLD-MCNC: 13.1 G/DL (ref 12–15.9)
IRON 24H UR-MRATE: 45 MCG/DL (ref 37–145)
IRON SATN MFR SERPL: 12 % (ref 20–50)
LDLC SERPL CALC-MCNC: 86 MG/DL (ref 0–100)
LDLC/HDLC SERPL: 1.85 {RATIO}
LYMPHOCYTES # BLD MANUAL: 4.66 10*3/MM3 (ref 0.7–3.1)
LYMPHOCYTES NFR BLD MANUAL: 36 % (ref 19.6–45.3)
LYMPHOCYTES NFR BLD MANUAL: 6 % (ref 5–12)
MCH RBC QN AUTO: 25.9 PG (ref 26.6–33)
MCHC RBC AUTO-ENTMCNC: 31.7 G/DL (ref 31.5–35.7)
MCV RBC AUTO: 81.8 FL (ref 79–97)
MONOCYTES # BLD AUTO: 0.78 10*3/MM3 (ref 0.1–0.9)
NEUTROPHILS # BLD AUTO: 7.12 10*3/MM3 (ref 1.7–7)
NEUTROPHILS NFR BLD MANUAL: 55 % (ref 42.7–76)
PLAT MORPH BLD: NORMAL
PLATELET # BLD AUTO: 266 10*3/MM3 (ref 140–450)
PMV BLD AUTO: 12.4 FL (ref 6–12)
POTASSIUM SERPL-SCNC: 4.1 MMOL/L (ref 3.5–5.2)
PROT SERPL-MCNC: 7.5 G/DL (ref 6–8.5)
RBC # BLD AUTO: 5.05 10*6/MM3 (ref 3.77–5.28)
RBC MORPH BLD: NORMAL
SODIUM SERPL-SCNC: 138 MMOL/L (ref 136–145)
T3FREE SERPL-MCNC: 2.56 PG/ML (ref 2–4.4)
T4 FREE SERPL-MCNC: 0.98 NG/DL (ref 0.93–1.7)
TIBC SERPL-MCNC: 380 MCG/DL (ref 298–536)
TRANSFERRIN SERPL-MCNC: 255 MG/DL (ref 200–360)
TRIGL SERPL-MCNC: 89 MG/DL (ref 0–150)
TSH SERPL DL<=0.05 MIU/L-ACNC: 3.49 UIU/ML (ref 0.27–4.2)
VLDLC SERPL-MCNC: 17 MG/DL (ref 5–40)
WBC # BLD AUTO: 12.94 10*3/MM3 (ref 3.4–10.8)
WBC MORPH BLD: NORMAL

## 2020-12-14 PROCEDURE — 82306 VITAMIN D 25 HYDROXY: CPT

## 2020-12-14 PROCEDURE — 80050 GENERAL HEALTH PANEL: CPT

## 2020-12-14 PROCEDURE — 85007 BL SMEAR W/DIFF WBC COUNT: CPT

## 2020-12-14 PROCEDURE — 82728 ASSAY OF FERRITIN: CPT

## 2020-12-14 PROCEDURE — 80061 LIPID PANEL: CPT

## 2020-12-14 PROCEDURE — 84439 ASSAY OF FREE THYROXINE: CPT

## 2020-12-14 PROCEDURE — 84466 ASSAY OF TRANSFERRIN: CPT

## 2020-12-14 PROCEDURE — 83036 HEMOGLOBIN GLYCOSYLATED A1C: CPT

## 2020-12-14 PROCEDURE — 83540 ASSAY OF IRON: CPT

## 2020-12-14 PROCEDURE — 84481 FREE ASSAY (FT-3): CPT

## 2020-12-14 RX ORDER — INSULIN GLARGINE 100 [IU]/ML
35 INJECTION, SOLUTION SUBCUTANEOUS NIGHTLY
Qty: 9 ML | Refills: 3 | Status: SHIPPED | OUTPATIENT
Start: 2020-12-14 | End: 2021-01-13 | Stop reason: SDUPTHER

## 2020-12-14 RX ORDER — LEVOTHYROXINE SODIUM 0.03 MG/1
25 TABLET ORAL DAILY
Qty: 30 TABLET | Refills: 3 | Status: SHIPPED | OUTPATIENT
Start: 2020-12-14 | End: 2021-03-10 | Stop reason: SDUPTHER

## 2020-12-14 RX ORDER — ATORVASTATIN CALCIUM 40 MG/1
40 TABLET, FILM COATED ORAL DAILY
Qty: 30 TABLET | Refills: 3 | Status: SHIPPED | OUTPATIENT
Start: 2020-12-14 | End: 2021-03-10 | Stop reason: SDUPTHER

## 2020-12-16 ENCOUNTER — TELEPHONE (OUTPATIENT)
Dept: FAMILY MEDICINE CLINIC | Facility: CLINIC | Age: 55
End: 2020-12-16

## 2020-12-16 ENCOUNTER — TREATMENT (OUTPATIENT)
Dept: PHYSICAL THERAPY | Facility: CLINIC | Age: 55
End: 2020-12-16

## 2020-12-16 DIAGNOSIS — S88.119A BELOW KNEE AMPUTATION (HCC): ICD-10-CM

## 2020-12-16 DIAGNOSIS — R26.81 GAIT INSTABILITY: Primary | ICD-10-CM

## 2020-12-16 DIAGNOSIS — I69.393 ATAXIA DUE TO OLD CEREBROVASCULAR ACCIDENT (CVA): ICD-10-CM

## 2020-12-16 DIAGNOSIS — E11.42 DIABETIC POLYNEUROPATHY ASSOCIATED WITH TYPE 2 DIABETES MELLITUS (HCC): ICD-10-CM

## 2020-12-16 DIAGNOSIS — Z91.81 AT HIGH RISK FOR FALLS: ICD-10-CM

## 2020-12-16 DIAGNOSIS — F33.1 MODERATE EPISODE OF RECURRENT MAJOR DEPRESSIVE DISORDER (HCC): ICD-10-CM

## 2020-12-16 PROCEDURE — 97530 THERAPEUTIC ACTIVITIES: CPT | Performed by: PHYSICAL THERAPIST

## 2020-12-16 PROCEDURE — 97116 GAIT TRAINING THERAPY: CPT | Performed by: PHYSICAL THERAPIST

## 2020-12-16 PROCEDURE — 97110 THERAPEUTIC EXERCISES: CPT | Performed by: PHYSICAL THERAPIST

## 2020-12-16 NOTE — PROGRESS NOTES
Re-Assessment / Re-Certification      Patient: Marta Giraldo   : 1965  Diagnosis/ICD-10 Code:  Gait instability [R26.81]  Referring practitioner: Gato Crane MD  Date of Initial Visit: Type: THERAPY  Noted: 10/30/2020  Today's Date: 2020  Patient seen for 9 sessions    Next MD appt: 2021.  Recertification: 2021     Subjective:   Marta Giraldo reports: 50% improvement.  Subjective Questionnaire: LEFS:   Clinical Progress: improved  Home Program Compliance: Yes  Treatment has included: therapeutic exercise, neuromuscular re-education, therapeutic activity and gait training    Subjective Evaluation    History of Present Illness    Subjective comment: patient reports she is trying ot get in touche with the prostetist. She rpeorts her B legs are hurting her today.Pain  Current pain ratin         Objective          Static Posture     Knee   Genu valgus.     Strength/Myotome Testing     Left Hip   Planes of Motion   Flexion: 4+  Extension: 4+  Abduction: 4+  Adduction: 4+    Right Hip   Planes of Motion   Flexion: 4+  Extension: 4+  Abduction: 4+  Adduction: 4+    Left Knee   Flexion: 4+  Extension: 5    Right Knee   Flexion: 4+  Extension: 5    Left Ankle/Foot   Dorsiflexion: 5  Plantar flexion: 4+    Ambulation   Weight-Bearing Status   Weight-Bearing Status (Left): weight-bearing as tolerated   Assistive device used: wheelchair and front-wheeled walker    Additional Weight-Bearing Status Details  Cmes in in new power wheelchair.  Utilizes front wheeled RW in clinic for measurements and functional tests and gait      Ambulation: Level Surfaces   Ambulation with assistive device: independent    Ambulation: Stairs     Additional Stairs Ambulation Details  Not assessed.    Observational Gait   Gait: antalgic and asymmetric   Decreased walking speed, stride length, right stance time, left step length and right step length.   Left foot contact pattern: heel to toe  Right foot  "contact pattern: toe to heel  Base of support: normal    Quality of Movement During Gait     Knee    Knee (Right): Positive increased flexion during stance.     Comments   Able to ambulate 50' and 30' with RW, SBA today in the clinic with above gait pattern and deviations. Step to gait was utilizes versus step through.    Functional Assessment     Comments  30\" sit to/from stand test: x3 reps, fair eccentric control after cueing, RW in front of patient to balance once standing.  TU\", 49\"; B UE A sit to stand, utilized RW, good eccentric control stand to sit.  Stand stance with no UE support: 28\", 28\", 26\"  Rhomberg EO: 18\", 22\", 22\"  Rhomberg EC: 6\", 8\", 11\"        Assessment & Plan     Assessment  Impairments: abnormal coordination, abnormal gait, activity intolerance, impaired balance, impaired physical strength, lacks appropriate home exercise program and safety issue  Assessment details: Patient still has very poor overall endurance. Strength improved some. Fatigues easily with standing there ex. Complains of fatigues in quads with shuttle but able to complete all time limit. Patient was given a graph/chart to willis in for log wear so we can establish baseline wearing for this patient. Patient is still trying to get in with a prosthetist.  Prognosis: fair  Prognosis details: Barriers to Rehab: Include significant or possible arthritic/degenerative changes that have occurred within the joint/spine, The chronicity of this issue, The patient's obesity  The patient's generally deconditioned state.    Safety Issues: Fall risk    Functional Limitations: carrying objects, lifting, walking, uncomfortable because of pain, standing, stooping and unable to perform repetitive tasks  Goals  Plan Goals: Short Term Goals:  1) Patient I with HEP and have additoins/changes by next recertification. (met)     2) Patient able to ambulate 150 feet with RW with good heel to toe gait and = step lengths, step through gait pattern " and no LOB. (ongoing/progressing)    3) Patient able to perform sit to/from stand with 1 UE A = WB B LE x5 reps, with good eccentric control in 30 seconds. (ongoing/progressing)    4) TUG in <= 30 seconds with RW assistive device, good eccentric control and safely with no LOB. (ongoing/progressing)    5) Rhomberg EO >= 30 seconds. (ongoing/progressing)    6) Rhomberg EC >= 10 seconds. (ongoing/progressing)    7) B hips >= 4/5 (met)    8) Patient to show understanding of sock use for proper prosthetic fit. (partially met/ongoing)     9) B quads 5/5. (met)    10) B hamstrings >= 4+/5. (met)      Long Term Goals:  1) B LE 5/5, excepy hips >= 4+/5 (revised)    2) Patient able to ascend/descend 3 steps with B hand rail assist, no increase in pain x5 reps.    3) Patient able to ambulate with/without least restrictive assistive device non-antalgicaly >= 300 feet.    4) TUG in <= 25 seconds with/without least restrictive assistive device, good eccentric control and safely with no LOB.     5) patient able to perform 20 sit to/from stand with 1 UE A.    6) Rhomberg EO >= 30 seconds. (revised)    7) Rhomberg EC >= 15 seconds. (revised)    8) Patient able to perform double limb reaching 12 inches from midline body with arm outstretched with no LOB.    9) Patient to be I with final HEP.    10) Standard Stance no UE support for 60 seconds. (new)    Plan  Therapy options: will be seen for skilled physical therapy services  Planned therapy interventions: ADL retraining, balance/weight-bearing training, body mechanics training, flexibility, functional ROM exercises, gait training, home exercise program, IADL retraining, motor coordination training, neuromuscular re-education, postural training, strengthening, stretching, therapeutic activities and transfer training  Treatment plan discussed with: patient  Plan details: Continue to focus on LE strengthening and endurance, while patient is attempting to obtain an appointment with  prosthetist in order to get a better fitting and better operational prosthetic limb. Also begin step ups next session F/L.      Other therapeutic activities and/or exercises will be prescribed depending on the patients progress or lack there of.     Visit Diagnoses:    ICD-10-CM ICD-9-CM   1. Gait instability  R26.81 781.2   2. Below knee amputation (CMS/[facility])  S88.119A 897.0   3. 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 At high risk for falls  Z91.81 V15.88   5. Diabetic polyneuropathy associated with type 2 diabetes mellitus (CMS/[facility])  E11.42 250.60     357.2   6. Moderate episode of recurrent major depressive disorder (CMS/[facility])  F33.1 296.32       Progress toward previous goals: Partially Met        Recommendations: Continue as planned  Timeframe: 3 months  Prognosis to achieve goals: fair    PT Signature: Christa Abbott, PT DPT, CSCS      Based upon review of the patient's progress and continued therapy plan, it is my medical opinion that Marta Giraldo should continue physical therapy treatment at [facility]  [address] [place] [address] [phone].    Signature: __________________________________  Gato Crane MD    Timed:  Manual Therapy:         mins  51344;  Therapeutic Exercise:    20     mins  72536;     Aquatic Ther Ex:         mins  89228;     Neuromuscular Cindi:        mins  56637;    Therapeutic Activity:     8     mins  18851;     Gait Trainin     mins  27472;     Ultrasound:          mins  09501;    Paraffin:        mins  07912;  Electrical Stimulation:         mins  01306 ( );    Untimed:  Electrical Stimulation:         mins  58991 ( );  Mechanical Traction:         mins  94172;     Timed Treatment:   55   mins   Total Treatment:     55   mins

## 2020-12-16 NOTE — TELEPHONE ENCOUNTER
----- Message from Kayla Castillo PTA sent at 12/14/2020  4:56 PM CST -----  Regarding: RE: Referral Needed  Oh ok, thanks!! We really really appreciate this!! I'll let the patient know when she comes in this week that you guys have faxed the order to the number they provided you and she can follow up with them.     Kayla Castillo PTA    ----- Message -----  From: Yunior Duval MA  Sent: 12/14/2020   4:41 PM CST  To: Kayla Castillo PTA  Subject: RE: Referral Needed                              That is the phone number I called and was given that [place] fax number. The lady stated they had multiple locations. I guess I can call and see if she has a different number to fax it to.   ----- Message -----  From: Kayla Castillo PTA  Sent: 12/14/2020   4:35 PM CST  To: Yunior Duval MA  Subject: RE: Referral Needed                              Upon reviewing her chart, it looks like it was sent to [facility] who has told her that they cannot see her due to insurance. She instead found a prosthetist in [place] ([facility]) who has said that they needed the order, etc.... their phone number is [phone] but we do not have a fax number for them    Kayla Castillo PTA    ----- Message -----  From: Yunior Duval MA  Sent: 12/14/2020   4:11 PM CST  To: Kayla Castillo PTA  Subject: RE: Referral Needed                              We have already faxed the prescription to them on 12/07.  ----- Message -----  From: Gato Crane MD  Sent: 12/14/2020   2:03 PM CST  To: Yunior Obrien MA  Subject: FW: Referral Needed                              Referred pt to Prosthetist in [place] or nearby    Please schedule if one found. Thanks     ----- Message -----  From: Kayla Castillo PTA  Sent: 12/9/2020   2:10 PM CST  To: Gato Crane MD  Subject: Referral Needed                                  Dr. Crane  Our Physical Therapist Christa Abbott, PT, DPT  has  suggested to Ms. Giraldo that she goes back to see a Prosthetist regarding fit and function of her current prosthetic. She has called the prosthetist of her choice and was instructed that she will need an order from you referring her to see a prosthetist as well as her current diagnosis of R TTA and previous CVA affecting right side. We are writing to see if you can write this order as soon as possible and get it faxed to their office. She is unable to tell me who or what the prosthetists name or company is but she has told me that they are in [place] and their telephone number is [phone]. Please let us know if we need to advise her differently.    Thank You  Kayla Castillo, PTA

## 2020-12-16 NOTE — TELEPHONE ENCOUNTER
----- Message from Gato Crane MD sent at 12/15/2020  5:00 PM CST -----  Please call pt    Vitamin D is low and please make sure pt is taking Vitamin D once a week give 4 pills and 3 refills    hga1c is slightly low at 9.60 but goal <7.0.  Please verify all diabetic medications pt is taking should be on jardiance bydureon, insulin long acting and short acting before meals. Needs to verify and bring blood sugars and medications on next visit.   Needs to bring blood sugars before breakfast in am and 2 hours after meal    Thyroid studies normal     Lipid panel stable. LDL improved     On CMP kidney function shows GFR down from 87 to 61. Needs to drink more water.  Continue to  Monitor. Pt likely had CKD stage 2     Liver enzymes stable     Iron profile stable and continue with iron pill once a day. Continue iron rich diet     CBC shows stable hemoglobin but WBC elevated with elevated neutrophil count. If pt reports any active infection such as UTI or dyspnea/fever let me know      Recheck on next visit. Thanks

## 2020-12-18 ENCOUNTER — TREATMENT (OUTPATIENT)
Dept: PHYSICAL THERAPY | Facility: CLINIC | Age: 55
End: 2020-12-18

## 2020-12-18 DIAGNOSIS — Z91.81 AT HIGH RISK FOR FALLS: ICD-10-CM

## 2020-12-18 DIAGNOSIS — F33.1 MODERATE EPISODE OF RECURRENT MAJOR DEPRESSIVE DISORDER (HCC): ICD-10-CM

## 2020-12-18 DIAGNOSIS — R26.81 GAIT INSTABILITY: Primary | ICD-10-CM

## 2020-12-18 DIAGNOSIS — E11.42 DIABETIC POLYNEUROPATHY ASSOCIATED WITH TYPE 2 DIABETES MELLITUS (HCC): ICD-10-CM

## 2020-12-18 DIAGNOSIS — I69.393 ATAXIA DUE TO OLD CEREBROVASCULAR ACCIDENT (CVA): ICD-10-CM

## 2020-12-18 DIAGNOSIS — S88.119A BELOW KNEE AMPUTATION (HCC): ICD-10-CM

## 2020-12-18 PROCEDURE — 97116 GAIT TRAINING THERAPY: CPT | Performed by: PHYSICAL THERAPIST

## 2020-12-18 PROCEDURE — 97110 THERAPEUTIC EXERCISES: CPT | Performed by: PHYSICAL THERAPIST

## 2020-12-18 NOTE — PROGRESS NOTES
"Physical Therapy Daily Progress Note    Patient: Marta Giraldo   : 1965  Diagnosis/ICD-10 Code:     Diagnosis Plan   1. Gait instability     2. Below knee amputation (CMS/HCC)     3. 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 At high risk for falls     5. Diabetic polyneuropathy associated with type 2 diabetes mellitus (CMS/HCC)     6. Moderate episode of recurrent major depressive disorder (CMS/HCC)       Referring practitioner: Gato Crane MD  Date of Initial Visit: Type: THERAPY  Noted: 10/30/2020  Today's Date: 2020  Patient seen for 10 sessions      PT Recheck Due: 2020  PT MD Visit: 2021       Marta Giraldo reports: 50% improvement at recheck      Subjective Evaluation    History of Present Illness    Subjective comment: states that she has been filling in the chart for prosthetic wear but forgot to bring it today. reports that her right knee is hurting today. Pain  Current pain rating: 10             Objective   See Exercise, Manual, and Modality Logs for complete treatment.       Assessment & Plan     Assessment  Assessment details: Patient is instructed in attempting heel strikes with gait for more appropriate kinematics, patient continuously reports that she can't and when PTA discusses the motion at the knee and to extend the knee to facilitate heel strike, patient again states that she can't. She uses the phrase \"I can't\" frequently when given instruction.   Utilized varied surface under L LE to facilitate increased use of R LE with transfers for sit to stand and sit secondary to patient kicks R LE out every time she goes to stand or sit. Patient is able to improve gait to get a slight heel strike during gait with consistent and constant cueing. Patient can achieve TKE during gait with consistent and constant cueing.     Goals  Plan Goals: Short Term Goals:  1) Patient I with HEP and have additoins/changes by next recertification. (met)     2) Patient " able to ambulate 150 feet with RW with good heel to toe gait and = step lengths, step through gait pattern and no LOB. (ongoing/progressing)    3) Patient able to perform sit to/from stand with 1 UE A = WB B LE x5 reps, with good eccentric control in 30 seconds. (ongoing/progressing)    4) TUG in <= 30 seconds with RW assistive device, good eccentric control and safely with no LOB. (ongoing/progressing)    5) Rhomberg EO >= 30 seconds. (ongoing/progressing)    6) Rhomberg EC >= 10 seconds. (ongoing/progressing)    7) B hips >= 4/5 (met)    8) Patient to show understanding of sock use for proper prosthetic fit. (partially met/ongoing)     9) B quads 5/5. (met)    10) B hamstrings >= 4+/5. (met)      Long Term Goals:  1) B LE 5/5, excepy hips >= 4+/5 (revised)    2) Patient able to ascend/descend 3 steps with B hand rail assist, no increase in pain x5 reps.    3) Patient able to ambulate with/without least restrictive assistive device non-antalgicaly >= 300 feet.    4) TUG in <= 25 seconds with/without least restrictive assistive device, good eccentric control and safely with no LOB.     5) patient able to perform 20 sit to/from stand with 1 UE A.    6) Rhomberg EO >= 30 seconds. (revised)    7) Rhomberg EC >= 15 seconds. (revised)    8) Patient able to perform double limb reaching 12 inches from midline body with arm outstretched with no LOB.    9) Patient to be I with final HEP.    10) Standard Stance no UE support for 60 seconds. (new)    Plan  Plan details: Continue to work on endurance, strength, and gait      Progress per Plan of Care and Progress strengthening /stabilization /functional activity            Timed:  Manual Therapy:         mins  83766;  Therapeutic Exercise:    44     mins  99563;   Aquatic Therex :        mins  36192    Neuromuscular Cindi:        mins  87893;    Therapeutic Activity:          mins  43547;     Gait Training:      10     mins  53709;     Ultrasound:          mins  65879;     Electrical Stimulation:         mins  97224 ( );    Untimed:  Electrical Stimulation:         mins  21861 ( );  Mechanical Traction:         mins  53580;   Orthotic fit/train:         mins  50589    Timed Treatment:   54   mins   Total Treatment:     54   mins  Kayla Castillo PTA  Physical Therapist Assistant

## 2020-12-22 ENCOUNTER — TREATMENT (OUTPATIENT)
Dept: PHYSICAL THERAPY | Facility: CLINIC | Age: 55
End: 2020-12-22

## 2020-12-22 DIAGNOSIS — Z91.81 AT HIGH RISK FOR FALLS: ICD-10-CM

## 2020-12-22 DIAGNOSIS — I69.393 ATAXIA DUE TO OLD CEREBROVASCULAR ACCIDENT (CVA): ICD-10-CM

## 2020-12-22 DIAGNOSIS — R26.81 GAIT INSTABILITY: Primary | ICD-10-CM

## 2020-12-22 DIAGNOSIS — S88.119A BELOW KNEE AMPUTATION (HCC): ICD-10-CM

## 2020-12-22 PROCEDURE — 97110 THERAPEUTIC EXERCISES: CPT | Performed by: PHYSICAL THERAPIST

## 2020-12-22 PROCEDURE — 97116 GAIT TRAINING THERAPY: CPT | Performed by: PHYSICAL THERAPIST

## 2020-12-22 NOTE — PROGRESS NOTES
Physical Therapy Daily Progress Note    Patient: Marta Giraldo   : 1965  Diagnosis/ICD-10 Code:     Diagnosis Plan   1. Gait instability     2. Below knee amputation (CMS/HCC)     3. 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 At high risk for falls       Referring practitioner: Gato Crane MD  Date of Initial Visit: Type: THERAPY  Noted: 10/30/2020  Today's Date: 2020  Patient seen for 11 sessions      PT Recheck Due: 2021  PT MD Visit: 2021       Marta Giraldo      Subjective Evaluation    History of Present Illness    Subjective comment: reports that she still hasnt heard from the prosthetist yet. reports that she brought her chart for her prosthetic wear schedule. states that she uses her walker at home about 50% of the time. Pain  Current pain ratin             Objective   See Exercise, Manual, and Modality Logs for complete treatment.       Assessment & Plan     Assessment  Assessment details: No carryover of skill with heel strike from last treatment to this treatment. Consistent cueing during gait for heel strike, equal step length. Patient has difficulty shifting weight to R LE and needs cues for weight shift    Goals  Plan Goals: Short Term Goals:  1) Patient I with HEP and have additoins/changes by next recertification. (met)     2) Patient able to ambulate 150 feet with RW with good heel to toe gait and = step lengths, step through gait pattern and no LOB. (ongoing/progressing)    3) Patient able to perform sit to/from stand with 1 UE A = WB B LE x5 reps, with good eccentric control in 30 seconds. (ongoing/progressing)    4) TUG in <= 30 seconds with RW assistive device, good eccentric control and safely with no LOB. (ongoing/progressing)    5) Rhomberg EO >= 30 seconds. (ongoing/progressing)    6) Rhomberg EC >= 10 seconds. (ongoing/progressing)    7) B hips >= 4/5 (met)    8) Patient to show understanding of sock use for proper prosthetic fit.  (partially met/ongoing)     9) B quads 5/5. (met)    10) B hamstrings >= 4+/5. (met)      Long Term Goals:  1) B LE 5/5, excepy hips >= 4+/5 (revised)    2) Patient able to ascend/descend 3 steps with B hand rail assist, no increase in pain x5 reps.    3) Patient able to ambulate with/without least restrictive assistive device non-antalgicaly >= 300 feet.    4) TUG in <= 25 seconds with/without least restrictive assistive device, good eccentric control and safely with no LOB.     5) patient able to perform 20 sit to/from stand with 1 UE A.    6) Rhomberg EO >= 30 seconds. (revised)    7) Rhomberg EC >= 15 seconds. (revised)    8) Patient able to perform double limb reaching 12 inches from midline body with arm outstretched with no LOB.    9) Patient to be I with final HEP.    10) Standard Stance no UE support for 60 seconds. (new)    Plan  Plan details: Weight shifting activities next visit      Progress per Plan of Care and Progress strengthening /stabilization /functional activity            Timed:  Manual Therapy:         mins  04620;  Therapeutic Exercise:    36     mins  00319;   Aquatic Therex :        mins  57903    Neuromuscular Cindi:        mins  37148;    Therapeutic Activity:          mins  79548;     Gait Trainin     mins  92111;     Ultrasound:          mins  37471;    Electrical Stimulation:         mins  36295 ( );    Untimed:  Electrical Stimulation:         mins  35670 ( );  Mechanical Traction:         mins  54778;   Orthotic fit/train:         mins  43710    Timed Treatment:   60   mins   Total Treatment:     70   mins  Kayla Castillo PTA  Physical Therapist Assistant

## 2020-12-28 ENCOUNTER — TELEPHONE (OUTPATIENT)
Dept: FAMILY MEDICINE CLINIC | Facility: CLINIC | Age: 55
End: 2020-12-28

## 2020-12-28 ENCOUNTER — TREATMENT (OUTPATIENT)
Dept: PHYSICAL THERAPY | Facility: CLINIC | Age: 55
End: 2020-12-28

## 2020-12-28 DIAGNOSIS — S88.119A BELOW KNEE AMPUTATION (HCC): ICD-10-CM

## 2020-12-28 DIAGNOSIS — R26.81 GAIT INSTABILITY: Primary | ICD-10-CM

## 2020-12-28 DIAGNOSIS — I69.393 ATAXIA DUE TO OLD CEREBROVASCULAR ACCIDENT (CVA): ICD-10-CM

## 2020-12-28 PROCEDURE — 97116 GAIT TRAINING THERAPY: CPT | Performed by: PHYSICAL THERAPIST

## 2020-12-28 PROCEDURE — 97110 THERAPEUTIC EXERCISES: CPT | Performed by: PHYSICAL THERAPIST

## 2020-12-28 NOTE — PROGRESS NOTES
Physical Therapy Daily Progress Note      Patient: Marta Giraldo   : 1965  Referring practitioner: Gato Crane MD  Date of Initial Visit: Type: THERAPY  Noted: 10/30/2020  Today's Date: 2020  Patient seen for 12 sessions    Recert due:  21  MD followup:  21     Marta Giraldo reports:   Subjective Questionnaire:       Subjective   Pt reports wearing prosthesis and ambulating with RW approx 50% of the time now.  Still awaiting prosthetic consultation for adjustment.  Is not going down far enough into socket and R LE is longer as result.     Objective   Presents to facility via power w/c.  Ambulates with RW and SBA.     See Exercise, Manual, and Modality Logs for complete treatment.       Assessment & Plan     Assessment  Assessment details: Pt has difficulty getting proper gait pattern due to LLD of prosthetic leg.  Fatigues easily with therex.     Goals  Plan Goals: Plan Goals: Short Term Goals:  1) Patient I with HEP and have additoins/changes by next recertification. (met)     2) Patient able to ambulate 150 feet with RW with good heel to toe gait and = step lengths, step through gait pattern and no LOB. (ongoing/progressing)    3) Patient able to perform sit to/from stand with 1 UE A = WB B LE x5 reps, with good eccentric control in 30 seconds. (ongoing/progressing)    4) TUG in <= 30 seconds with RW assistive device, good eccentric control and safely with no LOB. (ongoing/progressing)    5) Rhomberg EO >= 30 seconds. (ongoing/progressing)    6) Rhomberg EC >= 10 seconds. (ongoing/progressing)    7) B hips >= 4/5 (met)    8) Patient to show understanding of sock use for proper prosthetic fit. (partially met/ongoing)     9) B quads 5/5. (met)    10) B hamstrings >= 4+/5. (met)      Long Term Goals:  1) B LE 5/5, excepy hips >= 4+/5 (revised)    2) Patient able to ascend/descend 3 steps with B hand rail assist, no increase in pain x5 reps.    3) Patient able to ambulate  with/without least restrictive assistive device non-antalgicaly >= 300 feet.    4) TUG in <= 25 seconds with/without least restrictive assistive device, good eccentric control and safely with no LOB.     5) patient able to perform 20 sit to/from stand with 1 UE A.    6) Rhomberg EO >= 30 seconds. (revised)    7) Rhomberg EC >= 15 seconds. (revised)    8) Patient able to perform double limb reaching 12 inches from midline body with arm outstretched with no LOB.    9) Patient to be I with final HEP.    10) Standard Stance no UE support for 60 seconds. (new)                   Plan  Plan details: Standing weight shifting activities.         Visit Diagnoses:    ICD-10-CM ICD-9-CM   1. Gait instability  R26.81 781.2   2. Below knee amputation (CMS/HCC)  S88.119A 897.0   3. 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

## 2020-12-28 NOTE — TELEPHONE ENCOUNTER
PATIENT CALLED AND WOULD LIKE AN UPDATE ON THE REFERRAL FOR TO ORTHOPEDICS     PLEASE CALL BACK AT : [phone]

## 2020-12-29 RX ORDER — ERGOCALCIFEROL 1.25 MG/1
50000 CAPSULE ORAL
Qty: 4 CAPSULE | Refills: 3 | Status: SHIPPED | OUTPATIENT
Start: 2020-12-29 | End: 2021-03-10 | Stop reason: SDUPTHER

## 2021-01-04 ENCOUNTER — TELEPHONE (OUTPATIENT)
Dept: FAMILY MEDICINE CLINIC | Facility: CLINIC | Age: 56
End: 2021-01-04

## 2021-01-04 NOTE — TELEPHONE ENCOUNTER
PATIENT CALLED IN REQUESTING A CALL BACK ABOUT A REFERRAL THAT HAS BEEN SENT IN     PATIENT ALSO REQUEST THAT THE REFERRAL BE RESENT SHE IS BEING TOLD THEY HAVENT RECEIVED ANYTHING    GOOD CALL BACK WITH ANY QUESTIONS  [phone]

## 2021-01-04 NOTE — TELEPHONE ENCOUNTER
Spoke with company and they stated they found the information that was faxed and will be in contact with the pt. Made pt aware and told her if she does not hear from the today then call their office tomorrow. Pt voiced understanding.

## 2021-01-05 ENCOUNTER — TELEPHONE (OUTPATIENT)
Dept: FAMILY MEDICINE CLINIC | Facility: CLINIC | Age: 56
End: 2021-01-05

## 2021-01-06 ENCOUNTER — TELEPHONE (OUTPATIENT)
Dept: FAMILY MEDICINE CLINIC | Facility: CLINIC | Age: 56
End: 2021-01-06

## 2021-01-06 NOTE — TELEPHONE ENCOUNTER
Insurance will not cover Jardiance. Alternatives are aloglipitn 25 mg, metformin 500 mg, and basaglar. Please advise.

## 2021-01-07 ENCOUNTER — TELEPHONE (OUTPATIENT)
Dept: FAMILY MEDICINE CLINIC | Facility: CLINIC | Age: 56
End: 2021-01-07

## 2021-01-07 DIAGNOSIS — E11.42 DIABETIC POLYNEUROPATHY ASSOCIATED WITH TYPE 2 DIABETES MELLITUS (HCC): ICD-10-CM

## 2021-01-07 RX ORDER — PREGABALIN 150 MG/1
CAPSULE ORAL
Qty: 90 CAPSULE | Refills: 0 | Status: SHIPPED | OUTPATIENT
Start: 2021-01-07 | End: 2021-01-27

## 2021-01-12 ENCOUNTER — TELEPHONE (OUTPATIENT)
Dept: FAMILY MEDICINE CLINIC | Facility: CLINIC | Age: 56
End: 2021-01-12

## 2021-01-13 ENCOUNTER — OFFICE VISIT (OUTPATIENT)
Dept: FAMILY MEDICINE CLINIC | Facility: CLINIC | Age: 56
End: 2021-01-13

## 2021-01-13 VITALS
SYSTOLIC BLOOD PRESSURE: 112 MMHG | BODY MASS INDEX: 33.23 KG/M2 | OXYGEN SATURATION: 97 % | TEMPERATURE: 96.2 F | HEIGHT: 69 IN | HEART RATE: 80 BPM | DIASTOLIC BLOOD PRESSURE: 60 MMHG

## 2021-01-13 DIAGNOSIS — E55.9 VITAMIN D DEFICIENCY: ICD-10-CM

## 2021-01-13 DIAGNOSIS — E11.65 UNCONTROLLED TYPE 2 DIABETES MELLITUS WITH HYPERGLYCEMIA (HCC): Primary | ICD-10-CM

## 2021-01-13 DIAGNOSIS — S88.119A BELOW KNEE AMPUTATION (HCC): ICD-10-CM

## 2021-01-13 DIAGNOSIS — E03.9 HYPOTHYROIDISM, UNSPECIFIED TYPE: ICD-10-CM

## 2021-01-13 DIAGNOSIS — Z91.81 AT HIGH RISK FOR FALLS: ICD-10-CM

## 2021-01-13 DIAGNOSIS — E11.42 DIABETIC POLYNEUROPATHY ASSOCIATED WITH TYPE 2 DIABETES MELLITUS (HCC): ICD-10-CM

## 2021-01-13 DIAGNOSIS — E66.9 CLASS 1 OBESITY WITH SERIOUS COMORBIDITY AND BODY MASS INDEX (BMI) OF 32.0 TO 32.9 IN ADULT, UNSPECIFIED OBESITY TYPE: ICD-10-CM

## 2021-01-13 DIAGNOSIS — R26.81 GAIT INSTABILITY: ICD-10-CM

## 2021-01-13 PROCEDURE — 99214 OFFICE O/P EST MOD 30 MIN: CPT | Performed by: FAMILY MEDICINE

## 2021-01-13 RX ORDER — METHOCARBAMOL 750 MG/1
750 TABLET, FILM COATED ORAL 3 TIMES DAILY
Qty: 90 TABLET | Refills: 3 | Status: SHIPPED | OUTPATIENT
Start: 2021-01-13 | End: 2021-03-10 | Stop reason: SDUPTHER

## 2021-01-13 RX ORDER — INSULIN GLARGINE 100 [IU]/ML
35 INJECTION, SOLUTION SUBCUTANEOUS NIGHTLY
Qty: 9 ML | Refills: 3 | Status: SHIPPED | OUTPATIENT
Start: 2021-01-13 | End: 2021-02-24 | Stop reason: SDUPTHER

## 2021-01-14 ENCOUNTER — TELEPHONE (OUTPATIENT)
Dept: FAMILY MEDICINE CLINIC | Facility: CLINIC | Age: 56
End: 2021-01-14

## 2021-01-14 NOTE — TELEPHONE ENCOUNTER
HUB CAN READ. MEDICATIONS FROM APPOINTMENT THIS WEEK WERE SENT TO [facility]. Tried to call but phone kept hanging up after one ring.

## 2021-01-14 NOTE — TELEPHONE ENCOUNTER
PATIENT HAS CALLED TO INFORM THAT HER SCRIPTS WERE SUPPOSED TO BE SENT IN TO [facility] - [place], [place] - [facility] [address] - [phone]  - [phone]   [phone]      I TRIED CONFIRMING WHICH SCRIPTS SHE NEEDED REFILLED BUT PATIENT SAID SHE DID NOT KNOW WHICH ONES.     PATIENT'S CALL BACK [phone]

## 2021-01-15 ENCOUNTER — TELEPHONE (OUTPATIENT)
Dept: FAMILY MEDICINE CLINIC | Facility: CLINIC | Age: 56
End: 2021-01-15

## 2021-01-27 ENCOUNTER — TELEPHONE (OUTPATIENT)
Dept: FAMILY MEDICINE CLINIC | Facility: CLINIC | Age: 56
End: 2021-01-27

## 2021-01-27 DIAGNOSIS — E11.42 DIABETIC POLYNEUROPATHY ASSOCIATED WITH TYPE 2 DIABETES MELLITUS (HCC): Primary | ICD-10-CM

## 2021-01-27 RX ORDER — PREGABALIN 300 MG/1
300 CAPSULE ORAL 2 TIMES DAILY
Qty: 60 CAPSULE | Refills: 2 | Status: SHIPPED | OUTPATIENT
Start: 2021-01-27 | End: 2021-02-24 | Stop reason: SDUPTHER

## 2021-01-27 NOTE — TELEPHONE ENCOUNTER
PATIENT CALLED IN STATING SHE NEEDS A NEW PRESCRIPTION FOR HER LYRICA   THE CURRENT ONE IS FOR 3 TIMES A DAY PATIENT STATES IT WAS INCREASED TO FOUR TIMES A DAY AT LAST VISIT     PATIENT STATES SHE NEEDS BY Friday     GOOD CALL BACK WHEN PRESCRIPTION IS READY TO    [phone]

## 2021-02-10 ENCOUNTER — DOCUMENTATION (OUTPATIENT)
Dept: ENDOCRINOLOGY | Facility: CLINIC | Age: 56
End: 2021-02-10

## 2021-02-10 ENCOUNTER — OFFICE VISIT (OUTPATIENT)
Dept: ENDOCRINOLOGY | Facility: CLINIC | Age: 56
End: 2021-02-10

## 2021-02-10 ENCOUNTER — OFFICE VISIT (OUTPATIENT)
Dept: FAMILY MEDICINE CLINIC | Facility: CLINIC | Age: 56
End: 2021-02-10

## 2021-02-10 VITALS
OXYGEN SATURATION: 97 % | DIASTOLIC BLOOD PRESSURE: 62 MMHG | TEMPERATURE: 97.3 F | SYSTOLIC BLOOD PRESSURE: 110 MMHG | BODY MASS INDEX: 33.23 KG/M2 | HEIGHT: 69 IN | HEART RATE: 82 BPM

## 2021-02-10 VITALS — OXYGEN SATURATION: 96 % | HEART RATE: 76 BPM | SYSTOLIC BLOOD PRESSURE: 108 MMHG | DIASTOLIC BLOOD PRESSURE: 64 MMHG

## 2021-02-10 DIAGNOSIS — M54.2 NECK PAIN: Primary | ICD-10-CM

## 2021-02-10 DIAGNOSIS — S88.119A BELOW KNEE AMPUTATION (HCC): ICD-10-CM

## 2021-02-10 DIAGNOSIS — Z79.4 TYPE 2 DIABETES MELLITUS WITH HYPERGLYCEMIA, WITH LONG-TERM CURRENT USE OF INSULIN (HCC): Primary | ICD-10-CM

## 2021-02-10 DIAGNOSIS — E11.42 DIABETIC POLYNEUROPATHY ASSOCIATED WITH TYPE 2 DIABETES MELLITUS (HCC): ICD-10-CM

## 2021-02-10 DIAGNOSIS — E78.2 MIXED HYPERLIPIDEMIA: ICD-10-CM

## 2021-02-10 DIAGNOSIS — R26.81 GAIT INSTABILITY: ICD-10-CM

## 2021-02-10 DIAGNOSIS — E55.9 VITAMIN D DEFICIENCY: ICD-10-CM

## 2021-02-10 DIAGNOSIS — Z91.81 AT HIGH RISK FOR FALLS: ICD-10-CM

## 2021-02-10 DIAGNOSIS — E11.65 UNCONTROLLED TYPE 2 DIABETES MELLITUS WITH HYPERGLYCEMIA (HCC): ICD-10-CM

## 2021-02-10 DIAGNOSIS — I10 ESSENTIAL HYPERTENSION: ICD-10-CM

## 2021-02-10 DIAGNOSIS — E11.65 TYPE 2 DIABETES MELLITUS WITH HYPERGLYCEMIA, WITH LONG-TERM CURRENT USE OF INSULIN (HCC): Primary | ICD-10-CM

## 2021-02-10 DIAGNOSIS — R35.0 URINARY FREQUENCY: ICD-10-CM

## 2021-02-10 PROCEDURE — 99214 OFFICE O/P EST MOD 30 MIN: CPT | Performed by: FAMILY MEDICINE

## 2021-02-10 PROCEDURE — 95250 CONT GLUC MNTR PHYS/QHP EQP: CPT | Performed by: NURSE PRACTITIONER

## 2021-02-10 PROCEDURE — 99214 OFFICE O/P EST MOD 30 MIN: CPT | Performed by: NURSE PRACTITIONER

## 2021-02-10 RX ORDER — PROCHLORPERAZINE 25 MG/1
1 SUPPOSITORY RECTAL
Qty: 1 EACH | Refills: 3 | Status: SHIPPED | OUTPATIENT
Start: 2021-02-10 | End: 2021-08-19

## 2021-02-10 RX ORDER — PROCHLORPERAZINE 25 MG/1
1 SUPPOSITORY RECTAL AS NEEDED
Qty: 9 EACH | Refills: 3 | Status: SHIPPED | OUTPATIENT
Start: 2021-02-10 | End: 2021-05-19

## 2021-02-10 RX ORDER — PROCHLORPERAZINE 25 MG/1
1 SUPPOSITORY RECTAL ONCE
Qty: 1 EACH | Refills: 0 | Status: SHIPPED | OUTPATIENT
Start: 2021-02-10 | End: 2021-02-10

## 2021-02-10 RX ORDER — FLUCONAZOLE 150 MG/1
TABLET ORAL
Qty: 2 TABLET | Refills: 0 | Status: SHIPPED | OUTPATIENT
Start: 2021-02-10 | End: 2021-04-07

## 2021-02-10 NOTE — PATIENT INSTRUCTIONS
"Taking Jardiance 25 mg daily-keep     Taking bydureon  2 mg once weekly--keep     Taking Basaglar--- take 40 units at night --keep     Taking Humalog-- takes 5 units--change to the following       Take humalog before meals     If a small meal give 5 units     If a medium meal give 8 units     If a large meal give 10 units     Goals for sugar    Fasting and before meals 80 to 130    2 hours after meals 180 or less      Aim for 45 grams of carbohydrate per meal    Aim for 15 grams of carbohydrate per snack         Carbohydrate Counting for Diabetes Mellitus, Adult    Carbohydrate counting is a method of keeping track of how many carbohydrates you eat. Eating carbohydrates naturally increases the amount of sugar (glucose) in the blood. Counting how many carbohydrates you eat helps keep your blood glucose within normal limits, which helps you manage your diabetes (diabetes mellitus).  It is important to know how many carbohydrates you can safely have in each meal. This is different for every person. A diet and nutrition specialist (registered dietitian) can help you make a meal plan and calculate how many carbohydrates you should have at each meal and snack.  Carbohydrates are found in the following foods:  · Grains, such as breads and cereals.  · Dried beans and soy products.  · Starchy vegetables, such as potatoes, peas, and corn.  · Fruit and fruit juices.  · Milk and yogurt.  · Sweets and snack foods, such as cake, cookies, candy, chips, and soft drinks.  How do I count carbohydrates?  There are two ways to count carbohydrates in food. You can use either of the methods or a combination of both.  Reading \"Nutrition Facts\" on packaged food  The \"Nutrition Facts\" list is included on the labels of almost all packaged foods and beverages in the [place] It includes:  · The serving size.  · Information about nutrients in each serving, including the grams (g) of carbohydrate per serving.  To use the “Nutrition " "Facts\":  · Decide how many servings you will have.  · Multiply the number of servings by the number of carbohydrates per serving.  · The resulting number is the total amount of carbohydrates that you will be having.  Learning standard serving sizes of other foods  When you eat carbohydrate foods that are not packaged or do not include \"Nutrition Facts\" on the label, you need to measure the servings in order to count the amount of carbohydrates:  · Measure the foods that you will eat with a food scale or measuring cup, if needed.  · Decide how many standard-size servings you will eat.  · Multiply the number of servings by 15. Most carbohydrate-rich foods have about 15 g of carbohydrates per serving.  ? For example, if you eat 8 oz (170 g) of strawberries, you will have eaten 2 servings and 30 g of carbohydrates (2 servings x 15 g = 30 g).  · For foods that have more than one food mixed, such as soups and casseroles, you must count the carbohydrates in each food that is included.  The following list contains standard serving sizes of common carbohydrate-rich foods. Each of these servings has about 15 g of carbohydrates:  · ½ hamburger bun or ½ English muffin.  · ½ oz (15 mL) syrup.  · ½ oz (14 g) jelly.  · 1 slice of bread.  · 1 six-inch tortilla.  · 3 oz (85 g) cooked rice or pasta.  · 4 oz (113 g) cooked dried beans.  · 4 oz (113 g) starchy vegetable, such as peas, corn, or potatoes.  · 4 oz (113 g) hot cereal.  · 4 oz (113 g) mashed potatoes or ¼ of a large baked potato.  · 4 oz (113 g) canned or frozen fruit.  · 4 oz (120 mL) fruit juice.  · 4-6 crackers.  · 6 chicken nuggets.  · 6 oz (170 g) unsweetened dry cereal.  · 6 oz (170 g) plain fat-free yogurt or yogurt sweetened with artificial sweeteners.  · 8 oz (240 mL) milk.  · 8 oz (170 g) fresh fruit or one small piece of fruit.  · 24 oz (680 g) popped popcorn.  Example of carbohydrate counting  Sample meal  · 3 oz (85 g) chicken breast.  · 6 oz (170 g) brown " rice.  · 4 oz (113 g) corn.  · 8 oz (240 mL) milk.  · 8 oz (170 g) strawberries with sugar-free whipped topping.  Carbohydrate calculation  1. Identify the foods that contain carbohydrates:  ? Rice.  ? Corn.  ? Milk.  ? Strawberries.  2. Calculate how many servings you have of each food:  ? 2 servings rice.  ? 1 serving corn.  ? 1 serving milk.  ? 1 serving strawberries.  3. Multiply each number of servings by 15 g:  ? 2 servings rice x 15 g = 30 g.  ? 1 serving corn x 15 g = 15 g.  ? 1 serving milk x 15 g = 15 g.  ? 1 serving strawberries x 15 g = 15 g.  4. Add together all of the amounts to find the total grams of carbohydrates eaten:  ? 30 g + 15 g + 15 g + 15 g = 75 g of carbohydrates total.  Summary  · Carbohydrate counting is a method of keeping track of how many carbohydrates you eat.  · Eating carbohydrates naturally increases the amount of sugar (glucose) in the blood.  · Counting how many carbohydrates you eat helps keep your blood glucose within normal limits, which helps you manage your diabetes.  · A diet and nutrition specialist (registered dietitian) can help you make a meal plan and calculate how many carbohydrates you should have at each meal and snack.  This information is not intended to replace advice given to you by your health care provider. Make sure you discuss any questions you have with your health care provider.  Document Revised: 07/12/2018 Document Reviewed: 05/31/2017  [employer] Patient Education © 2020 [employer]

## 2021-02-10 NOTE — PROGRESS NOTES
Chief Complaint  Diabetes    Subjective          Marta Giraldo presents to [facility]  History of Present Illness     In office visit     Referring provider Gato Crane MD     56-year-old female presents for consultation    Reason diabetes mellitus type 2    Timing constant    Duration--- diagnosed in 1980 s     Quality not controlled    Severity-- high     Context -- presented with symptoms of diabetes     Macrovascular complications--CVA     Microvascular complications--neuropathy , diabetic retinopathy    Current symptoms problems today--- burning feet pain     Blood glucose monitoring fingersticks    Checks 4 times daily    Am fasting 200 and up       Daytime 200 and up     Occasional hypoglycemia      Alleviating factors-- compliance with medication     Aggravating factors-- none    Diet-- regular               Review of Systems   Constitutional: Positive for fatigue.   Neurological: Positive for numbness.     Objective   Vital Signs:   /64   Pulse 76   SpO2 96%     Physical Exam  Constitutional:       Appearance: Normal appearance.   HENT:      Head: Normocephalic and atraumatic.      Right Ear: External ear normal.      Left Ear: External ear normal.      Nose: Nose normal.   Eyes:      Extraocular Movements: Extraocular movements intact.      Conjunctiva/sclera: Conjunctivae normal.      Pupils: Pupils are equal, round, and reactive to light.   Neck:      Musculoskeletal: Normal range of motion and neck supple.   Cardiovascular:      Rate and Rhythm: Normal rate and regular rhythm.   Musculoskeletal: Normal range of motion.      Comments: In a wheelchair   Skin:     Coloration: Skin is not pale.   Neurological:      General: No focal deficit present.      Mental Status: She is alert.   Psychiatric:         Mood and Affect: Mood normal.         Thought Content: Thought content normal.         Judgment: Judgment normal.        Result Review :                  Assessment and Plan    Diagnoses and all orders for this visit:    1. Type 2 diabetes mellitus with hyperglycemia, with long-term current use of insulin (CMS/[facility]) (Primary)    2. Vitamin D deficiency    3. Essential hypertension    4. Mixed hyperlipidemia    Other orders  -     Continuous Blood Gluc Transmit (Dexcom G6 Transmitter) misc; 1 each Every 3 (Three) Months.  Dispense: 1 each; Refill: 3  -     Continuous Blood Gluc Sensor (Dexcom G6 Sensor); 1 each As Needed (glucose control). Every 10 days  Dispense: 9 each; Refill: 3  -     Continuous Blood Gluc  (Dexcom G6 ) device; 1 each 1 (One) Time for 1 dose.  Dispense: 1 each; Refill: 0       In summary Mrs. Giraldo is a pleasant 56 year old female with diabetes that is not controlled and has been referred for further evaluation.           Glycemic management    Diabetes mellitus type 2    Current regimen    Taking Jardiance 25 mg daily-keep     Taking bydureon  2 mg once weekly--keep     Taking Basaglar--- take 40 units at night --keep     Taking Humalog-- takes 5 units--change to the following       Take humalog before meals     If a small meal give 5 units     If a medium meal give 8 units     If a large meal give 10 units             Goals for sugar    Fasting and before meals 80 to 130    2 hours after meals 180 or less      Aim for 45 grams of carbohydrate per meal    Aim for 15 grams of carbohydrate per snack         Uncontrolled diabetes  Hyperglycemia    Insertion of continuous glucose monitor to define patter    The continuous glucose monitor that was inserted is a nigel glucose monitor            approve dexcom       1. Patient is diabetic, insulin dependent  2. He checks his sugars 4 times daily with variability from 50 up to 400  3, requires basal insulin and prandial insulin 3 times daily for a total of 4 injections per day  4. Based on glucose readings we make adjustments to the insulin  5. We have not achieved ideal outcomes with  more information with a continuous  glucose sensor we should be able to do so  6. We see him every 2 to 3 months for diabetes management  7. Patient has hypoglycemia with unawareness  8. Patient has completed diabetes education  9. Patient has day to day variation in mealtime which confounds the degree of insulin dosing with multiple injections unless we have the CGMS that allows us to better  insulin dosing            Lipid management    Taking Lipitor 40 mg 1 at night        Blood pressure management    Taking lisinopril 20 mg 1 daily            Microvascular monitoring    Last eye exam--- Jan. 2021 ,  , monthly injection     Neuropathy    Taking Lyrica          Thyroid    Hypothyroidism    Taking levothyroxine 25 mcg daily        Bone health    Vitamin D deficiency    Taking vitamin D 50,000 units weekly    Weight management            Decrease daily caloric intake by 500 garth/day          Records received from Dr. Crane  from 2021 and reviewed    Thank you for this consultation          Follow Up   Return in about 9 days (around 2/19/2021) for Recheck.  Patient was given instructions and counseling regarding her condition or for health maintenance advice. Please see specific information pulled into the AVS if appropriate.

## 2021-02-11 NOTE — PROGRESS NOTES
Chief Complaint  Follow-up    Subjective          Marta Giraldo presents to [facility] PRIMARY CARE       Pt is 57 yo female with management of obesity IDDM, HLP, diabetic neuropathy,  HTN, major depression  type 2, former tobacco smoker, sp below right  knee amputation of right leg , sp appendectomy, sp bladder surgery,diabetic retinopathy, vitamin D deficiency, hypothryoidism, microcytic anemia , colonic polyps, diverticulosis, internal hemorrhoids/GERD with esophagitis, gastritis. Diabetic retinopathy, cataracts, iron deficiency, CKD stage 2, chronic neck pain (DDD cervical spine,cervical spondylosis at C5-C6) cervical radiculopathy         2/10/21 in office visit for recheck on pt's above medical issues . On last visit her lyrica was adjusted to 300 mg PO BID. She did see Endocrinology today on 2/10/21 . She was ordered dexcom continuous glucose monitor. She still has issues with vision and has history of DM retinopathy. Her diabetic neuropathy is better with  Lyrica 300 mg PO BID. Her pain is better controlled. She does state that she has had increased urinary frequency for past month. Denies any fever or chills. She is on jardiance.  Also has vaginal irritation. She is also having pain  Both arms that radiate from neck to arms.  She also has intermittent neck pain. Denies any fall.       2/24/21 in office visit for recheck on pt's above medical issues. She had x-ray of her cervical spine on 2/10/21 that showed degenerative changes and spondylosis of cervical spine at C5-C6.  She was having pain in both arms.  She has yet to get urine studies. She was having issues with increased urinary frequency.  Pt continues to take her medications for HTN, HLP, DM type 2, hypothyroidism.   She stil lhas pain in her neck that radiates down both her arms. She states it feels like a stabbing sensation. Lyrica does help to some degree. BP stable        Arm Pain   The incident occurred more than 1 week  ago. The incident occurred at home. There was no injury mechanism. The pain is present in the left forearm and right forearm. The quality of the pain is described as cramping. The pain is at a severity of 0/10. The patient is experiencing no pain. The pain has been constant since the incident. Associated symptoms include numbness and tingling. Nothing aggravates the symptoms. She has tried nothing for the symptoms. The treatment provided no relief.   Pain  This is a chronic problem. The current episode started more than 1 year ago. The problem occurs constantly. The problem has been waxing and waning. Associated symptoms include arthralgias. Pertinent negatives include no abdominal pain, anorexia, change in bowel habit, chest pain, chills, congestion, coughing, diaphoresis, fatigue, fever, headaches, joint swelling, myalgias, nausea, neck pain, numbness, rash, sore throat, swollen glands, urinary symptoms, vertigo, visual change, vomiting or weakness. The symptoms are aggravated by exertion and standing. Treatments tried: lyrica  The treatment provided moderate relief.   Female  Problem  The patient's pertinent negatives include no genital lesions, genital odor, genital rash, missed menses, pelvic pain, vaginal bleeding or vaginal discharge. This is a recurrent problem. The current episode started more than 1 month ago. The problem occurs constantly. The problem has been waxing and waning. The patient is experiencing no pain. She is not pregnant. Pertinent negatives include no abdominal pain, anorexia, back pain, chills, constipation, diarrhea, discolored urine, dysuria, fever, flank pain, frequency, headaches, hematuria, joint pain, joint swelling, nausea, painful intercourse, rash, sore throat, urgency or vomiting. She has tried nothing for the symptoms. She is not sexually active. No, her partner does not have an STD. She uses nothing for contraception. There is no history of an abdominal surgery, a   section, an ectopic pregnancy, endometriosis, a gynecological surgery, herpes simplex, menorrhagia, metrorrhagia, miscarriage, ovarian cysts, perineal abscess, PID, an STD, a terminated pregnancy or vaginosis.   Neck Pain   This is a recurrent problem. The current episode started more than 1 month ago. The problem occurs constantly. The problem has been unchanged. The pain is present in the left side and right side. The quality of the pain is described as aching. The pain is at a severity of 7/10. Nothing aggravates the symptoms. The pain is same all the time. Pertinent negatives include no chest pain, fever, headaches, leg pain, numbness, pain with swallowing, paresis, photophobia, syncope, tingling, trouble swallowing, visual change, weakness or weight loss. She has tried nothing (lyrica) for the symptoms. The treatment provided no relief.   Diabetes  She presents for her follow-up diabetic visit. She has type 2 diabetes mellitus. Her disease course has been stable. Pertinent negatives for hypoglycemia include no confusion, dizziness, headaches, hunger, mood changes, nervousness/anxiousness, pallor or seizures. Associated symptoms include fatigue and weakness. Pertinent negatives for diabetes include no blurred vision, no chest pain, no foot paresthesias, no foot ulcerations, no polydipsia, no polyphagia and no polyuria. Pertinent negatives for hypoglycemia complications include no blackouts, no hospitalization, no nocturnal hypoglycemia and no required assistance. Symptoms are stable. Pertinent negatives for diabetic complications include no autonomic neuropathy, CVA, heart disease, impotence, nephropathy or peripheral neuropathy. Risk factors for coronary artery disease include diabetes mellitus and dyslipidemia. Current diabetic treatment includes insulin injections and oral agent (dual therapy). She is compliant with treatment most of the time. Her weight is stable. She has not had a previous visit with a  "dietitian. She never participates in exercise. Her home blood glucose trend is decreasing steadily. An ACE inhibitor/angiotensin II receptor blocker is being taken. She does not see a podiatrist.Eye exam is not current.   Hypothyroidism   This is a new problem. The current episode started more than 1 year ago. The problem occurs constantly. The problem has been improved Associated symptoms include arthralgias, fatigue, numbness and weakness. Pertinent negatives include no abdominal pain, anorexia, chest pain, chills, congestion, coughing, diaphoresis, fever, headaches, nausea, sore throat or vomiting. Nothing aggravates the symptoms. She has tried nothing for the symptoms. The treatment provided no relief.    Obesity   This is a chronic problem. The problem occurs constantly. The problem has been unchanged. Associated symptoms include arthralgias, fatigue, numbness and weakness. Pertinent negatives include no chest pain, chills, congestion, coughing, diaphoresis, fever, headaches, nausea, sore throat or vomiting. Nothing aggravates the symptoms. She has tried nothing for the symptoms. The treatment provided no relief  Review of Systems   Constitutional: Positive for activity change and fatigue.   Musculoskeletal: Positive for arthralgias, gait problem and neck stiffness.   Neurological: Positive for tingling and numbness.     Objective   Vital Signs:   /74 (BP Location: Right arm, Patient Position: Sitting, Cuff Size: Large Adult)   Pulse 90   Temp 98.9 °F (37.2 °C)   Ht 175.3 cm (69\")   SpO2 96%   BMI 33.23 kg/m²     /74 (BP Location: Right arm, Patient Position: Sitting, Cuff Size: Large Adult)   Pulse 90   Temp 98.9 °F (37.2 °C)   Ht 175.3 cm (69\")   SpO2 96%   BMI 33.23 kg/m²     Physical Exam  Vitals signs and nursing note reviewed.   Constitutional:       Appearance: She is well-developed. She is not diaphoretic.   HENT:      Head: Normocephalic and atraumatic.      Right Ear: External " ear normal.   Eyes:      Conjunctiva/sclera: Conjunctivae normal.      Pupils: Pupils are equal, round, and reactive to light.   Neck:      Musculoskeletal: Normal range of motion and neck supple.   Cardiovascular:      Rate and Rhythm: Normal rate and regular rhythm.      Heart sounds: Normal heart sounds. No murmur.   Pulmonary:      Effort: Pulmonary effort is normal. No respiratory distress.      Breath sounds: Normal breath sounds.   Abdominal:      General: Bowel sounds are normal. There is no distension.      Palpations: Abdomen is soft.      Tenderness: There is no abdominal tenderness.   Musculoskeletal:         General: Tenderness present. No deformity.      Cervical back: She exhibits decreased range of motion, tenderness, bony tenderness, pain and spasm.      Comments: Gait instability    Get up and go test >10 seconds    Skin:     General: Skin is warm.      Coloration: Skin is not pale.      Findings: No erythema or rash.   Neurological:      Mental Status: She is alert and oriented to person, place, and time.      Cranial Nerves: No cranial nerve deficit.   Psychiatric:         Behavior: Behavior normal.        Result Review :                 Assessment and Plan    Diagnoses and all orders for this visit:    1. Uncontrolled type 2 diabetes mellitus with hyperglycemia (CMS/HCC) (Primary)  -     CBC Auto Differential; Future  -     Comprehensive Metabolic Panel; Future  -     Hemoglobin A1c; Future  -     Lipid Panel; Future  -     TSH; Future  -     T4, Free; Future  -     T3, Free; Future  -     Vitamin D 25 Hydroxy; Future    2. Vitamin D deficiency  -     CBC Auto Differential; Future  -     Comprehensive Metabolic Panel; Future  -     Hemoglobin A1c; Future  -     Lipid Panel; Future  -     TSH; Future  -     T4, Free; Future  -     T3, Free; Future  -     Vitamin D 25 Hydroxy; Future    3. Mixed hyperlipidemia  -     CBC Auto Differential; Future  -     Comprehensive Metabolic Panel; Future  -      Hemoglobin A1c; Future  -     Lipid Panel; Future  -     TSH; Future  -     T4, Free; Future  -     T3, Free; Future  -     Vitamin D 25 Hydroxy; Future    4. Essential hypertension  -     CBC Auto Differential; Future  -     Comprehensive Metabolic Panel; Future  -     Hemoglobin A1c; Future  -     Lipid Panel; Future  -     TSH; Future  -     T4, Free; Future  -     T3, Free; Future  -     Vitamin D 25 Hydroxy; Future    5. At high risk for falls  -     CBC Auto Differential; Future  -     Comprehensive Metabolic Panel; Future  -     Hemoglobin A1c; Future  -     Lipid Panel; Future  -     TSH; Future  -     T4, Free; Future  -     T3, Free; Future  -     Vitamin D 25 Hydroxy; Future    6. Gait instability  -     CBC Auto Differential; Future  -     Comprehensive Metabolic Panel; Future  -     Hemoglobin A1c; Future  -     Lipid Panel; Future  -     TSH; Future  -     T4, Free; Future  -     T3, Free; Future  -     Vitamin D 25 Hydroxy; Future    7. Hypothyroidism, unspecified type  -     CBC Auto Differential; Future  -     Comprehensive Metabolic Panel; Future  -     Hemoglobin A1c; Future  -     Lipid Panel; Future  -     TSH; Future  -     T4, Free; Future  -     T3, Free; Future  -     Vitamin D 25 Hydroxy; Future    8. Chronic neck pain  -     CBC Auto Differential; Future  -     Comprehensive Metabolic Panel; Future  -     Hemoglobin A1c; Future  -     Lipid Panel; Future  -     TSH; Future  -     T4, Free; Future  -     T3, Free; Future  -     Vitamin D 25 Hydroxy; Future    9. DDD (degenerative disc disease), cervical  -     CBC Auto Differential; Future  -     Comprehensive Metabolic Panel; Future  -     Hemoglobin A1c; Future  -     Lipid Panel; Future  -     TSH; Future  -     T4, Free; Future  -     T3, Free; Future  -     Vitamin D 25 Hydroxy; Future    10. Cervical radiculopathy  -     CBC Auto Differential; Future  -     Comprehensive Metabolic Panel; Future  -     Hemoglobin A1c; Future  -      Lipid Panel; Future  -     TSH; Future  -     T4, Free; Future  -     T3, Free; Future  -     Vitamin D 25 Hydroxy; Future    11. Diabetic polyneuropathy associated with type 2 diabetes mellitus (CMS/HCC)  -     pregabalin (Lyrica) 300 MG capsule; Take 1 capsule by mouth 2 (Two) Times a Day.  Dispense: 60 capsule; Refill: 2  -     CBC Auto Differential; Future  -     Comprehensive Metabolic Panel; Future  -     Hemoglobin A1c; Future  -     Lipid Panel; Future  -     TSH; Future  -     T4, Free; Future  -     T3, Free; Future  -     Vitamin D 25 Hydroxy; Future    12. Class 1 obesity with serious comorbidity and body mass index (BMI) of 32.0 to 32.9 in adult, unspecified obesity type    Other orders  -     Insulin Glargine (BASAGLAR KWIKPEN) 100 UNIT/ML injection pen; Inject 35 Units under the skin into the appropriate area as directed Every Night.  Dispense: 9 mL; Refill: 3         -recommend labwork   --dysuria-  Will get urine studies.  Start on diflucan 150 mg  Now and in 72 hours. If UTI start on abx. Consider ditropan XL for urinary incontinence. Consider Urology evaluation   -chronic neck pain/DDD cervical spine/cervical radiculopathy -reviewed x-ray of neck. Recommend Neurology referral for possible EMG study. Will get MRI of cervical spine   -recommend diabetic eye exam/diabetic retinopathy/catatract  - pt's sister will call Ophthalmologist in [place]   -recommend mammogram screening -schedule maging center   -wrote prescirption for bedside commode   -gait instability/ high fall risk gave prescription for motorizized  Wheelchair for replacement  -urinary incontinence - wrote prescription for pullups.    -colonic/rectal polyps, internal hemorrhoids/GERD with esophagitis/gastritis  - GI following  Next colonsocopy in 2025   -vitamin D deficiency -vitamin D once a week  -hypothyroidism - on synthroid 25 mcg PO q daily.   -DM type 2  - on bydureon 2 mg subq weekly. On jardiance 25 mg daily.   on Tresiba  35  units at bedtime.  on humalog 5 units before meals. Endocrinology following. Has Dexcom monitor ordered   -microcytic anemia - on ferrous sulfate 325 mg PO q daily. Has upcoming appt with Gastroenteorlogy on 9/2/20 for colonoscopy and EGD   -HTN -  Stable  On lisinopril  20 mg PO q daily.   -HLP - on lipitor 20 mg PO qhs.  -major depression - on viibryd 20 mg Po q daily.    -obesity - counseled weight loss >5 minutes BMI at 33.23   -diabetic neuropathy - on lyrica 300 PO BID will go up to QID. She will finish bottle first before refilling prescription   -chronic pain  Was on Norco 7.5/325 mg every 12 hours PRN. Will refer to Pain Management.   on robaxin TID     -recommend pap smear  -advised pt to be safe and call with questions and concerns  -advised pt to go to ER or call 911 if symptoms worrisome or severe  -advised pt to followup with specialist and referrals  -advised pt to be safe during COVID-19 pandemic  -total time with pt >25 minutes   -recheck in 6 weeks         This document has been electronically signed by Gato Crane MD on February 24, 2021 16:01 CST        Follow Up   No follow-ups on file.  Patient was given instructions and counseling regarding her condition or for health maintenance advice. Please see specific information pulled into the AVS if appropriate.

## 2021-02-12 ENCOUNTER — TELEPHONE (OUTPATIENT)
Dept: FAMILY MEDICINE CLINIC | Facility: CLINIC | Age: 56
End: 2021-02-12

## 2021-02-12 RX ORDER — VILAZODONE HYDROCHLORIDE 20 MG/1
TABLET ORAL
Qty: 30 TABLET | Refills: 0 | Status: SHIPPED | OUTPATIENT
Start: 2021-02-12 | End: 2021-02-12 | Stop reason: SDUPTHER

## 2021-02-12 RX ORDER — VILAZODONE HYDROCHLORIDE 20 MG/1
20 TABLET ORAL DAILY
Qty: 30 TABLET | Refills: 3 | Status: SHIPPED | OUTPATIENT
Start: 2021-02-12 | End: 2021-03-10 | Stop reason: SDUPTHER

## 2021-02-12 RX ORDER — VILAZODONE HYDROCHLORIDE 20 MG/1
20 TABLET ORAL DAILY
Qty: 30 TABLET | Refills: 3 | Status: SHIPPED | OUTPATIENT
Start: 2021-02-12 | End: 2021-02-12 | Stop reason: SDUPTHER

## 2021-02-12 NOTE — TELEPHONE ENCOUNTER
PATIENT CALLED STATING THE MEDICATION THAT HAS BEEN SENT IN FOR HER TODAY TO BE PICKED UP NEED A PRIOR AUTHORIZATION BEFORE SHE CAN PICK IT UP.    GOOD CALL BACK   [phone]

## 2021-02-12 NOTE — TELEPHONE ENCOUNTER
Caller: BEAVER CAROL    Relationship: Emergency Contact    Best call back number: [phone]    Medication needed:   Requested Prescriptions     Pending Prescriptions Disp Refills   • vilazodone (Viibryd) 20 MG tablet tablet 30 tablet 0     Sig: Take 1 tablet by mouth Daily.       When do you need the refill by: ASAP    What details did the patient provide when requesting the medication: Needing to be sent to attached pharmacy  .  Does the patient have less than a 3 day supply:  [x] Yes  [] No    What is the patient's preferred pharmacy: [address] [phone] [facility] [phone]

## 2021-02-12 NOTE — TELEPHONE ENCOUNTER
Patient sates she found a pharmacy. [facility] has the medicine he discussed. (siva?)    She asked that you call it in today.

## 2021-02-12 NOTE — TELEPHONE ENCOUNTER
----- Message from Gato Crane MD sent at 2/10/2021  8:10 PM CST -----  Please let pt know she has degenerative changes of cervical spine that may be contributing to arm pain.  Will need to discuss about possible PT/OT or MRI of spine.     Recheck on next visit thanks

## 2021-02-12 NOTE — TELEPHONE ENCOUNTER
Spoke to pharmacy and made aware medication has a PA and they stated it was out of stock. Made pt aware and she is going to call around and see what pharmacy has it in stock.

## 2021-02-12 NOTE — TELEPHONE ENCOUNTER
----- Message from Marta Giraldo sent at 2/12/2021  3:46 PM CST -----  Regarding: Prescription Question  Contact: [phone]  [facility] on [address] has Víctor cameron in stock. They have 25 of them.     Thanks  June

## 2021-02-18 ENCOUNTER — DOCUMENTATION (OUTPATIENT)
Dept: ENDOCRINOLOGY | Facility: CLINIC | Age: 56
End: 2021-02-18

## 2021-02-18 NOTE — PROGRESS NOTES
LAST PA DENIED DUE TO DUAL PLAN - SECONDARY. FAXED INFO TO PHARMACY - THEY FIXED AND BILLED TO CORRECT PLAN.  NEW PA FOR DEXCOM G6 SENSOR AND  SENT TODAY TO [facility]   SENSOR KEY : [id number]   KEY: [id number]

## 2021-02-19 ENCOUNTER — OFFICE VISIT (OUTPATIENT)
Dept: ENDOCRINOLOGY | Facility: CLINIC | Age: 56
End: 2021-02-19

## 2021-02-19 VITALS
HEART RATE: 75 BPM | HEIGHT: 69 IN | BODY MASS INDEX: 33.33 KG/M2 | OXYGEN SATURATION: 99 % | DIASTOLIC BLOOD PRESSURE: 82 MMHG | SYSTOLIC BLOOD PRESSURE: 118 MMHG | WEIGHT: 225 LBS

## 2021-02-19 DIAGNOSIS — I10 ESSENTIAL HYPERTENSION: ICD-10-CM

## 2021-02-19 DIAGNOSIS — Z79.4 TYPE 2 DIABETES MELLITUS WITH HYPOGLYCEMIA WITHOUT COMA, WITH LONG-TERM CURRENT USE OF INSULIN (HCC): ICD-10-CM

## 2021-02-19 DIAGNOSIS — E78.2 MIXED HYPERLIPIDEMIA: Primary | ICD-10-CM

## 2021-02-19 DIAGNOSIS — E11.649 TYPE 2 DIABETES MELLITUS WITH HYPOGLYCEMIA WITHOUT COMA, WITH LONG-TERM CURRENT USE OF INSULIN (HCC): ICD-10-CM

## 2021-02-19 DIAGNOSIS — E55.9 VITAMIN D DEFICIENCY: ICD-10-CM

## 2021-02-19 PROCEDURE — 99214 OFFICE O/P EST MOD 30 MIN: CPT | Performed by: NURSE PRACTITIONER

## 2021-02-19 PROCEDURE — 95251 CONT GLUC MNTR ANALYSIS I&R: CPT | Performed by: NURSE PRACTITIONER

## 2021-02-19 NOTE — PROGRESS NOTES
"Chief Complaint  Diabetes    Subjective          Marta Giraldo presents to [facility] ENDOCRINOLOGY  History of Present Illness         In office visit     56-year-old female presents for follow up      Reason diabetes mellitus type 2     Timing constant     Duration--- diagnosed in 1980 s      Quality not controlled     Severity-- high      Context -- presented with symptoms of diabetes      Macrovascular complications--CVA      Microvascular complications--neuropathy , diabetic retinopathy     Current symptoms problems today--- burning feet pain      Blood glucose monitoring fingersticks     Checks 4 times daily     Am fasting 200 and up         Tae office use downloaded and reviewed    Dated from February 10 to February 19, 2021    Average blood glucose 191    Time in target 38%    High 38%    Very high 19%    Low 4%    Very low 1%          Alleviating factors-- compliance with medication      Aggravating factors-- none     Diet-- regular                          Review of Systems   Constitutional: Positive for fatigue.     Objective   Vital Signs:   /82   Pulse 75   Ht 175.3 cm (69\")   Wt 102 kg (225 lb)   SpO2 99%   BMI 33.23 kg/m²     Physical Exam  Constitutional:       Appearance: Normal appearance.   HENT:      Head: Normocephalic.      Nose: Nose normal.   Eyes:      Conjunctiva/sclera: Conjunctivae normal.      Pupils: Pupils are equal, round, and reactive to light.   Neck:      Musculoskeletal: Normal range of motion and neck supple.   Cardiovascular:      Rate and Rhythm: Normal rate and regular rhythm.      Heart sounds: Normal heart sounds.   Pulmonary:      Breath sounds: Normal breath sounds.   Musculoskeletal: Normal range of motion.   Skin:     Coloration: Skin is not pale.   Neurological:      General: No focal deficit present.      Mental Status: She is alert.   Psychiatric:         Mood and Affect: Mood normal.         Thought Content: Thought content " normal.         Judgment: Judgment normal.        Result Review :   The following data was reviewed by: CONNOR Concepcion on 02/19/2021:  Common labs    Common Labsle 8/11/20 8/11/20 8/11/20 8/11/20 8/11/20 12/14/20 12/14/20 12/14/20 12/14/20    0820 0820 0820 0820 0820 0812 0812 0812 0812   Glucose    267 (A)   133 (A)     BUN    19   14     Creatinine    0.70   0.95     eGFR Non  Am    87   61     Sodium    133 (A)   138     Potassium    4.4   4.1     Chloride    100   103     Calcium    8.9   9.3     Albumin    3.80   4.20     Total Bilirubin    0.2   0.2     Alkaline Phosphatase    64   71     AST (SGOT)    11   15     ALT (SGPT)    13   16     WBC 9.81     12.94 (A)      Hemoglobin 10.3 (A)     13.1      Hematocrit 30.9 (A)     41.3      Platelets 242     266      Total Cholesterol     172   149    Triglycerides     102   89    HDL Cholesterol     47   46    LDL Cholesterol      105 (A)   86    Hemoglobin A1C  10.00 (A)       9.60 (A)   Microalbumin, Urine   6.0         (A) Abnormal value       Comments are available for some flowsheets but are not being displayed.                     Assessment and Plan    Diagnoses and all orders for this visit:    1. Mixed hyperlipidemia (Primary)    2. Type 2 diabetes mellitus with hypoglycemia without coma, with long-term current use of insulin (CMS/[facility])    3. Essential hypertension    4. Vitamin D deficiency             Glycemic management     Diabetes mellitus type 2      Tae office use downloaded and reviewed    Dated from February 10 to February 19, 2021    Average blood glucose 191    Time in target 38%    High 38%    Very high 19%    Low 4%    Very low 1%    Lows are occurring in the early morning    She has postprandial hyperglycemia after supper             Current regimen     Taking Jardiance 25 mg daily-keep      Taking bydureon  2 mg once weekly--keep      Taking Basaglar---40 units  -decrease to 36 units      Taking Humalog-- takes 5  units--    Please give for supper         Take humalog before meals      If a small meal give 5 units      If a medium meal give 8 units      If a large meal give 10 units                  Goals for sugar     Fasting and before meals 80 to 130     2 hours after meals 180 or less        Aim for 45 grams of carbohydrate per meal     Aim for 15 grams of carbohydrate per snack                    approve dexcom         1. Patient is diabetic, insulin dependent  2. He checks his sugars 4 times daily with variability from 50 up to 400  3, requires basal insulin and prandial insulin 3 times daily for a total of 4 injections per day  4. Based on glucose readings we make adjustments to the insulin  5. We have not achieved ideal outcomes with more information with a continuous  glucose sensor we should be able to do so  6. We see him every 2 to 3 months for diabetes management  7. Patient has hypoglycemia with unawareness  8. Patient has completed diabetes education  9. Patient has day to day variation in mealtime which confounds the degree of insulin dosing with multiple injections unless we have the CGMS that allows us to better  insulin dosing              Lipid management     Taking Lipitor 40 mg 1 at night           Blood pressure management     Taking lisinopril 20 mg 1 daily                 Microvascular monitoring     Last eye exam--- Jan. 2021 ,  , monthly injection      Neuropathy     Taking Lyrica              Thyroid     Hypothyroidism     Taking levothyroxine 25 mcg daily           Bone health     Vitamin D deficiency     Taking vitamin D 50,000 units weekly     Weight management                 Decrease daily caloric intake by 500 garth/day                Follow Up   Return in about 3 months (around 5/19/2021) for Recheck.  Patient was given instructions and counseling regarding her condition or for health maintenance advice. Please see specific information pulled into the AVS if appropriate.

## 2021-02-22 ENCOUNTER — DOCUMENTATION (OUTPATIENT)
Dept: ENDOCRINOLOGY | Facility: CLINIC | Age: 56
End: 2021-02-22

## 2021-02-23 ENCOUNTER — TELEPHONE (OUTPATIENT)
Dept: FAMILY MEDICINE CLINIC | Facility: CLINIC | Age: 56
End: 2021-02-23

## 2021-02-24 ENCOUNTER — OFFICE VISIT (OUTPATIENT)
Dept: FAMILY MEDICINE CLINIC | Facility: CLINIC | Age: 56
End: 2021-02-24

## 2021-02-24 ENCOUNTER — LAB (OUTPATIENT)
Dept: LAB | Facility: HOSPITAL | Age: 56
End: 2021-02-24

## 2021-02-24 VITALS
SYSTOLIC BLOOD PRESSURE: 132 MMHG | DIASTOLIC BLOOD PRESSURE: 74 MMHG | OXYGEN SATURATION: 96 % | TEMPERATURE: 98.9 F | HEIGHT: 69 IN | BODY MASS INDEX: 33.23 KG/M2 | HEART RATE: 90 BPM

## 2021-02-24 DIAGNOSIS — M54.12 CERVICAL RADICULOPATHY: Primary | ICD-10-CM

## 2021-02-24 DIAGNOSIS — E11.42 DIABETIC POLYNEUROPATHY ASSOCIATED WITH TYPE 2 DIABETES MELLITUS (HCC): ICD-10-CM

## 2021-02-24 DIAGNOSIS — E78.2 MIXED HYPERLIPIDEMIA: ICD-10-CM

## 2021-02-24 DIAGNOSIS — M50.30 DDD (DEGENERATIVE DISC DISEASE), CERVICAL: ICD-10-CM

## 2021-02-24 DIAGNOSIS — E11.65 UNCONTROLLED TYPE 2 DIABETES MELLITUS WITH HYPERGLYCEMIA (HCC): ICD-10-CM

## 2021-02-24 DIAGNOSIS — M54.2 CHRONIC NECK PAIN: ICD-10-CM

## 2021-02-24 DIAGNOSIS — E03.9 HYPOTHYROIDISM, UNSPECIFIED TYPE: ICD-10-CM

## 2021-02-24 DIAGNOSIS — M79.602 BILATERAL ARM PAIN: ICD-10-CM

## 2021-02-24 DIAGNOSIS — R26.81 GAIT INSTABILITY: ICD-10-CM

## 2021-02-24 DIAGNOSIS — M79.601 BILATERAL ARM PAIN: ICD-10-CM

## 2021-02-24 DIAGNOSIS — I10 ESSENTIAL HYPERTENSION: ICD-10-CM

## 2021-02-24 DIAGNOSIS — E55.9 VITAMIN D DEFICIENCY: ICD-10-CM

## 2021-02-24 DIAGNOSIS — Z91.81 AT HIGH RISK FOR FALLS: ICD-10-CM

## 2021-02-24 DIAGNOSIS — R35.0 URINARY FREQUENCY: ICD-10-CM

## 2021-02-24 DIAGNOSIS — E66.9 CLASS 1 OBESITY WITH SERIOUS COMORBIDITY AND BODY MASS INDEX (BMI) OF 32.0 TO 32.9 IN ADULT, UNSPECIFIED OBESITY TYPE: ICD-10-CM

## 2021-02-24 DIAGNOSIS — G89.29 CHRONIC NECK PAIN: ICD-10-CM

## 2021-02-24 PROCEDURE — 81003 URINALYSIS AUTO W/O SCOPE: CPT

## 2021-02-24 PROCEDURE — 81015 MICROSCOPIC EXAM OF URINE: CPT

## 2021-02-24 PROCEDURE — 99214 OFFICE O/P EST MOD 30 MIN: CPT | Performed by: FAMILY MEDICINE

## 2021-02-24 PROCEDURE — 87086 URINE CULTURE/COLONY COUNT: CPT

## 2021-02-24 RX ORDER — PREGABALIN 300 MG/1
300 CAPSULE ORAL 2 TIMES DAILY
Qty: 60 CAPSULE | Refills: 2 | Status: SHIPPED | OUTPATIENT
Start: 2021-02-24 | End: 2021-04-07 | Stop reason: SDUPTHER

## 2021-02-24 RX ORDER — INSULIN GLARGINE 100 [IU]/ML
35 INJECTION, SOLUTION SUBCUTANEOUS NIGHTLY
Qty: 9 ML | Refills: 3 | Status: SHIPPED | OUTPATIENT
Start: 2021-02-24 | End: 2021-03-10 | Stop reason: SDUPTHER

## 2021-02-24 NOTE — PATIENT INSTRUCTIONS
Get labwork before next visit    Will get MRI of cervical spine    Will refer to Dr. Lal with Neurology for her arm pain and neck pain issues

## 2021-02-25 ENCOUNTER — DOCUMENTATION (OUTPATIENT)
Dept: ENDOCRINOLOGY | Facility: CLINIC | Age: 56
End: 2021-02-25

## 2021-02-25 LAB
BACTERIA UR QL AUTO: NORMAL /HPF
BILIRUB UR QL STRIP: NEGATIVE
CLARITY UR: CLEAR
COLOR UR: YELLOW
GLUCOSE UR STRIP-MCNC: ABNORMAL MG/DL
HGB UR QL STRIP.AUTO: NEGATIVE
HYALINE CASTS UR QL AUTO: NORMAL /LPF
KETONES UR QL STRIP: NEGATIVE
LEUKOCYTE ESTERASE UR QL STRIP.AUTO: NEGATIVE
NITRITE UR QL STRIP: NEGATIVE
PH UR STRIP.AUTO: 6 [PH] (ref 5–8)
PROT UR QL STRIP: NEGATIVE
RBC # UR: NORMAL /HPF
REF LAB TEST METHOD: NORMAL
SP GR UR STRIP: 1.03 (ref 1–1.03)
SQUAMOUS #/AREA URNS HPF: NORMAL /HPF
UROBILINOGEN UR QL STRIP: ABNORMAL
WBC UR QL AUTO: NORMAL /HPF

## 2021-02-26 ENCOUNTER — TELEPHONE (OUTPATIENT)
Dept: FAMILY MEDICINE CLINIC | Facility: CLINIC | Age: 56
End: 2021-02-26

## 2021-02-26 LAB — BACTERIA SPEC AEROBE CULT: NORMAL

## 2021-02-26 RX ORDER — OXYBUTYNIN CHLORIDE 10 MG/1
10 TABLET, EXTENDED RELEASE ORAL DAILY
Qty: 30 TABLET | Refills: 3 | Status: SHIPPED | OUTPATIENT
Start: 2021-02-26 | End: 2021-03-10 | Stop reason: SDUPTHER

## 2021-02-26 NOTE — TELEPHONE ENCOUNTER
----- Message from Gato Crane MD sent at 2/26/2021  7:20 AM CST -----  Please  Let pt know she does not have active UTI    If she continues to have increased urinary frequency recommend starting on ditropan XL 25 mg PO q daily. Give 30 pills and 3 refills if insurance willing to cover. Will see if this improves before pt's next appt    Recheck on next visit. Thanks

## 2021-03-02 ENCOUNTER — DOCUMENTATION (OUTPATIENT)
Dept: ENDOCRINOLOGY | Facility: CLINIC | Age: 56
End: 2021-03-02

## 2021-03-05 ENCOUNTER — TELEPHONE (OUTPATIENT)
Dept: ENDOCRINOLOGY | Facility: CLINIC | Age: 56
End: 2021-03-05

## 2021-03-05 NOTE — TELEPHONE ENCOUNTER
Polly with [facility]  Called said fax that they received was cut off and needs refaxed was for CMN & progress notes for CGM- Freestyle nigel     Fax [phone]

## 2021-03-09 ENCOUNTER — TELEPHONE (OUTPATIENT)
Dept: FAMILY MEDICINE CLINIC | Facility: CLINIC | Age: 56
End: 2021-03-09

## 2021-03-09 NOTE — TELEPHONE ENCOUNTER
Pt's primary insurance approved MRI but secondary denied it. They stated PCP would need to write something out stating why it is medically neccessary for pt to have this done and fax along with records. Please advise.

## 2021-03-10 RX ORDER — LISINOPRIL 20 MG/1
20 TABLET ORAL DAILY
Qty: 30 TABLET | Refills: 3 | Status: SHIPPED | OUTPATIENT
Start: 2021-03-10 | End: 2021-06-15

## 2021-03-10 RX ORDER — PNV NO.95/FERROUS FUM/FOLIC AC 28MG-0.8MG
1 TABLET ORAL
Qty: 30 TABLET | Refills: 3 | Status: SHIPPED | OUTPATIENT
Start: 2021-03-10 | End: 2021-06-15

## 2021-03-10 RX ORDER — ERGOCALCIFEROL 1.25 MG/1
50000 CAPSULE ORAL
Qty: 4 CAPSULE | Refills: 3 | Status: SHIPPED | OUTPATIENT
Start: 2021-03-10 | End: 2021-06-15

## 2021-03-10 RX ORDER — METHOCARBAMOL 750 MG/1
750 TABLET, FILM COATED ORAL 3 TIMES DAILY
Qty: 90 TABLET | Refills: 3 | Status: SHIPPED | OUTPATIENT
Start: 2021-03-10 | End: 2021-06-15

## 2021-03-10 RX ORDER — OXYBUTYNIN CHLORIDE 10 MG/1
10 TABLET, EXTENDED RELEASE ORAL DAILY
Qty: 30 TABLET | Refills: 3 | Status: SHIPPED | OUTPATIENT
Start: 2021-03-10 | End: 2021-08-09

## 2021-03-10 RX ORDER — LEVOTHYROXINE SODIUM 0.03 MG/1
25 TABLET ORAL DAILY
Qty: 30 TABLET | Refills: 3 | Status: SHIPPED | OUTPATIENT
Start: 2021-03-10 | End: 2021-06-15

## 2021-03-10 RX ORDER — INSULIN GLARGINE 100 [IU]/ML
35 INJECTION, SOLUTION SUBCUTANEOUS NIGHTLY
Qty: 9 ML | Refills: 3 | Status: SHIPPED | OUTPATIENT
Start: 2021-03-10 | End: 2021-06-09 | Stop reason: SDUPTHER

## 2021-03-10 RX ORDER — ATORVASTATIN CALCIUM 40 MG/1
40 TABLET, FILM COATED ORAL DAILY
Qty: 30 TABLET | Refills: 3 | Status: SHIPPED | OUTPATIENT
Start: 2021-03-10 | End: 2021-06-15

## 2021-03-10 RX ORDER — VILAZODONE HYDROCHLORIDE 20 MG/1
20 TABLET ORAL DAILY
Qty: 30 TABLET | Refills: 3 | Status: SHIPPED | OUTPATIENT
Start: 2021-03-10 | End: 2021-06-15

## 2021-03-11 RX ORDER — OMEPRAZOLE 40 MG/1
40 CAPSULE, DELAYED RELEASE ORAL DAILY
Qty: 30 CAPSULE | Refills: 11 | Status: SHIPPED | OUTPATIENT
Start: 2021-03-11 | End: 2022-04-29 | Stop reason: SDUPTHER

## 2021-03-25 ENCOUNTER — LAB (OUTPATIENT)
Dept: LAB | Facility: HOSPITAL | Age: 56
End: 2021-03-25

## 2021-03-25 DIAGNOSIS — G89.29 CHRONIC NECK PAIN: ICD-10-CM

## 2021-03-25 DIAGNOSIS — E03.9 HYPOTHYROIDISM, UNSPECIFIED TYPE: ICD-10-CM

## 2021-03-25 DIAGNOSIS — M54.12 CERVICAL RADICULOPATHY: ICD-10-CM

## 2021-03-25 DIAGNOSIS — M54.2 CHRONIC NECK PAIN: ICD-10-CM

## 2021-03-25 DIAGNOSIS — E11.42 DIABETIC POLYNEUROPATHY ASSOCIATED WITH TYPE 2 DIABETES MELLITUS (HCC): ICD-10-CM

## 2021-03-25 DIAGNOSIS — Z91.81 AT HIGH RISK FOR FALLS: ICD-10-CM

## 2021-03-25 DIAGNOSIS — E11.65 UNCONTROLLED TYPE 2 DIABETES MELLITUS WITH HYPERGLYCEMIA (HCC): ICD-10-CM

## 2021-03-25 DIAGNOSIS — I10 ESSENTIAL HYPERTENSION: ICD-10-CM

## 2021-03-25 DIAGNOSIS — E55.9 VITAMIN D DEFICIENCY: ICD-10-CM

## 2021-03-25 DIAGNOSIS — E78.2 MIXED HYPERLIPIDEMIA: ICD-10-CM

## 2021-03-25 DIAGNOSIS — M50.30 DDD (DEGENERATIVE DISC DISEASE), CERVICAL: ICD-10-CM

## 2021-03-25 DIAGNOSIS — R26.81 GAIT INSTABILITY: ICD-10-CM

## 2021-03-25 LAB
25(OH)D3 SERPL-MCNC: 27.9 NG/ML
ALBUMIN SERPL-MCNC: 4.1 G/DL (ref 3.5–5.2)
ALBUMIN/GLOB SERPL: 1.4 G/DL
ALP SERPL-CCNC: 72 U/L (ref 39–117)
ALT SERPL W P-5'-P-CCNC: 17 U/L (ref 1–33)
ANION GAP SERPL CALCULATED.3IONS-SCNC: 9.4 MMOL/L (ref 5–15)
AST SERPL-CCNC: 16 U/L (ref 1–32)
BASOPHILS # BLD AUTO: 0.09 10*3/MM3 (ref 0–0.2)
BASOPHILS NFR BLD AUTO: 0.7 % (ref 0–1.5)
BILIRUB SERPL-MCNC: 0.2 MG/DL (ref 0–1.2)
BUN SERPL-MCNC: 37 MG/DL (ref 6–20)
BUN/CREAT SERPL: 27.2 (ref 7–25)
CALCIUM SPEC-SCNC: 8.7 MG/DL (ref 8.6–10.5)
CHLORIDE SERPL-SCNC: 104 MMOL/L (ref 98–107)
CHOLEST SERPL-MCNC: 137 MG/DL (ref 0–200)
CO2 SERPL-SCNC: 25.6 MMOL/L (ref 22–29)
CREAT SERPL-MCNC: 1.36 MG/DL (ref 0.57–1)
DEPRECATED RDW RBC AUTO: 37.8 FL (ref 37–54)
EOSINOPHIL # BLD AUTO: 0.3 10*3/MM3 (ref 0–0.4)
EOSINOPHIL NFR BLD AUTO: 2.3 % (ref 0.3–6.2)
ERYTHROCYTE [DISTWIDTH] IN BLOOD BY AUTOMATED COUNT: 13 % (ref 12.3–15.4)
GFR SERPL CREATININE-BSD FRML MDRD: 40 ML/MIN/1.73
GLOBULIN UR ELPH-MCNC: 3 GM/DL
GLUCOSE SERPL-MCNC: 181 MG/DL (ref 65–99)
HBA1C MFR BLD: 11.3 % (ref 4.8–5.6)
HCT VFR BLD AUTO: 35.9 % (ref 34–46.6)
HDLC SERPL-MCNC: 35 MG/DL (ref 40–60)
HGB BLD-MCNC: 12.3 G/DL (ref 12–15.9)
IMM GRANULOCYTES # BLD AUTO: 0.06 10*3/MM3 (ref 0–0.05)
IMM GRANULOCYTES NFR BLD AUTO: 0.5 % (ref 0–0.5)
LDLC SERPL CALC-MCNC: 79 MG/DL (ref 0–100)
LDLC/HDLC SERPL: 2.18 {RATIO}
LYMPHOCYTES # BLD AUTO: 3.53 10*3/MM3 (ref 0.7–3.1)
LYMPHOCYTES NFR BLD AUTO: 27.4 % (ref 19.6–45.3)
MCH RBC QN AUTO: 27.9 PG (ref 26.6–33)
MCHC RBC AUTO-ENTMCNC: 34.3 G/DL (ref 31.5–35.7)
MCV RBC AUTO: 81.4 FL (ref 79–97)
MONOCYTES # BLD AUTO: 0.58 10*3/MM3 (ref 0.1–0.9)
MONOCYTES NFR BLD AUTO: 4.5 % (ref 5–12)
NEUTROPHILS NFR BLD AUTO: 64.6 % (ref 42.7–76)
NEUTROPHILS NFR BLD AUTO: 8.33 10*3/MM3 (ref 1.7–7)
NRBC BLD AUTO-RTO: 0 /100 WBC (ref 0–0.2)
PLATELET # BLD AUTO: 226 10*3/MM3 (ref 140–450)
PMV BLD AUTO: 13 FL (ref 6–12)
POTASSIUM SERPL-SCNC: 4.6 MMOL/L (ref 3.5–5.2)
PROT SERPL-MCNC: 7.1 G/DL (ref 6–8.5)
RBC # BLD AUTO: 4.41 10*6/MM3 (ref 3.77–5.28)
SODIUM SERPL-SCNC: 139 MMOL/L (ref 136–145)
T3FREE SERPL-MCNC: 2.54 PG/ML (ref 2–4.4)
T4 FREE SERPL-MCNC: 1.13 NG/DL (ref 0.93–1.7)
TRIGL SERPL-MCNC: 129 MG/DL (ref 0–150)
TSH SERPL DL<=0.05 MIU/L-ACNC: 2.73 UIU/ML (ref 0.27–4.2)
VLDLC SERPL-MCNC: 23 MG/DL (ref 5–40)
WBC # BLD AUTO: 12.89 10*3/MM3 (ref 3.4–10.8)

## 2021-03-25 PROCEDURE — 85025 COMPLETE CBC W/AUTO DIFF WBC: CPT

## 2021-03-25 PROCEDURE — 82306 VITAMIN D 25 HYDROXY: CPT

## 2021-03-25 PROCEDURE — 84439 ASSAY OF FREE THYROXINE: CPT

## 2021-03-25 PROCEDURE — 80061 LIPID PANEL: CPT

## 2021-03-25 PROCEDURE — 80053 COMPREHEN METABOLIC PANEL: CPT

## 2021-03-25 PROCEDURE — 84481 FREE ASSAY (FT-3): CPT

## 2021-03-25 PROCEDURE — 83036 HEMOGLOBIN GLYCOSYLATED A1C: CPT

## 2021-03-25 PROCEDURE — 84443 ASSAY THYROID STIM HORMONE: CPT

## 2021-03-26 ENCOUNTER — TELEPHONE (OUTPATIENT)
Dept: FAMILY MEDICINE CLINIC | Facility: CLINIC | Age: 56
End: 2021-03-26

## 2021-03-26 NOTE — TELEPHONE ENCOUNTER
----- Message from Gato Crane MD sent at 3/26/2021  2:19 PM CDT -----  Please let pt know that hga1c worse at >11.0. She will need to followup with Endocrinologist    Also kidney function worse with GFR in 60s to 50s.  Needs to see Nephrologist. If pt agreeable to referral let me know and I will order     On CBC WBC count continues to be high at 12.89.  if pt reports any fever, weight loss, or signs of infection have her let us know    Recheck on next visit. Thanks

## 2021-03-29 ENCOUNTER — TELEPHONE (OUTPATIENT)
Dept: ENDOCRINOLOGY | Facility: CLINIC | Age: 56
End: 2021-03-29

## 2021-03-29 DIAGNOSIS — E11.65 UNCONTROLLED TYPE 2 DIABETES MELLITUS WITH HYPERGLYCEMIA (HCC): ICD-10-CM

## 2021-03-29 DIAGNOSIS — N18.31 STAGE 3A CHRONIC KIDNEY DISEASE (HCC): Primary | ICD-10-CM

## 2021-04-05 ENCOUNTER — DOCUMENTATION (OUTPATIENT)
Dept: PHYSICAL THERAPY | Facility: CLINIC | Age: 56
End: 2021-04-05

## 2021-04-05 ENCOUNTER — OFFICE VISIT (OUTPATIENT)
Dept: FAMILY MEDICINE CLINIC | Facility: CLINIC | Age: 56
End: 2021-04-05

## 2021-04-05 VITALS
TEMPERATURE: 97.7 F | BODY MASS INDEX: 33.23 KG/M2 | OXYGEN SATURATION: 94 % | SYSTOLIC BLOOD PRESSURE: 114 MMHG | HEART RATE: 96 BPM | DIASTOLIC BLOOD PRESSURE: 62 MMHG | HEIGHT: 69 IN

## 2021-04-05 DIAGNOSIS — I69.393 ATAXIA DUE TO OLD CEREBROVASCULAR ACCIDENT (CVA): ICD-10-CM

## 2021-04-05 DIAGNOSIS — Z00.00 MEDICARE WELCOME EXAM: Primary | ICD-10-CM

## 2021-04-05 DIAGNOSIS — S88.119A BELOW KNEE AMPUTATION (HCC): ICD-10-CM

## 2021-04-05 DIAGNOSIS — E11.42 DIABETIC POLYNEUROPATHY ASSOCIATED WITH TYPE 2 DIABETES MELLITUS (HCC): ICD-10-CM

## 2021-04-05 DIAGNOSIS — Z91.81 AT HIGH RISK FOR FALLS: ICD-10-CM

## 2021-04-05 DIAGNOSIS — R26.81 GAIT INSTABILITY: Primary | ICD-10-CM

## 2021-04-05 PROCEDURE — 1125F AMNT PAIN NOTED PAIN PRSNT: CPT | Performed by: FAMILY MEDICINE

## 2021-04-05 PROCEDURE — 1170F FXNL STATUS ASSESSED: CPT | Performed by: FAMILY MEDICINE

## 2021-04-05 PROCEDURE — 1160F RVW MEDS BY RX/DR IN RCRD: CPT | Performed by: FAMILY MEDICINE

## 2021-04-05 PROCEDURE — G0402 INITIAL PREVENTIVE EXAM: HCPCS | Performed by: FAMILY MEDICINE

## 2021-04-05 RX ORDER — DULOXETIN HYDROCHLORIDE 30 MG/1
CAPSULE, DELAYED RELEASE ORAL
COMMUNITY
Start: 2021-03-17 | End: 2021-04-21 | Stop reason: ALTCHOICE

## 2021-04-05 NOTE — PROGRESS NOTES
Discharge Summary  Discharge Summary from Physical Therapy Report        Number of Visits: 12 visits     Discharge Status of Patient: See PT Note dated 12/28/2020    Goals: Partially Met    Discharge Plan: Continue with current home exercise program as instructed  Refer to other services (specify):prosthetist    Comments: patient to see prosthetist and return after getting adjustments.     Date of Discharge 02/20/2020        Christa Abbott, PT DPT, CSCS  Physical Therapist      This document has been electronically signed by Christa Abbott PT DPT on April 5, 2021 10:50 CDT

## 2021-04-05 NOTE — PATIENT INSTRUCTIONS
Medicare Wellness  Personal Prevention Plan of Service     Date of Office Visit:  2021  Encounter Provider:  Gato Crane MD  Place of Service:  [facility]  Patient Name: Marta Giraldo  :  1965    As part of the Medicare Wellness portion of your visit today, we are providing you with this personalized preventive plan of services (PPPS). This plan is based upon recommendations of the [place] Preventive Services Task Force (USPSTF) and the Advisory Committee on Immunization Practices (ACIP).    This lists the preventive care services that should be considered, and provides dates of when you are due. Items listed as completed are up-to-date and do not require any further intervention.    Health Maintenance   Topic Date Due   • Hepatitis B (1 of 3 - Risk 3-dose series) 2021 (Originally 1984)   • INFLUENZA VACCINE  2021   • URINE MICROALBUMIN  2021   • DIABETIC FOOT EXAM  2021   • HEMOGLOBIN A1C  2021   • DIABETIC EYE EXAM  10/22/2021   • LIPID PANEL  2022   • ANNUAL WELLNESS VISIT  2022   • MAMMOGRAM  2022   • PAP SMEAR  10/05/2023   • COLONOSCOPY  2025   • TDAP/TD VACCINES (2 - Td) 2030   • HEPATITIS C SCREENING  Completed   • MENINGOCOCCAL VACCINE  Aged Out   • Pneumococcal Vaccine 0-64  Discontinued   • ZOSTER VACCINE  Discontinued       No orders of the defined types were placed in this encounter.      Return in about 1 year (around 2022) for Medicare Wellness.

## 2021-04-05 NOTE — PROGRESS NOTES
The ABCs of the Annual Wellness Visit  Welcome to Medicare Visit    Chief Complaint   Patient presents with   • Annual Exam     Welcome to Medicare       Subjective   History of Present Illness:  Marta Giraldo is a 56 y.o. female who presents for a  Welcome to Medicare Visit.    HEALTH RISK ASSESSMENT    Recent Hospitalizations:  No hospitalization(s) within the last year.    Current Medical Providers:  Patient Care Team:  Gato Crane MD as PCP - General (Family Medicine)    Smoking Status:  Social History     Tobacco Use   Smoking Status Former Smoker   • Quit date:    • Years since quittin.2   Smokeless Tobacco Never Used       Alcohol Consumption:  Social History     Substance and Sexual Activity   Alcohol Use Not Currently       Depression Screen:   PHQ-2/PHQ-9 Depression Screening 2/10/2021   Little interest or pleasure in doing things 0   Feeling down, depressed, or hopeless 0   Trouble falling or staying asleep, or sleeping too much -   Feeling tired or having little energy -   Poor appetite or overeating -   Feeling bad about yourself - or that you are a failure or have let yourself or your family down -   Trouble concentrating on things, such as reading the newspaper or watching television -   Moving or speaking so slowly that other people could have noticed. Or the opposite - being so fidgety or restless that you have been moving around a lot more than usual -   Thoughts that you would be better off dead, or of hurting yourself in some way -   Total Score 0   If you checked off any problems, how difficult have these problems made it for you to do your work, take care of things at home, or get along with other people? -       Fall Risk Screen:  STEADI Fall Risk Assessment has not been completed.    Health Habits and Functional and Cognitive Screening:  No flowsheet data found.      Does the patient have evidence of cognitive impairment? No    Asprin use counseling:Start ASA 81 mg daily      Visual Acuity:    No exam data present    Age-appropriate Screening Schedule:  Refer to the list below for future screening recommendations based on patient's age, sex and/or medical conditions. Orders for these recommended tests are listed in the plan section. The patient has been provided with a written plan.    Health Maintenance   Topic Date Due   • INFLUENZA VACCINE  08/01/2021   • URINE MICROALBUMIN  08/11/2021   • DIABETIC FOOT EXAM  08/27/2021   • HEMOGLOBIN A1C  09/25/2021   • DIABETIC EYE EXAM  10/22/2021   • LIPID PANEL  03/25/2022   • MAMMOGRAM  08/20/2022   • PAP SMEAR  10/05/2023   • COLONOSCOPY  09/02/2025   • TDAP/TD VACCINES (2 - Td) 09/14/2030   • ZOSTER VACCINE  Discontinued          The following portions of the patient's history were reviewed and updated as appropriate: allergies, current medications, past family history, past medical history, past social history, past surgical history and problem list.    Outpatient Medications Prior to Visit   Medication Sig Dispense Refill   • atorvastatin (LIPITOR) 40 MG tablet Take 1 tablet by mouth Daily. 30 tablet 3   • B-D UF III MINI PEN NEEDLES 31G X 5 MM misc      • Blood Glucose Monitoring Suppl w/Device kit Use for E11.65 diabetes to check sugars 4 times a day. 1 each 0   • clotrimazole-betamethasone (Lotrisone) 1-0.05 % cream Apply  topically to the appropriate area as directed 2 (Two) Times a Day. 1 each 3   • Continuous Blood Gluc Sensor (Dexcom G6 Sensor) 1 each As Needed (glucose control). Every 10 days 9 each 3   • Continuous Blood Gluc Transmit (Dexcom G6 Transmitter) misc 1 each Every 3 (Three) Months. 1 each 3   • Empagliflozin 25 MG tablet Take 25 mg by mouth Daily. 30 tablet 3   • exenatide er (BYDUREON) 2 MG pen-injector injection Inject 1 pen under the skin into the appropriate area as directed 1 (One) Time Per Week. 2 pen 3   • ferrous sulfate 325 (65 Fe) MG tablet Take 1 tablet by mouth Daily With Breakfast. 30 tablet 3   •  fluconazole (Diflucan) 150 MG tablet Take one tablet now and in 72 hours. 2 tablet 0   • glucose (DEX4) 4 GM chewable tablet Chew 4 tablets As Needed for Low Blood Sugar. 90 tablet 3   • glucose blood test strip Use as instructed 200 each 12   • glucose blood test strip Use as instructed 200 each 12   • Insulin Glargine (BASAGLAR KWIKPEN) 100 UNIT/ML injection pen Inject 35 Units under the skin into the appropriate area as directed Every Night. 9 mL 3   • insulin lispro (HumaLOG) 100 UNIT/ML injection 5 unit before meals 10 mL 3   • Insulin Pen Needle 32G X 8 MM misc Use at bedtime 100 each 3   • Insulin Pen Needle 32G X 8 MM misc Use with humalog  each 3   • Lancets misc Use for E11.65 diabetes to check sugars 4 times a day. 200 each 3   • levothyroxine (SYNTHROID, LEVOTHROID) 25 MCG tablet Take 1 tablet by mouth Daily. 30 tablet 3   • lisinopril (PRINIVIL,ZESTRIL) 20 MG tablet Take 1 tablet by mouth Daily. 30 tablet 3   • methocarbamol (ROBAXIN) 750 MG tablet Take 1 tablet by mouth 3 (Three) Times a Day. 90 tablet 3   • mupirocin (Bactroban) 2 % cream Apply  topically to the appropriate area as directed 3 (Three) Times a Day. 30 g 1   • mupirocin (BACTROBAN) 2 % ointment Apply  topically to the appropriate area as directed 3 (Three) Times a Day. 30 g 1   • omeprazole (priLOSEC) 40 MG capsule Take 1 capsule by mouth Daily. 30 capsule 11   • oxybutynin XL (DITROPAN-XL) 10 MG 24 hr tablet Take 1 tablet by mouth Daily. 30 tablet 3   • pregabalin (Lyrica) 300 MG capsule Take 1 capsule by mouth 2 (Two) Times a Day. 60 capsule 2   • vilazodone (Viibryd) 20 MG tablet tablet Take 1 tablet by mouth Daily. 30 tablet 3   • vitamin D (ERGOCALCIFEROL) 1.25 MG (41743 UT) capsule capsule Take 1 capsule by mouth Every 7 (Seven) Days. 4 capsule 3     No facility-administered medications prior to visit.       Patient Active Problem List   Diagnosis   • Class 1 obesity with serious comorbidity and body mass index (BMI) of 32.0  to 32.9 in adult   • Moderate episode of recurrent major depressive disorder (CMS/HCC)   • Encounter for screening for malignant neoplasm of colon   • Gastroesophageal reflux disease   • Hypothyroidism   • Microcytic anemia   • Vitamin D deficiency   • Uncontrolled type 2 diabetes mellitus with hyperglycemia (CMS/HCC)   • Class 1 obesity with body mass index (BMI) of 32.0 to 32.9 in adult   • Diabetic polyneuropathy associated with type 2 diabetes mellitus (CMS/HCC)   • Gastritis without bleeding   • Class 1 obesity with serious comorbidity and body mass index (BMI) of 33.0 to 33.9 in adult   • At high risk for falls   • Gait instability   • Below knee amputation (CMS/HCC)   • Type 2 diabetes mellitus with hyperglycemia, with long-term current use of insulin (CMS/[facility])   • Essential hypertension   • Mixed hyperlipidemia   • Cervical radiculopathy   • DDD (degenerative disc disease), cervical   • Chronic neck pain       Advanced Care Planning:  ACP discussion was held with the patient during this visit. Patient has an advance directive (not in EMR), copy requested.    Review of Systems    Compared to one year ago, the patient feels her physical health is better.  Compared to one year ago, the patient feels her mental health is the same.    Reviewed chart for potential of high risk medication in the elderly: yes  Reviewed chart for potential of harmful drug interactions in the elderly:yes    Objective       There were no vitals filed for this visit.    There is no height or weight on file to calculate BMI.  Discussed the patient's BMI with her. The BMI is above average; BMI management plan is completed.    Physical Exam    Lab Results   Component Value Date    TRIG 129 03/25/2021    HDL 35 (L) 03/25/2021    LDL 79 03/25/2021    VLDL 23 03/25/2021    HGBA1C 11.30 (H) 03/25/2021        Procedures    Assessment/Plan   Medicare Risks and Personalized Health Plan  CMS Preventative Services Quick Reference  Abdominal Aortic  Aneurysm Screening  Advance Directive Discussion  Alcohol Misuse  Breast Cancer/Mammogram Screening  Cardiovascular risk  Chronic Pain   Colon Cancer Screening  Dementia/Memory   Depression/Dysphoria  Diabetic Lab Screening   Fall Risk  Glaucoma Risk  Hearing Problem  Inadequate Social Support, Isolation, Loneliness, Lack of Transportation, Financial Difficulties, or Caregiver Stress   Inactivity/Sedentary  Lung Cancer Risk  Obesity/Overweight   Osteoprorosis Risk  Polypharmacy  Urinary Incontinence    The above risks/problems have been discussed with the patient.  Pertinent information has been shared with the patient in the After Visit Summary.  Follow up plans and orders are seen below in the Assessment/Plan Section.    Diagnoses and all orders for this visit:    1. Medicare welcome exam (Primary)        Follow Up:  Return in about 1 year (around 4/5/2022) for Medicare Wellness.     An After Visit Summary and PPPS were given to the patient.     -Action plan discussed please see scanned documents  -repeat in 1 year        This document has been electronically signed by Gato Crane MD on April 5, 2021 11:11 CDT

## 2021-04-06 ENCOUNTER — TELEPHONE (OUTPATIENT)
Dept: FAMILY MEDICINE CLINIC | Facility: CLINIC | Age: 56
End: 2021-04-06

## 2021-04-06 ENCOUNTER — TRANSCRIBE ORDERS (OUTPATIENT)
Dept: PHYSICAL THERAPY | Facility: CLINIC | Age: 56
End: 2021-04-06

## 2021-04-06 DIAGNOSIS — M54.12 RADICULOPATHY, CERVICAL: ICD-10-CM

## 2021-04-06 DIAGNOSIS — M54.2 CERVICALGIA: Primary | ICD-10-CM

## 2021-04-07 ENCOUNTER — OFFICE VISIT (OUTPATIENT)
Dept: FAMILY MEDICINE CLINIC | Facility: CLINIC | Age: 56
End: 2021-04-07

## 2021-04-07 VITALS
TEMPERATURE: 98.2 F | DIASTOLIC BLOOD PRESSURE: 60 MMHG | OXYGEN SATURATION: 94 % | SYSTOLIC BLOOD PRESSURE: 108 MMHG | HEART RATE: 88 BPM | HEIGHT: 69 IN | BODY MASS INDEX: 33.23 KG/M2

## 2021-04-07 DIAGNOSIS — D72.829 LEUKOCYTOSIS, UNSPECIFIED TYPE: Primary | ICD-10-CM

## 2021-04-07 DIAGNOSIS — I10 ESSENTIAL HYPERTENSION: ICD-10-CM

## 2021-04-07 DIAGNOSIS — E11.42 DIABETIC POLYNEUROPATHY ASSOCIATED WITH TYPE 2 DIABETES MELLITUS (HCC): ICD-10-CM

## 2021-04-07 DIAGNOSIS — E11.65 UNCONTROLLED TYPE 2 DIABETES MELLITUS WITH HYPERGLYCEMIA (HCC): ICD-10-CM

## 2021-04-07 DIAGNOSIS — M50.30 DDD (DEGENERATIVE DISC DISEASE), CERVICAL: ICD-10-CM

## 2021-04-07 DIAGNOSIS — E55.9 VITAMIN D DEFICIENCY: ICD-10-CM

## 2021-04-07 DIAGNOSIS — E78.2 MIXED HYPERLIPIDEMIA: ICD-10-CM

## 2021-04-07 DIAGNOSIS — F33.1 MODERATE EPISODE OF RECURRENT MAJOR DEPRESSIVE DISORDER (HCC): ICD-10-CM

## 2021-04-07 DIAGNOSIS — E66.9 CLASS 1 OBESITY WITH SERIOUS COMORBIDITY AND BODY MASS INDEX (BMI) OF 32.0 TO 32.9 IN ADULT, UNSPECIFIED OBESITY TYPE: ICD-10-CM

## 2021-04-07 PROCEDURE — 99214 OFFICE O/P EST MOD 30 MIN: CPT | Performed by: FAMILY MEDICINE

## 2021-04-07 RX ORDER — LANCETS 30 GAUGE
EACH MISCELLANEOUS
Qty: 200 EACH | Refills: 3 | Status: SHIPPED | OUTPATIENT
Start: 2021-04-07

## 2021-04-07 RX ORDER — FLURBIPROFEN SODIUM 0.3 MG/ML
SOLUTION/ DROPS OPHTHALMIC
Qty: 100 EACH | Refills: 3 | Status: SHIPPED | OUTPATIENT
Start: 2021-04-07 | End: 2021-08-31 | Stop reason: SDUPTHER

## 2021-04-07 RX ORDER — MUPIROCIN CALCIUM 20 MG/G
CREAM TOPICAL 3 TIMES DAILY
Qty: 30 G | Refills: 1 | Status: SHIPPED | OUTPATIENT
Start: 2021-04-07 | End: 2021-11-16 | Stop reason: SDUPTHER

## 2021-04-07 RX ORDER — PREGABALIN 300 MG/1
300 CAPSULE ORAL 2 TIMES DAILY
Qty: 60 CAPSULE | Refills: 2 | Status: SHIPPED | OUTPATIENT
Start: 2021-04-07 | End: 2021-08-24

## 2021-04-07 NOTE — PATIENT INSTRUCTIONS
For DM Type 2     jardiance 25 mg daily for sugars - pill you take with a lot of water    basaglar insulin  35 units at bedtime    humalog before meals 5-10 units before meals    STOP bydureon on saturdays.  START on trulicity 3 mg this Saturday. This is once a week    Bring all medications next visit     Will refer to Hematology Dr. Coyle regarding her elevated white blood cell count    Will refer to Nephrology for chronic kidney disesae     Recheck in 4 weeks Dulaglutide injection  What is this medicine?  DULAGLUTIDE (DOO la GLOO tide) is used to improve blood sugar control in adults with type 2 diabetes. This medicine may be used with other oral diabetes medicines. This drug may also reduce the risk of heart attack or stroke if you have type 2 diabetes and risk factors for heart disease.  This medicine may be used for other purposes; ask your health care provider or pharmacist if you have questions.  COMMON BRAND NAME(S): Trulicity  What should I tell my health care provider before I take this medicine?  They need to know if you have any of these conditions:  · endocrine tumors (MEN 2) or if someone in your family had these tumors  · eye disease, vision problems  · history of pancreatitis  · kidney disease  · liver disease  · stomach problems  · thyroid cancer or if someone in your family had thyroid cancer  · an unusual or allergic reaction to dulaglutide, other medicines, foods, dyes, or preservatives  · pregnant or trying to get pregnant  · breast-feeding  How should I use this medicine?  This medicine is for injection under the skin of your upper leg (thigh), stomach area, or upper arm. It is usually given once every week (every 7 days). You will be taught how to prepare and give this medicine. Use exactly as directed. Take your medicine at regular intervals. Do not take it more often than directed.  If you use this medicine with insulin, you should inject this medicine and the insulin separately. Do not mix  them together. Do not give the injections right next to each other. Change (rotate) injection sites with each injection.  It is important that you put your used needles and syringes in a special sharps container. Do not put them in a trash can. If you do not have a sharps container, call your pharmacist or healthcare provider to get one.  A special MedGuide will be given to you by the pharmacist with each prescription and refill. Be sure to read this information carefully each time.  This drug comes with INSTRUCTIONS FOR USE. Ask your pharmacist for directions on how to use this drug. Read the information carefully. Talk to your pharmacist or health care provider if you have questions.  Talk to your pediatrician regarding the use of this medicine in children. Special care may be needed.  Overdosage: If you think you have taken too much of this medicine contact a poison control center or emergency room at once.  NOTE: This medicine is only for you. Do not share this medicine with others.  What if I miss a dose?  If you miss a dose, take it as soon as you can within 3 days after the missed dose. Then take your next dose at your regular weekly time. If it has been longer than 3 days after the missed dose, do not take the missed dose. Take the next dose at your regular time. Do not take double or extra doses. If you have questions about a missed dose, contact your health care provider for advice.  What may interact with this medicine?  · other medicines for diabetes  Many medications may cause changes in blood sugar, these include:  · alcohol containing beverages  · antiviral medicines for HIV or AIDS  · aspirin and aspirin-like drugs  · certain medicines for blood pressure, heart disease, irregular heart beat  · chromium  · diuretics  · female hormones, such as estrogens or progestins, birth control pills  · fenofibrate  · gemfibrozil  · isoniazid  · lanreotide  · male hormones or anabolic steroids  · MAOIs like  Carbex, Eldepryl, Marplan, Nardil, and Parnate  · medicines for weight loss  · medicines for allergies, asthma, cold, or cough  · medicines for depression, anxiety, or psychotic disturbances  · niacin  · nicotine  · NSAIDs, medicines for pain and inflammation, like ibuprofen or naproxen  · octreotide  · pasireotide  · pentamidine  · phenytoin  · probenecid  · quinolone antibiotics such as ciprofloxacin, levofloxacin, ofloxacin  · some herbal dietary supplements  · steroid medicines such as prednisone or cortisone  · sulfamethoxazole; trimethoprim  · thyroid hormones  Some medications can hide the warning symptoms of low blood sugar (hypoglycemia). You may need to monitor your blood sugar more closely if you are taking one of these medications. These include:  · beta-blockers, often used for high blood pressure or heart problems (examples include atenolol, metoprolol, propranolol)  · clonidine  · guanethidine  · reserpine  This list may not describe all possible interactions. Give your health care provider a list of all the medicines, herbs, non-prescription drugs, or dietary supplements you use. Also tell them if you smoke, drink alcohol, or use illegal drugs. Some items may interact with your medicine.  What should I watch for while using this medicine?  Visit your doctor or health care professional for regular checks on your progress.  Drink plenty of fluids while taking this medicine. Check with your doctor or health care professional if you get an attack of severe diarrhea, nausea, and vomiting. The loss of too much body fluid can make it dangerous for you to take this medicine.  A test called the HbA1C (A1C) will be monitored. This is a simple blood test. It measures your blood sugar control over the last 2 to 3 months. You will receive this test every 3 to 6 months.  Learn how to check your blood sugar. Learn the symptoms of low and high blood sugar and how to manage them.  Always carry a quick-source of sugar  with you in case you have symptoms of low blood sugar. Examples include hard sugar candy or glucose tablets. Make sure others know that you can choke if you eat or drink when you develop serious symptoms of low blood sugar, such as seizures or unconsciousness. They must get medical help at once.  Tell your doctor or health care professional if you have high blood sugar. You might need to change the dose of your medicine. If you are sick or exercising more than usual, you might need to change the dose of your medicine.  Do not skip meals. Ask your doctor or health care professional if you should avoid alcohol. Many nonprescription cough and cold products contain sugar or alcohol. These can affect blood sugar.  Pens should never be shared. Even if the needle is changed, sharing may result in passing of viruses like hepatitis or HIV.  Wear a medical ID bracelet or chain, and carry a card that describes your disease and details of your medicine and dosage times.  What side effects may I notice from receiving this medicine?  Side effects that you should report to your doctor or health care professional as soon as possible:  · allergic reactions like skin rash, itching or hives, swelling of the face, lips, or tongue  · breathing problems  · changes in vision  · diarrhea that continues or is severe  · lump or swelling on the neck  · severe nausea  · signs and symptoms of infection like fever or chills; cough; sore throat; pain or trouble passing urine  · signs and symptoms of low blood sugar such as feeling anxious, confusion, dizziness, increased hunger, unusually weak or tired, sweating, shakiness, cold, irritable, headache, blurred vision, fast heartbeat, loss of consciousness  · signs and symptoms of kidney injury like trouble passing urine or change in the amount of urine  · trouble swallowing  · unusual stomach upset or pain  · vomiting  Side effects that usually do not require medical attention (report to your  doctor or health care professional if they continue or are bothersome):  · diarrhea  · loss of appetite  · nausea  · pain, redness, or irritation at site where injected  · stomach upset  This list may not describe all possible side effects. Call your doctor for medical advice about side effects. You may report side effects to FDA at 1-366-OSQ-9229.  Where should I keep my medicine?  Keep out of the reach of children.  Store unopened pens in a refrigerator between 2 and 8 degrees C (36 and 46 degrees F). Do not freeze or use if the medicine has been frozen. Protect from light and excessive heat. Store in the carton until use. Each single-dose pen can be kept at room temperature, not to exceed 30 degrees C (86 degrees F) for a total of 14 days, if needed. Throw away any unused medicine after the expiration date on the label.  NOTE: This sheet is a summary. It may not cover all possible information. If you have questions about this medicine, talk to your doctor, pharmacist, or health care provider.  © 2021 [employer]vier/Gold Standard (2020-09-01 09:34:53)    Leukocytosis  Leukocytosis means that a person has more white blood cells than normal. White blood cells are made in the bone marrow. Bone marrow is the spongy tissue inside bones. The main job of white blood cells is to fight infection.  Having too many white blood cells is a common condition. It can develop as a result of many types of medical problems.  What are the causes?  Leukocytosis may be caused by various conditions. In some cases, the bone marrow is normal but is still making too many white blood cells. This can be due to:  · Infection.  · Injury.  · Physical stress.  · Emotional stress.  · Surgery.  · Allergic reactions.  · Tumors that do not start in the blood or bone marrow.  · An inherited disease.  · Certain medicines.  · Pregnancy and labor.  In other cases, a person may have a bone marrow disorder that is causing the body to make too many white blood  cells. Bone marrow disorders include:  · Leukemia. This is a type of blood cancer.  · Myeloproliferative disorders. These disorders cause blood cells to grow abnormally.  What are the signs or symptoms?  Often, this condition causes no symptoms. Some people may have symptoms due to the medical condition that is causing their leukocytosis. These symptoms may include:  · Bleeding.  · Bruising.  · Fever.  · Night sweats.  · Swollen lymph nodes.  · An enlarged spleen.  · Repeated infections.  · Weakness.  · Weight loss.  How is this diagnosed?  This condition is diagnosed with blood tests. It is often found when blood is tested as part of a routine physical exam. You may have other tests to help determine why you have too many white blood cells. These tests may include:  · A complete blood count (CBC). This test measures all the types of blood cells in your body.  · Chest X-rays, urine tests, or other tests to look for signs of infection.  · Bone marrow aspiration. For this test, a needle is put into your bone. Cells from the bone marrow are removed through the needle and examined under a microscope.  · Other tests on the blood or bone marrow sample.  · CT scan, bone scan, or other imaging tests.  How is this treated?  Usually, treatment is not needed for leukocytosis. However, if an infection, cancer, bone marrow disorder, or other serious problem is causing your leukocytosis, it will need to be treated. Treatment may include:  · Regular monitoring of your white blood cell count to look for changes.  · Antibiotic medicine if you have a bacterial infection.  · Bone marrow transplant. This treatment replaces your diseased bone marrow with healthy cells that will grow new bone marrow.  · Chemotherapy or biological therapies such as the use of antibodies. These treatments may be used to kill cancer cells or to decrease the number of white blood cells.  Follow these instructions at home:  Medicines  · Take over-the-counter  and prescription medicines only as told by your health care provider.  · If you were prescribed an antibiotic medicine, take it as told by your health care provider. Do not stop taking the antibiotic even if you start to feel better.  Eating and drinking    · Eat foods that are low in saturated fats and high in fiber. Eat plenty of fruits and vegetables.  · Drink enough fluid to keep your urine pale yellow.  · Limit your intake of caffeine and alcohol.  General instructions  · Maintain a healthy weight. Ask your health care provider what weight is best for you.  · Do 30 minutes of exercise at least 5 times each week. Check with your health care provider before you start a new exercise routine.  · Follow any safety precautions as told by your health care provider. This may be needed if you are at increased risk for infection or bleeding because of your condition.  · Do not use any products that contain nicotine or tobacco, such as cigarettes, e-cigarettes, and chewing tobacco. If you need help quitting, ask your health care provider.  · Keep all follow-up visits as told by your health care provider. This is important.  Contact a health care provider if you:  · Feel weak or more tired than usual.  · Develop chills, a cough, or nasal congestion.  · Have a fever.  · Lose weight without trying.  · Have night sweats.  · Bruise easily.  · Have new or worsening symptoms.  Get help right away if you:  · Bleed more than normal.  · Have chest pain.  · Have trouble breathing.  · Have uncontrolled nausea or vomiting.  · Feel dizzy or light-headed.  Summary  · Leukocytosis means that a person has more white blood cells than normal.  · This condition often causes no symptoms.  · This condition may be caused by various conditions.  · If an infection, cancer, bone marrow disorder, or other serious problem is causing your leukocytosis, it will need to be treated.  · Keep all follow-up visits as told by your health care provider. This  is important.  This information is not intended to replace advice given to you by your health care provider. Make sure you discuss any questions you have with your health care provider.  Document Revised: 09/12/2019 Document Reviewed: 09/12/2019  Elsevier Patient Education © 2021 [employer]

## 2021-04-09 ENCOUNTER — TREATMENT (OUTPATIENT)
Dept: PHYSICAL THERAPY | Facility: CLINIC | Age: 56
End: 2021-04-09

## 2021-04-09 DIAGNOSIS — M54.2 CERVICALGIA: Primary | ICD-10-CM

## 2021-04-09 DIAGNOSIS — M54.12 RADICULOPATHY, CERVICAL: ICD-10-CM

## 2021-04-09 PROCEDURE — 97162 PT EVAL MOD COMPLEX 30 MIN: CPT | Performed by: PHYSICAL THERAPIST

## 2021-04-09 PROCEDURE — 97110 THERAPEUTIC EXERCISES: CPT | Performed by: PHYSICAL THERAPIST

## 2021-04-09 NOTE — PROGRESS NOTES
Physical Therapy Initial Evaluation and Plan of Care      Patient: Marta Giraldo   : 1965  Diagnosis/ICD-10 Code:  Cervicalgia [M54.2]  Referring practitioner: Kofi RAWLS MD  Date of Initial Visit: 2021  Today's Date: 2021  Patient seen for 1 sessions    Recertification: 2021   Next MD appt: 2021.         Subjective Questionnaire: NDI:27/50 = 54%      Subjective Evaluation    History of Present Illness  Onset date: ~1 month.    Subjective comment: Ptient reprorts she has had about 1 month onst of neck and B arm pain. She reports both arms bother her. She reports the arm pain is come and go and goes almost to the elbow.  She reports it is on the fornt side of the amr when it occurs. She reports the arm pain occurs pretty much every jay nd will last through the night. She reports she mainly has it at night.  Patient Occupation: Unemployed Quality of life: fair    Pain  Current pain ratin  At best pain ratin  At worst pain ratin  Location: neck, mainly in the arms  Quality: radiating and knife-like  Relieving factors: medications (pain gel/cream )  Aggravating factors: sleeping  Progression: no change    Hand dominance: right    Diagnostic Tests  X-ray: abnormal    Treatments  No previous or current treatments  Patient Goals  Patient goals for therapy: decreased pain             Objective          Static Posture     Head  Forward.    Cervical Spine  Tilted left.    Shoulders  Depressed and rounded.    Thoracic Spine  Hyperkyphosis.    Postural Observations  Seated posture: poor  Correction of posture: makes symptoms better        Tenderness   Cervical Spine   Tenderness in the facet joint.     Additional Tenderness Details  C5/C6    Neurological Testing     Sensation   Cervical/Thoracic   Left   Intact: light touch  Paresthesia: light touch    Right   Intact: light touch  Paresthesia: light touch    Additional Neurological Details  Reports on/off rad pain down the  bicep area to the elbow at times.    Active Range of Motion   Cervical/Thoracic Spine   Cervical    Subcranial retraction: restricted   Flexion: 40 degrees   Extension: 44 degrees   Left lateral flexion: 30 (slight rotational component) degrees   Right lateral flexion: 20 (slight rotational component) degrees   Left rotation: 67 degrees   Right rotation: 55 degrees     Strength/Myotome Testing   Cervical Spine   Neck extension: 4-  Neck flexion: 4-    Left   Neck lateral flexion (C3): 4-    Right   Neck lateral flexion (C3): 4-    Left Shoulder     Planes of Motion   Flexion: 4+   Abduction: 5     Right Shoulder     Planes of Motion   Flexion: 4+   Abduction: 5     Left Elbow   Flexion: 4+  Extension: 5    Right Elbow   Flexion: 5  Extension: 5    Tests   Cervical     Left   Positive active compression ([place]).   Negative cervical distraction and Spurling's sign.     Right   Negative active compression ([place]), cervical distraction and Spurling's sign.     Ambulation   Weight-Bearing Status   Assistive device used: wheelchair    Additional Weight-Bearing Status Details  Comes in in custom power wheelchair. Slumped sitting posture. R TTA, no prosthetic on, dressing on top of R knee. Patient reports she burned herself.          Assessment & Plan     Assessment  Impairments: abnormal or restricted ROM, activity intolerance, impaired physical strength, lacks appropriate home exercise program and pain with function  Assessment details: This is a obese 57yo female with history of R TTA as well as a CVA. Patient uses power wheelchair as primary mode of getting around. Patient has a custom chair with custom back but still has significantly slumped sitting posture which puts her into significant forward head posture and increases her thoracic kyphosis. She has tightness noted in posterior cervical spine. Patient had significant difficulty with posterior shoulder rolls and motor control for that action. Did well with  other there ex with some verbal cueing. Patient did well with all HEP exercises today. Patient was given written copies of HEP.  Prognosis: fair  Prognosis details: Barriers to Rehab: Include significant or possible arthritic/degenerative changes that have occurred within the spine, The chronicity of this issue, The patient's obesity, The patient's generally deconditioned state.    Safety Issues: Fall risk  Functional Limitations: carrying objects, lifting, sleeping, pulling, pushing, uncomfortable because of pain, sitting and reaching overhead  Goals  Plan Goals: Short Term Goals:  1) I with HEP and have additions/changes by next re-certification.    2) Patient to be more aware of posture and posture correction technique.    3) AROM cervical flexion/extension >=50°    4) AROM B cervical SB >= 35° with no rotational component..    5) AROM B cervical ROT >=75°.    6) C-spine >= 4/5.      Long Term Goals:  1) AROM for the cervical spine all WNL, no increase in pain.    2) B UE 5/5, no increase in pain.    3) C-spine 5/5, no increase in pain.    5) Patient able to perform 10 minutes of scapular stabilization exercises with good posture and no cueing to correct.    6) NDI score of <= 40%.    7) I with final HEP.        Plan  Therapy options: will be seen for skilled physical therapy services  Planned modality interventions: cryotherapy and thermotherapy (hydrocollator packs)  Planned therapy interventions: flexibility, body mechanics training, home exercise program, joint mobilization, manual therapy, neuromuscular re-education, postural training, soft tissue mobilization, spinal/joint mobilization, strengthening, stretching and therapeutic activities  Duration in visits: 10  Treatment plan discussed with: patient  Plan details: Progress overall ROM, strength, posture, scap stab, neural extensibility, seated posture and ergonomics for s/s management.      Other therapeutic activities and/or exercises will be prescribed  depending on the patients progress or lack there of.     Visit Diagnoses:    ICD-10-CM ICD-9-CM   1. Cervicalgia  M54.2 723.1   2. Radiculopathy, cervical  M54.12 723.4       Timed:  Manual Therapy:         mins  38499;  Therapeutic Exercise:    24     mins  16387;      Neuromuscular Cindi:        mins  04458;    Therapeutic Activity:          mins  37719;     Gait Training:           mins  57288;     Ultrasound:          mins  82086;    Paraffin:        mins  12489;  Electrical Stimulation:         mins  28417 ( );    Untimed:  Electrical Stimulation:         mins  91687 ( );  Mechanical Traction:         mins  75141;     Timed Treatment:   24   mins   Total Treatment:     46   mins    PT SIGNATURE: Christa Abbott PT DPT, CSCS   DATE TREATMENT INITIATED: 4/9/2021    Initial Certification  Certification Period: 7/8/2021  I certify that the therapy services are furnished while this patient is under my care.  The services outlined above are required by this patient, and will be reviewed every 90 days.     PHYSICIAN: Kofi Mccormick MD      DATE:     Please sign and return via fax to  .. Thank you, [facility].        This document has been electronically signed by Christa Abbott PT DPT, CSCS on April 9, 2021 10:24 CDT

## 2021-04-15 ENCOUNTER — TREATMENT (OUTPATIENT)
Dept: PHYSICAL THERAPY | Facility: CLINIC | Age: 56
End: 2021-04-15

## 2021-04-15 DIAGNOSIS — M54.12 RADICULOPATHY, CERVICAL: ICD-10-CM

## 2021-04-15 DIAGNOSIS — M54.2 CERVICALGIA: Primary | ICD-10-CM

## 2021-04-15 PROCEDURE — 97110 THERAPEUTIC EXERCISES: CPT | Performed by: PHYSICAL THERAPIST

## 2021-04-15 NOTE — PROGRESS NOTES
Physical Therapy Daily Progress Note    Patient: Marta Giraldo   : 1965  Diagnosis/ICD-10 Code:     Diagnosis Plan   1. Cervicalgia     2. Radiculopathy, cervical       Referring practitioner: Kofi RAWLS MD  Date of Initial Visit: Type: THERAPY  Noted: 2021  Today's Date: 4/15/2021  Patient seen for 2 sessions      PT Recheck Due: 2021  PT MD Visit:        Marta Giraldo reports:         Subjective Evaluation    History of Present Illness    Subjective comment: states that she has a little pain today. Pain  Current pain rating: 3             Objective   See Exercise, Manual, and Modality Logs for complete treatment.       Assessment & Plan     Assessment  Assessment details: Patient needs postural cues throughout treatment today. Gives good effort. Does need cueing for previous exercises. Added cspine isometrics to HEP and is given written HEP to complete.     Goals  Plan Goals: Short Term Goals:  1) I with HEP and have additions/changes by next re-certification.    2) Patient to be more aware of posture and posture correction technique.    3) AROM cervical flexion/extension >=50°    4) AROM B cervical SB >= 35° with no rotational component..    5) AROM B cervical ROT >=75°.    6) C-spine >= 4/5.      Long Term Goals:  1) AROM for the cervical spine all WNL, no increase in pain.    2) B UE 5/5, no increase in pain.    3) C-spine 5/5, no increase in pain.    5) Patient able to perform 10 minutes of scapular stabilization exercises with good posture and no cueing to correct.    6) NDI score of <= 40%.    7) I with final HEP.    Plan  Plan details: Next visit tband rows      Progress per Plan of Care and Progress strengthening /stabilization /functional activity            Timed:  Manual Therapy:         mins  32179;  Therapeutic Exercise:    44     mins  92391;   Aquatic Therex :        mins  44719    Neuromuscular Cindi:        mins  31250;    Therapeutic Activity:          mins   21823;     Gait Training:           mins  13432;     Ultrasound:          mins  89918;    Electrical Stimulation:         mins  72285 ( );    Untimed:  Electrical Stimulation:         mins  79007 ( );  Mechanical Traction:         mins  42652;   Paraffin:         mins  55194;  Orthotic fit/train:         mins  96082  Iontophoresis:          mins  22308    Timed Treatment:   44   mins   Total Treatment:     44   mins  Kayla Castillo PTA  Physical Therapist Assistant      This document has been electronically signed by Kayla Castillo PTA on April 15, 2021 11:14 CDT

## 2021-04-16 ENCOUNTER — TREATMENT (OUTPATIENT)
Dept: PHYSICAL THERAPY | Facility: CLINIC | Age: 56
End: 2021-04-16

## 2021-04-16 DIAGNOSIS — M54.12 RADICULOPATHY, CERVICAL: ICD-10-CM

## 2021-04-16 DIAGNOSIS — M54.2 CERVICALGIA: Primary | ICD-10-CM

## 2021-04-16 PROCEDURE — 97110 THERAPEUTIC EXERCISES: CPT | Performed by: PHYSICAL THERAPIST

## 2021-04-16 NOTE — PROGRESS NOTES
Physical Therapy Daily Progress Note    Patient: Marta Giraldo   : 1965  Diagnosis/ICD-10 Code:     Diagnosis Plan   1. Cervicalgia     2. Radiculopathy, cervical       Referring practitioner: Kofi RAWLS MD  Date of Initial Visit: Type: THERAPY  Noted: 2021  Today's Date: 2021  Patient seen for 3 sessions      PT Recheck Due: 2021  PT MD Visit:        Marta Giraldo        Subjective Evaluation    History of Present Illness    Subjective comment: not really hurting today. Pain  Current pain ratin             Objective   See Exercise, Manual, and Modality Logs for complete treatment.       Assessment & Plan     Assessment  Assessment details: Patient needs postural cueing and significant cueing with technique of current HEP. Patient reports doing her exercises but has to use her sheets at home every time she does them. Patient has no increase in complaints while she is here.     Goals  Plan Goals: Short Term Goals:  1) I with HEP and have additions/changes by next re-certification. (partially met)    2) Patient to be more aware of posture and posture correction technique.    3) AROM cervical flexion/extension >=50°    4) AROM B cervical SB >= 35° with no rotational component..    5) AROM B cervical ROT >=75°.    6) C-spine >= 4/5.      Long Term Goals:  1) AROM for the cervical spine all WNL, no increase in pain.    2) B UE 5/5, no increase in pain.    3) C-spine 5/5, no increase in pain.    5) Patient able to perform 10 minutes of scapular stabilization exercises with good posture and no cueing to correct.    6) NDI score of <= 40%.    7) I with final HEP.    Plan  Plan details: Next visit tband chest pulls      Progress per Plan of Care and Progress strengthening /stabilization /functional activity            Timed:  Manual Therapy:         mins  12130;  Therapeutic Exercise:    41     mins  01977;   Aquatic Therex :        mins  88599    Neuromuscular Cindi:        mins   38377;    Therapeutic Activity:          mins  23142;     Gait Training:           mins  05415;     Ultrasound:          mins  87536;    Electrical Stimulation:         mins  76781 ( );    Untimed:  Electrical Stimulation:         mins  48350 ( );  Mechanical Traction:         mins  15256;   Paraffin:         mins  56182;  Orthotic fit/train:         mins  21478  Iontophoresis:          mins  34212    Timed Treatment:   41   mins   Total Treatment:     41   mins  Kayla Castillo PTA  Physical Therapist Assistant      This document has been electronically signed by Kayla Castillo PTA on April 16, 2021 10:22 CDT

## 2021-04-20 ENCOUNTER — APPOINTMENT (OUTPATIENT)
Dept: ONCOLOGY | Facility: HOSPITAL | Age: 56
End: 2021-04-20

## 2021-04-20 ENCOUNTER — APPOINTMENT (OUTPATIENT)
Dept: ONCOLOGY | Facility: CLINIC | Age: 56
End: 2021-04-20

## 2021-04-21 ENCOUNTER — CONSULT (OUTPATIENT)
Dept: ONCOLOGY | Facility: CLINIC | Age: 56
End: 2021-04-21

## 2021-04-21 ENCOUNTER — LAB (OUTPATIENT)
Dept: ONCOLOGY | Facility: HOSPITAL | Age: 56
End: 2021-04-21

## 2021-04-21 VITALS
SYSTOLIC BLOOD PRESSURE: 120 MMHG | DIASTOLIC BLOOD PRESSURE: 62 MMHG | BODY MASS INDEX: 33.23 KG/M2 | HEIGHT: 69 IN | HEART RATE: 85 BPM | TEMPERATURE: 97.9 F | RESPIRATION RATE: 18 BRPM

## 2021-04-21 DIAGNOSIS — E61.1 IRON DEFICIENCY: ICD-10-CM

## 2021-04-21 DIAGNOSIS — D72.829 LEUKOCYTOSIS, UNSPECIFIED TYPE: ICD-10-CM

## 2021-04-21 DIAGNOSIS — D72.829 LEUKOCYTOSIS, UNSPECIFIED TYPE: Primary | ICD-10-CM

## 2021-04-21 LAB
BASOPHILS # BLD AUTO: 0.06 10*3/MM3 (ref 0–0.2)
BASOPHILS NFR BLD AUTO: 0.5 % (ref 0–1.5)
CRP SERPL-MCNC: 0.33 MG/DL (ref 0–0.5)
DEPRECATED RDW RBC AUTO: 42.9 FL (ref 37–54)
EOSINOPHIL # BLD AUTO: 0.22 10*3/MM3 (ref 0–0.4)
EOSINOPHIL NFR BLD AUTO: 1.7 % (ref 0.3–6.2)
ERYTHROCYTE [DISTWIDTH] IN BLOOD BY AUTOMATED COUNT: 13.7 % (ref 12.3–15.4)
FERRITIN SERPL-MCNC: 85.95 NG/ML (ref 13–150)
HCT VFR BLD AUTO: 37.3 % (ref 34–46.6)
HGB BLD-MCNC: 11.6 G/DL (ref 12–15.9)
IMM GRANULOCYTES # BLD AUTO: 0.04 10*3/MM3 (ref 0–0.05)
IMM GRANULOCYTES NFR BLD AUTO: 0.3 % (ref 0–0.5)
IRON 24H UR-MRATE: 43 MCG/DL (ref 37–145)
IRON SATN MFR SERPL: 13 % (ref 20–50)
LDH SERPL-CCNC: 207 U/L (ref 135–214)
LYMPHOCYTES # BLD AUTO: 1.88 10*3/MM3 (ref 0.7–3.1)
LYMPHOCYTES NFR BLD AUTO: 14.8 % (ref 19.6–45.3)
MCH RBC QN AUTO: 26.4 PG (ref 26.6–33)
MCHC RBC AUTO-ENTMCNC: 31.1 G/DL (ref 31.5–35.7)
MCV RBC AUTO: 85 FL (ref 79–97)
MONOCYTES # BLD AUTO: 0.48 10*3/MM3 (ref 0.1–0.9)
MONOCYTES NFR BLD AUTO: 3.8 % (ref 5–12)
NEUTROPHILS NFR BLD AUTO: 10.02 10*3/MM3 (ref 1.7–7)
NEUTROPHILS NFR BLD AUTO: 78.9 % (ref 42.7–76)
NRBC BLD AUTO-RTO: 0 /100 WBC (ref 0–0.2)
PLATELET # BLD AUTO: 264 10*3/MM3 (ref 140–450)
PMV BLD AUTO: 11.6 FL (ref 6–12)
RBC # BLD AUTO: 4.39 10*6/MM3 (ref 3.77–5.28)
TIBC SERPL-MCNC: 320 MCG/DL (ref 298–536)
TRANSFERRIN SERPL-MCNC: 215 MG/DL (ref 200–360)
WBC # BLD AUTO: 12.7 10*3/MM3 (ref 3.4–10.8)

## 2021-04-21 PROCEDURE — 85025 COMPLETE CBC W/AUTO DIFF WBC: CPT | Performed by: INTERNAL MEDICINE

## 2021-04-21 PROCEDURE — 84466 ASSAY OF TRANSFERRIN: CPT | Performed by: INTERNAL MEDICINE

## 2021-04-21 PROCEDURE — 82728 ASSAY OF FERRITIN: CPT | Performed by: INTERNAL MEDICINE

## 2021-04-21 PROCEDURE — 83540 ASSAY OF IRON: CPT | Performed by: INTERNAL MEDICINE

## 2021-04-21 PROCEDURE — G0463 HOSPITAL OUTPT CLINIC VISIT: HCPCS | Performed by: INTERNAL MEDICINE

## 2021-04-21 PROCEDURE — G9903 PT SCRN TBCO ID AS NON USER: HCPCS | Performed by: INTERNAL MEDICINE

## 2021-04-21 PROCEDURE — 83615 LACTATE (LD) (LDH) ENZYME: CPT | Performed by: INTERNAL MEDICINE

## 2021-04-21 PROCEDURE — 1126F AMNT PAIN NOTED NONE PRSNT: CPT | Performed by: INTERNAL MEDICINE

## 2021-04-21 PROCEDURE — 86140 C-REACTIVE PROTEIN: CPT | Performed by: INTERNAL MEDICINE

## 2021-04-21 PROCEDURE — 1157F ADVNC CARE PLAN IN RCRD: CPT | Performed by: INTERNAL MEDICINE

## 2021-04-21 PROCEDURE — 82607 VITAMIN B-12: CPT | Performed by: INTERNAL MEDICINE

## 2021-04-21 PROCEDURE — 99204 OFFICE O/P NEW MOD 45 MIN: CPT | Performed by: INTERNAL MEDICINE

## 2021-04-21 PROCEDURE — 82746 ASSAY OF FOLIC ACID SERUM: CPT | Performed by: INTERNAL MEDICINE

## 2021-04-21 RX ORDER — DULOXETIN HYDROCHLORIDE 60 MG/1
CAPSULE, DELAYED RELEASE ORAL
COMMUNITY
Start: 2021-04-16 | End: 2021-04-21

## 2021-04-21 RX ORDER — PREDNISOLONE ACETATE 10 MG/ML
SUSPENSION/ DROPS OPHTHALMIC
Status: ON HOLD | COMMUNITY
Start: 2021-04-12 | End: 2022-01-27

## 2021-04-21 RX ORDER — POLYMYXIN B SULFATE AND TRIMETHOPRIM 1; 10000 MG/ML; [USP'U]/ML
SOLUTION OPHTHALMIC
COMMUNITY
Start: 2021-04-12 | End: 2021-10-20
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
## 2021-04-22 ENCOUNTER — TELEPHONE (OUTPATIENT)
Dept: ONCOLOGY | Facility: HOSPITAL | Age: 56
End: 2021-04-22

## 2021-04-22 LAB
FOLATE SERPL-MCNC: 17.6 NG/ML (ref 4.78–24.2)
VIT B12 BLD-MCNC: 407 PG/ML (ref 211–946)

## 2021-04-22 RX ORDER — LANOLIN ALCOHOL/MO/W.PET/CERES
1000 CREAM (GRAM) TOPICAL DAILY
Qty: 90 TABLET | Refills: 1 | Status: SHIPPED | OUTPATIENT
Start: 2021-04-22 | End: 2021-08-16 | Stop reason: SDUPTHER

## 2021-04-22 NOTE — TELEPHONE ENCOUNTER
Pt sister returns call, lab results given along with instructions for taking medications as ordered by Dr. Bates, v/u obtained.

## 2021-04-22 NOTE — TELEPHONE ENCOUNTER
----- Message from Adalid Bates MD sent at 4/22/2021  6:43 AM CDT -----  Please let patient know, her white blood cell count was again little elevated at 12.6, hemoglobin is 11.6.  She needs to continue taking ferrous sulfate 1 tablet p.o. daily.  She needs to start taking B12 1000 mcg p.o. daily.  I have sent prescription for B12 to her pharmacy.  Flow cytometry still pending.  Thank you

## 2021-04-23 ENCOUNTER — TREATMENT (OUTPATIENT)
Dept: PHYSICAL THERAPY | Facility: CLINIC | Age: 56
End: 2021-04-23

## 2021-04-23 DIAGNOSIS — M54.12 RADICULOPATHY, CERVICAL: ICD-10-CM

## 2021-04-23 DIAGNOSIS — M54.2 CERVICALGIA: Primary | ICD-10-CM

## 2021-04-23 LAB — REF LAB TEST METHOD: NORMAL

## 2021-04-23 PROCEDURE — 97110 THERAPEUTIC EXERCISES: CPT | Performed by: PHYSICAL THERAPIST

## 2021-04-23 NOTE — PROGRESS NOTES
Physical Therapy Daily Progress Note    Patient: Marta Giraldo   : 1965  Diagnosis/ICD-10 Code:     Diagnosis Plan   1. Cervicalgia     2. Radiculopathy, cervical       Referring practitioner: Kofi RAWLS MD  Date of Initial Visit: Type: THERAPY  Noted: 2021  Today's Date: 2021  Patient seen for 4 sessions      PT Recheck Due: 2021  PT MD Visit:        Marta Giraldo reports: 70% improvement        Subjective Evaluation    History of Present Illness    Subjective comment: doing ok today. a little painPain  Current pain ratin             Objective          Active Range of Motion   Cervical/Thoracic Spine   Cervical    Flexion: 62 degrees   Extension: 52 degrees   Left lateral flexion: 30 degrees   Right lateral flexion: 30 degrees   Left rotation: 77 degrees   Right rotation: 80 degrees     Strength/Myotome Testing   Cervical Spine   Neck extension: 5  Neck flexion: 5    Left   Neck lateral flexion (C3): 5    Right   Neck lateral flexion (C3): 5    Left Shoulder     Planes of Motion   Flexion: 4+   Abduction: 4+     Right Shoulder     Planes of Motion   Flexion: 4+   Abduction: 4+       See Exercise, Manual, and Modality Logs for complete treatment.       Assessment & Plan     Assessment  Assessment details: Patient has overall improved AROM at this time. Gives good effort. Patient has improved cspine strength. Still needs postural cueing throughout. Does have difficulty with mid trap wall slides today with right arm.     Goals  Plan Goals: Short Term Goals:  1) I with HEP and have additions/changes by next re-certification. (met)    2) Patient to be more aware of posture and posture correction technique.    3) AROM cervical flexion/extension >=50° (met)    4) AROM B cervical SB >= 35° with no rotational component.. (progressing)    5) AROM B cervical ROT >=75°. (met)    6) C-spine >= 4/5. (met)      Long Term Goals:  1) AROM for the cervical spine all WNL, no increase in  pain. (met except for Lateral SB B)    2) B UE 5/5, no increase in pain. (progressing)    3) C-spine 5/5, no increase in pain. (met)    5) Patient able to perform 10 minutes of scapular stabilization exercises with good posture and no cueing to correct.    6) NDI score of <= 40%.    7) I with final HEP.    Plan  Plan details: Next visit resume mid trap wall slides      Progress per Plan of Care and Progress strengthening /stabilization /functional activity            Timed:  Manual Therapy:         mins  34333;  Therapeutic Exercise:    45     mins  91926;   Aquatic Therex :        mins  95257    Neuromuscular Cindi:        mins  00400;    Therapeutic Activity:          mins  81220;     Gait Training:           mins  40869;     Ultrasound:          mins  06378;    Electrical Stimulation:         mins  96066 ( );    Untimed:  Electrical Stimulation:         mins  77970 ( );  Mechanical Traction:         mins  69200;   Paraffin:         mins  33975;  Orthotic fit/train:         mins  30849  Iontophoresis:          mins  92874    Timed Treatment:   45   mins   Total Treatment:     45   mins  Kayla Castillo PTA  Physical Therapist Assistant      This document has been electronically signed by Kayla Castillo PTA on April 23, 2021 10:03 CDT

## 2021-04-26 ENCOUNTER — TREATMENT (OUTPATIENT)
Dept: PHYSICAL THERAPY | Facility: CLINIC | Age: 56
End: 2021-04-26

## 2021-04-26 DIAGNOSIS — M54.2 CERVICALGIA: Primary | ICD-10-CM

## 2021-04-26 DIAGNOSIS — M54.12 RADICULOPATHY, CERVICAL: ICD-10-CM

## 2021-04-26 PROCEDURE — 97110 THERAPEUTIC EXERCISES: CPT | Performed by: PHYSICAL THERAPIST

## 2021-04-26 NOTE — PROGRESS NOTES
Physical Therapy Daily Progress Note    Patient: Marta Giraldo   : 1965  Diagnosis/ICD-10 Code:     Diagnosis Plan   1. Cervicalgia     2. Radiculopathy, cervical       Referring practitioner: Kofi RAWLS MD  Date of Initial Visit: Type: THERAPY  Noted: 2021  Today's Date: 2021  Patient seen for 5 sessions      PT Recheck Due: 2021  PT MD Visit:        Marta Giraldo reports: 70% improvement      Subjective Evaluation    History of Present Illness    Subjective comment: has very little pain today.,          Objective   See Exercise, Manual, and Modality Logs for complete treatment.       Assessment & Plan     Assessment  Assessment details: Patient needs some cueing for appropriate technique with therex as well as appropriate posture. Patient fatigues with UE exercises. And has some complaints of right shoulder pain.    Goals  Plan Goals: Short Term Goals:  1) I with HEP and have additions/changes by next re-certification. (met)    2) Patient to be more aware of posture and posture correction technique.    3) AROM cervical flexion/extension >=50° (met)    4) AROM B cervical SB >= 35° with no rotational component.. (progressing)    5) AROM B cervical ROT >=75°. (met)    6) C-spine >= 4/5. (met)      Long Term Goals:  1) AROM for the cervical spine all WNL, no increase in pain. (met except for Lateral SB B)    2) B UE 5/5, no increase in pain. (progressing)    3) C-spine 5/5, no increase in pain. (met)    5) Patient able to perform 10 minutes of scapular stabilization exercises with good posture and no cueing to correct. (partially met)    6) NDI score of <= 40%.    7) I with final HEP.    Plan  Plan details: Next visit tband resisted abd with shoulder flexion      Progress per Plan of Care and Progress strengthening /stabilization /functional activity            Timed:  Manual Therapy:         mins  99949;  Therapeutic Exercise:    45     mins  46451;   Aquatic Therex :         mins  75194    Neuromuscular Cindi:        mins  08654;    Therapeutic Activity:          mins  29698;     Gait Training:           mins  30321;     Ultrasound:          mins  18444;    Electrical Stimulation:         mins  27639 ( );    Untimed:  Electrical Stimulation:         mins  97041 ( );  Mechanical Traction:         mins  72393;   Paraffin:         mins  98295;  Orthotic fit/train:         mins  24035  Iontophoresis:          mins  71138    Timed Treatment:   45   mins   Total Treatment:     45   mins  Kayla Castillo PTA  Physical Therapist Assistant      This document has been electronically signed by Kayla Castillo PTA on April 26, 2021 11:28 CDT

## 2021-04-30 ENCOUNTER — TREATMENT (OUTPATIENT)
Dept: PHYSICAL THERAPY | Facility: CLINIC | Age: 56
End: 2021-04-30

## 2021-04-30 DIAGNOSIS — M54.2 CERVICALGIA: Primary | ICD-10-CM

## 2021-04-30 DIAGNOSIS — M54.12 RADICULOPATHY, CERVICAL: ICD-10-CM

## 2021-04-30 PROCEDURE — 97110 THERAPEUTIC EXERCISES: CPT | Performed by: PHYSICAL THERAPIST

## 2021-04-30 NOTE — PROGRESS NOTES
Physical Therapy Daily Treatment Note      Patient: Marta Giraldo   : 1965  Referring practitioner: Kofi RAWLS MD  Date of Initial Visit: Type: THERAPY  Noted: 2021  Today's Date: 2021  Patient seen for 6 sessions    PT Recheck Due: 2021  PT MD Visit:      Marta Giraldo reports: 70% improvement        Subjective Evaluation    History of Present Illness    Subjective comment: pt states that she is huting some today but not too bad. States that she is seeing improvement coming to therapyPain  Current pain ratin           Objective    Max verbal cues for posture         See Exercise, Manual, and Modality Logs for complete treatment.       Assessment & Plan     Assessment  Assessment details: Verbal cues for correct form with every exercise/stretch. Also requires max verbal cues for posture throughout treatment. Complains of pain in RUE > L with PREs and with tband exercises.     Goals  Plan Goals: Short Term Goals:  1) I with HEP and have additions/changes by next re-certification. (met)    2) Patient to be more aware of posture and posture correction technique.    3) AROM cervical flexion/extension >=50° (met)    4) AROM B cervical SB >= 35° with no rotational component.. (progressing)    5) AROM B cervical ROT >=75°. (met)    6) C-spine >= 4/5. (met)      Long Term Goals:  1) AROM for the cervical spine all WNL, no increase in pain. (met except for Lateral SB B)    2) B UE 5/5, no increase in pain. (progressing)    3) C-spine 5/5, no increase in pain. (met)    5) Patient able to perform 10 minutes of scapular stabilization exercises with good posture and no cueing to correct. (partially met)    6) NDI score of <= 40%.    7) I with final HEP.      Plan  Plan details: Wand press out and up next visit        Visit Diagnoses:    ICD-10-CM ICD-9-CM   1. Cervicalgia  M54.2 723.1   2. Radiculopathy, cervical  M54.12 723.4       Progress per Plan of Care and Progress  strengthening /stabilization /functional activity           Timed:  Manual Therapy:         mins  66094;  Therapeutic Exercise:    45     mins  72661;     Neuromuscular Cindi:        mins  88922;    Therapeutic Activity:          mins  45013;     Gait Training:           mins  18142;     Ultrasound:          mins  72533;    Electrical Stimulation:         mins  44796 ( );    Untimed:  Electrical Stimulation:         mins  06115 ( );  Mechanical Traction:         mins  03223;     Timed Treatment:   45   mins   Total Treatment:     45   mins  [facility] in [place]  Physical Therapist

## 2021-05-04 ENCOUNTER — TELEPHONE (OUTPATIENT)
Dept: FAMILY MEDICINE CLINIC | Facility: CLINIC | Age: 56
End: 2021-05-04

## 2021-05-04 NOTE — PROGRESS NOTES
Chief Complaint  Neck Pain, Depression, Heartburn, Diabetes, Vitamin D Deficiency, Hypothyroidism, and Hypertension    Subjective          Marta Giraldo presents to [facility]       Pt is 55 yo female with management of obesity IDDM type 2 , HLP, diabetic neuropathy,  HTN, major depression  type 2, former tobacco smoker, sp below right knee amputation of right leg , sp appendectomy, sp bladder surgery,diabetic retinopathy of right eye, vitamin D deficiency, hypothryoidism, microcytic anemia , colonic polyps, diverticulosis, internal hemorrhoids/GERD with esophagitis, gastritis. Diabetic retinopathy, cataracts, iron deficiency, CKD stage 2, chronic neck pain (DDD cervical spine,cervical spondylosis at C5-C6) cervical radiculopathy, leukocytosis      4/7/21 in office visit for recheck on pt's above medical issues. Pt had labwork done on 3/25/21 that showed normal Vitamin D levels. Thyroid studies normal lipid panel showed HDL at 35 with LDL at 79.  hga1c at 11.30. CMP  Showed GFR at 40 from 61 and CR at 1.36 and BUN at 37.  CBC showed WBC at 12.89 and on last visit it was 12.94. pt continues to take her medication for IDDM type 2, HTN, vitamin D deficiency, HLP, major depression, diabetic neuropathy. She has tried to change her diet.  She also has been referred to Nephrology for CKD stage 3a.       5/5/21 in office visit for recheck on pt's above medical issues. Pt saw Hematology on 4/21/21 for iron deficiency anemia, leukocytosis likely secondary to iron deficiency anemia, underlying bone marrow problem vs inflammation from arthritis.  labwork was ordered.  She was started on b12 supplements . Pt has also been doing PT/OT for her neck pain. She did have labwork done on 4/21/21 thyat showed CRP was normal Ferritin normal LDH normal CBC showed  WBC at 12.70 with hemoglobin at 11.6 Vitamin B12 and folate normal Flow cytometry was normal . Pt continues to take her medications for  HLP, HTN, IDDM type 2, diabetic neuropathy, chronic neck pain,  Vitamin D deficiency.  She is feeling better since doing PT/OT for her neck pian. She has upcoming appt with Hematology Endocrinolog and Neurology later this Monday and Nephrology.  She states she does have a rash on her elbows for about 2 weeks. She also had a burn injury on her right leg while handling food on oven about 2 weeks ago.        Rash  This is a chronic problem. The current episode started more than 1 year ago. The problem is unchanged. The rash is diffuse. The rash is characterized by blistering. She was exposed to nothing. Pertinent negatives include no anorexia, congestion, cough, diarrhea, eye pain, facial edema, fatigue, fever, joint pain, nail changes, shortness of breath or sore throat. The treatment provided moderate relief. There is no history of allergies, asthma, eczema or varicella.   Chronic Kidney Disease  This is a chronic problem. The current episode started more than 1 year ago. The problem occurs constantly. The problem has been unchanged. Pertinent negatives include no abdominal pain, anorexia, arthralgias, change in bowel habit, chest pain, chills, congestion, coughing, diaphoresis, fatigue, fever, headaches, joint swelling, myalgias, nausea, neck pain, numbness, rash, sore throat, swollen glands, urinary symptoms, vertigo, visual change, vomiting or weakness. Nothing aggravates the symptoms. She has tried nothing for the symptoms. The treatment provided no relief.   Neck Pain   This is a recurrent problem. The current episode started more than 1 month ago. The problem occurs constantly. The problem has been unchanged. The pain is present in the left side and right side. The quality of the pain is described as aching. The pain is at a severity of 7/10. Nothing aggravates the symptoms. The pain is same all the time. Pertinent negatives include no chest pain, fever, headaches, leg pain, numbness, pain with  swallowing, paresis, photophobia, syncope, tingling, trouble swallowing, visual change, weakness or weight loss. She has tried nothing (lyrica) for the symptoms. The treatment provided no relief.   Diabetes  She presents for her follow-up diabetic visit. She has type 2 diabetes mellitus. Her disease course has been waxing and waning . Pertinent negatives for hypoglycemia include no confusion, dizziness, headaches, hunger, mood changes, nervousness/anxiousness, pallor or seizures. Associated symptoms include fatigue and weakness. Pertinent negatives for diabetes include no blurred vision, no chest pain, no foot paresthesias, no foot ulcerations, no polydipsia, no polyphagia and no polyuria. Pertinent negatives for hypoglycemia complications include no blackouts, no hospitalization, no nocturnal hypoglycemia and no required assistance. Symptoms are stable. Pertinent negatives for diabetic complications include no autonomic neuropathy, CVA, heart disease, impotence, nephropathy or peripheral neuropathy. Risk factors for coronary artery disease include diabetes mellitus and dyslipidemia. Current diabetic treatment includes insulin injections and oral agent (dual therapy). She is compliant with treatment most of the time. Her weight is stable. She has not had a previous visit with a dietitian. She never participates in exercise. Her home blood glucose trend is decreasing steadily. An ACE inhibitor/angiotensin II receptor blocker is being taken. She does not see a podiatrist.Eye exam is not current.   Hypothyroidism   This is a new problem. The current episode started more than 1 year ago. The problem occurs constantly. The problem has been improved Associated symptoms include arthralgias, fatigue, numbness and weakness. Pertinent negatives include no abdominal pain, anorexia, chest pain, chills, congestion, coughing, diaphoresis, fever, headaches, nausea, sore throat or vomiting. Nothing aggravates the symptoms. She has  "tried nothing for the symptoms. The treatment provided no relief.    Obesity   This is a chronic problem. The problem occurs constantly. The problem has been unchanged. Associated symptoms include arthralgias, fatigue, numbness and weakness. Pertinent negatives include no chest pain, chills, congestion, coughing, diaphoresis, fever, headaches, nausea, sore throat or vomiting. Nothing aggravates the symptoms. She has tried nothing for the symptoms. The treatment provided no relief  Review of Systems   Constitutional: Positive for activity change and fatigue.   Musculoskeletal: Positive for arthralgias, gait problem and neck stiffness.   Neurological: Positive for tingling and numbness.      Objective   Vital Signs:   /66 (BP Location: Right arm, Patient Position: Sitting, Cuff Size: Large Adult)   Pulse 88   Temp 96.2 °F (35.7 °C)   Ht 175.3 cm (69\")   SpO2 95%   BMI 33.23 kg/m²        Physical Exam  Vitals and nursing note reviewed.   Constitutional:       Appearance: She is well-developed. She is not diaphoretic.   HENT:      Head: Normocephalic and atraumatic.      Right Ear: External ear normal.   Eyes:      Conjunctiva/sclera: Conjunctivae normal.      Pupils: Pupils are equal, round, and reactive to light.   Cardiovascular:      Rate and Rhythm: Normal rate and regular rhythm.      Heart sounds: Normal heart sounds. No murmur heard.     Pulmonary:      Effort: Pulmonary effort is normal. No respiratory distress.      Breath sounds: Normal breath sounds.   Abdominal:      General: Bowel sounds are normal. There is no distension.      Palpations: Abdomen is soft.      Tenderness: There is no abdominal tenderness.      Comments: Obese abdomen    Musculoskeletal:         General: Tenderness present. No deformity. Normal range of motion.      Cervical back: Normal range of motion. Rigidity, spasms, tenderness and bony tenderness present. Pain with movement present.      Comments: Wheelchair bound  "   Skin:     General: Skin is warm.      Coloration: Skin is not pale.      Findings: Rash present. No erythema.          Neurological:      Mental Status: She is alert and oriented to person, place, and time.      Cranial Nerves: No cranial nerve deficit.   Psychiatric:         Behavior: Behavior normal.        Result Review :                 Assessment and Plan    Diagnoses and all orders for this visit:    1. Uncontrolled type 2 diabetes mellitus with hyperglycemia (CMS/[facility]) (Primary)  -     CBC Auto Differential; Future  -     Comprehensive Metabolic Panel; Future  -     Hemoglobin A1c; Future  -     Lipid Panel; Future  -     TSH; Future  -     T4, Free; Future  -     T3, Free; Future  -     Vitamin D 25 Hydroxy; Future  -     Vitamin B12; Future  -     Microalbumin / Creatinine Urine Ratio - Urine, Clean Catch; Future  -     Ambulatory Referral to Home Health    2. Mixed hyperlipidemia  -     CBC Auto Differential; Future  -     Comprehensive Metabolic Panel; Future  -     Hemoglobin A1c; Future  -     Lipid Panel; Future  -     TSH; Future  -     T4, Free; Future  -     T3, Free; Future  -     Vitamin D 25 Hydroxy; Future  -     Vitamin B12; Future  -     Microalbumin / Creatinine Urine Ratio - Urine, Clean Catch; Future  -     Ambulatory Referral to Home Health    3. Essential hypertension  -     CBC Auto Differential; Future  -     Comprehensive Metabolic Panel; Future  -     Hemoglobin A1c; Future  -     Lipid Panel; Future  -     TSH; Future  -     T4, Free; Future  -     T3, Free; Future  -     Vitamin D 25 Hydroxy; Future  -     Vitamin B12; Future  -     Microalbumin / Creatinine Urine Ratio - Urine, Clean Catch; Future  -     Ambulatory Referral to Home Health    4. Vitamin D deficiency  -     CBC Auto Differential; Future  -     Comprehensive Metabolic Panel; Future  -     Hemoglobin A1c; Future  -     Lipid Panel; Future  -     TSH; Future  -     T4, Free; Future  -     T3, Free; Future  -      Vitamin D 25 Hydroxy; Future  -     Vitamin B12; Future  -     Microalbumin / Creatinine Urine Ratio - Urine, Clean Catch; Future  -     Ambulatory Referral to Home Health    5. Moderate episode of recurrent major depressive disorder (CMS/[facility])  -     CBC Auto Differential; Future  -     Comprehensive Metabolic Panel; Future  -     Hemoglobin A1c; Future  -     Lipid Panel; Future  -     TSH; Future  -     T4, Free; Future  -     T3, Free; Future  -     Vitamin D 25 Hydroxy; Future  -     Vitamin B12; Future  -     Microalbumin / Creatinine Urine Ratio - Urine, Clean Catch; Future  -     Ambulatory Referral to Home Health    6. Diabetic polyneuropathy associated with type 2 diabetes mellitus (CMS/[facility])  -     CBC Auto Differential; Future  -     Comprehensive Metabolic Panel; Future  -     Hemoglobin A1c; Future  -     Lipid Panel; Future  -     TSH; Future  -     T4, Free; Future  -     T3, Free; Future  -     Vitamin D 25 Hydroxy; Future  -     Vitamin B12; Future  -     Microalbumin / Creatinine Urine Ratio - Urine, Clean Catch; Future  -     Ambulatory Referral to Home Health    7. DDD (degenerative disc disease), cervical  -     CBC Auto Differential; Future  -     Comprehensive Metabolic Panel; Future  -     Hemoglobin A1c; Future  -     Lipid Panel; Future  -     TSH; Future  -     T4, Free; Future  -     T3, Free; Future  -     Vitamin D 25 Hydroxy; Future  -     Vitamin B12; Future  -     Microalbumin / Creatinine Urine Ratio - Urine, Clean Catch; Future  -     Ambulatory Referral to Home Health    8. Chronic neck pain  -     CBC Auto Differential; Future  -     Comprehensive Metabolic Panel; Future  -     Hemoglobin A1c; Future  -     Lipid Panel; Future  -     TSH; Future  -     T4, Free; Future  -     T3, Free; Future  -     Vitamin D 25 Hydroxy; Future  -     Vitamin B12; Future  -     Microalbumin / Creatinine Urine Ratio - Urine, Clean Catch; Future  -     Ambulatory Referral to Home Health    9.  Cervical radiculopathy  -     CBC Auto Differential; Future  -     Comprehensive Metabolic Panel; Future  -     Hemoglobin A1c; Future  -     Lipid Panel; Future  -     TSH; Future  -     T4, Free; Future  -     T3, Free; Future  -     Vitamin D 25 Hydroxy; Future  -     Vitamin B12; Future  -     Microalbumin / Creatinine Urine Ratio - Urine, Clean Catch; Future  -     Ambulatory Referral to Home Health    10. Wheelchair bound  -     CBC Auto Differential; Future  -     Comprehensive Metabolic Panel; Future  -     Hemoglobin A1c; Future  -     Lipid Panel; Future  -     TSH; Future  -     T4, Free; Future  -     T3, Free; Future  -     Vitamin D 25 Hydroxy; Future  -     Vitamin B12; Future  -     Microalbumin / Creatinine Urine Ratio - Urine, Clean Catch; Future  -     Ambulatory Referral to Home Health    11. Class 1 obesity with serious comorbidity and body mass index (BMI) of 33.0 to 33.9 in adult, unspecified obesity type  -     CBC Auto Differential; Future  -     Comprehensive Metabolic Panel; Future  -     Hemoglobin A1c; Future  -     Lipid Panel; Future  -     TSH; Future  -     T4, Free; Future  -     T3, Free; Future  -     Vitamin D 25 Hydroxy; Future  -     Vitamin B12; Future  -     Microalbumin / Creatinine Urine Ratio - Urine, Clean Catch; Future  -     Ambulatory Referral to Home Health    12. Skin burn  -     Ambulatory Referral to Home Health    13. Pruritic rash    Other orders  -     Dulaglutide 3 MG/0.5ML solution pen-injector; Inject 3 mg under the skin into the appropriate area as directed 1 (One) Time Per Week.  Dispense: 4 pen; Refill: 3  -     silver sulfadiazine (Silvadene) 1 % cream; Apply  topically to the appropriate area as directed 2 (Two) Times a Day.  Dispense: 400 g; Refill: 3  -     triamcinolone (KENALOG) 0.1 % ointment; Apply  topically to the appropriate area as directed 2 (Two) Times a Day.  Dispense: 30 g; Refill: 3            -recommend labwork  -will refer to [facility]    -chronic neck pain/DDD cervical spine/cervical radiculopathy -reviewed x-ray of neck. Recommend Neurology referral for possible EMG study.MRI of Cervical spine not approved. Has upcoming appt with Neurlogy   -diabetic retinopathy/catatract  - Ophthalmology following in [place]   -burn on right leg - silvadene cream topical BID  -vitamin B12 deficiency - on vitamin b12 once a week   -pruritc rash on elbows -  Triamcinolone topical BID  -gait instability/ high fall risk-  Uses wheelchair   -urinary incontinence - currently uses pullups  -colonic/rectal polyps, internal hemorrhoids/GERD with esophagitis/gastritis  - GI following  Next colonsocopy in 2025   -vitamin D deficiency -vitamin D once a week  -hypothyroidism - on synthroid 25 mcg PO q daily.   -DM type 2  - on trulicity 3 mg subq weekly.  On jardiance 25 mg daily.  on basaglar  35 units at bedtime.  on humalog 5 units before meals. Endocrinology following. Has Dexcom monitor ordered   -microcytic anemia/iron deficiency anemialleukocytosis  - on ferrous sulfate 325 mg PO q daily. Hematology following   -HTN -  Stable  On lisinopril  20 mg PO q daily.   -HLP - on lipitor 20 mg PO qhs.  -major depression - on viibryd 20 mg Po q daily.    -obesity - counseled weight loss >5 minutes BMI at 33.23   -diabetic neuropathy - on lyrica 300 PO BID will go up to QID. She will finish bottle first before refilling prescription   -chronic pain  Was on Norco 7.5/325 mg every 12 hours PRN. Will refer to Pain Management.   on robaxin TID     -recommend pap smear  -advised pt to be safe and call with questions and concerns  -advised pt to go to ER or call 911 if symptoms worrisome or severe  -advised pt to followup with specialist and referrals  -advised pt to be safe during COVID-19 pandemic  I spent 25  minutes caring for Marta on this date of service. This time includes time spent by me in the following activities: preparing for the visit, reviewing tests, obtaining  and/or reviewing a separately obtained history, performing a medically appropriate examination and/or evaluation, counseling and educating the patient/family/caregiver, ordering medications, tests, or procedures, referring and communicating with other health care professionals, documenting information in the medical record, independently interpreting results and communicating that information with the patient/family/caregiver and care coordination.             This document has been electronically signed by Gato Crane MD on May 5, 2021 16:07 CDT        Follow Up   Return in about 6 months (around 11/5/2021).  Patient was given instructions and counseling regarding her condition or for health maintenance advice. Please see specific information pulled into the AVS if appropriate.

## 2021-05-05 ENCOUNTER — OFFICE VISIT (OUTPATIENT)
Dept: FAMILY MEDICINE CLINIC | Facility: CLINIC | Age: 56
End: 2021-05-05

## 2021-05-05 VITALS
HEIGHT: 69 IN | BODY MASS INDEX: 33.23 KG/M2 | TEMPERATURE: 96.2 F | HEART RATE: 88 BPM | OXYGEN SATURATION: 95 % | DIASTOLIC BLOOD PRESSURE: 66 MMHG | SYSTOLIC BLOOD PRESSURE: 122 MMHG

## 2021-05-05 DIAGNOSIS — M54.2 CHRONIC NECK PAIN: ICD-10-CM

## 2021-05-05 DIAGNOSIS — L28.2 PRURITIC RASH: ICD-10-CM

## 2021-05-05 DIAGNOSIS — E78.2 MIXED HYPERLIPIDEMIA: ICD-10-CM

## 2021-05-05 DIAGNOSIS — E11.42 DIABETIC POLYNEUROPATHY ASSOCIATED WITH TYPE 2 DIABETES MELLITUS (HCC): ICD-10-CM

## 2021-05-05 DIAGNOSIS — M54.12 CERVICAL RADICULOPATHY: ICD-10-CM

## 2021-05-05 DIAGNOSIS — E55.9 VITAMIN D DEFICIENCY: ICD-10-CM

## 2021-05-05 DIAGNOSIS — M50.30 DDD (DEGENERATIVE DISC DISEASE), CERVICAL: ICD-10-CM

## 2021-05-05 DIAGNOSIS — E11.65 UNCONTROLLED TYPE 2 DIABETES MELLITUS WITH HYPERGLYCEMIA (HCC): Primary | ICD-10-CM

## 2021-05-05 DIAGNOSIS — G89.29 CHRONIC NECK PAIN: ICD-10-CM

## 2021-05-05 DIAGNOSIS — T30.0 SKIN BURN: ICD-10-CM

## 2021-05-05 DIAGNOSIS — I10 ESSENTIAL HYPERTENSION: ICD-10-CM

## 2021-05-05 DIAGNOSIS — F33.1 MODERATE EPISODE OF RECURRENT MAJOR DEPRESSIVE DISORDER (HCC): ICD-10-CM

## 2021-05-05 DIAGNOSIS — Z99.3 WHEELCHAIR BOUND: ICD-10-CM

## 2021-05-05 DIAGNOSIS — E66.9 CLASS 1 OBESITY WITH SERIOUS COMORBIDITY AND BODY MASS INDEX (BMI) OF 33.0 TO 33.9 IN ADULT, UNSPECIFIED OBESITY TYPE: ICD-10-CM

## 2021-05-05 PROCEDURE — 99214 OFFICE O/P EST MOD 30 MIN: CPT | Performed by: FAMILY MEDICINE

## 2021-05-05 NOTE — PATIENT INSTRUCTIONS
Get labwork in June    Before next visit in 6 weeks     Silvadene cream twice a day to burn on leg    Triamcinolone cream twice a day to elbows

## 2021-05-10 ENCOUNTER — OFFICE VISIT (OUTPATIENT)
Dept: ONCOLOGY | Facility: CLINIC | Age: 56
End: 2021-05-10

## 2021-05-10 VITALS
RESPIRATION RATE: 20 BRPM | WEIGHT: 235 LBS | HEIGHT: 69 IN | BODY MASS INDEX: 34.8 KG/M2 | TEMPERATURE: 97.6 F | HEART RATE: 87 BPM | SYSTOLIC BLOOD PRESSURE: 114 MMHG | DIASTOLIC BLOOD PRESSURE: 64 MMHG

## 2021-05-10 DIAGNOSIS — D72.829 LEUKOCYTOSIS, UNSPECIFIED TYPE: Primary | ICD-10-CM

## 2021-05-10 DIAGNOSIS — E61.1 IRON DEFICIENCY: ICD-10-CM

## 2021-05-10 PROCEDURE — G9903 PT SCRN TBCO ID AS NON USER: HCPCS | Performed by: INTERNAL MEDICINE

## 2021-05-10 PROCEDURE — 1126F AMNT PAIN NOTED NONE PRSNT: CPT | Performed by: INTERNAL MEDICINE

## 2021-05-10 PROCEDURE — G0463 HOSPITAL OUTPT CLINIC VISIT: HCPCS | Performed by: INTERNAL MEDICINE

## 2021-05-10 PROCEDURE — 99214 OFFICE O/P EST MOD 30 MIN: CPT | Performed by: INTERNAL MEDICINE

## 2021-05-10 PROCEDURE — 1157F ADVNC CARE PLAN IN RCRD: CPT | Performed by: INTERNAL MEDICINE

## 2021-05-10 NOTE — PROGRESS NOTES
"DATE OF VISIT: 5/10/2021      REASON FOR VISIT: Leukocytosis, iron deficiency anemia      HISTORY OF PRESENT ILLNESS:   56-year-old female with medical problem consisting of uncontrolled diabetes mellitus with neuropathy, hypothyroidism, hypertension, dyslipidemia, history of right lower extremity amputation, iron deficiency for which patient was initially seen in consultation on April 21, 2021 for evaluation of leukocytosis and iron deficiency.  Patient had extensive blood work done, she is here to discuss the results and further recommendation.  Denies any new lymph node enlargement.  Denies any bleeding.  Denies any fevers.          Past Medical History, Past Surgical History, Social History, Family History have been reviewed and are without significant changes except as mentioned.    Review of Systems   Constitutional: Positive for fatigue.   Musculoskeletal: Positive for arthralgias.   Neurological: Positive for numbness.   Hematological: Negative for adenopathy.      A comprehensive 14 point review of systems was performed and was negative except as mentioned.    Medications:  The current medication list was reviewed in the EMR    ALLERGIES:    Allergies   Allergen Reactions   • Compazine [Prochlorperazine Edisylate] Unknown - High Severity   • Penicillins Unknown - High Severity       Objective      Vitals:    05/10/21 0917   BP: 114/64   Pulse: 87   Resp: 20   Temp: 97.6 °F (36.4 °C)   TempSrc: Temporal   Weight: 107 kg (235 lb)  Comment: w/c / per pt   Height: 175.3 cm (69.02\")   PainSc: 0-No pain     Current Status 5/10/2021   ECOG score 3       Physical Exam  Cardiovascular:      Rate and Rhythm: Normal rate and regular rhythm.   Pulmonary:      Breath sounds: Normal breath sounds.   Musculoskeletal:      Comments: Evidence of below-knee amputation on right side.  No edema.   Neurological:      Mental Status: She is alert and oriented to person, place, and time.           RECENT LABS:  Glucose   Date " Value Ref Range Status   03/25/2021 181 (H) 65 - 99 mg/dL Final     Sodium   Date Value Ref Range Status   03/25/2021 139 136 - 145 mmol/L Final     Potassium   Date Value Ref Range Status   03/25/2021 4.6 3.5 - 5.2 mmol/L Final     CO2   Date Value Ref Range Status   03/25/2021 25.6 22.0 - 29.0 mmol/L Final     Chloride   Date Value Ref Range Status   03/25/2021 104 98 - 107 mmol/L Final     Anion Gap   Date Value Ref Range Status   03/25/2021 9.4 5.0 - 15.0 mmol/L Final     Creatinine   Date Value Ref Range Status   03/25/2021 1.36 (H) 0.57 - 1.00 mg/dL Final     BUN   Date Value Ref Range Status   03/25/2021 37 (H) 6 - 20 mg/dL Final     BUN/Creatinine Ratio   Date Value Ref Range Status   03/25/2021 27.2 (H) 7.0 - 25.0 Final     Calcium   Date Value Ref Range Status   03/25/2021 8.7 8.6 - 10.5 mg/dL Final     eGFR Non  Amer   Date Value Ref Range Status   03/25/2021 40 (L) >60 mL/min/1.73 Final     Alkaline Phosphatase   Date Value Ref Range Status   03/25/2021 72 39 - 117 U/L Final     Total Protein   Date Value Ref Range Status   03/25/2021 7.1 6.0 - 8.5 g/dL Final     ALT (SGPT)   Date Value Ref Range Status   03/25/2021 17 1 - 33 U/L Final     AST (SGOT)   Date Value Ref Range Status   03/25/2021 16 1 - 32 U/L Final     Total Bilirubin   Date Value Ref Range Status   03/25/2021 0.2 0.0 - 1.2 mg/dL Final     Albumin   Date Value Ref Range Status   03/25/2021 4.10 3.50 - 5.20 g/dL Final     Globulin   Date Value Ref Range Status   03/25/2021 3.0 gm/dL Final     Lab Results   Component Value Date    WBC 12.70 (H) 04/21/2021    HGB 11.6 (L) 04/21/2021    HCT 37.3 04/21/2021    MCV 85.0 04/21/2021     04/21/2021     Lab Results   Component Value Date    NEUTROABS 10.02 (H) 04/21/2021    IRON 43 04/21/2021    IRON 45 12/14/2020    TIBC 320 04/21/2021    TIBC 380 12/14/2020    LABIRON 13 (L) 04/21/2021    LABIRON 12 (L) 12/14/2020    FERRITIN 85.95 04/21/2021    FERRITIN 61.60 12/14/2020    CIVFBCIH64  407 04/21/2021    JFRFCSJS62 555 08/11/2020    FOLATE 17.60 04/21/2021     No results found for: , LABCA2, AFPTM, HCGQUANT, , CHROMGRNA, [id number], CEA, REFLABREPO      Peripheral blood flow cytometry done on April 21, 2021 showed:            PATHOLOGY:  * Cannot find OR log *         RADIOLOGY DATA :  No radiology results for the last 7 days        Assessment/Plan     1.  Leukocytosis:  -Patient has ongoing leukocytosis since December 2020.  -CBC done on April 21, 2021 shows white blood cell count was again elevated at 12.6  -LDH and ESR were within normal limits  -Peripheral blood flow cytometry did not show any evidence of any significant immunophenotypic abnormality  -Leukocytosis may be related to her uncontrolled hyperglycemia versus recent infection from skin infection.  Bone marrow pathology is less likely  -Recommend continue with clinical monitoring  -We will ask patient to return to clinic in 3 months with repeat CBC, iron studies, ferritin, B12 and folate to be done prior to that      2.  Iron deficiency anemia:  -Hemoglobin was 11.6 with MCV of 85  -Iron studies were consistent with iron deficiency with iron saturation of 16% and ferritin of 55.  -B12 level is also 407  -Recommend continue with ferrous sulfate and B12 p.o. daily for now  -We will repeat anemia work-up upon next clinic visit in 3 months    3.  Uncontrolled diabetes mellitus    4.  Health maintenance: Patient quit smoking around 2019.  Had a colonoscopy in August 2020.  Had a mammogram in August 2020    5. Advance Care Planning   ACP discussion was held with the patient during this visit. Patient has an advance directive in EMR which is still valid.                PHQ-9 Total Score:       Marta Giraldo reports a pain score of 0.  Given her pain assessment as noted, treatment options were discussed and the following options were decided upon as a follow-up plan to address the patient's pain: continuation of current  treatment plan for pain.         Adalid Bates MD  5/10/2021  09:24 CDT        Part of this note may be an electronic transcription/translation of spoken language to printed text using the Dragon Dictation System.          CC:

## 2021-05-19 ENCOUNTER — OFFICE VISIT (OUTPATIENT)
Dept: ENDOCRINOLOGY | Facility: CLINIC | Age: 56
End: 2021-05-19

## 2021-05-19 VITALS
DIASTOLIC BLOOD PRESSURE: 82 MMHG | SYSTOLIC BLOOD PRESSURE: 130 MMHG | BODY MASS INDEX: 34.8 KG/M2 | OXYGEN SATURATION: 99 % | HEIGHT: 69 IN | WEIGHT: 235 LBS | HEART RATE: 89 BPM

## 2021-05-19 DIAGNOSIS — E55.9 VITAMIN D DEFICIENCY: ICD-10-CM

## 2021-05-19 DIAGNOSIS — Z79.4 TYPE 2 DIABETES MELLITUS WITH HYPERGLYCEMIA, WITH LONG-TERM CURRENT USE OF INSULIN (HCC): Primary | ICD-10-CM

## 2021-05-19 DIAGNOSIS — E03.9 HYPOTHYROIDISM, UNSPECIFIED TYPE: ICD-10-CM

## 2021-05-19 DIAGNOSIS — E11.65 TYPE 2 DIABETES MELLITUS WITH HYPERGLYCEMIA, WITH LONG-TERM CURRENT USE OF INSULIN (HCC): Primary | ICD-10-CM

## 2021-05-19 PROCEDURE — 99214 OFFICE O/P EST MOD 30 MIN: CPT | Performed by: NURSE PRACTITIONER

## 2021-05-19 NOTE — PROGRESS NOTES
"Chief Complaint  Follow-up (3 mo) and Diabetes (type 2)    Subjective          Marta Travis Giraldo presents to [facility] ENDOCRINOLOGY  History of Present Illness           In office visit     56 year old female presents for follow up     Reason diabetes mellitus type 2    Duration diagnosed in 1980s    Timing constant       Quality improving           Severity-- high      Context -- presented with symptoms of diabetes      Macrovascular complications--CVA      Microvascular complications--neuropathy , diabetic retinopathy     Current symptoms problems today--- burning feet pain      Blood glucose monitoring fingersticks     Checks 4 times daily     States under 200       Approved for nigel  but not started         Alleviating factors-- compliance with medication      Aggravating factors-- none     Diet-- regular                Review of Systems - General ROS: negative             Objective   Vital Signs:   /82   Pulse 89   Ht 175.3 cm (69.02\")   Wt 107 kg (235 lb)   SpO2 99%   BMI 34.68 kg/m²     Physical Exam  Constitutional:       Appearance: Normal appearance.   Cardiovascular:      Rate and Rhythm: Regular rhythm.      Heart sounds: Normal heart sounds.   Pulmonary:      Breath sounds: Normal breath sounds.   Musculoskeletal:      Cervical back: Normal range of motion.      Comments: Right BKA    Neurological:      Mental Status: She is alert.        Result Review :   The following data was reviewed by: CONNOR Concepcion on 05/19/2021:  Common labs    Common Labsle 12/14/20 12/14/20 12/14/20 12/14/20 3/25/21 3/25/21 3/25/21 3/25/21 4/21/21    0812 0812 0812 0812 0812 0812 0812 0812    Glucose  133 (A)     181 (A)     BUN  14     37 (A)     Creatinine  0.95     1.36 (A)     eGFR Non  Am  61     40 (A)     Sodium  138     139     Potassium  4.1     4.6     Chloride  103     104     Calcium  9.3     8.7     Albumin  4.20     4.10     Total Bilirubin  0.2     0.2   "   Alkaline Phosphatase  71     72     AST (SGOT)  15     16     ALT (SGPT)  16     17     WBC 12.94 (A)    12.89 (A)    12.70 (A)   Hemoglobin 13.1    12.3    11.6 (A)   Hematocrit 41.3    35.9    37.3   Platelets 266    226    264   Total Cholesterol   149   137      Triglycerides   89   129      HDL Cholesterol   46   35 (A)      LDL Cholesterol    86   79      Hemoglobin A1C    9.60 (A)    11.30 (A)    (A) Abnormal value                      Assessment and Plan    Diagnoses and all orders for this visit:    1. Type 2 diabetes mellitus with hyperglycemia, with long-term current use of insulin (CMS/[facility]) (Primary)  -     Hemoglobin A1c    2. Hypothyroidism, unspecified type    3. Vitamin D deficiency    Other orders  -     Continuous Blood Gluc Sensor (FreeStyle Tae 2 Sensor) misc; 1 each Every 14 (Fourteen) Days. Use every 14 days  Dispense: 2 each; Refill: 11  -     Continuous Blood Gluc  (FreeStyle Tae 2 [place]) device; 1 each Continuous. Use as indicated for glucose monitoring  Dispense: 1 each; Refill: 1             Glycemic management     Diabetes mellitus type 2             Lab Results   Component Value Date    HGBA1C 11.30 (H) 03/25/2021                  Taking Jardiance 25 mg daily-keep      Taking bydureon  2 mg once weekly--keep      Taking Basaglar--- 36 units           Take humalog before meals      If a small meal give 5 units      If a medium meal give 8 units      If a large meal give 10 units             no changes      Goals for sugar     Fasting and before meals 80 to 130     2 hours after meals 180 or less        Aim for 45 grams of carbohydrate per meal     Aim for 15 grams of carbohydrate per snack               Tae  has been approved but she does not have yet            Lipid management     Taking Lipitor 40 mg 1 at night       Total Cholesterol   Date Value Ref Range Status   03/25/2021 137 0 - 200 mg/dL Final     Triglycerides   Date Value Ref Range Status   03/25/2021 129 0 -  150 mg/dL Final     HDL Cholesterol   Date Value Ref Range Status   03/25/2021 35 (L) 40 - 60 mg/dL Final     LDL Cholesterol    Date Value Ref Range Status   03/25/2021 79 0 - 100 mg/dL Final           Blood pressure management     Taking lisinopril 20 mg 1 daily                 Microvascular monitoring     Last eye exam--- Jan. 2021 ,  , monthly injection      Neuropathy     Taking Lyrica              Thyroid     Hypothyroidism     Taking levothyroxine 25 mcg daily    Lab Results   Component Value Date    TSH 2.730 03/25/2021                Bone health     Vitamin D deficiency     Taking vitamin D 50,000 units weekly     Weight management         Patient's Body mass index is 34.68 kg/m². indicating that she is obese (BMI >30). Obesity-related health conditions include the following: diabetes mellitus. Obesity is unchanged. BMI is is above average; no BMI management plan is appropriate. We discussed portion control and increasing exercise..          Decrease daily caloric intake by 500 garth/day                     Follow Up   Return in about 3 months (around 8/19/2021) for Recheck.  Patient was given instructions and counseling regarding her condition or for health maintenance advice. Please see specific information pulled into the AVS if appropriate.

## 2021-05-24 ENCOUNTER — TELEPHONE (OUTPATIENT)
Dept: FAMILY MEDICINE CLINIC | Facility: CLINIC | Age: 56
End: 2021-05-24

## 2021-05-24 NOTE — TELEPHONE ENCOUNTER
Karla a  left a voice message saying  needs a return call regarding services. Please call [phone]

## 2021-05-24 NOTE — TELEPHONE ENCOUNTER
Spoke with Karla and she stated that she saw pt and she is needing a lot of services.  She has recommended her for the Medicaid Waiver Program so there will be a form that will be faxed to sign and fax back. She also needed a verbal order for a one week follow up to see how pt is doing and go over community resources..  Ok to give a verbal order for the follow up?  Karla asked about copay savings cards for her insulin.  I explained that they will not work with Medicare.

## 2021-05-28 ENCOUNTER — TELEPHONE (OUTPATIENT)
Dept: ENDOCRINOLOGY | Facility: CLINIC | Age: 56
End: 2021-05-28

## 2021-06-02 ENCOUNTER — TELEPHONE (OUTPATIENT)
Dept: ENDOCRINOLOGY | Facility: CLINIC | Age: 56
End: 2021-06-02

## 2021-06-09 RX ORDER — INSULIN GLARGINE 100 [IU]/ML
35 INJECTION, SOLUTION SUBCUTANEOUS NIGHTLY
Qty: 9 ML | Refills: 3 | Status: SHIPPED | OUTPATIENT
Start: 2021-06-09 | End: 2021-06-11 | Stop reason: ALTCHOICE

## 2021-06-14 ENCOUNTER — LAB (OUTPATIENT)
Dept: LAB | Facility: HOSPITAL | Age: 56
End: 2021-06-14

## 2021-06-14 DIAGNOSIS — M54.2 CHRONIC NECK PAIN: ICD-10-CM

## 2021-06-14 DIAGNOSIS — Z99.3 WHEELCHAIR BOUND: ICD-10-CM

## 2021-06-14 DIAGNOSIS — M50.30 DDD (DEGENERATIVE DISC DISEASE), CERVICAL: ICD-10-CM

## 2021-06-14 DIAGNOSIS — E11.65 UNCONTROLLED TYPE 2 DIABETES MELLITUS WITH HYPERGLYCEMIA (HCC): ICD-10-CM

## 2021-06-14 DIAGNOSIS — E66.9 CLASS 1 OBESITY WITH SERIOUS COMORBIDITY AND BODY MASS INDEX (BMI) OF 33.0 TO 33.9 IN ADULT, UNSPECIFIED OBESITY TYPE: ICD-10-CM

## 2021-06-14 DIAGNOSIS — G89.29 CHRONIC NECK PAIN: ICD-10-CM

## 2021-06-14 DIAGNOSIS — E11.42 DIABETIC POLYNEUROPATHY ASSOCIATED WITH TYPE 2 DIABETES MELLITUS (HCC): ICD-10-CM

## 2021-06-14 DIAGNOSIS — E78.2 MIXED HYPERLIPIDEMIA: ICD-10-CM

## 2021-06-14 DIAGNOSIS — F33.1 MODERATE EPISODE OF RECURRENT MAJOR DEPRESSIVE DISORDER (HCC): ICD-10-CM

## 2021-06-14 DIAGNOSIS — I10 ESSENTIAL HYPERTENSION: ICD-10-CM

## 2021-06-14 DIAGNOSIS — M54.12 CERVICAL RADICULOPATHY: ICD-10-CM

## 2021-06-14 DIAGNOSIS — E55.9 VITAMIN D DEFICIENCY: ICD-10-CM

## 2021-06-14 LAB
25(OH)D3 SERPL-MCNC: 33.2 NG/ML
ALBUMIN SERPL-MCNC: 4.2 G/DL (ref 3.5–5.2)
ALBUMIN UR-MCNC: 3.4 MG/DL
ALBUMIN/GLOB SERPL: 1.3 G/DL
ALP SERPL-CCNC: 71 U/L (ref 39–117)
ALT SERPL W P-5'-P-CCNC: 13 U/L (ref 1–33)
ANION GAP SERPL CALCULATED.3IONS-SCNC: 7.1 MMOL/L (ref 5–15)
AST SERPL-CCNC: 14 U/L (ref 1–32)
BASOPHILS # BLD AUTO: 0.1 10*3/MM3 (ref 0–0.2)
BASOPHILS NFR BLD AUTO: 0.7 % (ref 0–1.5)
BILIRUB SERPL-MCNC: 0.2 MG/DL (ref 0–1.2)
BUN SERPL-MCNC: 14 MG/DL (ref 6–20)
BUN/CREAT SERPL: 12 (ref 7–25)
CALCIUM SPEC-SCNC: 9 MG/DL (ref 8.6–10.5)
CHLORIDE SERPL-SCNC: 106 MMOL/L (ref 98–107)
CHOLEST SERPL-MCNC: 127 MG/DL (ref 0–200)
CO2 SERPL-SCNC: 26.9 MMOL/L (ref 22–29)
CREAT SERPL-MCNC: 1.17 MG/DL (ref 0.57–1)
CREAT UR-MCNC: 88.9 MG/DL
DEPRECATED RDW RBC AUTO: 41.3 FL (ref 37–54)
EOSINOPHIL # BLD AUTO: 0.4 10*3/MM3 (ref 0–0.4)
EOSINOPHIL NFR BLD AUTO: 2.8 % (ref 0.3–6.2)
ERYTHROCYTE [DISTWIDTH] IN BLOOD BY AUTOMATED COUNT: 13.2 % (ref 12.3–15.4)
GFR SERPL CREATININE-BSD FRML MDRD: 48 ML/MIN/1.73
GLOBULIN UR ELPH-MCNC: 3.2 GM/DL
GLUCOSE SERPL-MCNC: 137 MG/DL (ref 65–99)
HBA1C MFR BLD: 7.9 % (ref 4.8–5.6)
HCT VFR BLD AUTO: 40.7 % (ref 34–46.6)
HDLC SERPL-MCNC: 39 MG/DL (ref 40–60)
HGB BLD-MCNC: 12.7 G/DL (ref 12–15.9)
IMM GRANULOCYTES # BLD AUTO: 0.08 10*3/MM3 (ref 0–0.05)
IMM GRANULOCYTES NFR BLD AUTO: 0.6 % (ref 0–0.5)
LDLC SERPL CALC-MCNC: 74 MG/DL (ref 0–100)
LDLC/HDLC SERPL: 1.9 {RATIO}
LYMPHOCYTES # BLD AUTO: 2.84 10*3/MM3 (ref 0.7–3.1)
LYMPHOCYTES NFR BLD AUTO: 20 % (ref 19.6–45.3)
MCH RBC QN AUTO: 26.5 PG (ref 26.6–33)
MCHC RBC AUTO-ENTMCNC: 31.2 G/DL (ref 31.5–35.7)
MCV RBC AUTO: 85 FL (ref 79–97)
MICROALBUMIN/CREAT UR: 38.2 MG/G
MONOCYTES # BLD AUTO: 0.6 10*3/MM3 (ref 0.1–0.9)
MONOCYTES NFR BLD AUTO: 4.2 % (ref 5–12)
NEUTROPHILS NFR BLD AUTO: 10.19 10*3/MM3 (ref 1.7–7)
NEUTROPHILS NFR BLD AUTO: 71.7 % (ref 42.7–76)
NRBC BLD AUTO-RTO: 0 /100 WBC (ref 0–0.2)
PLATELET # BLD AUTO: 218 10*3/MM3 (ref 140–450)
PMV BLD AUTO: 13 FL (ref 6–12)
POTASSIUM SERPL-SCNC: 4.6 MMOL/L (ref 3.5–5.2)
PROT SERPL-MCNC: 7.4 G/DL (ref 6–8.5)
RBC # BLD AUTO: 4.79 10*6/MM3 (ref 3.77–5.28)
SODIUM SERPL-SCNC: 140 MMOL/L (ref 136–145)
T3FREE SERPL-MCNC: 2.3 PG/ML (ref 2–4.4)
T4 FREE SERPL-MCNC: 0.94 NG/DL (ref 0.93–1.7)
TRIGL SERPL-MCNC: 70 MG/DL (ref 0–150)
TSH SERPL DL<=0.05 MIU/L-ACNC: 1.73 UIU/ML (ref 0.27–4.2)
VIT B12 BLD-MCNC: 825 PG/ML (ref 211–946)
VLDLC SERPL-MCNC: 14 MG/DL (ref 5–40)
WBC # BLD AUTO: 14.21 10*3/MM3 (ref 3.4–10.8)

## 2021-06-14 PROCEDURE — 82607 VITAMIN B-12: CPT

## 2021-06-14 PROCEDURE — 84443 ASSAY THYROID STIM HORMONE: CPT

## 2021-06-14 PROCEDURE — 80053 COMPREHEN METABOLIC PANEL: CPT

## 2021-06-14 PROCEDURE — 85025 COMPLETE CBC W/AUTO DIFF WBC: CPT

## 2021-06-14 PROCEDURE — 83036 HEMOGLOBIN GLYCOSYLATED A1C: CPT

## 2021-06-14 PROCEDURE — 80061 LIPID PANEL: CPT

## 2021-06-14 PROCEDURE — 84481 FREE ASSAY (FT-3): CPT

## 2021-06-14 PROCEDURE — 82043 UR ALBUMIN QUANTITATIVE: CPT

## 2021-06-14 PROCEDURE — 82306 VITAMIN D 25 HYDROXY: CPT

## 2021-06-14 PROCEDURE — 82570 ASSAY OF URINE CREATININE: CPT

## 2021-06-14 PROCEDURE — 84439 ASSAY OF FREE THYROXINE: CPT

## 2021-06-15 ENCOUNTER — TELEPHONE (OUTPATIENT)
Dept: FAMILY MEDICINE CLINIC | Facility: CLINIC | Age: 56
End: 2021-06-15

## 2021-06-15 RX ORDER — VILAZODONE HYDROCHLORIDE 20 MG/1
TABLET ORAL
Qty: 30 TABLET | Refills: 0 | Status: SHIPPED | OUTPATIENT
Start: 2021-06-15 | End: 2021-08-09

## 2021-06-15 RX ORDER — PNV NO.95/FERROUS FUM/FOLIC AC 28MG-0.8MG
TABLET ORAL
Qty: 30 TABLET | Refills: 0 | Status: SHIPPED | OUTPATIENT
Start: 2021-06-15 | End: 2021-08-09

## 2021-06-15 RX ORDER — ERGOCALCIFEROL 1.25 MG/1
CAPSULE ORAL
Qty: 4 CAPSULE | Refills: 0 | Status: SHIPPED | OUTPATIENT
Start: 2021-06-15 | End: 2021-08-09

## 2021-06-15 RX ORDER — EMPAGLIFLOZIN 25 MG/1
TABLET, FILM COATED ORAL
Qty: 30 TABLET | Refills: 0 | Status: SHIPPED | OUTPATIENT
Start: 2021-06-15 | End: 2021-08-09

## 2021-06-15 RX ORDER — LISINOPRIL 20 MG/1
TABLET ORAL
Qty: 30 TABLET | Refills: 0 | Status: SHIPPED | OUTPATIENT
Start: 2021-06-15 | End: 2021-08-09

## 2021-06-15 RX ORDER — METHOCARBAMOL 750 MG/1
TABLET, FILM COATED ORAL
Qty: 90 TABLET | Refills: 0 | Status: SHIPPED | OUTPATIENT
Start: 2021-06-15 | End: 2021-08-09

## 2021-06-15 RX ORDER — ATORVASTATIN CALCIUM 40 MG/1
TABLET, FILM COATED ORAL
Qty: 30 TABLET | Refills: 0 | Status: SHIPPED | OUTPATIENT
Start: 2021-06-15 | End: 2021-08-09

## 2021-06-15 RX ORDER — LEVOTHYROXINE SODIUM 0.03 MG/1
TABLET ORAL
Qty: 30 TABLET | Refills: 0 | Status: SHIPPED | OUTPATIENT
Start: 2021-06-15 | End: 2021-08-09

## 2021-06-16 ENCOUNTER — OFFICE VISIT (OUTPATIENT)
Dept: FAMILY MEDICINE CLINIC | Facility: CLINIC | Age: 56
End: 2021-06-16

## 2021-06-16 VITALS
HEART RATE: 92 BPM | SYSTOLIC BLOOD PRESSURE: 122 MMHG | DIASTOLIC BLOOD PRESSURE: 64 MMHG | OXYGEN SATURATION: 99 % | TEMPERATURE: 98 F

## 2021-06-16 DIAGNOSIS — S88.119A BELOW KNEE AMPUTATION (HCC): ICD-10-CM

## 2021-06-16 DIAGNOSIS — I10 ESSENTIAL HYPERTENSION: ICD-10-CM

## 2021-06-16 DIAGNOSIS — F33.1 MODERATE EPISODE OF RECURRENT MAJOR DEPRESSIVE DISORDER (HCC): ICD-10-CM

## 2021-06-16 DIAGNOSIS — M25.562 LEFT KNEE PAIN, UNSPECIFIED CHRONICITY: ICD-10-CM

## 2021-06-16 DIAGNOSIS — E11.65 UNCONTROLLED TYPE 2 DIABETES MELLITUS WITH HYPERGLYCEMIA (HCC): Primary | ICD-10-CM

## 2021-06-16 DIAGNOSIS — E55.9 VITAMIN D DEFICIENCY: ICD-10-CM

## 2021-06-16 DIAGNOSIS — E78.2 MIXED HYPERLIPIDEMIA: ICD-10-CM

## 2021-06-16 DIAGNOSIS — N18.31 STAGE 3A CHRONIC KIDNEY DISEASE (HCC): ICD-10-CM

## 2021-06-16 DIAGNOSIS — E11.42 DIABETIC POLYNEUROPATHY ASSOCIATED WITH TYPE 2 DIABETES MELLITUS (HCC): ICD-10-CM

## 2021-06-16 DIAGNOSIS — E03.9 HYPOTHYROIDISM, UNSPECIFIED TYPE: ICD-10-CM

## 2021-06-16 DIAGNOSIS — K21.00 GASTROESOPHAGEAL REFLUX DISEASE WITH ESOPHAGITIS WITHOUT HEMORRHAGE: ICD-10-CM

## 2021-06-16 DIAGNOSIS — M50.30 DDD (DEGENERATIVE DISC DISEASE), CERVICAL: ICD-10-CM

## 2021-06-16 DIAGNOSIS — Z99.3 WHEELCHAIR BOUND: ICD-10-CM

## 2021-06-16 PROCEDURE — 99214 OFFICE O/P EST MOD 30 MIN: CPT | Performed by: FAMILY MEDICINE

## 2021-06-16 RX ORDER — DULOXETIN HYDROCHLORIDE 60 MG/1
CAPSULE, DELAYED RELEASE ORAL
COMMUNITY
Start: 2021-05-12 | End: 2022-07-20 | Stop reason: SDUPTHER

## 2021-06-18 ENCOUNTER — TELEPHONE (OUTPATIENT)
Dept: FAMILY MEDICINE CLINIC | Facility: CLINIC | Age: 56
End: 2021-06-18

## 2021-06-18 NOTE — TELEPHONE ENCOUNTER
----- Message from Gato Crane MD sent at 6/17/2021  6:35 PM CDT -----  Please let pt know she has mild arthritis of her left knee. Her option is to try PT/OT and possible get MRI or see Orthopedic at [facility]    Let me know what pt decides. Thanks

## 2021-07-06 DIAGNOSIS — S99.929A INJURY OF TOE, UNSPECIFIED LATERALITY, INITIAL ENCOUNTER: Primary | ICD-10-CM

## 2021-07-06 DIAGNOSIS — S90.229A CONTUSION OF TOENAIL, UNSPECIFIED LATERALITY, INITIAL ENCOUNTER: ICD-10-CM

## 2021-07-13 ENCOUNTER — OFFICE VISIT (OUTPATIENT)
Dept: PODIATRY | Facility: CLINIC | Age: 56
End: 2021-07-13

## 2021-07-13 VITALS
HEIGHT: 69 IN | WEIGHT: 235 LBS | BODY MASS INDEX: 34.8 KG/M2 | OXYGEN SATURATION: 98 % | DIASTOLIC BLOOD PRESSURE: 75 MMHG | SYSTOLIC BLOOD PRESSURE: 135 MMHG | HEART RATE: 90 BPM

## 2021-07-13 DIAGNOSIS — E11.65 UNCONTROLLED TYPE 2 DIABETES MELLITUS WITH HYPERGLYCEMIA (HCC): ICD-10-CM

## 2021-07-13 DIAGNOSIS — B35.1 ONYCHOMYCOSIS: ICD-10-CM

## 2021-07-13 DIAGNOSIS — Z89.511 AMPUTEE, BELOW KNEE, RIGHT (HCC): ICD-10-CM

## 2021-07-13 DIAGNOSIS — E11.9 ENCOUNTER FOR DIABETIC FOOT EXAM (HCC): Primary | ICD-10-CM

## 2021-07-13 PROCEDURE — 99203 OFFICE O/P NEW LOW 30 MIN: CPT | Performed by: PODIATRIST

## 2021-07-13 PROCEDURE — 11720 DEBRIDE NAIL 1-5: CPT | Performed by: PODIATRIST

## 2021-07-13 RX ORDER — SULFAMETHOXAZOLE AND TRIMETHOPRIM 800; 160 MG/1; MG/1
1 TABLET ORAL 2 TIMES DAILY
COMMUNITY
Start: 2021-07-04 | End: 2022-01-20

## 2021-07-13 NOTE — PROGRESS NOTES
Marta Giraldo  1965  56 y.o. female   PCP: Gato Crane 6/16/2021   BS: 140 per patient     Patient came to clinic for concern of left hallux     07/13/2021    Chief Complaint   Patient presents with   • Left Foot - diabetic foot care       History of Present Illness    Marta Giraldo is a 56 y.o.female who presents to clinic today with chief complaint of left great toenail falling off.  Patient states that over the weekend her toe became red and subsequently the nail fell off.  She was evaluated in the emergency department at [facility] and placed on antibiotic.  She was referred here by her primary care provider for evaluation and treatment.  She denies any pain.  She does have a history of right below-knee amputation and is a type 2 diabetic.     Past Medical History:   Diagnosis Date   • Anemia    • Anxiety    • Arthritis    • Cataract    • Depression    • Diabetes mellitus (CMS/[facility])    • Disease of thyroid gland    • Family history of thyroid problem    • GERD (gastroesophageal reflux disease)    • Headache    • History of transfusion    • Hyperlipidemia    • Hypertension    • Neuropathy    • Stroke (CMS/HCC) 2019   • Urinary tract infection          Past Surgical History:   Procedure Laterality Date   • APPENDECTOMY     • BELOW KNEE AMPUTATION     • BLADDER SURGERY     • COLONOSCOPY N/A 9/2/2020    Procedure: COLONOSCOPY;  Surgeon: Ashli Gonzalez MD;  Location: [facility] ENDOSCOPY;  Service: Gastroenterology;  Laterality: N/A;   • ENDOSCOPY N/A 9/2/2020    Procedure: ESOPHAGOGASTRODUODENOSCOPY;  Surgeon: Ashli Gonzalez MD;  Location: [facility] ENDOSCOPY;  Service: Gastroenterology;  Laterality: N/A;         Family History   Problem Relation Age of Onset   • Diabetes Other    • Hyperlipidemia Other    • Hypertension Other    • Stroke Other    • Heart disease Other    • Other Other    • Diabetes Mother    • Diabetes Father    • Diabetes Sister    • Heart disease Sister    • Diabetes  Brother        Allergies   Allergen Reactions   • Compazine [Prochlorperazine Edisylate] Unknown - High Severity   • Penicillins Unknown - High Severity and Other (See Comments)       Social History     Socioeconomic History   • Marital status:      Spouse name: Not on file   • Number of children: Not on file   • Years of education: Not on file   • Highest education level: Not on file   Tobacco Use   • Smoking status: Former Smoker     Quit date:      Years since quittin.5   • Smokeless tobacco: Never Used   Vaping Use   • Vaping Use: Never used   Substance and Sexual Activity   • Alcohol use: Not Currently   • Drug use: Never   • Sexual activity: Defer         Current Outpatient Medications   Medication Sig Dispense Refill   • atorvastatin (LIPITOR) 40 MG tablet TAKE 1 TABLET DAILY. 30 tablet 0   • B-D UF III MINI PEN NEEDLES 31G X 5 MM misc Use as needed 100 each 3   • Blood Glucose Monitoring Suppl w/Device kit Use for E11.65 diabetes to check sugars 4 times a day. 1 each 0   • clotrimazole-betamethasone (Lotrisone) 1-0.05 % cream Apply  topically to the appropriate area as directed 2 (Two) Times a Day. 1 each 3   • Continuous Blood Gluc  (FreeStyle Tae 2 [place]) device USE AS DIRECTED 1 each 11   • Continuous Blood Gluc Sensor (FreeStyle Tae 2 Sensor) misc 1 each Every 14 (Fourteen) Days. Use every 14 days 2 each 11   • Continuous Blood Gluc Transmit (Dexcom G6 Transmitter) misc 1 each Every 3 (Three) Months. 1 each 3   • Dulaglutide 3 MG/0.5ML solution pen-injector Inject 3 mg under the skin into the appropriate area as directed 1 (One) Time Per Week. 4 pen 3   • DULoxetine (CYMBALTA) 60 MG capsule      • ferrous sulfate 325 (65 Fe) MG tablet TAKE 1 TABLET DAILY WITH BREAKFAST 30 tablet 0   • glucose (DEX4) 4 GM chewable tablet Chew 4 tablets As Needed for Low Blood Sugar. 90 tablet 3   • glucose blood test strip Use as instructed 200 each 12   • Insulin Glargine (LANTUS SOLOSTAR) 100  UNIT/ML injection pen Inject 35 Units under the skin into the appropriate area as directed Every Night. 9 mL 3   • insulin lispro (HumaLOG) 100 UNIT/ML injection 5 unit before meals 10 mL 3   • Insulin Pen Needle 32G X 8 MM misc Use at bedtime 100 each 3   • Jardiance 25 MG tablet TAKE 1 TABLET DAILY. 30 tablet 0   • Lancets misc Use for E11.65 diabetes to check sugars 4 times a day. 200 each 3   • levothyroxine (SYNTHROID, LEVOTHROID) 25 MCG tablet TAKE 1 TABLET DAILY. 30 tablet 0   • lisinopril (PRINIVIL,ZESTRIL) 20 MG tablet TAKE 1 TABLET DAILY. 30 tablet 0   • methocarbamol (ROBAXIN) 750 MG tablet TAKE 1 TABLET THREE TIMES DAILY. 90 tablet 0   • mupirocin (Bactroban) 2 % cream Apply  topically to the appropriate area as directed 3 (Three) Times a Day. 30 g 1   • omeprazole (priLOSEC) 40 MG capsule Take 1 capsule by mouth Daily. 30 capsule 11   • oxybutynin XL (DITROPAN-XL) 10 MG 24 hr tablet Take 1 tablet by mouth Daily. 30 tablet 3   • prednisoLONE acetate (PRED FORTE) 1 % ophthalmic suspension      • pregabalin (Lyrica) 300 MG capsule Take 1 capsule by mouth 2 (Two) Times a Day. 60 capsule 2   • silver sulfadiazine (Silvadene) 1 % cream Apply  topically to the appropriate area as directed 2 (Two) Times a Day. 400 g 3   • sulfamethoxazole-trimethoprim (BACTRIM DS,SEPTRA DS) 800-160 MG per tablet Take 1 tablet by mouth 2 (Two) Times a Day.     • trimethoprim-polymyxin b (POLYTRIM) 41174-0.1 UNIT/ML-% ophthalmic solution      • Viibryd 20 MG tablet tablet TAKE 1 TABLET DAILY. 30 tablet 0   • vitamin B-12 (CYANOCOBALAMIN) 1000 MCG tablet Take 1 tablet by mouth Daily. 90 tablet 1   • vitamin D (ERGOCALCIFEROL) 1.25 MG (34592 UT) capsule capsule TAKE 1 CAPSULE WEEKLY 4 capsule 0     No current facility-administered medications for this visit.       Review of Systems   Constitutional: Negative.    HENT: Negative.    Eyes: Negative.    Respiratory: Negative.    Cardiovascular: Negative.    Gastrointestinal: Negative.   "  Endocrine: Negative.    Genitourinary: Negative.    Musculoskeletal: Negative.    Skin: Negative.    Allergic/Immunologic: Negative.    Neurological: Negative.    Hematological: Negative.    Psychiatric/Behavioral: Negative.          OBJECTIVE    /75   Pulse 90   Ht 175.3 cm (69.02\")   Wt 107 kg (235 lb)   SpO2 98%   BMI 34.68 kg/m²     Physical Exam  Vitals reviewed.   Constitutional:       General: She is not in acute distress.     Appearance: She is well-developed.   HENT:      Head: Normocephalic and atraumatic.      Nose: Nose normal.   Eyes:      Conjunctiva/sclera: Conjunctivae normal.      Pupils: Pupils are equal, round, and reactive to light.   Pulmonary:      Effort: Pulmonary effort is normal. No respiratory distress.      Breath sounds: No wheezing.   Musculoskeletal:         General: No deformity. Normal range of motion.   Skin:     General: Skin is warm and dry.      Capillary Refill: Capillary refill takes less than 2 seconds.   Neurological:      Mental Status: She is alert and oriented to person, place, and time.   Psychiatric:         Behavior: Behavior normal.         Thought Content: Thought content normal.        Right BKA    Left Lower Extremity Exam:     Cardiovascular:    DP/PT pulses palpable    CFT brisk  to all digits   Musculoskeletal:  Muscle strength is 5/5 for all muscle groups tested   No pain on palpation noted    Partial fourth digit amputation  Dermatological:   Webspaces 1-4  are clean, dry and intact.   No subcutaneous nodules or masses noted     No open wounds noted    Left hallux nail is absent.  No signs of infection  Left second, third and fifth digit toenails are thickened, discolored, elongated with subungual debris.  Neurological:   Protective sensation absent   Sensation intact to light diminished        Foot/Ankle Exam:       General:   Diabetic Foot Exam Performed            Procedures        ASSESSMENT AND PLAN    Diagnoses and all orders for this " visit:    1. Encounter for diabetic foot exam (CMS/[facility]) (Primary)    2. Uncontrolled type 2 diabetes mellitus with hyperglycemia (CMS/[facility])    3. Onychomycosis    4. Amputee, below knee, right (CMS/[facility])        - A diabetic foot screening exam was performed and the patient was educated on the foot complications related to diabetes,  preventative foot care and what signs and symptoms to watch for.  Instructed to contact our office if any foot problems develop before next visit.  -Toenails 2, 3 and 5 left were debrided in length and thickness with nail nipper and electric  to decrease fungal load and risk of infection.  - All the patients questions were answered.  - RTC 3 months or sooner if needed.              This document has been electronically signed by Gagan Moreno DPM on July 13, 2021 19:43 CDT     7/13/2021  19:43 CDT

## 2021-08-09 RX ORDER — LISINOPRIL 20 MG/1
TABLET ORAL
Qty: 30 TABLET | Refills: 0 | Status: SHIPPED | OUTPATIENT
Start: 2021-08-09 | End: 2021-09-07

## 2021-08-09 RX ORDER — VILAZODONE HYDROCHLORIDE 20 MG/1
TABLET ORAL
Qty: 30 TABLET | Refills: 0 | Status: SHIPPED | OUTPATIENT
Start: 2021-08-09 | End: 2021-09-07

## 2021-08-09 RX ORDER — ATORVASTATIN CALCIUM 40 MG/1
TABLET, FILM COATED ORAL
Qty: 30 TABLET | Refills: 0 | Status: SHIPPED | OUTPATIENT
Start: 2021-08-09 | End: 2021-09-07

## 2021-08-09 RX ORDER — EMPAGLIFLOZIN 25 MG/1
TABLET, FILM COATED ORAL
Qty: 30 TABLET | Refills: 0 | Status: SHIPPED | OUTPATIENT
Start: 2021-08-09 | End: 2021-09-07

## 2021-08-09 RX ORDER — METHOCARBAMOL 750 MG/1
TABLET, FILM COATED ORAL
Qty: 90 TABLET | Refills: 0 | Status: SHIPPED | OUTPATIENT
Start: 2021-08-09 | End: 2021-09-07

## 2021-08-09 RX ORDER — LEVOTHYROXINE SODIUM 0.03 MG/1
TABLET ORAL
Qty: 30 TABLET | Refills: 0 | Status: SHIPPED | OUTPATIENT
Start: 2021-08-09 | End: 2021-09-07

## 2021-08-09 RX ORDER — PNV NO.95/FERROUS FUM/FOLIC AC 28MG-0.8MG
TABLET ORAL
Qty: 30 TABLET | Refills: 0 | Status: SHIPPED | OUTPATIENT
Start: 2021-08-09 | End: 2021-09-03

## 2021-08-09 RX ORDER — ERGOCALCIFEROL 1.25 MG/1
CAPSULE ORAL
Qty: 4 CAPSULE | Refills: 0 | Status: SHIPPED | OUTPATIENT
Start: 2021-08-09 | End: 2021-09-07

## 2021-08-09 RX ORDER — OXYBUTYNIN CHLORIDE 10 MG/1
TABLET, EXTENDED RELEASE ORAL
Qty: 30 TABLET | Refills: 0 | Status: SHIPPED | OUTPATIENT
Start: 2021-08-09 | End: 2021-09-27

## 2021-08-16 ENCOUNTER — LAB (OUTPATIENT)
Dept: ONCOLOGY | Facility: HOSPITAL | Age: 56
End: 2021-08-16

## 2021-08-16 ENCOUNTER — OFFICE VISIT (OUTPATIENT)
Dept: ONCOLOGY | Facility: CLINIC | Age: 56
End: 2021-08-16

## 2021-08-16 VITALS
DIASTOLIC BLOOD PRESSURE: 74 MMHG | SYSTOLIC BLOOD PRESSURE: 135 MMHG | BODY MASS INDEX: 34.68 KG/M2 | HEART RATE: 82 BPM | TEMPERATURE: 97.3 F | RESPIRATION RATE: 18 BRPM | HEIGHT: 69 IN

## 2021-08-16 DIAGNOSIS — E61.1 IRON DEFICIENCY: Chronic | ICD-10-CM

## 2021-08-16 DIAGNOSIS — E61.1 IRON DEFICIENCY: ICD-10-CM

## 2021-08-16 DIAGNOSIS — D72.829 LEUKOCYTOSIS, UNSPECIFIED TYPE: ICD-10-CM

## 2021-08-16 DIAGNOSIS — D72.829 LEUKOCYTOSIS, UNSPECIFIED TYPE: Primary | Chronic | ICD-10-CM

## 2021-08-16 LAB
BASOPHILS # BLD AUTO: 0.09 10*3/MM3 (ref 0–0.2)
BASOPHILS NFR BLD AUTO: 0.8 % (ref 0–1.5)
DEPRECATED RDW RBC AUTO: 45.3 FL (ref 37–54)
EOSINOPHIL # BLD AUTO: 0.28 10*3/MM3 (ref 0–0.4)
EOSINOPHIL NFR BLD AUTO: 2.4 % (ref 0.3–6.2)
ERYTHROCYTE [DISTWIDTH] IN BLOOD BY AUTOMATED COUNT: 14.9 % (ref 12.3–15.4)
FERRITIN SERPL-MCNC: 59.94 NG/ML (ref 13–150)
FOLATE SERPL-MCNC: 19.6 NG/ML (ref 4.78–24.2)
HCT VFR BLD AUTO: 38.2 % (ref 34–46.6)
HGB BLD-MCNC: 12 G/DL (ref 12–15.9)
HOLD SPECIMEN: NORMAL
IMM GRANULOCYTES # BLD AUTO: 0.08 10*3/MM3 (ref 0–0.05)
IMM GRANULOCYTES NFR BLD AUTO: 0.7 % (ref 0–0.5)
IRON 24H UR-MRATE: 57 MCG/DL (ref 37–145)
IRON SATN MFR SERPL: 18 % (ref 20–50)
LYMPHOCYTES # BLD AUTO: 2.27 10*3/MM3 (ref 0.7–3.1)
LYMPHOCYTES NFR BLD AUTO: 19.7 % (ref 19.6–45.3)
MCH RBC QN AUTO: 26.2 PG (ref 26.6–33)
MCHC RBC AUTO-ENTMCNC: 31.4 G/DL (ref 31.5–35.7)
MCV RBC AUTO: 83.4 FL (ref 79–97)
MONOCYTES # BLD AUTO: 0.48 10*3/MM3 (ref 0.1–0.9)
MONOCYTES NFR BLD AUTO: 4.2 % (ref 5–12)
NEUTROPHILS NFR BLD AUTO: 72.2 % (ref 42.7–76)
NEUTROPHILS NFR BLD AUTO: 8.31 10*3/MM3 (ref 1.7–7)
NRBC BLD AUTO-RTO: 0 /100 WBC (ref 0–0.2)
PLATELET # BLD AUTO: 202 10*3/MM3 (ref 140–450)
PMV BLD AUTO: 12 FL (ref 6–12)
RBC # BLD AUTO: 4.58 10*6/MM3 (ref 3.77–5.28)
TIBC SERPL-MCNC: 317 MCG/DL (ref 298–536)
TRANSFERRIN SERPL-MCNC: 213 MG/DL (ref 200–360)
VIT B12 BLD-MCNC: 500 PG/ML (ref 211–946)
WBC # BLD AUTO: 11.51 10*3/MM3 (ref 3.4–10.8)

## 2021-08-16 PROCEDURE — 85025 COMPLETE CBC W/AUTO DIFF WBC: CPT

## 2021-08-16 PROCEDURE — 82746 ASSAY OF FOLIC ACID SERUM: CPT

## 2021-08-16 PROCEDURE — 1157F ADVNC CARE PLAN IN RCRD: CPT | Performed by: INTERNAL MEDICINE

## 2021-08-16 PROCEDURE — 82728 ASSAY OF FERRITIN: CPT

## 2021-08-16 PROCEDURE — 83540 ASSAY OF IRON: CPT

## 2021-08-16 PROCEDURE — G0463 HOSPITAL OUTPT CLINIC VISIT: HCPCS | Performed by: INTERNAL MEDICINE

## 2021-08-16 PROCEDURE — 84466 ASSAY OF TRANSFERRIN: CPT

## 2021-08-16 PROCEDURE — 1126F AMNT PAIN NOTED NONE PRSNT: CPT | Performed by: INTERNAL MEDICINE

## 2021-08-16 PROCEDURE — 82607 VITAMIN B-12: CPT

## 2021-08-16 PROCEDURE — 99214 OFFICE O/P EST MOD 30 MIN: CPT | Performed by: INTERNAL MEDICINE

## 2021-08-16 RX ORDER — LANOLIN ALCOHOL/MO/W.PET/CERES
1000 CREAM (GRAM) TOPICAL DAILY
Qty: 90 TABLET | Refills: 1 | Status: ON HOLD | OUTPATIENT
Start: 2021-08-16 | End: 2022-01-27

## 2021-08-16 NOTE — PROGRESS NOTES
"DATE OF VISIT: 8/16/2021      REASON FOR VISIT: Leukocytosis, iron deficiency anemia      HISTORY OF PRESENT ILLNESS:   56-year-old female with medical problem consisting of uncontrolled diabetes mellitus with neuropathy, hypothyroidism, hypertension, dyslipidemia, history of left lower extremity amputation, iron deficiency was initially seen in consultation on April 21, 2021 for evaluation of leukocytosis and iron deficiency.  Patient is here for follow-up appointment today.  Denies any bleeding.  Denies any new lymph node enlargement or any recent infection.          Past Medical History, Past Surgical History, Social History, Family History have been reviewed and are without significant changes except as mentioned.    Review of Systems   Gastrointestinal: Negative for blood in stool.   Musculoskeletal: Positive for arthralgias.   Neurological: Positive for numbness.   Hematological: Negative for adenopathy.      A comprehensive 14 point review of systems was performed and was negative except as mentioned.    Medications:  The current medication list was reviewed in the EMR    ALLERGIES:    Allergies   Allergen Reactions   • Compazine [Prochlorperazine Edisylate] Unknown - High Severity   • Penicillins Unknown - High Severity and Other (See Comments)       Objective      Vitals:    08/16/21 1004   BP: 135/74   Pulse: 82   Resp: 18   Temp: 97.3 °F (36.3 °C)   TempSrc: Temporal   Weight: Comment: w/c, amputee   Height: 175.3 cm (69.02\")   PainSc: 0-No pain     Current Status 8/16/2021   ECOG score 3       Physical Exam  Pulmonary:      Breath sounds: Normal breath sounds.   Neurological:      Mental Status: She is alert and oriented to person, place, and time.           RECENT LABS:  Glucose   Date Value Ref Range Status   06/14/2021 137 (H) 65 - 99 mg/dL Final     Sodium   Date Value Ref Range Status   06/14/2021 140 136 - 145 mmol/L Final     Potassium   Date Value Ref Range Status   06/14/2021 4.6 3.5 - 5.2 " mmol/L Final     CO2   Date Value Ref Range Status   06/14/2021 26.9 22.0 - 29.0 mmol/L Final     Chloride   Date Value Ref Range Status   06/14/2021 106 98 - 107 mmol/L Final     Anion Gap   Date Value Ref Range Status   06/14/2021 7.1 5.0 - 15.0 mmol/L Final     Creatinine   Date Value Ref Range Status   06/14/2021 1.17 (H) 0.57 - 1.00 mg/dL Final     BUN   Date Value Ref Range Status   06/14/2021 14 6 - 20 mg/dL Final     BUN/Creatinine Ratio   Date Value Ref Range Status   06/14/2021 12.0 7.0 - 25.0 Final     Calcium   Date Value Ref Range Status   06/14/2021 9.0 8.6 - 10.5 mg/dL Final     eGFR Non  Amer   Date Value Ref Range Status   06/14/2021 48 (L) >60 mL/min/1.73 Final     Alkaline Phosphatase   Date Value Ref Range Status   06/14/2021 71 39 - 117 U/L Final     Total Protein   Date Value Ref Range Status   06/14/2021 7.4 6.0 - 8.5 g/dL Final     ALT (SGPT)   Date Value Ref Range Status   06/14/2021 13 1 - 33 U/L Final     AST (SGOT)   Date Value Ref Range Status   06/14/2021 14 1 - 32 U/L Final     Total Bilirubin   Date Value Ref Range Status   06/14/2021 0.2 0.0 - 1.2 mg/dL Final     Albumin   Date Value Ref Range Status   06/14/2021 4.20 3.50 - 5.20 g/dL Final     Globulin   Date Value Ref Range Status   06/14/2021 3.2 gm/dL Final     Lab Results   Component Value Date    WBC 11.51 (H) 08/16/2021    HGB 12.0 08/16/2021    HCT 38.2 08/16/2021    MCV 83.4 08/16/2021     08/16/2021     Lab Results   Component Value Date    NEUTROABS 8.31 (H) 08/16/2021    IRON 57 08/16/2021    IRON 43 04/21/2021    IRON 45 12/14/2020    TIBC 317 08/16/2021    TIBC 320 04/21/2021    TIBC 380 12/14/2020    LABIRON 18 (L) 08/16/2021    LABIRON 13 (L) 04/21/2021    LABIRON 12 (L) 12/14/2020    FERRITIN 59.94 08/16/2021    FERRITIN 85.95 04/21/2021    FERRITIN 61.60 12/14/2020    DHJOXJXZ34 825 06/14/2021    [id number] 407 04/21/2021    YADQJCWD84 555 08/11/2020    FOLATE 17.60 04/21/2021     No results found for:  , LABCA2, AFPTM, HCGQUANT, , CHROMGRNA, [id number], CEA, REFLABREPO      PATHOLOGY:  * Cannot find OR log *         RADIOLOGY DATA :  No radiology results for the last 7 days        Assessment/Plan     1.  Leukocytosis:  -Patient has ongoing leukocytosis since December 2020  -CBC done today shows white blood cell count is 11.6 which is again predominantly neutrophils.  -Work-up in the past including peripheral blood flow cytometry in April 2021 was negative.  -We will continue with clinical monitoring for now.  -We will ask patient to return to clinic in 4 months with repeat CBC, iron studies, ferritin, B12 and folate on that day.    2.  Iron deficiency anemia:  -Patient is currently on ferrous sulfate and B12 p.o. daily  -Anemia work-up done today shows hemoglobin is 12.  -Iron studies are showing improvement.  Recommend continue with ferrous sulfate 1 tablet p.o. daily.  -We will repeat anemia work-up upon next clinic visit in 4 months    3.  Diabetes mellitus with neuropathy    4.  Health maintenance: Patient quit smoking in 2019.  Had a colonoscopy in August 2020.  Had a mammogram in August 2020    5. Advance Care Planning   ACP discussion was held with the patient during this visit. Patient has an advance directive in EMR which is still valid.     6.  Prescriptions: Prescription for B12 has been sent to her pharmacy today             PHQ-9 Total Score: 0   -Patient is not homicidal or suicidal.  No acute intervention required.    Marta Giraldo reports a pain score of 0.  Given her pain assessment as noted, treatment options were discussed and the following options were decided upon as a follow-up plan to address the patient's pain: continuation of current treatment plan for pain.         Adalid Bates MD  8/16/2021  18:17 CDT        Part of this note may be an electronic transcription/translation of spoken language to printed text using the Dragon Dictation System.          CC:

## 2021-08-17 ENCOUNTER — TELEPHONE (OUTPATIENT)
Dept: ONCOLOGY | Facility: HOSPITAL | Age: 56
End: 2021-08-17

## 2021-08-17 NOTE — TELEPHONE ENCOUNTER
----- Message from Adalid Bates MD sent at 8/16/2021 10:25 PM CDT -----  Please let patient know, she needs to continue taking ferrous sulfate and B12 daily.  I have sent prescription for B12 to her pharmacy.  Thank you

## 2021-08-17 NOTE — TELEPHONE ENCOUNTER
Called and spoke with pt sister regarding pt lab results and taking meds as ordered by Dr. Bates, v/u obtained.

## 2021-08-19 ENCOUNTER — OFFICE VISIT (OUTPATIENT)
Dept: ENDOCRINOLOGY | Facility: CLINIC | Age: 56
End: 2021-08-19

## 2021-08-19 VITALS
OXYGEN SATURATION: 96 % | DIASTOLIC BLOOD PRESSURE: 62 MMHG | HEART RATE: 83 BPM | WEIGHT: 235 LBS | BODY MASS INDEX: 34.8 KG/M2 | HEIGHT: 69 IN | SYSTOLIC BLOOD PRESSURE: 128 MMHG

## 2021-08-19 DIAGNOSIS — E03.9 HYPOTHYROIDISM, UNSPECIFIED TYPE: ICD-10-CM

## 2021-08-19 DIAGNOSIS — E55.9 VITAMIN D DEFICIENCY: ICD-10-CM

## 2021-08-19 DIAGNOSIS — E78.2 MIXED HYPERLIPIDEMIA: ICD-10-CM

## 2021-08-19 DIAGNOSIS — Z79.4 TYPE 2 DIABETES MELLITUS WITH HYPERGLYCEMIA, WITH LONG-TERM CURRENT USE OF INSULIN (HCC): Primary | ICD-10-CM

## 2021-08-19 DIAGNOSIS — E11.65 TYPE 2 DIABETES MELLITUS WITH HYPERGLYCEMIA, WITH LONG-TERM CURRENT USE OF INSULIN (HCC): Primary | ICD-10-CM

## 2021-08-19 PROCEDURE — 99214 OFFICE O/P EST MOD 30 MIN: CPT | Performed by: NURSE PRACTITIONER

## 2021-08-19 NOTE — PROGRESS NOTES
"Chief Complaint  Diabetes    Subjective          Marta Travis Giraldo presents to [facility] ENDOCRINOLOGY  History of Present Illness       In office visit    56-year-old female presents for follow-up    Reason diabetes mellitus type 2    Timing constant    Quality improved control      Lab Results   Component Value Date    HGBA1C 7.90 (H) 06/14/2021       Duration diagnosed 1980s    Severity is high         Macrovascular complications--CVA      Microvascular complications--neuropathy , diabetic retinopathy          Blood glucose monitoring fingersticks     Checks 4 times daily    Has nigel but could not download         states at goal      Alleviating factors-- compliance with medication      Aggravating factors-- none     Diet-- regular           Review of Systems - General ROS: negative        Objective   Vital Signs:   /62   Pulse 83   Ht 175.3 cm (69\")   Wt 107 kg (235 lb)   SpO2 96%   BMI 34.70 kg/m²     Physical Exam  Constitutional:       Appearance: Normal appearance.   Cardiovascular:      Rate and Rhythm: Regular rhythm.      Heart sounds: Normal heart sounds.   Pulmonary:      Breath sounds: Normal breath sounds.   Musculoskeletal:      Cervical back: Normal range of motion.      Comments: Right leg bka    Neurological:      Mental Status: She is alert.        Result Review :   The following data was reviewed by: CONNOR Concepcion on 08/19/2021:  Common labs    Common Labsle 4/21/21 6/14/21 6/14/21 6/14/21 6/14/21 6/14/21 8/16/21     0816 0816 0816 0816 0816    Glucose   137 (A)       BUN   14       Creatinine   1.17 (A)       eGFR Non  Am   48 (A)       Sodium   140       Potassium   4.6       Chloride   106       Calcium   9.0       Albumin   4.20       Total Bilirubin   0.2       Alkaline Phosphatase   71       AST (SGOT)   14       ALT (SGPT)   13       WBC 12.70 (A) 14.21 (A)     11.51 (A)   Hemoglobin 11.6 (A) 12.7     12.0   Hematocrit 37.3 40.7     " 38.2   Platelets 264 218     202   Total Cholesterol    127      Triglycerides    70      HDL Cholesterol    39 (A)      LDL Cholesterol     74      Hemoglobin A1C     7.90 (A)     Microalbumin, Urine      3.4    (A) Abnormal value                      Assessment and Plan    There are no diagnoses linked to this encounter.       Glycemic management     Diabetes mellitus type 2              Lab Results   Component Value Date    HGBA1C 7.90 (H) 06/14/2021                Taking Jardiance 25 mg daily-keep      Taking bydureon  2 mg once weekly--keep      Taking Basaglar--- 36 units            Take humalog before meals      If a small meal give 5 units      If a medium meal give 8 units      If a large meal give 10 units             no changes      Goals for sugar     Fasting and before meals 80 to 130     2 hours after meals 180 or less        Aim for 45 grams of carbohydrate per meal     Aim for 15 grams of carbohydrate per snack                    Lipid management     Taking Lipitor 40 mg 1 at night        Total Cholesterol   Date Value Ref Range Status   06/14/2021 127 0 - 200 mg/dL Final     Triglycerides   Date Value Ref Range Status   06/14/2021 70 0 - 150 mg/dL Final     HDL Cholesterol   Date Value Ref Range Status   06/14/2021 39 (L) 40 - 60 mg/dL Final     LDL Cholesterol    Date Value Ref Range Status   06/14/2021 74 0 - 100 mg/dL Final              Blood pressure management     Taking lisinopril 20 mg 1 daily                 Microvascular monitoring     Last eye exam--- Jan. 2021 ,  , monthly injection      Neuropathy     Taking Lyrica              Thyroid     Hypothyroidism     Taking levothyroxine 25 mcg daily       Lab Results   Component Value Date    TSH 1.730 06/14/2021           Bone health     Vitamin D deficiency     Taking vitamin D 50,000 units weekly     Weight management     Patient's Body mass index is 34.7 kg/m². indicating that she is obese (BMI >30). Obesity-related health conditions  include the following: diabetes mellitus. Obesity is unchanged. BMI is is above average; no BMI management plan is appropriate. We discussed portion control and increasing exercise..                                      Follow Up   Return in about 3 months (around 11/19/2021) for Recheck.  Patient was given instructions and counseling regarding her condition or for health maintenance advice. Please see specific information pulled into the AVS if appropriate.         This document has been electronically signed by CONNOR Concepcion on August 19, 2021 14:03 CDT.

## 2021-08-24 DIAGNOSIS — E11.42 DIABETIC POLYNEUROPATHY ASSOCIATED WITH TYPE 2 DIABETES MELLITUS (HCC): ICD-10-CM

## 2021-08-24 RX ORDER — PREGABALIN 300 MG/1
CAPSULE ORAL
Qty: 60 CAPSULE | Refills: 0 | Status: SHIPPED | OUTPATIENT
Start: 2021-08-24 | End: 2021-09-22

## 2021-08-24 NOTE — TELEPHONE ENCOUNTER
Rx Refill Note  Requested Prescriptions     Pending Prescriptions Disp Refills   • pregabalin (LYRICA) 300 MG capsule [Pharmacy Med Name: PREGABALIN 300 MG CAPSULE] 60 capsule 0     Sig: TAKE 1 CAPSULE TWICE DAILY      Last office visit with prescribing clinician: 6/16/2021      Next office visit with prescribing clinician: 9/16/2021   {TIP  Encounters:      {TIP  Please add Last Relevant Lab Date if appropriate  {TIP  Is Refill Pharmacy correct? YES  Anshu Samayoa LPN  08/24/21, 13:17 CDT

## 2021-08-31 RX ORDER — FLURBIPROFEN SODIUM 0.3 MG/ML
SOLUTION/ DROPS OPHTHALMIC
Qty: 100 EACH | Refills: 3 | Status: SHIPPED | OUTPATIENT
Start: 2021-08-31 | End: 2021-11-16 | Stop reason: SDUPTHER

## 2021-09-03 RX ORDER — PNV NO.95/FERROUS FUM/FOLIC AC 28MG-0.8MG
TABLET ORAL
Qty: 30 TABLET | Refills: 0 | Status: SHIPPED | OUTPATIENT
Start: 2021-09-03 | End: 2021-10-08

## 2021-09-03 RX ORDER — DULAGLUTIDE 3 MG/.5ML
INJECTION, SOLUTION SUBCUTANEOUS
Qty: 2 ML | Refills: 0 | Status: SHIPPED | OUTPATIENT
Start: 2021-09-03 | End: 2021-11-16 | Stop reason: SDUPTHER

## 2021-09-07 RX ORDER — LEVOTHYROXINE SODIUM 0.03 MG/1
TABLET ORAL
Qty: 30 TABLET | Refills: 0 | Status: SHIPPED | OUTPATIENT
Start: 2021-09-07 | End: 2021-10-08

## 2021-09-07 RX ORDER — VILAZODONE HYDROCHLORIDE 20 MG/1
TABLET ORAL
Qty: 30 TABLET | Refills: 0 | Status: SHIPPED | OUTPATIENT
Start: 2021-09-07 | End: 2021-10-08

## 2021-09-07 RX ORDER — METHOCARBAMOL 750 MG/1
TABLET, FILM COATED ORAL
Qty: 90 TABLET | Refills: 0 | Status: SHIPPED | OUTPATIENT
Start: 2021-09-07 | End: 2021-10-08

## 2021-09-07 RX ORDER — EMPAGLIFLOZIN 25 MG/1
TABLET, FILM COATED ORAL
Qty: 30 TABLET | Refills: 0 | Status: SHIPPED | OUTPATIENT
Start: 2021-09-07 | End: 2021-10-08

## 2021-09-07 RX ORDER — ATORVASTATIN CALCIUM 40 MG/1
TABLET, FILM COATED ORAL
Qty: 30 TABLET | Refills: 0 | Status: SHIPPED | OUTPATIENT
Start: 2021-09-07 | End: 2021-09-09 | Stop reason: DRUGHIGH

## 2021-09-07 RX ORDER — LISINOPRIL 20 MG/1
TABLET ORAL
Qty: 30 TABLET | Refills: 0 | Status: SHIPPED | OUTPATIENT
Start: 2021-09-07 | End: 2021-10-08

## 2021-09-07 RX ORDER — ERGOCALCIFEROL 1.25 MG/1
CAPSULE ORAL
Qty: 4 CAPSULE | Refills: 0 | Status: SHIPPED | OUTPATIENT
Start: 2021-09-07 | End: 2021-10-08

## 2021-09-08 ENCOUNTER — LAB (OUTPATIENT)
Dept: LAB | Facility: HOSPITAL | Age: 56
End: 2021-09-08

## 2021-09-08 DIAGNOSIS — M50.30 DDD (DEGENERATIVE DISC DISEASE), CERVICAL: ICD-10-CM

## 2021-09-08 DIAGNOSIS — E03.9 HYPOTHYROIDISM, UNSPECIFIED TYPE: ICD-10-CM

## 2021-09-08 DIAGNOSIS — K21.00 GASTROESOPHAGEAL REFLUX DISEASE WITH ESOPHAGITIS WITHOUT HEMORRHAGE: ICD-10-CM

## 2021-09-08 DIAGNOSIS — E78.2 MIXED HYPERLIPIDEMIA: ICD-10-CM

## 2021-09-08 DIAGNOSIS — E11.65 UNCONTROLLED TYPE 2 DIABETES MELLITUS WITH HYPERGLYCEMIA (HCC): ICD-10-CM

## 2021-09-08 DIAGNOSIS — E11.42 DIABETIC POLYNEUROPATHY ASSOCIATED WITH TYPE 2 DIABETES MELLITUS (HCC): ICD-10-CM

## 2021-09-08 DIAGNOSIS — E55.9 VITAMIN D DEFICIENCY: ICD-10-CM

## 2021-09-08 DIAGNOSIS — I10 ESSENTIAL HYPERTENSION: ICD-10-CM

## 2021-09-08 DIAGNOSIS — F33.1 MODERATE EPISODE OF RECURRENT MAJOR DEPRESSIVE DISORDER (HCC): ICD-10-CM

## 2021-09-08 DIAGNOSIS — Z99.3 WHEELCHAIR BOUND: ICD-10-CM

## 2021-09-08 DIAGNOSIS — S88.119A BELOW KNEE AMPUTATION (HCC): ICD-10-CM

## 2021-09-08 DIAGNOSIS — N18.31 STAGE 3A CHRONIC KIDNEY DISEASE (HCC): ICD-10-CM

## 2021-09-08 LAB
25(OH)D3 SERPL-MCNC: 34.3 NG/ML
ALBUMIN SERPL-MCNC: 3.9 G/DL (ref 3.5–5.2)
ALBUMIN/GLOB SERPL: 1.2 G/DL
ALP SERPL-CCNC: 84 U/L (ref 39–117)
ALT SERPL W P-5'-P-CCNC: 11 U/L (ref 1–33)
ANION GAP SERPL CALCULATED.3IONS-SCNC: 7.6 MMOL/L (ref 5–15)
AST SERPL-CCNC: 10 U/L (ref 1–32)
BASOPHILS # BLD AUTO: 0.12 10*3/MM3 (ref 0–0.2)
BASOPHILS NFR BLD AUTO: 1 % (ref 0–1.5)
BILIRUB SERPL-MCNC: <0.2 MG/DL (ref 0–1.2)
BUN SERPL-MCNC: 22 MG/DL (ref 6–20)
BUN/CREAT SERPL: 17.7 (ref 7–25)
CALCIUM SPEC-SCNC: 8.8 MG/DL (ref 8.6–10.5)
CHLORIDE SERPL-SCNC: 104 MMOL/L (ref 98–107)
CHOLEST SERPL-MCNC: 170 MG/DL (ref 0–200)
CO2 SERPL-SCNC: 27.4 MMOL/L (ref 22–29)
CREAT SERPL-MCNC: 1.24 MG/DL (ref 0.57–1)
DEPRECATED RDW RBC AUTO: 41.2 FL (ref 37–54)
EOSINOPHIL # BLD AUTO: 0.3 10*3/MM3 (ref 0–0.4)
EOSINOPHIL NFR BLD AUTO: 2.4 % (ref 0.3–6.2)
ERYTHROCYTE [DISTWIDTH] IN BLOOD BY AUTOMATED COUNT: 14 % (ref 12.3–15.4)
GFR SERPL CREATININE-BSD FRML MDRD: 45 ML/MIN/1.73
GLOBULIN UR ELPH-MCNC: 3.3 GM/DL
GLUCOSE SERPL-MCNC: 201 MG/DL (ref 65–99)
HBA1C MFR BLD: 7.98 % (ref 4.8–5.6)
HCT VFR BLD AUTO: 35.9 % (ref 34–46.6)
HDLC SERPL-MCNC: 47 MG/DL (ref 40–60)
HGB BLD-MCNC: 11.7 G/DL (ref 12–15.9)
IMM GRANULOCYTES # BLD AUTO: 0.1 10*3/MM3 (ref 0–0.05)
IMM GRANULOCYTES NFR BLD AUTO: 0.8 % (ref 0–0.5)
LDLC SERPL CALC-MCNC: 111 MG/DL (ref 0–100)
LDLC/HDLC SERPL: 2.35 {RATIO}
LYMPHOCYTES # BLD AUTO: 2.9 10*3/MM3 (ref 0.7–3.1)
LYMPHOCYTES NFR BLD AUTO: 23.5 % (ref 19.6–45.3)
MCH RBC QN AUTO: 26.7 PG (ref 26.6–33)
MCHC RBC AUTO-ENTMCNC: 32.6 G/DL (ref 31.5–35.7)
MCV RBC AUTO: 82 FL (ref 79–97)
MONOCYTES # BLD AUTO: 0.6 10*3/MM3 (ref 0.1–0.9)
MONOCYTES NFR BLD AUTO: 4.9 % (ref 5–12)
NEUTROPHILS NFR BLD AUTO: 67.4 % (ref 42.7–76)
NEUTROPHILS NFR BLD AUTO: 8.34 10*3/MM3 (ref 1.7–7)
NRBC BLD AUTO-RTO: 0.1 /100 WBC (ref 0–0.2)
PLATELET # BLD AUTO: 214 10*3/MM3 (ref 140–450)
PMV BLD AUTO: 12.7 FL (ref 6–12)
POTASSIUM SERPL-SCNC: 5 MMOL/L (ref 3.5–5.2)
PROT SERPL-MCNC: 7.2 G/DL (ref 6–8.5)
RBC # BLD AUTO: 4.38 10*6/MM3 (ref 3.77–5.28)
SODIUM SERPL-SCNC: 139 MMOL/L (ref 136–145)
T3FREE SERPL-MCNC: 2.45 PG/ML (ref 2–4.4)
T4 FREE SERPL-MCNC: 0.97 NG/DL (ref 0.93–1.7)
TRIGL SERPL-MCNC: 63 MG/DL (ref 0–150)
TSH SERPL DL<=0.05 MIU/L-ACNC: 1.09 UIU/ML (ref 0.27–4.2)
VIT B12 BLD-MCNC: 403 PG/ML (ref 211–946)
VLDLC SERPL-MCNC: 12 MG/DL (ref 5–40)
WBC # BLD AUTO: 12.36 10*3/MM3 (ref 3.4–10.8)

## 2021-09-08 PROCEDURE — 85025 COMPLETE CBC W/AUTO DIFF WBC: CPT

## 2021-09-08 PROCEDURE — 82306 VITAMIN D 25 HYDROXY: CPT

## 2021-09-08 PROCEDURE — 84443 ASSAY THYROID STIM HORMONE: CPT

## 2021-09-08 PROCEDURE — 84481 FREE ASSAY (FT-3): CPT

## 2021-09-08 PROCEDURE — 83036 HEMOGLOBIN GLYCOSYLATED A1C: CPT

## 2021-09-08 PROCEDURE — 80053 COMPREHEN METABOLIC PANEL: CPT

## 2021-09-08 PROCEDURE — 84439 ASSAY OF FREE THYROXINE: CPT

## 2021-09-08 PROCEDURE — 82607 VITAMIN B-12: CPT

## 2021-09-08 PROCEDURE — 80061 LIPID PANEL: CPT

## 2021-09-09 ENCOUNTER — TELEPHONE (OUTPATIENT)
Dept: FAMILY MEDICINE CLINIC | Facility: CLINIC | Age: 56
End: 2021-09-09

## 2021-09-09 NOTE — TELEPHONE ENCOUNTER
----- Message from Gato Crane MD sent at 9/9/2021  7:58 AM CDT -----  Please call pt    Labwork stable except LDL continues to be high and recommend going up on lipitor from 40 to 80 mg PO qhs give 30 pills and 3 refills. Take with CoQ10 OTC to help reduce side effects    Hga1c at 7.98. will discuss about going up on trulicity to 4.5 mg weekly on next visit     Pt continues to have CKD stage 3a. Continue with Nephrology followup     Also continues to have leukocytosis    Recheck on next visit thanks

## 2021-09-10 RX ORDER — ATORVASTATIN CALCIUM 80 MG/1
80 TABLET, FILM COATED ORAL NIGHTLY
Qty: 30 TABLET | Refills: 3 | Status: SHIPPED | OUTPATIENT
Start: 2021-09-10 | End: 2021-11-16 | Stop reason: SDUPTHER

## 2021-09-16 ENCOUNTER — OFFICE VISIT (OUTPATIENT)
Dept: FAMILY MEDICINE CLINIC | Facility: CLINIC | Age: 56
End: 2021-09-16

## 2021-09-16 VITALS
BODY MASS INDEX: 34.7 KG/M2 | HEIGHT: 69 IN | SYSTOLIC BLOOD PRESSURE: 130 MMHG | DIASTOLIC BLOOD PRESSURE: 72 MMHG | TEMPERATURE: 98.2 F | HEART RATE: 94 BPM | OXYGEN SATURATION: 97 %

## 2021-09-16 DIAGNOSIS — E78.2 MIXED HYPERLIPIDEMIA: ICD-10-CM

## 2021-09-16 DIAGNOSIS — E66.9 CLASS 1 OBESITY WITH SERIOUS COMORBIDITY AND BODY MASS INDEX (BMI) OF 34.0 TO 34.9 IN ADULT, UNSPECIFIED OBESITY TYPE: ICD-10-CM

## 2021-09-16 DIAGNOSIS — E03.9 HYPOTHYROIDISM, UNSPECIFIED TYPE: ICD-10-CM

## 2021-09-16 DIAGNOSIS — M79.642 LEFT HAND PAIN: Primary | ICD-10-CM

## 2021-09-16 DIAGNOSIS — E55.9 VITAMIN D DEFICIENCY: ICD-10-CM

## 2021-09-16 DIAGNOSIS — E11.65 UNCONTROLLED TYPE 2 DIABETES MELLITUS WITH HYPERGLYCEMIA (HCC): ICD-10-CM

## 2021-09-16 DIAGNOSIS — R26.81 GAIT INSTABILITY: ICD-10-CM

## 2021-09-16 DIAGNOSIS — Z99.3 WHEELCHAIR BOUND: ICD-10-CM

## 2021-09-16 DIAGNOSIS — D50.9 MICROCYTIC ANEMIA: ICD-10-CM

## 2021-09-16 DIAGNOSIS — K21.00 GASTROESOPHAGEAL REFLUX DISEASE WITH ESOPHAGITIS WITHOUT HEMORRHAGE: ICD-10-CM

## 2021-09-16 DIAGNOSIS — S88.119A BELOW KNEE AMPUTATION (HCC): ICD-10-CM

## 2021-09-16 DIAGNOSIS — E61.1 IRON DEFICIENCY: Chronic | ICD-10-CM

## 2021-09-16 DIAGNOSIS — I10 ESSENTIAL HYPERTENSION: ICD-10-CM

## 2021-09-16 PROCEDURE — 99214 OFFICE O/P EST MOD 30 MIN: CPT | Performed by: FAMILY MEDICINE

## 2021-09-20 ENCOUNTER — TELEPHONE (OUTPATIENT)
Dept: FAMILY MEDICINE CLINIC | Facility: CLINIC | Age: 56
End: 2021-09-20

## 2021-09-20 NOTE — TELEPHONE ENCOUNTER
Left message on pt voicemail with results and recommendation. Pt to call back if any questions. Verbal consent was verified.

## 2021-09-20 NOTE — TELEPHONE ENCOUNTER
----- Message from Gato Crane MD sent at 9/18/2021  1:40 PM CDT -----  Please let pt know that x-ray of hand was negative for fracture. If symptoms persist recommend MRI of hand.     Will recheck on next visit thanks

## 2021-09-22 DIAGNOSIS — E11.42 DIABETIC POLYNEUROPATHY ASSOCIATED WITH TYPE 2 DIABETES MELLITUS (HCC): ICD-10-CM

## 2021-09-22 RX ORDER — PREGABALIN 300 MG/1
CAPSULE ORAL
Qty: 60 CAPSULE | Refills: 0 | Status: SHIPPED | OUTPATIENT
Start: 2021-09-22 | End: 2021-10-12

## 2021-09-22 NOTE — TELEPHONE ENCOUNTER
Rx Refill Note  Requested Prescriptions     Pending Prescriptions Disp Refills   • pregabalin (LYRICA) 300 MG capsule [Pharmacy Med Name: PREGABALIN 300 MG CAPSULE] 60 capsule 0     Sig: TAKE 1 CAPSULE TWICE DAILY      Last office visit with prescribing clinician: 9/16/2021      Next office visit with prescribing clinician: 11/16/2021   {TIP  Encounters ;        {TIP  Please add Last Relevant Lab Date if appropriate   {TIP  Is Refill Pharmacy correct? yes  Anshu Samayoa LPN  09/22/21, 10:57 CDT

## 2021-09-27 ENCOUNTER — TELEPHONE (OUTPATIENT)
Dept: FAMILY MEDICINE CLINIC | Facility: CLINIC | Age: 56
End: 2021-09-27

## 2021-09-27 RX ORDER — OXYBUTYNIN CHLORIDE 10 MG/1
TABLET, EXTENDED RELEASE ORAL
Qty: 30 TABLET | Refills: 1 | Status: SHIPPED | OUTPATIENT
Start: 2021-09-27 | End: 2021-11-16 | Stop reason: SDUPTHER

## 2021-09-27 NOTE — TELEPHONE ENCOUNTER
Patient called she needs regualr needles not the ones got the pen. She said that is not what she needs

## 2021-09-28 DIAGNOSIS — E11.65 UNCONTROLLED TYPE 2 DIABETES MELLITUS WITH HYPERGLYCEMIA (HCC): Primary | ICD-10-CM

## 2021-09-28 RX ORDER — SYRINGE-NEEDLE,INSULIN,0.5 ML 27GX1/2"
SYRINGE, EMPTY DISPOSABLE MISCELLANEOUS
Qty: 100 EACH | Refills: 3 | Status: SHIPPED | OUTPATIENT
Start: 2021-09-28 | End: 2021-11-16 | Stop reason: SDUPTHER

## 2021-09-28 NOTE — TELEPHONE ENCOUNTER
"Patient called she needs regualr needles not the ones got the pen. She said that is not what she needs  Rx Refill Note  Requested Prescriptions     Pending Prescriptions Disp Refills   • Insulin Syringe-Needle U-100 30G X 1/2\" 1 ML misc 100 each 3     Sig: Use three times daily with insulin      Last office visit with prescribing clinician: 9/16/2021      Next office visit with prescribing clinician: 11/16/2021   {TIP  Encounters:      Pt called needing insulin syringes sent to her pharmacy for her Humalog.    {TIP  Please add Last Relevant Lab Date if appropriate  {TIP  Is Refill Pharmacy correct? yes  Anshu Samayoa, PEDRO  09/28/21, 09:38 CDT  "

## 2021-10-08 RX ORDER — ERGOCALCIFEROL 1.25 MG/1
CAPSULE ORAL
Qty: 4 CAPSULE | Refills: 0 | Status: SHIPPED | OUTPATIENT
Start: 2021-10-08 | End: 2021-11-02

## 2021-10-08 RX ORDER — EMPAGLIFLOZIN 25 MG/1
TABLET, FILM COATED ORAL
Qty: 30 TABLET | Refills: 0 | Status: SHIPPED | OUTPATIENT
Start: 2021-10-08 | End: 2021-11-02

## 2021-10-08 RX ORDER — LEVOTHYROXINE SODIUM 0.03 MG/1
TABLET ORAL
Qty: 30 TABLET | Refills: 0 | Status: SHIPPED | OUTPATIENT
Start: 2021-10-08 | End: 2021-11-02

## 2021-10-08 RX ORDER — PNV NO.95/FERROUS FUM/FOLIC AC 28MG-0.8MG
TABLET ORAL
Qty: 30 TABLET | Refills: 0 | Status: SHIPPED | OUTPATIENT
Start: 2021-10-08 | End: 2021-10-20

## 2021-10-08 RX ORDER — LISINOPRIL 20 MG/1
TABLET ORAL
Qty: 30 TABLET | Refills: 0 | Status: SHIPPED | OUTPATIENT
Start: 2021-10-08 | End: 2021-11-02

## 2021-10-08 RX ORDER — ATORVASTATIN CALCIUM 40 MG/1
TABLET, FILM COATED ORAL
Qty: 30 TABLET | Refills: 0 | Status: SHIPPED | OUTPATIENT
Start: 2021-10-08 | End: 2021-11-02

## 2021-10-08 RX ORDER — VILAZODONE HYDROCHLORIDE 20 MG/1
TABLET ORAL
Qty: 30 TABLET | Refills: 0 | Status: SHIPPED | OUTPATIENT
Start: 2021-10-08 | End: 2021-11-02

## 2021-10-08 RX ORDER — METHOCARBAMOL 750 MG/1
TABLET, FILM COATED ORAL
Qty: 90 TABLET | Refills: 0 | Status: SHIPPED | OUTPATIENT
Start: 2021-10-08 | End: 2021-11-02

## 2021-10-11 DIAGNOSIS — E11.42 DIABETIC POLYNEUROPATHY ASSOCIATED WITH TYPE 2 DIABETES MELLITUS (HCC): ICD-10-CM

## 2021-10-12 RX ORDER — PREGABALIN 300 MG/1
CAPSULE ORAL
Qty: 60 CAPSULE | Refills: 0 | Status: SHIPPED | OUTPATIENT
Start: 2021-10-12 | End: 2021-11-02

## 2021-10-12 NOTE — TELEPHONE ENCOUNTER
Rx Refill Note  Requested Prescriptions     Pending Prescriptions Disp Refills   • pregabalin (LYRICA) 300 MG capsule [Pharmacy Med Name: PREGABALIN 300 MG CAPSULE] 60 capsule 0     Sig: TAKE 1 CAPSULE TWICE DAILY      Last office visit with prescribing clinician: 9/16/2021      Next office visit with prescribing clinician: 11/16/2021   {TIP  Encounters:    Last RX sent on 9/22/21    {TIP  Please add Last Relevant Lab Date if appropriate  {TIP  Is Refill Pharmacy correct? yes  Anshu Samayoa LPN  10/12/21, 08:46 CDT

## 2021-10-20 ENCOUNTER — OFFICE VISIT (OUTPATIENT)
Dept: PODIATRY | Facility: CLINIC | Age: 56
End: 2021-10-20

## 2021-10-20 VITALS — HEART RATE: 82 BPM | HEIGHT: 69 IN | BODY MASS INDEX: 34.8 KG/M2 | WEIGHT: 235 LBS | OXYGEN SATURATION: 99 %

## 2021-10-20 DIAGNOSIS — Z89.511 AMPUTEE, BELOW KNEE, RIGHT (HCC): ICD-10-CM

## 2021-10-20 DIAGNOSIS — B35.1 ONYCHOMYCOSIS: ICD-10-CM

## 2021-10-20 DIAGNOSIS — E11.65 UNCONTROLLED TYPE 2 DIABETES MELLITUS WITH HYPERGLYCEMIA (HCC): ICD-10-CM

## 2021-10-20 DIAGNOSIS — M79.672 LEFT FOOT PAIN: Primary | ICD-10-CM

## 2021-10-20 PROCEDURE — 99213 OFFICE O/P EST LOW 20 MIN: CPT | Performed by: PODIATRIST

## 2021-10-20 PROCEDURE — 11720 DEBRIDE NAIL 1-5: CPT | Performed by: PODIATRIST

## 2021-10-20 RX ORDER — LEVOFLOXACIN 750 MG/1
TABLET ORAL
COMMUNITY
Start: 2021-10-01 | End: 2022-01-20

## 2021-10-20 RX ORDER — BROMFENAC SODIUM 0.7 MG/ML
SOLUTION/ DROPS OPHTHALMIC
Status: ON HOLD | COMMUNITY
Start: 2021-09-27 | End: 2022-01-27

## 2021-10-20 RX ORDER — SYRINGE-NEEDLE,INSULIN,0.5 ML 30 GX5/16"
SYRINGE, EMPTY DISPOSABLE MISCELLANEOUS
COMMUNITY
Start: 2021-09-28

## 2021-10-20 RX ORDER — MOXIFLOXACIN 5 MG/ML
SOLUTION/ DROPS OPHTHALMIC
Status: ON HOLD | COMMUNITY
Start: 2021-10-14 | End: 2022-01-27

## 2021-10-20 NOTE — PROGRESS NOTES
Marta Giraldo  1965  56 y.o. female   PCP: Gato Crane 6/16/2021   BS: 172 per patient     Left foot pain. Xray today     10/20/2021     Chief Complaint   Patient presents with   • Left Foot - Follow-up       History of Present Illness    Marta Giraldo is a 56 y.o.female who presents to clinic today with chief complaint of left foot pain.  Pain is located to the midfoot.  States that she has had these on multiple things over the last few weeks.  She is ambulating in a wheelchair with a surgical shoe on the left foot.  No other complaints.    Past Medical History:   Diagnosis Date   • Anemia    • Anxiety    • Arthritis    • Cataract    • Depression    • Diabetes mellitus (HCC)    • Disease of thyroid gland    • Family history of thyroid problem    • GERD (gastroesophageal reflux disease)    • Headache    • History of transfusion    • Hyperlipidemia    • Hypertension    • Neuropathy    • Stroke (HCC) 2019   • Urinary tract infection          Past Surgical History:   Procedure Laterality Date   • APPENDECTOMY     • BELOW KNEE AMPUTATION     • BLADDER SURGERY     • COLONOSCOPY N/A 9/2/2020    Procedure: COLONOSCOPY;  Surgeon: Ashli Gonzalez MD;  Location: [facility] ENDOSCOPY;  Service: Gastroenterology;  Laterality: N/A;   • ENDOSCOPY N/A 9/2/2020    Procedure: ESOPHAGOGASTRODUODENOSCOPY;  Surgeon: Ashli Gonzalez MD;  Location: [facility] ENDOSCOPY;  Service: Gastroenterology;  Laterality: N/A;         Family History   Problem Relation Age of Onset   • Diabetes Other    • Hyperlipidemia Other    • Hypertension Other    • Stroke Other    • Heart disease Other    • Other Other    • Diabetes Mother    • Diabetes Father    • Diabetes Sister    • Heart disease Sister    • Diabetes Brother        Allergies   Allergen Reactions   • Compazine [Prochlorperazine Edisylate] Unknown - High Severity   • Penicillins Other (See Comments) and Unknown - High Severity     unknown       Social History  "    Socioeconomic History   • Marital status:    Tobacco Use   • Smoking status: Former Smoker     Quit date: 2016     Years since quittin.8   • Smokeless tobacco: Never Used   Vaping Use   • Vaping Use: Never used   Substance and Sexual Activity   • Alcohol use: Not Currently   • Drug use: Never   • Sexual activity: Defer         Current Outpatient Medications   Medication Sig Dispense Refill   • atorvastatin (LIPITOR) 40 MG tablet TAKE 1 TABLET DAILY. 30 tablet 0   • atorvastatin (Lipitor) 80 MG tablet Take 1 tablet by mouth Every Night. 30 tablet 3   • B-D UF III MINI PEN NEEDLES 31G X 5 MM misc Use as needed 100 each 3   • Blood Glucose Monitoring Suppl w/Device kit Use for E11.65 diabetes to check sugars 4 times a day. 1 each 0   • clotrimazole-betamethasone (Lotrisone) 1-0.05 % cream Apply  topically to the appropriate area as directed 2 (Two) Times a Day. 1 each 3   • Continuous Blood Gluc  (FreeStyle Tae 2 [place]) device USE AS DIRECTED 1 each 11   • Continuous Blood Gluc Sensor (FreeStyle Tae 2 Sensor) misc 1 each Every 14 (Fourteen) Days. Use every 14 days 2 each 11   • DULoxetine (CYMBALTA) 60 MG capsule      • glucose (DEX4) 4 GM chewable tablet Chew 4 tablets As Needed for Low Blood Sugar. 90 tablet 3   • glucose blood test strip Use as instructed 200 each 12   • Insulin Glargine (LANTUS SOLOSTAR) 100 UNIT/ML injection pen Inject 35 Units under the skin into the appropriate area as directed Every Night. 9 mL 3   • insulin lispro (humaLOG) 100 UNIT/ML injection INJECT 5 UNITS SUBCUTANEOUSLY PRIOR TO MEALS. 10 mL 0   • Insulin Pen Needle 32G X 8 MM misc Use at bedtime 100 each 3   • Insulin Syringe-Needle U-100 30G X 1/2\" 1 ML misc Use three times daily with insulin 100 each 3   • Jardiance 25 MG tablet tablet TAKE 1 TABLET DAILY. 30 tablet 0   • Lancets misc Use for E11.65 diabetes to check sugars 4 times a day. 200 each 3   • levoFLOXacin (LEVAQUIN) 750 MG tablet      • " "levothyroxine (SYNTHROID, LEVOTHROID) 25 MCG tablet TAKE 1 TABLET DAILY. 30 tablet 0   • lisinopril (PRINIVIL,ZESTRIL) 20 MG tablet TAKE 1 TABLET DAILY. 30 tablet 0   • methocarbamol (ROBAXIN) 750 MG tablet TAKE 1 TABLET THREE TIMES DAILY. 90 tablet 0   • moxifloxacin (VIGAMOX) 0.5 % ophthalmic solution      • mupirocin (Bactroban) 2 % cream Apply  topically to the appropriate area as directed 3 (Three) Times a Day. 30 g 1   • omeprazole (priLOSEC) 40 MG capsule Take 1 capsule by mouth Daily. 30 capsule 11   • oxybutynin XL (DITROPAN-XL) 10 MG 24 hr tablet TAKE 1 TABLET DAILY. 30 tablet 1   • prednisoLONE acetate (PRED FORTE) 1 % ophthalmic suspension      • pregabalin (LYRICA) 300 MG capsule TAKE 1 CAPSULE TWICE DAILY 60 capsule 0   • Prolensa 0.07 % solution      • silver sulfadiazine (Silvadene) 1 % cream Apply  topically to the appropriate area as directed 2 (Two) Times a Day. 400 g 3   • sulfamethoxazole-trimethoprim (BACTRIM DS,SEPTRA DS) 800-160 MG per tablet Take 1 tablet by mouth 2 (Two) Times a Day.     • TRUEplus Insulin Syringe 30G X 5/16\" 0.5 ML misc      • Trulicity 3 MG/0.5ML solution pen-injector INJECT 1 PEN SUBCUTANEOUSLY ONCE WEEKLY 2 mL 0   • Viibryd 20 MG tablet tablet TAKE 1 TABLET DAILY. 30 tablet 0   • vitamin B-12 (CYANOCOBALAMIN) 1000 MCG tablet Take 1 tablet by mouth Daily. 90 tablet 1   • vitamin D (ERGOCALCIFEROL) 1.25 MG (25012 UT) capsule capsule TAKE 1 CAPSULE WEEKLY 4 capsule 0     No current facility-administered medications for this visit.       Review of Systems   Constitutional: Negative.    HENT: Negative.    Eyes: Negative.    Respiratory: Negative.    Cardiovascular: Negative.    Gastrointestinal: Negative.    Musculoskeletal:        Left foot pain    Skin: Negative.    Neurological: Negative.    Psychiatric/Behavioral: Negative.          OBJECTIVE    Pulse 82   Ht 175.3 cm (69\")   Wt 107 kg (235 lb)   SpO2 99%   BMI 34.70 kg/m²     Physical Exam  Vitals reviewed. "   Constitutional:       General: She is not in acute distress.     Appearance: She is well-developed.   HENT:      Head: Normocephalic and atraumatic.      Nose: Nose normal.   Eyes:      Conjunctiva/sclera: Conjunctivae normal.      Pupils: Pupils are equal, round, and reactive to light.   Pulmonary:      Effort: Pulmonary effort is normal. No respiratory distress.      Breath sounds: No wheezing.   Musculoskeletal:         General: Tenderness present. No deformity. Normal range of motion.   Skin:     General: Skin is warm and dry.      Capillary Refill: Capillary refill takes less than 2 seconds.   Neurological:      Mental Status: She is alert and oriented to person, place, and time.   Psychiatric:         Behavior: Behavior normal.         Thought Content: Thought content normal.        Right BKA    Left Lower Extremity Exam:     Cardiovascular:    DP/PT pulses palpable    CFT brisk  to all digits   Musculoskeletal:  Muscle strength is 5/5 for all muscle groups tested   Slight tenderness to left midfoot  Partial fourth digit amputation  Dermatological:   Webspaces 1-4  are clean, dry and intact.   No subcutaneous nodules or masses noted     No open wounds noted    Left hallux nail is absent.    Left second, third and fifth digit toenails are thickened, discolored, elongated with subungual debris.  Neurological:   Protective sensation absent   Sensation intact to light diminished            Procedures        ASSESSMENT AND PLAN    Diagnoses and all orders for this visit:    1. Left foot pain (Primary)  -     XR Foot 3+ View Left    2. Onychomycosis    3. Uncontrolled type 2 diabetes mellitus with hyperglycemia (HCC)    4. Amputee, below knee, right (HCC)        - Left foot and ankle exam performed.  - Radiographs taken reviewed left foot.  No acute osseous or articular pathology.  Okay to discontinue surgical shoe.  Transition to regular shoe gear as tolerated.  -Toenails 2, 3 and 5 left were debrided in length  and thickness with nail nipper and electric  to decrease fungal load and risk of infection.  - All the patients questions were answered.  - RTC 3 months or sooner if needed.              This document has been electronically signed by Gagan Moreno DPM on October 21, 2021 13:11 CDT     10/21/2021  13:11 CDT

## 2021-10-22 ENCOUNTER — TELEPHONE (OUTPATIENT)
Dept: ENDOCRINOLOGY | Facility: CLINIC | Age: 56
End: 2021-10-22

## 2021-10-22 NOTE — TELEPHONE ENCOUNTER
Pt called a her Tae 2 reader isn't working. She uses [facility] if this is something we can call in. Or does she need to call the company? Thank you.

## 2021-11-02 DIAGNOSIS — E11.42 DIABETIC POLYNEUROPATHY ASSOCIATED WITH TYPE 2 DIABETES MELLITUS (HCC): ICD-10-CM

## 2021-11-02 RX ORDER — ERGOCALCIFEROL 1.25 MG/1
CAPSULE ORAL
Qty: 4 CAPSULE | Refills: 0 | Status: SHIPPED | OUTPATIENT
Start: 2021-11-02 | End: 2021-11-16 | Stop reason: SDUPTHER

## 2021-11-02 RX ORDER — LEVOTHYROXINE SODIUM 0.03 MG/1
TABLET ORAL
Qty: 30 TABLET | Refills: 0 | Status: SHIPPED | OUTPATIENT
Start: 2021-11-02 | End: 2021-11-16 | Stop reason: SDUPTHER

## 2021-11-02 RX ORDER — METHOCARBAMOL 750 MG/1
TABLET, FILM COATED ORAL
Qty: 90 TABLET | Refills: 0 | Status: SHIPPED | OUTPATIENT
Start: 2021-11-02 | End: 2021-11-16 | Stop reason: SDUPTHER

## 2021-11-02 RX ORDER — LISINOPRIL 20 MG/1
TABLET ORAL
Qty: 30 TABLET | Refills: 0 | Status: SHIPPED | OUTPATIENT
Start: 2021-11-02 | End: 2021-11-16 | Stop reason: SDUPTHER

## 2021-11-02 RX ORDER — PNV NO.95/FERROUS FUM/FOLIC AC 28MG-0.8MG
TABLET ORAL
Qty: 30 TABLET | Refills: 0 | Status: SHIPPED | OUTPATIENT
Start: 2021-11-02 | End: 2021-11-16 | Stop reason: SDUPTHER

## 2021-11-02 RX ORDER — EMPAGLIFLOZIN 25 MG/1
TABLET, FILM COATED ORAL
Qty: 30 TABLET | Refills: 0 | Status: SHIPPED | OUTPATIENT
Start: 2021-11-02 | End: 2021-11-16 | Stop reason: SDUPTHER

## 2021-11-02 RX ORDER — ATORVASTATIN CALCIUM 40 MG/1
TABLET, FILM COATED ORAL
Qty: 30 TABLET | Refills: 0 | Status: SHIPPED | OUTPATIENT
Start: 2021-11-02 | End: 2021-11-16 | Stop reason: DRUGHIGH

## 2021-11-02 RX ORDER — VILAZODONE HYDROCHLORIDE 20 MG/1
TABLET ORAL
Qty: 30 TABLET | Refills: 0 | Status: SHIPPED | OUTPATIENT
Start: 2021-11-02 | End: 2021-11-16 | Stop reason: SDUPTHER

## 2021-11-02 RX ORDER — PREGABALIN 300 MG/1
CAPSULE ORAL
Qty: 60 CAPSULE | Refills: 0 | Status: SHIPPED | OUTPATIENT
Start: 2021-11-02 | End: 2021-11-16 | Stop reason: SDUPTHER

## 2021-11-15 ENCOUNTER — TELEPHONE (OUTPATIENT)
Dept: FAMILY MEDICINE CLINIC | Facility: CLINIC | Age: 56
End: 2021-11-15

## 2021-11-16 ENCOUNTER — OFFICE VISIT (OUTPATIENT)
Dept: FAMILY MEDICINE CLINIC | Facility: CLINIC | Age: 56
End: 2021-11-16

## 2021-11-16 VITALS
SYSTOLIC BLOOD PRESSURE: 110 MMHG | HEART RATE: 82 BPM | BODY MASS INDEX: 34.7 KG/M2 | DIASTOLIC BLOOD PRESSURE: 50 MMHG | HEIGHT: 69 IN | OXYGEN SATURATION: 99 % | TEMPERATURE: 97.8 F

## 2021-11-16 DIAGNOSIS — I51.7 LEFT ATRIAL DILATION: ICD-10-CM

## 2021-11-16 DIAGNOSIS — E61.1 IRON DEFICIENCY: Chronic | ICD-10-CM

## 2021-11-16 DIAGNOSIS — E78.2 MIXED HYPERLIPIDEMIA: ICD-10-CM

## 2021-11-16 DIAGNOSIS — E03.9 HYPOTHYROIDISM, UNSPECIFIED TYPE: ICD-10-CM

## 2021-11-16 DIAGNOSIS — E55.9 VITAMIN D DEFICIENCY: ICD-10-CM

## 2021-11-16 DIAGNOSIS — I31.39 PERICARDIAL EFFUSION: ICD-10-CM

## 2021-11-16 DIAGNOSIS — E66.9 CLASS 1 OBESITY WITH SERIOUS COMORBIDITY AND BODY MASS INDEX (BMI) OF 34.0 TO 34.9 IN ADULT, UNSPECIFIED OBESITY TYPE: ICD-10-CM

## 2021-11-16 DIAGNOSIS — M79.642 LEFT HAND PAIN: ICD-10-CM

## 2021-11-16 DIAGNOSIS — S88.119A BELOW KNEE AMPUTATION (HCC): ICD-10-CM

## 2021-11-16 DIAGNOSIS — Z99.3 WHEELCHAIR BOUND: ICD-10-CM

## 2021-11-16 DIAGNOSIS — I10 ESSENTIAL HYPERTENSION: ICD-10-CM

## 2021-11-16 DIAGNOSIS — Z23 NEED FOR IMMUNIZATION AGAINST INFLUENZA: ICD-10-CM

## 2021-11-16 DIAGNOSIS — I51.7 ACQUIRED DILATION OF LEFT VENTRICLE OF HEART: ICD-10-CM

## 2021-11-16 DIAGNOSIS — F33.1 MODERATE EPISODE OF RECURRENT MAJOR DEPRESSIVE DISORDER (HCC): ICD-10-CM

## 2021-11-16 DIAGNOSIS — E11.65 UNCONTROLLED TYPE 2 DIABETES MELLITUS WITH HYPERGLYCEMIA (HCC): Primary | ICD-10-CM

## 2021-11-16 DIAGNOSIS — E11.42 DIABETIC POLYNEUROPATHY ASSOCIATED WITH TYPE 2 DIABETES MELLITUS (HCC): ICD-10-CM

## 2021-11-16 PROCEDURE — 99214 OFFICE O/P EST MOD 30 MIN: CPT | Performed by: FAMILY MEDICINE

## 2021-11-16 PROCEDURE — 90686 IIV4 VACC NO PRSV 0.5 ML IM: CPT | Performed by: FAMILY MEDICINE

## 2021-11-16 PROCEDURE — G0008 ADMIN INFLUENZA VIRUS VAC: HCPCS | Performed by: FAMILY MEDICINE

## 2021-11-16 RX ORDER — PNV NO.95/FERROUS FUM/FOLIC AC 28MG-0.8MG
1 TABLET ORAL
Qty: 90 TABLET | Refills: 1 | Status: SHIPPED | OUTPATIENT
Start: 2021-11-16 | End: 2021-12-14

## 2021-11-16 RX ORDER — LEVOTHYROXINE SODIUM 0.03 MG/1
25 TABLET ORAL DAILY
Qty: 90 TABLET | Refills: 1 | Status: SHIPPED | OUTPATIENT
Start: 2021-11-16 | End: 2021-12-30

## 2021-11-16 RX ORDER — FLURBIPROFEN SODIUM 0.3 MG/ML
SOLUTION/ DROPS OPHTHALMIC
Qty: 100 EACH | Refills: 3 | Status: SHIPPED | OUTPATIENT
Start: 2021-11-16 | End: 2021-11-22 | Stop reason: SDUPTHER

## 2021-11-16 RX ORDER — PREGABALIN 300 MG/1
300 CAPSULE ORAL 2 TIMES DAILY
Qty: 60 CAPSULE | Refills: 1 | Status: SHIPPED | OUTPATIENT
Start: 2021-11-16 | End: 2022-01-20 | Stop reason: SDUPTHER

## 2021-11-16 RX ORDER — VILAZODONE HYDROCHLORIDE 20 MG/1
20 TABLET ORAL DAILY
Qty: 90 TABLET | Refills: 1 | Status: SHIPPED | OUTPATIENT
Start: 2021-11-16 | End: 2022-03-09

## 2021-11-16 RX ORDER — SYRINGE-NEEDLE,INSULIN,0.5 ML 27GX1/2"
SYRINGE, EMPTY DISPOSABLE MISCELLANEOUS
Qty: 100 EACH | Refills: 3 | Status: SHIPPED | OUTPATIENT
Start: 2021-11-16

## 2021-11-16 RX ORDER — DULAGLUTIDE 3 MG/.5ML
3 INJECTION, SOLUTION SUBCUTANEOUS WEEKLY
Qty: 4 PEN | Refills: 3 | Status: SHIPPED | OUTPATIENT
Start: 2021-11-16 | End: 2021-11-22 | Stop reason: SDUPTHER

## 2021-11-16 RX ORDER — MUPIROCIN CALCIUM 20 MG/G
CREAM TOPICAL 3 TIMES DAILY
Qty: 30 G | Refills: 1 | Status: SHIPPED | OUTPATIENT
Start: 2021-11-16 | End: 2022-03-04

## 2021-11-16 RX ORDER — LISINOPRIL 20 MG/1
20 TABLET ORAL DAILY
Qty: 90 TABLET | Refills: 1 | Status: SHIPPED | OUTPATIENT
Start: 2021-11-16 | End: 2021-12-30

## 2021-11-16 RX ORDER — METHOCARBAMOL 750 MG/1
750 TABLET, FILM COATED ORAL 3 TIMES DAILY
Qty: 90 TABLET | Refills: 4 | Status: SHIPPED | OUTPATIENT
Start: 2021-11-16 | End: 2022-05-26

## 2021-11-16 RX ORDER — ERGOCALCIFEROL 1.25 MG/1
50000 CAPSULE ORAL WEEKLY
Qty: 12 CAPSULE | Refills: 1 | Status: SHIPPED | OUTPATIENT
Start: 2021-11-16 | End: 2021-12-30

## 2021-11-16 RX ORDER — OXYBUTYNIN CHLORIDE 10 MG/1
10 TABLET, EXTENDED RELEASE ORAL DAILY
Qty: 90 TABLET | Refills: 1 | Status: SHIPPED | OUTPATIENT
Start: 2021-11-16 | End: 2021-12-30

## 2021-11-16 RX ORDER — ATORVASTATIN CALCIUM 80 MG/1
80 TABLET, FILM COATED ORAL NIGHTLY
Qty: 30 TABLET | Refills: 3 | Status: SHIPPED | OUTPATIENT
Start: 2021-11-16 | End: 2022-03-01

## 2021-11-16 NOTE — PATIENT INSTRUCTIONS
"Will refer to [facility] with [facility]. appt will be here     Get x-ray of left hand again   Conn's Current Therapy 2017 (pp. ). DYLAN Li: [employer]"> Stoelting's Anesthesia and Co-Existing Disease (7th ed., pp. 225-236). DYLAN Li: [employer]"> Braunwald's Heart Disease (11th ed., pp. 174-243). DYLAN Li: Elsevier.\">   Pericardial Effusion    Pericardial effusion is a buildup of fluid around the heart. The heart is surrounded by a thin, double-layered sac (pericardium). When fluid builds up in this sac, it can put too much pressure on the heart and make it harder for the heart to pump blood (cardiac tamponade). This can be life-threatening.  What are the causes?  Often, the cause of pericardial effusion is not known (idiopathic effusion). In some cases, the condition may be caused by:  · Infections from a virus, fungus, parasite, or bacteria.  · Damage to the pericardium from heart surgery or a heart attack.  · Inflammatory diseases, such as rheumatoid arthritis or lupus.  · Kidney or thyroid disease.  · Cancer or treatment for cancer, including radiation or chemotherapy.  · Certain medicines, including medicines for tuberculosis or seizures.  · Chest injury.  What are the signs or symptoms?  Pericardial effusion may not cause symptoms at first, especially if the fluid builds up slowly. In time, pressure on the heart may cause:  · Chest pain and trouble breathing. This may include pain and shortness of breath that get worse when lying down.  · Dizziness and fainting.  · Coughing and hiccups.  · Skipped heartbeats.  · Anxiety and confusion.  · A bluish skin color (cyanosis).  · Swollen legs and ankles.  · A feeling of fullness in the chest.  How is this diagnosed?  This condition is diagnosed based on your symptoms and testing, which may include:  · A test that creates ultrasound images of your heart (echocardiogram).  · A test to examine the electrical functions " of your heart (electrocardiogram).  · Chest X-ray.  · CT scan.  · MRI.  · Blood tests.  How is this treated?  Treatment for this condition depends on the cause of your condition and how severe your symptoms are. Treatment may include:  · Medicines, such as:  ? NSAIDs, such as ibuprofen.  ? Anti-inflammatory medicines, such as steroids.  ? Medicines to fight infection, such as antibiotics.  · Hospital treatment. This may be necessary if the fluid around the heart prevents it from pumping enough blood. Treatment in the hospital may include:  ? IV fluids.  ? Breathing support.  · Surgery. This may be needed in severe cases. Surgery may include:  ? A procedure to remove fluid from the pericardium by placing a needle into it (pericardiocentesis).  ? A procedure to make a permanent opening in the pericardium (pericardial window).  ? Open heart surgery.  Follow these instructions at home:  · Take over-the-counter and prescription medicines only as told by your health care provider.  · If you were prescribed a medicine to fight infection, such as antibiotic medicine, take it as told by your health care provider. Do not stop taking the medicine even if you start to feel better.  · Rest as told by your health care provider. Ask your health care provider what activities are safe for you.  · Keep all follow-up visits as told by your health care provider. This is important.  Contact a health care provider if:  · You have a cough or hiccups that do not go away.  · You have severe swelling in your legs or ankles.  Get help right away if you:  · Have fast or irregular heartbeats (palpitations).  · Feel dizzy or light-headed.  · Faint.  · Have chest pain.  · Have trouble breathing.  These symptoms may represent a serious problem that is an emergency. Do not wait to see if the symptoms will go away. Get medical help right away. Call your local emergency services (911 in the [place] Do not drive yourself to the  hospital.  Summary  · Pericardial effusion is a buildup of fluid around the heart. The fluid can eventually prevent the heart from pumping enough blood (cardiac tamponade), which can be life-threatening.  · Pericardial effusion may not cause symptoms at first.  · Treatment for pericardial effusion depends on the cause of your condition and how severe your symptoms are. In severe cases, hospital treatment or surgery may be required.  · Rest as told by your health care provider. Ask your health care provider what activities are safe for you.  This information is not intended to replace advice given to you by your health care provider. Make sure you discuss any questions you have with your health care provider.  Document Revised: 05/05/2020 Document Reviewed: 05/05/2020  [employer] Patient Education © 2021 [employer] Inc.

## 2021-11-16 NOTE — PROGRESS NOTES
Injection  Injection performed in Left Deltoid by Yunior Duval MA. Patient tolerated the procedure well without complications.  11/16/21   Yunior Duval MA

## 2021-11-18 ENCOUNTER — TELEPHONE (OUTPATIENT)
Dept: FAMILY MEDICINE CLINIC | Facility: CLINIC | Age: 56
End: 2021-11-18

## 2021-11-18 NOTE — TELEPHONE ENCOUNTER
----- Message from Gato Crane MD sent at 11/18/2021  7:16 AM CST -----  Please let pt know her x-ray of left hand was normal    If pain persist consider MRI of left hand     Recheck on next visit thanks

## 2021-11-22 ENCOUNTER — OFFICE VISIT (OUTPATIENT)
Dept: ENDOCRINOLOGY | Facility: CLINIC | Age: 56
End: 2021-11-22

## 2021-11-22 VITALS
WEIGHT: 235 LBS | SYSTOLIC BLOOD PRESSURE: 112 MMHG | DIASTOLIC BLOOD PRESSURE: 60 MMHG | HEIGHT: 69 IN | OXYGEN SATURATION: 99 % | HEART RATE: 83 BPM | BODY MASS INDEX: 34.8 KG/M2

## 2021-11-22 DIAGNOSIS — I10 ESSENTIAL HYPERTENSION: ICD-10-CM

## 2021-11-22 DIAGNOSIS — Z79.4 TYPE 2 DIABETES MELLITUS WITH HYPOGLYCEMIA WITHOUT COMA, WITH LONG-TERM CURRENT USE OF INSULIN (HCC): Primary | ICD-10-CM

## 2021-11-22 DIAGNOSIS — E03.9 HYPOTHYROIDISM, UNSPECIFIED TYPE: ICD-10-CM

## 2021-11-22 DIAGNOSIS — E78.2 MIXED HYPERLIPIDEMIA: ICD-10-CM

## 2021-11-22 DIAGNOSIS — E55.9 VITAMIN D DEFICIENCY: ICD-10-CM

## 2021-11-22 DIAGNOSIS — E11.649 TYPE 2 DIABETES MELLITUS WITH HYPOGLYCEMIA WITHOUT COMA, WITH LONG-TERM CURRENT USE OF INSULIN (HCC): Primary | ICD-10-CM

## 2021-11-22 PROCEDURE — 99214 OFFICE O/P EST MOD 30 MIN: CPT | Performed by: NURSE PRACTITIONER

## 2021-11-22 RX ORDER — DULAGLUTIDE 3 MG/.5ML
3 INJECTION, SOLUTION SUBCUTANEOUS WEEKLY
Qty: 4 PEN | Refills: 3 | Status: SHIPPED | OUTPATIENT
Start: 2021-11-22 | End: 2022-09-13

## 2021-11-22 RX ORDER — FLURBIPROFEN SODIUM 0.3 MG/ML
SOLUTION/ DROPS OPHTHALMIC
Qty: 100 EACH | Refills: 3 | Status: SHIPPED | OUTPATIENT
Start: 2021-11-22

## 2021-11-22 NOTE — PROGRESS NOTES
"Chief Complaint  Diabetes    Subjective          Marta Travis Giraldo presents to [facility] ENDOCRINOLOGY  History of Present Illness     In office visit          56-year-old female presents for follow-up     Reason diabetes mellitus type 2     Timing constant     Quality improved control        Lab Results   Component Value Date    HGBA1C 7.98 (H) 09/08/2021          Duration diagnosed 1980s     Severity is high           Macrovascular complications--CVA      Microvascular complications--neuropathy , diabetic retinopathy           Blood glucose monitoring fingersticks     Checks 4 times daily     Has nigel but could not download         States most at goal     Has had low sugars --- after giving the 10 units of Humalog                Alleviating factors-- compliance with medication      Aggravating factors-- none             Review of Systems - General ROS: negative            Objective   Vital Signs:   /60   Pulse 83   Ht 175.3 cm (69\")   Wt 107 kg (235 lb)   SpO2 99%   BMI 34.70 kg/m²     Physical Exam  Cardiovascular:      Rate and Rhythm: Regular rhythm.      Heart sounds: Normal heart sounds.   Pulmonary:      Breath sounds: Normal breath sounds.   Musculoskeletal:      Cervical back: Normal range of motion.      Comments: In wheelchair; right BKA    Neurological:      Mental Status: She is alert.        Result Review :   The following data was reviewed by: CONNOR Concepcion on 11/22/2021:  Common labs    Common Labsle 6/14/21 6/14/21 6/14/21 6/14/21 6/14/21 8/16/21 9/8/21 9/8/21 9/8/21 9/8/21    0816 0816 0816 0816 0816  0805 0805 0805 0805   Glucose  137 (A)        201 (A)   BUN  14        22 (A)   Creatinine  1.17 (A)        1.24 (A)   eGFR Non  Am  48 (A)        45 (A)   Sodium  140        139   Potassium  4.6        5.0   Chloride  106        104   Calcium  9.0        8.8   Albumin  4.20        3.90   Total Bilirubin  0.2        <0.2   Alkaline " Phosphatase  71        84   AST (SGOT)  14        10   ALT (SGPT)  13        11   WBC 14.21 (A)     11.51 (A) 12.36 (A)      Hemoglobin 12.7     12.0 11.7 (A)      Hematocrit 40.7     38.2 35.9      Platelets 218     202 214      Total Cholesterol   127      170    Triglycerides   70      63    HDL Cholesterol   39 (A)      47    LDL Cholesterol    74      111 (A)    Hemoglobin A1C    7.90 (A)    7.98 (A)     Microalbumin, Urine     3.4        (A) Abnormal value                      Assessment and Plan    Diagnoses and all orders for this visit:    1. Type 2 diabetes mellitus with hypoglycemia without coma, with long-term current use of insulin (HCC) (Primary)  -     CBC & Differential; Future  -     Comprehensive Metabolic Panel; Future  -     Hemoglobin A1c; Future  -     Lipid Panel; Future  -     Microalbumin / Creatinine Urine Ratio - Urine, Clean Catch; Future  -     Protein / Creatinine Ratio, Urine - Urine, Clean Catch; Future  -     TSH; Future  -     Vitamin B12; Future  -     Vitamin D 25 Hydroxy; Future    2. Hypothyroidism, unspecified type  -     CBC & Differential; Future  -     Comprehensive Metabolic Panel; Future  -     Hemoglobin A1c; Future  -     Lipid Panel; Future  -     Microalbumin / Creatinine Urine Ratio - Urine, Clean Catch; Future  -     Protein / Creatinine Ratio, Urine - Urine, Clean Catch; Future  -     TSH; Future  -     Vitamin B12; Future  -     Vitamin D 25 Hydroxy; Future    3. Vitamin D deficiency  -     CBC & Differential; Future  -     Comprehensive Metabolic Panel; Future  -     Hemoglobin A1c; Future  -     Lipid Panel; Future  -     Microalbumin / Creatinine Urine Ratio - Urine, Clean Catch; Future  -     Protein / Creatinine Ratio, Urine - Urine, Clean Catch; Future  -     TSH; Future  -     Vitamin B12; Future  -     Vitamin D 25 Hydroxy; Future    4. Essential hypertension  -     CBC & Differential; Future  -     Comprehensive Metabolic Panel; Future  -     Hemoglobin  A1c; Future  -     Lipid Panel; Future  -     Microalbumin / Creatinine Urine Ratio - Urine, Clean Catch; Future  -     Protein / Creatinine Ratio, Urine - Urine, Clean Catch; Future  -     TSH; Future  -     Vitamin B12; Future  -     Vitamin D 25 Hydroxy; Future    5. Mixed hyperlipidemia  -     CBC & Differential; Future  -     Comprehensive Metabolic Panel; Future  -     Hemoglobin A1c; Future  -     Lipid Panel; Future  -     Microalbumin / Creatinine Urine Ratio - Urine, Clean Catch; Future  -     Protein / Creatinine Ratio, Urine - Urine, Clean Catch; Future  -     TSH; Future  -     Vitamin B12; Future  -     Vitamin D 25 Hydroxy; Future    Other orders  -     B-D UF III MINI PEN NEEDLES 31G X 5 MM misc; Use as needed  Dispense: 100 each; Refill: 3  -     Continuous Blood Gluc Sensor (FreeStyle Tae 2 Sensor) misc; 1 each Every 14 (Fourteen) Days. Use every 14 days  Dispense: 2 each; Refill: 11  -     Dulaglutide (Trulicity) 3 MG/0.5ML solution pen-injector; Inject 0.5 mL under the skin into the appropriate area as directed 1 (One) Time Per Week.  Dispense: 4 pen; Refill: 3  -     empagliflozin (Jardiance) 25 MG tablet tablet; Take 1 tablet by mouth Daily.  Dispense: 90 tablet; Refill: 1  -     Insulin Glargine (LANTUS SOLOSTAR) 100 UNIT/ML injection pen; Inject 35 Units under the skin into the appropriate area as directed Every Night.  Dispense: 9 mL; Refill: 3  -     insulin lispro (humaLOG) 100 UNIT/ML injection; INJECT 5 UNITS SUBCUTANEOUSLY PRIOR TO MEALS.  Dispense: 10 mL; Refill: 0           Glycemic management     Diabetes mellitus type 2              Lab Results   Component Value Date    HGBA1C 7.98 (H) 09/08/2021          Taking Jardiance 25 mg daily-keep      Taking Trulicity 3 mg weekly --keep      Taking Basaglar--- 36 units            Take humalog before meals      If a small meal give 5 units      If a medium meal give 8 units      If a large meal give 10 units ---decrease to 8 untis                 Goals for sugar     Fasting and before meals 80 to 130     2 hours after meals 180 or less        Aim for 45 grams of carbohydrate per meal     Aim for 15 grams of carbohydrate per snack                     Lipid management     Taking Lipitor 40 mg 1 at night        Total Cholesterol   Date Value Ref Range Status   09/08/2021 170 0 - 200 mg/dL Final     Triglycerides   Date Value Ref Range Status   09/08/2021 63 0 - 150 mg/dL Final     HDL Cholesterol   Date Value Ref Range Status   09/08/2021 47 40 - 60 mg/dL Final     LDL Cholesterol    Date Value Ref Range Status   09/08/2021 111 (H) 0 - 100 mg/dL Final              Blood pressure management     Taking lisinopril 20 mg 1 daily                 Microvascular monitoring     Last eye exam--- Jan. 2021 ,  , monthly injection      Neuropathy     Taking Lyrica              Thyroid     Hypothyroidism     Taking levothyroxine 25 mcg daily        Lab Results   Component Value Date    TSH 1.090 09/08/2021          Bone health     Vitamin D deficiency     Taking vitamin D 50,000 units weekly     Weight management     Patient's Body mass index is 34.7 kg/m². indicating that she is obese (BMI >30). Obesity-related health conditions include the following: diabetes mellitus. Obesity is unchanged. BMI is is above average; no BMI management plan is appropriate. We discussed portion control..                             Follow Up   No follow-ups on file.  Patient was given instructions and counseling regarding her condition or for health maintenance advice. Please see specific information pulled into the AVS if appropriate.         This document has been electronically signed by CONNOR Concepcion on November 22, 2021 15:10 CST.

## 2021-11-29 ENCOUNTER — TELEPHONE (OUTPATIENT)
Dept: FAMILY MEDICINE CLINIC | Facility: CLINIC | Age: 56
End: 2021-11-29

## 2021-12-01 ENCOUNTER — OFFICE VISIT (OUTPATIENT)
Dept: CARDIOLOGY | Facility: CLINIC | Age: 56
End: 2021-12-01

## 2021-12-01 VITALS
SYSTOLIC BLOOD PRESSURE: 116 MMHG | BODY MASS INDEX: 34.8 KG/M2 | WEIGHT: 235 LBS | HEART RATE: 83 BPM | DIASTOLIC BLOOD PRESSURE: 58 MMHG | HEIGHT: 69 IN | OXYGEN SATURATION: 98 %

## 2021-12-01 DIAGNOSIS — R06.02 SHORTNESS OF BREATH: ICD-10-CM

## 2021-12-01 DIAGNOSIS — I31.39 PERICARDIAL EFFUSION: Primary | ICD-10-CM

## 2021-12-01 DIAGNOSIS — R07.9 CHEST PAIN, UNSPECIFIED TYPE: ICD-10-CM

## 2021-12-01 DIAGNOSIS — I10 HYPERTENSION, UNSPECIFIED TYPE: ICD-10-CM

## 2021-12-01 DIAGNOSIS — E66.2 CLASS 1 OBESITY WITH ALVEOLAR HYPOVENTILATION, SERIOUS COMORBIDITY, AND BODY MASS INDEX (BMI) OF 34.0 TO 34.9 IN ADULT (HCC): ICD-10-CM

## 2021-12-01 PROCEDURE — 93000 ELECTROCARDIOGRAM COMPLETE: CPT | Performed by: INTERNAL MEDICINE

## 2021-12-01 PROCEDURE — 99204 OFFICE O/P NEW MOD 45 MIN: CPT | Performed by: INTERNAL MEDICINE

## 2021-12-02 ENCOUNTER — PATIENT ROUNDING (BHMG ONLY) (OUTPATIENT)
Dept: CARDIOLOGY | Facility: CLINIC | Age: 56
End: 2021-12-02

## 2021-12-02 ENCOUNTER — TRANSCRIBE ORDERS (OUTPATIENT)
Dept: GENERAL RADIOLOGY | Facility: HOSPITAL | Age: 56
End: 2021-12-02

## 2021-12-02 DIAGNOSIS — R06.02 SHORTNESS OF BREATH: Primary | ICD-10-CM

## 2021-12-02 LAB
QT INTERVAL: 368 MS
QTC INTERVAL: 432 MS

## 2021-12-02 NOTE — PROGRESS NOTES
December 2, 2021    Hello, may I speak with Marta Giraldo?    My name is Chris Guzmán, clinical coordinator     I am  with [facility] CARDIOLOGY [facility] CARDIOLOGY  [address] [place] [address] [address] [place] [address] [phone].    Before we get started may I verify your date of birth? 1965    I am calling to officially welcome you to our practice and ask about your recent visit. Is this a good time to talk? yes    Tell me about your visit with us. What things went well?  it was really good, no complaints       We're always looking for ways to make our patients' experiences even better. Do you have recommendations on ways we may improve?  no    Overall were you satisfied with your first visit to our practice? yes       I appreciate you taking the time to speak with me today. Is there anything else I can do for you? no      Thank you, and have a great day.

## 2021-12-07 ENCOUNTER — TELEPHONE (OUTPATIENT)
Dept: CARDIOLOGY | Facility: CLINIC | Age: 56
End: 2021-12-07

## 2021-12-07 ENCOUNTER — HOSPITAL ENCOUNTER (OUTPATIENT)
Dept: CT IMAGING | Facility: HOSPITAL | Age: 56
Discharge: HOME | End: 2021-12-07

## 2021-12-07 ENCOUNTER — LAB (OUTPATIENT)
Dept: LAB | Facility: HOSPITAL | Age: 56
End: 2021-12-07

## 2021-12-07 VITALS
HEART RATE: 70 BPM | DIASTOLIC BLOOD PRESSURE: 68 MMHG | RESPIRATION RATE: 18 BRPM | SYSTOLIC BLOOD PRESSURE: 141 MMHG | OXYGEN SATURATION: 98 %

## 2021-12-07 DIAGNOSIS — R07.9 CHEST PAIN, UNSPECIFIED TYPE: Primary | ICD-10-CM

## 2021-12-07 DIAGNOSIS — R06.02 SHORTNESS OF BREATH: ICD-10-CM

## 2021-12-07 DIAGNOSIS — R07.9 CHEST PAIN, UNSPECIFIED TYPE: ICD-10-CM

## 2021-12-07 LAB
BUN SERPL-MCNC: 26 MG/DL (ref 6–20)
CREAT SERPL-MCNC: 1.12 MG/DL (ref 0.57–1)
GFR SERPL CREATININE-BSD FRML MDRD: 50 ML/MIN/1.73

## 2021-12-07 PROCEDURE — 36415 COLL VENOUS BLD VENIPUNCTURE: CPT

## 2021-12-07 PROCEDURE — 96374 THER/PROPH/DIAG INJ IV PUSH: CPT

## 2021-12-07 PROCEDURE — 84520 ASSAY OF UREA NITROGEN: CPT

## 2021-12-07 PROCEDURE — 82565 ASSAY OF CREATININE: CPT

## 2021-12-07 RX ORDER — METOPROLOL TARTRATE 5 MG/5ML
INJECTION INTRAVENOUS
Status: DISPENSED
Start: 2021-12-07 | End: 2021-12-07

## 2021-12-07 RX ORDER — METOPROLOL TARTRATE 5 MG/5ML
INJECTION INTRAVENOUS
Status: DISCONTINUED | OUTPATIENT
Start: 2021-12-07 | End: 2021-12-08 | Stop reason: HOSPADM

## 2021-12-07 RX ADMIN — METOPROLOL TARTRATE 5 MG: 5 INJECTION INTRAVENOUS at 09:50

## 2021-12-07 RX ADMIN — METOPROLOL TARTRATE 5 MG: 5 INJECTION INTRAVENOUS at 09:43

## 2021-12-07 RX ADMIN — METOPROLOL TARTRATE 5 MG: 5 INJECTION INTRAVENOUS at 09:37

## 2021-12-13 ENCOUNTER — OFFICE VISIT (OUTPATIENT)
Dept: ONCOLOGY | Facility: CLINIC | Age: 56
End: 2021-12-13

## 2021-12-13 ENCOUNTER — LAB (OUTPATIENT)
Dept: ONCOLOGY | Facility: HOSPITAL | Age: 56
End: 2021-12-13

## 2021-12-13 VITALS
DIASTOLIC BLOOD PRESSURE: 64 MMHG | WEIGHT: 235 LBS | BODY MASS INDEX: 34.7 KG/M2 | OXYGEN SATURATION: 98 % | HEART RATE: 81 BPM | SYSTOLIC BLOOD PRESSURE: 129 MMHG | TEMPERATURE: 97.3 F

## 2021-12-13 DIAGNOSIS — D72.829 LEUKOCYTOSIS, UNSPECIFIED TYPE: Chronic | ICD-10-CM

## 2021-12-13 DIAGNOSIS — D72.829 LEUKOCYTOSIS, UNSPECIFIED TYPE: ICD-10-CM

## 2021-12-13 DIAGNOSIS — E61.1 IRON DEFICIENCY: ICD-10-CM

## 2021-12-13 DIAGNOSIS — E61.1 IRON DEFICIENCY: Primary | Chronic | ICD-10-CM

## 2021-12-13 LAB
BASOPHILS # BLD AUTO: 0.12 10*3/MM3 (ref 0–0.2)
BASOPHILS NFR BLD AUTO: 0.9 % (ref 0–1.5)
DEPRECATED RDW RBC AUTO: 45 FL (ref 37–54)
EOSINOPHIL # BLD AUTO: 0.3 10*3/MM3 (ref 0–0.4)
EOSINOPHIL NFR BLD AUTO: 2.2 % (ref 0.3–6.2)
ERYTHROCYTE [DISTWIDTH] IN BLOOD BY AUTOMATED COUNT: 15 % (ref 12.3–15.4)
FERRITIN SERPL-MCNC: 52.45 NG/ML (ref 13–150)
FOLATE SERPL-MCNC: 15.4 NG/ML (ref 4.78–24.2)
HCT VFR BLD AUTO: 36.4 % (ref 34–46.6)
HGB BLD-MCNC: 11.7 G/DL (ref 12–15.9)
IMM GRANULOCYTES # BLD AUTO: 0.06 10*3/MM3 (ref 0–0.05)
IMM GRANULOCYTES NFR BLD AUTO: 0.4 % (ref 0–0.5)
IRON 24H UR-MRATE: 48 MCG/DL (ref 37–145)
IRON SATN MFR SERPL: 16 % (ref 20–50)
LYMPHOCYTES # BLD AUTO: 2.44 10*3/MM3 (ref 0.7–3.1)
LYMPHOCYTES NFR BLD AUTO: 18 % (ref 19.6–45.3)
MCH RBC QN AUTO: 26.4 PG (ref 26.6–33)
MCHC RBC AUTO-ENTMCNC: 32.1 G/DL (ref 31.5–35.7)
MCV RBC AUTO: 82 FL (ref 79–97)
MONOCYTES # BLD AUTO: 0.53 10*3/MM3 (ref 0.1–0.9)
MONOCYTES NFR BLD AUTO: 3.9 % (ref 5–12)
NEUTROPHILS NFR BLD AUTO: 10.08 10*3/MM3 (ref 1.7–7)
NEUTROPHILS NFR BLD AUTO: 74.6 % (ref 42.7–76)
NRBC BLD AUTO-RTO: 0 /100 WBC (ref 0–0.2)
PLATELET # BLD AUTO: 215 10*3/MM3 (ref 140–450)
PMV BLD AUTO: 12.4 FL (ref 6–12)
RBC # BLD AUTO: 4.44 10*6/MM3 (ref 3.77–5.28)
TIBC SERPL-MCNC: 304 MCG/DL (ref 298–536)
TRANSFERRIN SERPL-MCNC: 204 MG/DL (ref 200–360)
VIT B12 BLD-MCNC: 337 PG/ML (ref 211–946)
WBC NRBC COR # BLD: 13.53 10*3/MM3 (ref 3.4–10.8)

## 2021-12-13 PROCEDURE — 1157F ADVNC CARE PLAN IN RCRD: CPT | Performed by: INTERNAL MEDICINE

## 2021-12-13 PROCEDURE — G0463 HOSPITAL OUTPT CLINIC VISIT: HCPCS | Performed by: INTERNAL MEDICINE

## 2021-12-13 PROCEDURE — 99214 OFFICE O/P EST MOD 30 MIN: CPT | Performed by: INTERNAL MEDICINE

## 2021-12-13 PROCEDURE — 82728 ASSAY OF FERRITIN: CPT

## 2021-12-13 PROCEDURE — 84466 ASSAY OF TRANSFERRIN: CPT

## 2021-12-13 PROCEDURE — 85025 COMPLETE CBC W/AUTO DIFF WBC: CPT

## 2021-12-13 PROCEDURE — 1126F AMNT PAIN NOTED NONE PRSNT: CPT | Performed by: INTERNAL MEDICINE

## 2021-12-13 PROCEDURE — 82607 VITAMIN B-12: CPT

## 2021-12-13 PROCEDURE — 83540 ASSAY OF IRON: CPT

## 2021-12-13 PROCEDURE — 82746 ASSAY OF FOLIC ACID SERUM: CPT

## 2021-12-14 PROBLEM — E53.8 B12 DEFICIENCY: Status: ACTIVE | Noted: 2021-12-14

## 2021-12-14 RX ORDER — DOCUSATE SODIUM 100 MG/1
100 CAPSULE, LIQUID FILLED ORAL 2 TIMES DAILY PRN
Qty: 60 CAPSULE | Refills: 2 | Status: SHIPPED | OUTPATIENT
Start: 2021-12-14

## 2021-12-14 RX ORDER — CYANOCOBALAMIN 1000 UG/ML
1000 INJECTION, SOLUTION INTRAMUSCULAR; SUBCUTANEOUS ONCE
Status: CANCELLED | OUTPATIENT
Start: 2021-12-20

## 2021-12-14 RX ORDER — PNV NO.95/FERROUS FUM/FOLIC AC 28MG-0.8MG
1 TABLET ORAL 2 TIMES DAILY WITH MEALS
Qty: 180 TABLET | Refills: 1 | Status: SHIPPED | OUTPATIENT
Start: 2021-12-14 | End: 2022-05-04

## 2021-12-20 ENCOUNTER — TELEPHONE (OUTPATIENT)
Dept: ONCOLOGY | Facility: HOSPITAL | Age: 56
End: 2021-12-20

## 2021-12-20 NOTE — TELEPHONE ENCOUNTER
----- Message from Adalid Bates MD sent at 12/14/2021  6:54 AM CST -----  Please let patient know, her B12 level has decreased in blood despite of her taking B12 every day.  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  Recommend starting intramuscular B12 induction regimen starting next week.  I have ordered B12 injection regimen to be started.  Her iron level is not also showing improvement, recommend increasing ferrous sulfate to 1 tablet twice daily.  I have sent prescription for ferrous sulfate twice daily and Colace to her pharmacy.  If she is not able to tolerate iron by mouth, she needs to let us know, in that case we will start her on intravenous iron replacement.  Thank you

## 2021-12-20 NOTE — TELEPHONE ENCOUNTER
Contacted pt regarding lab results and need for increase iron/B12 injections. PT does not wish to start injections until next week. PT instructed to call office for GI issues with iron. PT verbalizes understanding and denies any further questions.

## 2021-12-21 ENCOUNTER — APPOINTMENT (OUTPATIENT)
Dept: NUCLEAR MEDICINE | Facility: HOSPITAL | Age: 56
End: 2021-12-21

## 2021-12-21 ENCOUNTER — APPOINTMENT (OUTPATIENT)
Dept: CARDIOLOGY | Facility: HOSPITAL | Age: 56
End: 2021-12-21

## 2021-12-29 ENCOUNTER — APPOINTMENT (OUTPATIENT)
Dept: CARDIOLOGY | Facility: HOSPITAL | Age: 56
End: 2021-12-29

## 2021-12-29 ENCOUNTER — APPOINTMENT (OUTPATIENT)
Dept: NUCLEAR MEDICINE | Facility: HOSPITAL | Age: 56
End: 2021-12-29

## 2021-12-30 RX ORDER — LEVOTHYROXINE SODIUM 0.03 MG/1
TABLET ORAL
Qty: 30 TABLET | Refills: 2 | Status: SHIPPED | OUTPATIENT
Start: 2021-12-30 | End: 2022-05-10

## 2021-12-30 RX ORDER — LISINOPRIL 20 MG/1
TABLET ORAL
Qty: 30 TABLET | Refills: 2 | Status: SHIPPED | OUTPATIENT
Start: 2021-12-30 | End: 2022-03-18

## 2021-12-30 RX ORDER — OXYBUTYNIN CHLORIDE 10 MG/1
TABLET, EXTENDED RELEASE ORAL
Qty: 30 TABLET | Refills: 2 | Status: SHIPPED | OUTPATIENT
Start: 2021-12-30 | End: 2022-01-20

## 2021-12-30 RX ORDER — ERGOCALCIFEROL 1.25 MG/1
CAPSULE ORAL
Qty: 12 CAPSULE | Refills: 0 | Status: SHIPPED | OUTPATIENT
Start: 2021-12-30 | End: 2022-08-16

## 2022-01-04 ENCOUNTER — HOSPITAL ENCOUNTER (OUTPATIENT)
Dept: NUCLEAR MEDICINE | Facility: HOSPITAL | Age: 57
Discharge: HOME | End: 2022-01-04

## 2022-01-04 ENCOUNTER — HOSPITAL ENCOUNTER (OUTPATIENT)
Dept: CARDIOLOGY | Facility: HOSPITAL | Age: 57
Discharge: HOME | End: 2022-01-04

## 2022-01-04 DIAGNOSIS — R07.9 CHEST PAIN, UNSPECIFIED TYPE: ICD-10-CM

## 2022-01-04 PROCEDURE — 78452 HT MUSCLE IMAGE SPECT MULT: CPT

## 2022-01-04 PROCEDURE — A9500 TC99M SESTAMIBI: HCPCS | Performed by: INTERNAL MEDICINE

## 2022-01-04 PROCEDURE — 93017 CV STRESS TEST TRACING ONLY: CPT

## 2022-01-04 PROCEDURE — 0 TECHNETIUM SESTAMIBI: Performed by: INTERNAL MEDICINE

## 2022-01-04 PROCEDURE — 25010000002 REGADENOSON 0.4 MG/5ML SOLUTION: Performed by: INTERNAL MEDICINE

## 2022-01-04 PROCEDURE — 78452 HT MUSCLE IMAGE SPECT MULT: CPT | Performed by: INTERNAL MEDICINE

## 2022-01-04 PROCEDURE — 93018 CV STRESS TEST I&R ONLY: CPT | Performed by: INTERNAL MEDICINE

## 2022-01-04 PROCEDURE — 93016 CV STRESS TEST SUPVJ ONLY: CPT | Performed by: INTERNAL MEDICINE

## 2022-01-04 RX ORDER — SODIUM CHLORIDE 9 MG/ML
10 INJECTION INTRAVENOUS EVERY 12 HOURS SCHEDULED
Status: DISCONTINUED | OUTPATIENT
Start: 2022-01-04 | End: 2022-01-05 | Stop reason: HOSPADM

## 2022-01-04 RX ADMIN — TECHNETIUM TC 99M SESTAMIBI 1 DOSE: 1 INJECTION INTRAVENOUS at 08:51

## 2022-01-04 RX ADMIN — SODIUM CHLORIDE 10 ML: 9 INJECTION INTRAMUSCULAR; INTRAVENOUS; SUBCUTANEOUS at 09:53

## 2022-01-04 RX ADMIN — REGADENOSON 0.4 MG: 0.08 INJECTION, SOLUTION INTRAVENOUS at 09:53

## 2022-01-04 RX ADMIN — TECHNETIUM TC 99M SESTAMIBI 1 DOSE: 1 INJECTION INTRAVENOUS at 09:54

## 2022-01-05 LAB
BH CV REST NUCLEAR ISOTOPE DOSE: 10.2 MCI
BH CV STRESS NUCLEAR ISOTOPE DOSE: 32.7 MCI
BH CV STRESS RECOVERY BP: NORMAL MMHG
BH CV STRESS RECOVERY HR: 89 BPM
BH CV STRESS RECOVERY O2: 98 %
LV EF NUC BP: 70 %
MAXIMAL PREDICTED HEART RATE: 162 BPM
PERCENT MAX PREDICTED HR: 58.02 %
STRESS BASELINE BP: NORMAL MMHG
STRESS BASELINE HR: 78 BPM
STRESS O2 SAT REST: 99 %
STRESS PERCENT HR: 68 %
STRESS POST O2 SAT PEAK: 98 %
STRESS POST PEAK BP: NORMAL MMHG
STRESS POST PEAK HR: 94 BPM
STRESS TARGET HR: 138 BPM

## 2022-01-05 NOTE — PROGRESS NOTES
Please call and let her know that her stress test appeared abnormal. She will possibly need a heart cath for further evaluation, but we need to look at her echo first. We will wait on the echo results before we make a decision on further steps.

## 2022-01-10 ENCOUNTER — INFUSION (OUTPATIENT)
Dept: ONCOLOGY | Facility: HOSPITAL | Age: 57
End: 2022-01-10

## 2022-01-10 ENCOUNTER — TELEPHONE (OUTPATIENT)
Dept: CARDIOLOGY | Facility: CLINIC | Age: 57
End: 2022-01-10

## 2022-01-10 DIAGNOSIS — E53.8 B12 DEFICIENCY: Primary | ICD-10-CM

## 2022-01-10 PROCEDURE — 25010000002 CYANOCOBALAMIN PER 1000 MCG: Performed by: INTERNAL MEDICINE

## 2022-01-10 PROCEDURE — 96372 THER/PROPH/DIAG INJ SC/IM: CPT | Performed by: NURSE PRACTITIONER

## 2022-01-10 RX ORDER — CYANOCOBALAMIN 1000 UG/ML
1000 INJECTION, SOLUTION INTRAMUSCULAR; SUBCUTANEOUS ONCE
Status: CANCELLED | OUTPATIENT
Start: 2022-01-11

## 2022-01-10 RX ORDER — CYANOCOBALAMIN 1000 UG/ML
1000 INJECTION, SOLUTION INTRAMUSCULAR; SUBCUTANEOUS ONCE
Status: COMPLETED | OUTPATIENT
Start: 2022-01-10 | End: 2022-01-10

## 2022-01-10 RX ADMIN — CYANOCOBALAMIN 1000 MCG: 1000 INJECTION, SOLUTION INTRAMUSCULAR at 13:01

## 2022-01-10 NOTE — TELEPHONE ENCOUNTER
Called patient. informed her of results. Patient voiced her understandings.        ----- Message from Josias Carpio MD sent at 1/5/2022  5:13 PM CST -----  Please call and let her know that her stress test appeared abnormal. She will possibly need a heart cath for further evaluation, but we need to look at her echo first. We will wait on the echo results before we make a decision on further steps.

## 2022-01-11 ENCOUNTER — TELEPHONE (OUTPATIENT)
Dept: FAMILY MEDICINE CLINIC | Facility: CLINIC | Age: 57
End: 2022-01-11

## 2022-01-11 ENCOUNTER — INFUSION (OUTPATIENT)
Dept: ONCOLOGY | Facility: HOSPITAL | Age: 57
End: 2022-01-11

## 2022-01-11 VITALS
TEMPERATURE: 97.4 F | RESPIRATION RATE: 20 BRPM | HEART RATE: 86 BPM | DIASTOLIC BLOOD PRESSURE: 91 MMHG | SYSTOLIC BLOOD PRESSURE: 157 MMHG

## 2022-01-11 DIAGNOSIS — E53.8 B12 DEFICIENCY: Primary | ICD-10-CM

## 2022-01-11 PROCEDURE — 25010000002 CYANOCOBALAMIN PER 1000 MCG: Performed by: INTERNAL MEDICINE

## 2022-01-11 PROCEDURE — 96372 THER/PROPH/DIAG INJ SC/IM: CPT | Performed by: INTERNAL MEDICINE

## 2022-01-11 RX ORDER — CYANOCOBALAMIN 1000 UG/ML
1000 INJECTION, SOLUTION INTRAMUSCULAR; SUBCUTANEOUS ONCE
Status: COMPLETED | OUTPATIENT
Start: 2022-01-11 | End: 2022-01-11

## 2022-01-11 RX ORDER — CYANOCOBALAMIN 1000 UG/ML
1000 INJECTION, SOLUTION INTRAMUSCULAR; SUBCUTANEOUS ONCE
Status: CANCELLED | OUTPATIENT
Start: 2022-01-12

## 2022-01-11 RX ADMIN — CYANOCOBALAMIN 1000 MCG: 1000 INJECTION, SOLUTION INTRAMUSCULAR at 13:45

## 2022-01-12 ENCOUNTER — INFUSION (OUTPATIENT)
Dept: ONCOLOGY | Facility: HOSPITAL | Age: 57
End: 2022-01-12

## 2022-01-12 VITALS
TEMPERATURE: 98.9 F | SYSTOLIC BLOOD PRESSURE: 127 MMHG | HEART RATE: 85 BPM | RESPIRATION RATE: 18 BRPM | DIASTOLIC BLOOD PRESSURE: 59 MMHG

## 2022-01-12 DIAGNOSIS — E53.8 B12 DEFICIENCY: Primary | ICD-10-CM

## 2022-01-12 PROCEDURE — 25010000002 CYANOCOBALAMIN PER 1000 MCG: Performed by: INTERNAL MEDICINE

## 2022-01-12 PROCEDURE — 96372 THER/PROPH/DIAG INJ SC/IM: CPT | Performed by: INTERNAL MEDICINE

## 2022-01-12 RX ORDER — CYANOCOBALAMIN 1000 UG/ML
1000 INJECTION, SOLUTION INTRAMUSCULAR; SUBCUTANEOUS ONCE
Status: CANCELLED | OUTPATIENT
Start: 2022-01-13

## 2022-01-12 RX ORDER — CYANOCOBALAMIN 1000 UG/ML
1000 INJECTION, SOLUTION INTRAMUSCULAR; SUBCUTANEOUS ONCE
Status: COMPLETED | OUTPATIENT
Start: 2022-01-12 | End: 2022-01-12

## 2022-01-12 RX ADMIN — CYANOCOBALAMIN 1000 MCG: 1000 INJECTION, SOLUTION INTRAMUSCULAR at 14:31

## 2022-01-13 ENCOUNTER — INFUSION (OUTPATIENT)
Dept: ONCOLOGY | Facility: HOSPITAL | Age: 57
End: 2022-01-13

## 2022-01-13 VITALS
RESPIRATION RATE: 18 BRPM | SYSTOLIC BLOOD PRESSURE: 94 MMHG | DIASTOLIC BLOOD PRESSURE: 66 MMHG | HEART RATE: 77 BPM | TEMPERATURE: 98.7 F

## 2022-01-13 DIAGNOSIS — E53.8 B12 DEFICIENCY: Primary | ICD-10-CM

## 2022-01-13 PROCEDURE — 25010000002 CYANOCOBALAMIN PER 1000 MCG: Performed by: INTERNAL MEDICINE

## 2022-01-13 PROCEDURE — 96372 THER/PROPH/DIAG INJ SC/IM: CPT | Performed by: INTERNAL MEDICINE

## 2022-01-13 RX ORDER — CYANOCOBALAMIN 1000 UG/ML
1000 INJECTION, SOLUTION INTRAMUSCULAR; SUBCUTANEOUS ONCE
Status: CANCELLED | OUTPATIENT
Start: 2022-01-14

## 2022-01-13 RX ORDER — CYANOCOBALAMIN 1000 UG/ML
1000 INJECTION, SOLUTION INTRAMUSCULAR; SUBCUTANEOUS ONCE
Status: COMPLETED | OUTPATIENT
Start: 2022-01-13 | End: 2022-01-13

## 2022-01-13 RX ADMIN — CYANOCOBALAMIN 1000 MCG: 1000 INJECTION, SOLUTION INTRAMUSCULAR at 13:08

## 2022-01-14 ENCOUNTER — INFUSION (OUTPATIENT)
Dept: ONCOLOGY | Facility: HOSPITAL | Age: 57
End: 2022-01-14

## 2022-01-14 VITALS
SYSTOLIC BLOOD PRESSURE: 117 MMHG | HEART RATE: 82 BPM | DIASTOLIC BLOOD PRESSURE: 59 MMHG | RESPIRATION RATE: 18 BRPM | TEMPERATURE: 97.3 F

## 2022-01-14 DIAGNOSIS — E53.8 B12 DEFICIENCY: Primary | ICD-10-CM

## 2022-01-14 PROCEDURE — 25010000002 CYANOCOBALAMIN PER 1000 MCG: Performed by: INTERNAL MEDICINE

## 2022-01-14 PROCEDURE — 96372 THER/PROPH/DIAG INJ SC/IM: CPT | Performed by: INTERNAL MEDICINE

## 2022-01-14 RX ORDER — CYANOCOBALAMIN 1000 UG/ML
1000 INJECTION, SOLUTION INTRAMUSCULAR; SUBCUTANEOUS ONCE
Status: CANCELLED | OUTPATIENT
Start: 2022-01-15

## 2022-01-14 RX ORDER — CYANOCOBALAMIN 1000 UG/ML
1000 INJECTION, SOLUTION INTRAMUSCULAR; SUBCUTANEOUS ONCE
Status: COMPLETED | OUTPATIENT
Start: 2022-01-14 | End: 2022-01-14

## 2022-01-14 RX ADMIN — CYANOCOBALAMIN 1000 MCG: 1000 INJECTION, SOLUTION INTRAMUSCULAR at 10:54

## 2022-01-17 ENCOUNTER — INFUSION (OUTPATIENT)
Dept: ONCOLOGY | Facility: HOSPITAL | Age: 57
End: 2022-01-17

## 2022-01-17 VITALS
HEART RATE: 78 BPM | SYSTOLIC BLOOD PRESSURE: 114 MMHG | TEMPERATURE: 98.2 F | RESPIRATION RATE: 18 BRPM | DIASTOLIC BLOOD PRESSURE: 58 MMHG

## 2022-01-17 DIAGNOSIS — E53.8 B12 DEFICIENCY: Primary | ICD-10-CM

## 2022-01-17 PROCEDURE — 25010000002 CYANOCOBALAMIN PER 1000 MCG: Performed by: INTERNAL MEDICINE

## 2022-01-17 PROCEDURE — 96372 THER/PROPH/DIAG INJ SC/IM: CPT | Performed by: INTERNAL MEDICINE

## 2022-01-17 RX ORDER — CYANOCOBALAMIN 1000 UG/ML
1000 INJECTION, SOLUTION INTRAMUSCULAR; SUBCUTANEOUS ONCE
Status: CANCELLED | OUTPATIENT
Start: 2022-01-18

## 2022-01-17 RX ORDER — CYANOCOBALAMIN 1000 UG/ML
1000 INJECTION, SOLUTION INTRAMUSCULAR; SUBCUTANEOUS ONCE
Status: COMPLETED | OUTPATIENT
Start: 2022-01-17 | End: 2022-01-17

## 2022-01-17 RX ADMIN — CYANOCOBALAMIN 1000 MCG: 1000 INJECTION, SOLUTION INTRAMUSCULAR at 14:12

## 2022-01-18 ENCOUNTER — TELEPHONE (OUTPATIENT)
Dept: CARDIOLOGY | Facility: CLINIC | Age: 57
End: 2022-01-18

## 2022-01-18 ENCOUNTER — PREP FOR SURGERY (OUTPATIENT)
Dept: OTHER | Facility: HOSPITAL | Age: 57
End: 2022-01-18

## 2022-01-18 DIAGNOSIS — R07.9 CHEST PAIN, UNSPECIFIED TYPE: Primary | ICD-10-CM

## 2022-01-18 DIAGNOSIS — R94.39 ABNORMAL NUCLEAR STRESS TEST: ICD-10-CM

## 2022-01-18 RX ORDER — SODIUM CHLORIDE 9 MG/ML
50 INJECTION, SOLUTION INTRAVENOUS ONCE
Status: CANCELLED | OUTPATIENT
Start: 2022-01-27 | End: 2022-01-18

## 2022-01-18 RX ORDER — ASPIRIN 325 MG
325 TABLET ORAL ONCE
Status: CANCELLED | OUTPATIENT
Start: 2022-01-18 | End: 2022-01-18

## 2022-01-18 RX ORDER — ASPIRIN 81 MG/1
81 TABLET ORAL DAILY
Status: CANCELLED | OUTPATIENT
Start: 2022-01-19

## 2022-01-18 RX ORDER — SODIUM CHLORIDE 0.9 % (FLUSH) 0.9 %
10 SYRINGE (ML) INJECTION AS NEEDED
Status: CANCELLED | OUTPATIENT
Start: 2022-01-27

## 2022-01-18 RX ORDER — SODIUM CHLORIDE 0.9 % (FLUSH) 0.9 %
3 SYRINGE (ML) INJECTION EVERY 12 HOURS SCHEDULED
Status: CANCELLED | OUTPATIENT
Start: 2022-01-27

## 2022-01-18 NOTE — TELEPHONE ENCOUNTER
----- Message from Joana Beltran MA sent at 1/18/2022 10:37 AM CST -----    ----- Message -----  From: Josias Carpio MD  Sent: 1/17/2022  10:54 AM CST  To: Joana Beltran MA    Can you look into this please?

## 2022-01-18 NOTE — TELEPHONE ENCOUNTER
Patient returned call, informed her per Dr. Carpio: let her know that her echocardiogram showed normal function of the pumping chamber of the heart.  There was a small amount of fluid around the heart, which appeared similar to what was found on her echocardiogram at the other hospital.  We can schedule her for cardiac catheterization for her stress test abnormality as discussed before if she is agreeable. She was agreeable, so told her would speak to Dr. Carpio and call her back to let her know of date and time for heart cath.

## 2022-01-18 NOTE — TELEPHONE ENCOUNTER
Contacted patient to inform her per Dr. Carpio: let her know that her echocardiogram showed normal function of the pumping chamber of the heart.  There was a small amount of fluid around the heart, which appeared similar to what was found on her echocardiogram at the other hospital.  We can schedule her for cardiac catheterization for her stress test abnormality as discussed before if she is agreeable.    Patient was unavailable so left generic VM for return call to office.

## 2022-01-18 NOTE — TELEPHONE ENCOUNTER
Contacted patient to inform her of her Heart Cath date and time.   Heart Cath will be 2022 with Covid test 2022.     Coivd Test instructions :   On 2022 you will go to the [facility] portico at 9am to 10 am and stay in your car, give name  and show Photo ID and get the nasal swab.     Heart Cath instructions:   [facility] will call day before procedure to inform what time to arrive at ER entrance on 2022. Shower night before or morning of procedure, avoid lotion and perfume. NPO after midnight, take all meds with small sips of water( per Dr. Carpio no meds need to be held), please bring a  in case you are not admitted to hospital. If not feeling well call [phone].     Patient will be coming will need to arrange transportation( grits or PACS) so informed patient she will be first case and need to arrive at 0600 to [facility]. She also asked what time she will need to be picked up and advised patient that when she is finished and ready to be release the staff will contact her transportation and let them know that she is ready for . Patient voiced her understanding.

## 2022-01-18 NOTE — TELEPHONE ENCOUNTER
----- Message from Josias Carpio MD sent at 1/18/2022  3:57 PM CST -----  I do not see any specific anticoagulants to hold. We can do it this Friday or next Thursday AM. I will put in the orders now. Thanks.   ----- Message -----  From: Epley, Kendall, MA  Sent: 1/18/2022   3:28 PM CST  To: Josias Carpio MD    Agreed to Heart Cath, when would you like to do one? Also please let me know what medications to hold. Also if you can put orders in I can go ahead and pull her up on the snapboard, thank you!

## 2022-01-19 ENCOUNTER — INFUSION (OUTPATIENT)
Dept: ONCOLOGY | Facility: HOSPITAL | Age: 57
End: 2022-01-19

## 2022-01-19 DIAGNOSIS — E53.8 B12 DEFICIENCY: Primary | ICD-10-CM

## 2022-01-19 PROCEDURE — 25010000002 CYANOCOBALAMIN PER 1000 MCG: Performed by: INTERNAL MEDICINE

## 2022-01-19 PROCEDURE — 96372 THER/PROPH/DIAG INJ SC/IM: CPT | Performed by: INTERNAL MEDICINE

## 2022-01-19 RX ORDER — CYANOCOBALAMIN 1000 UG/ML
1000 INJECTION, SOLUTION INTRAMUSCULAR; SUBCUTANEOUS ONCE
Status: CANCELLED | OUTPATIENT
Start: 2022-01-23

## 2022-01-19 RX ORDER — CYANOCOBALAMIN 1000 UG/ML
1000 INJECTION, SOLUTION INTRAMUSCULAR; SUBCUTANEOUS ONCE
Status: CANCELLED | OUTPATIENT
Start: 2022-01-25

## 2022-01-19 RX ORDER — CYANOCOBALAMIN 1000 UG/ML
1000 INJECTION, SOLUTION INTRAMUSCULAR; SUBCUTANEOUS ONCE
Status: COMPLETED | OUTPATIENT
Start: 2022-01-19 | End: 2022-01-19

## 2022-01-19 RX ADMIN — CYANOCOBALAMIN 1000 MCG: 1000 INJECTION, SOLUTION INTRAMUSCULAR at 13:00

## 2022-01-20 ENCOUNTER — OFFICE VISIT (OUTPATIENT)
Dept: FAMILY MEDICINE CLINIC | Facility: CLINIC | Age: 57
End: 2022-01-20

## 2022-01-20 ENCOUNTER — LAB (OUTPATIENT)
Dept: LAB | Facility: HOSPITAL | Age: 57
End: 2022-01-20

## 2022-01-20 ENCOUNTER — TELEPHONE (OUTPATIENT)
Dept: FAMILY MEDICINE CLINIC | Facility: CLINIC | Age: 57
End: 2022-01-20

## 2022-01-20 VITALS
OXYGEN SATURATION: 98 % | SYSTOLIC BLOOD PRESSURE: 120 MMHG | HEART RATE: 83 BPM | DIASTOLIC BLOOD PRESSURE: 64 MMHG | TEMPERATURE: 97.1 F

## 2022-01-20 DIAGNOSIS — N39.41 URGE INCONTINENCE: Chronic | ICD-10-CM

## 2022-01-20 DIAGNOSIS — Z79.4 TYPE 2 DIABETES MELLITUS WITH HYPOGLYCEMIA WITHOUT COMA, WITH LONG-TERM CURRENT USE OF INSULIN: ICD-10-CM

## 2022-01-20 DIAGNOSIS — M79.642 LEFT HAND PAIN: Primary | ICD-10-CM

## 2022-01-20 DIAGNOSIS — R94.39 ABNORMAL NUCLEAR STRESS TEST: ICD-10-CM

## 2022-01-20 DIAGNOSIS — E55.9 VITAMIN D DEFICIENCY: ICD-10-CM

## 2022-01-20 DIAGNOSIS — R07.9 CHEST PAIN, UNSPECIFIED TYPE: ICD-10-CM

## 2022-01-20 DIAGNOSIS — M25.552 LEFT HIP PAIN: ICD-10-CM

## 2022-01-20 DIAGNOSIS — E78.2 MIXED HYPERLIPIDEMIA: ICD-10-CM

## 2022-01-20 DIAGNOSIS — E11.42 DIABETIC POLYNEUROPATHY ASSOCIATED WITH TYPE 2 DIABETES MELLITUS: Chronic | ICD-10-CM

## 2022-01-20 DIAGNOSIS — E61.1 IRON DEFICIENCY: Chronic | ICD-10-CM

## 2022-01-20 DIAGNOSIS — E03.9 HYPOTHYROIDISM, UNSPECIFIED TYPE: ICD-10-CM

## 2022-01-20 DIAGNOSIS — I10 ESSENTIAL HYPERTENSION: ICD-10-CM

## 2022-01-20 DIAGNOSIS — M25.562 LEFT KNEE PAIN, UNSPECIFIED CHRONICITY: ICD-10-CM

## 2022-01-20 DIAGNOSIS — D72.829 LEUKOCYTOSIS, UNSPECIFIED TYPE: Chronic | ICD-10-CM

## 2022-01-20 DIAGNOSIS — E11.649 TYPE 2 DIABETES MELLITUS WITH HYPOGLYCEMIA WITHOUT COMA, WITH LONG-TERM CURRENT USE OF INSULIN: ICD-10-CM

## 2022-01-20 LAB
25(OH)D3 SERPL-MCNC: 39.1 NG/ML (ref 30–100)
ALBUMIN SERPL-MCNC: 4.1 G/DL (ref 3.5–5.2)
ALBUMIN UR-MCNC: 1.5 MG/DL
ALBUMIN/GLOB SERPL: 1.3 G/DL
ALP SERPL-CCNC: 101 U/L (ref 39–117)
ALT SERPL W P-5'-P-CCNC: 11 U/L (ref 1–33)
ANION GAP SERPL CALCULATED.3IONS-SCNC: 11 MMOL/L (ref 5–15)
AST SERPL-CCNC: 14 U/L (ref 1–32)
BASOPHILS # BLD AUTO: 0.11 10*3/MM3 (ref 0–0.2)
BASOPHILS NFR BLD AUTO: 0.8 % (ref 0–1.5)
BILIRUB SERPL-MCNC: 0.3 MG/DL (ref 0–1.2)
BUN SERPL-MCNC: 15 MG/DL (ref 6–20)
BUN/CREAT SERPL: 12.5 (ref 7–25)
CALCIUM SPEC-SCNC: 8.9 MG/DL (ref 8.6–10.5)
CHLORIDE SERPL-SCNC: 105 MMOL/L (ref 98–107)
CHOLEST SERPL-MCNC: 133 MG/DL (ref 0–200)
CO2 SERPL-SCNC: 24 MMOL/L (ref 22–29)
CREAT SERPL-MCNC: 1.2 MG/DL (ref 0.57–1)
CREAT UR-MCNC: 61.8 MG/DL
CREAT UR-MCNC: 61.8 MG/DL
DEPRECATED RDW RBC AUTO: 45.6 FL (ref 37–54)
EOSINOPHIL # BLD AUTO: 0.22 10*3/MM3 (ref 0–0.4)
EOSINOPHIL NFR BLD AUTO: 1.5 % (ref 0.3–6.2)
ERYTHROCYTE [DISTWIDTH] IN BLOOD BY AUTOMATED COUNT: 15.3 % (ref 12.3–15.4)
FERRITIN SERPL-MCNC: 88.72 NG/ML (ref 13–150)
FOLATE SERPL-MCNC: 11.6 NG/ML (ref 4.78–24.2)
GFR SERPL CREATININE-BSD FRML MDRD: 46 ML/MIN/1.73
GLOBULIN UR ELPH-MCNC: 3.2 GM/DL
GLUCOSE SERPL-MCNC: 196 MG/DL (ref 65–99)
HBA1C MFR BLD: 9.37 % (ref 4.8–5.6)
HCT VFR BLD AUTO: 38.2 % (ref 34–46.6)
HDLC SERPL-MCNC: 39 MG/DL (ref 40–60)
HGB BLD-MCNC: 12.2 G/DL (ref 12–15.9)
IMM GRANULOCYTES # BLD AUTO: 0.06 10*3/MM3 (ref 0–0.05)
IMM GRANULOCYTES NFR BLD AUTO: 0.4 % (ref 0–0.5)
IRON 24H UR-MRATE: 52 MCG/DL (ref 37–145)
IRON SATN MFR SERPL: 15 % (ref 20–50)
LDLC SERPL CALC-MCNC: 79 MG/DL (ref 0–100)
LDLC/HDLC SERPL: 2.02 {RATIO}
LYMPHOCYTES # BLD AUTO: 2.3 10*3/MM3 (ref 0.7–3.1)
LYMPHOCYTES NFR BLD AUTO: 16.1 % (ref 19.6–45.3)
MCH RBC QN AUTO: 26.2 PG (ref 26.6–33)
MCHC RBC AUTO-ENTMCNC: 31.9 G/DL (ref 31.5–35.7)
MCV RBC AUTO: 82.2 FL (ref 79–97)
MICROALBUMIN/CREAT UR: 24.3 MG/G
MONOCYTES # BLD AUTO: 0.58 10*3/MM3 (ref 0.1–0.9)
MONOCYTES NFR BLD AUTO: 4.1 % (ref 5–12)
NEUTROPHILS NFR BLD AUTO: 11.04 10*3/MM3 (ref 1.7–7)
NEUTROPHILS NFR BLD AUTO: 77.1 % (ref 42.7–76)
NRBC BLD AUTO-RTO: 0 /100 WBC (ref 0–0.2)
PLATELET # BLD AUTO: 216 10*3/MM3 (ref 140–450)
PMV BLD AUTO: 13 FL (ref 6–12)
POTASSIUM SERPL-SCNC: 4.5 MMOL/L (ref 3.5–5.2)
PROT ?TM UR-MCNC: 7 MG/DL
PROT SERPL-MCNC: 7.3 G/DL (ref 6–8.5)
PROT/CREAT UR: 113.3 MG/G CREA (ref 0–200)
RBC # BLD AUTO: 4.65 10*6/MM3 (ref 3.77–5.28)
RBC MORPH BLD: NORMAL
SMALL PLATELETS BLD QL SMEAR: ADEQUATE
SODIUM SERPL-SCNC: 140 MMOL/L (ref 136–145)
TIBC SERPL-MCNC: 340 MCG/DL (ref 298–536)
TRANSFERRIN SERPL-MCNC: 228 MG/DL (ref 200–360)
TRIGL SERPL-MCNC: 77 MG/DL (ref 0–150)
TSH SERPL DL<=0.05 MIU/L-ACNC: 1.07 UIU/ML (ref 0.27–4.2)
VIT B12 BLD-MCNC: >2000 PG/ML (ref 211–946)
VLDLC SERPL-MCNC: 15 MG/DL (ref 5–40)
WBC MORPH BLD: NORMAL
WBC NRBC COR # BLD: 14.31 10*3/MM3 (ref 3.4–10.8)

## 2022-01-20 PROCEDURE — 82043 UR ALBUMIN QUANTITATIVE: CPT

## 2022-01-20 PROCEDURE — 82728 ASSAY OF FERRITIN: CPT

## 2022-01-20 PROCEDURE — 83036 HEMOGLOBIN GLYCOSYLATED A1C: CPT

## 2022-01-20 PROCEDURE — 99215 OFFICE O/P EST HI 40 MIN: CPT | Performed by: NURSE PRACTITIONER

## 2022-01-20 PROCEDURE — 84156 ASSAY OF PROTEIN URINE: CPT

## 2022-01-20 PROCEDURE — C9803 HOPD COVID-19 SPEC COLLECT: HCPCS

## 2022-01-20 PROCEDURE — 82746 ASSAY OF FOLIC ACID SERUM: CPT

## 2022-01-20 PROCEDURE — 85025 COMPLETE CBC W/AUTO DIFF WBC: CPT

## 2022-01-20 PROCEDURE — 80061 LIPID PANEL: CPT

## 2022-01-20 PROCEDURE — 80053 COMPREHEN METABOLIC PANEL: CPT

## 2022-01-20 PROCEDURE — 85007 BL SMEAR W/DIFF WBC COUNT: CPT

## 2022-01-20 PROCEDURE — 83540 ASSAY OF IRON: CPT

## 2022-01-20 PROCEDURE — 82607 VITAMIN B-12: CPT

## 2022-01-20 PROCEDURE — 82306 VITAMIN D 25 HYDROXY: CPT

## 2022-01-20 PROCEDURE — 84466 ASSAY OF TRANSFERRIN: CPT

## 2022-01-20 PROCEDURE — 84443 ASSAY THYROID STIM HORMONE: CPT

## 2022-01-20 PROCEDURE — 82570 ASSAY OF URINE CREATININE: CPT

## 2022-01-20 RX ORDER — SOLIFENACIN SUCCINATE 5 MG/1
5 TABLET, FILM COATED ORAL DAILY
Qty: 30 TABLET | Refills: 2 | Status: SHIPPED | OUTPATIENT
Start: 2022-01-20 | End: 2022-03-18

## 2022-01-20 RX ORDER — PREGABALIN 300 MG/1
300 CAPSULE ORAL 2 TIMES DAILY
Qty: 60 CAPSULE | Refills: 1 | Status: SHIPPED | OUTPATIENT
Start: 2022-01-20 | End: 2022-03-31 | Stop reason: SDUPTHER

## 2022-01-20 NOTE — TELEPHONE ENCOUNTER
Shanna from [facility] called to speak to Yunior in regards to authorization on MRI of left hand for patient. Yunior already spoke to them and they really needed to speak to a provider. Contact ended phone call before a message could be sent to provider about the call.  Call number was: [phone]

## 2022-01-20 NOTE — PROGRESS NOTES
"Chief Complaint  Leg Pain and Hand Pain    Subjective          Marta Giraldo presents to [facility] - [facility]    PCP: Dr. Crane    CC: \"leg pain; hand pain; incontinence episodes; handicap sticker; refill on Lyrica\"    Presents with multiple complaints today.  PCP out of office, appt for routine f/u for January was cancelled, but hasn't been rescheduled yet.  Scheduled while in office for 3-.    Left hand pain:  This has been ongoing issue, has had previous xrays x 2 in the last year that have been negative.  PCP recommended MRI if symptoms persisted.  Reports pain is getting worse.  Denies known injury.  C/O stiffness/burning in left 2nd finger, hard to bend at times.      Left hip pain: C/O pain x 2 weeks.  Denies injury.  Previous xrays show degenerative changes.  Reports some stiffness/grinding at times. Denies redness/warmth over joints.     Left knee pain: C/O pain x 2 weeks.  Denies injury.  Previous xrays show degenerative changes.  Reports some stiffness, swelling, grinding at times.  Denies redness or warmth over joints.     Diabetic polyneuropathy: Affects bilateral arms/legs, hands, foot.  Has had BKA on right.  Using wheelchair/motorzied chair at home and when out.  Reports glucose levels when checked at home are 140-150.  Last A1C 7.98.  Is followed by endocrinology for diabetes.  Has repeat labs ordered, plans to obtain today. Is nearly out of her Lyrica, needs refill. Will take last pills today.  Miguel reviewed.      Incontinence:  C/O urge incontinence, long standing.  PCP has recently sent in Rx for pull ups, wipes, gloves for patient.  Has been on oxybutin for over a year, seemed like it helped initially, but not longer beneficial.  Would like to increase dose or change medication.  Uses bedside commode at night, using regular bathroom during the day. Due to mobility issues, has frequent accidents.  "     Requesting handicap sticker.  Reports she has never had before.  She does not drive, but her sister is her main support person and often transports her places.        Review of Systems   Constitutional: Negative.    HENT: Negative.    Eyes: Negative.    Respiratory: Negative.    Cardiovascular: Negative.    Gastrointestinal: Negative.    Genitourinary: Positive for frequency and urgency (urge incontinence). Negative for dysuria and vaginal discharge.   Musculoskeletal: Positive for arthralgias (left hip, knee, hand).   Skin: Negative.    Neurological: Negative for dizziness and headaches.        Objective   Vital Signs:   /64 (BP Location: Left arm, Patient Position: Sitting, Cuff Size: Adult)   Pulse 83   Temp 97.1 °F (36.2 °C) (Temporal)   SpO2 98%       Physical Exam  Vitals and nursing note reviewed.   Constitutional:       General: She is not in acute distress.     Appearance: Normal appearance. She is not ill-appearing.   HENT:      Head: Normocephalic and atraumatic.      Right Ear: Tympanic membrane and ear canal normal.      Left Ear: Tympanic membrane and ear canal normal.      Nose: Congestion ( mild) present.      Mouth/Throat:      Mouth: Mucous membranes are moist.      Pharynx: Oropharynx is clear. No oropharyngeal exudate or posterior oropharyngeal erythema.   Eyes:      General:         Right eye: No discharge.         Left eye: No discharge.      Conjunctiva/sclera: Conjunctivae normal.   Cardiovascular:      Rate and Rhythm: Normal rate and regular rhythm.   Pulmonary:      Effort: Pulmonary effort is normal. No respiratory distress.      Breath sounds: Normal breath sounds. No wheezing, rhonchi or rales.   Abdominal:      General: Bowel sounds are normal.      Palpations: Abdomen is soft.      Tenderness: There is no abdominal tenderness. There is no right CVA tenderness, left CVA tenderness, guarding or rebound.   Musculoskeletal:      Left hand: Tenderness (2nd finger) present.  Decreased range of motion (2nd finger). Decreased strength. Normal pulse.      Cervical back: Neck supple. No tenderness.      Left hip: Tenderness present. Decreased range of motion.      Left knee: Decreased range of motion. No tenderness.      Comments: +crepitus to left knee        Right Lower Extremity: Right leg is amputated below knee.   Lymphadenopathy:      Cervical: No cervical adenopathy.   Skin:     General: Skin is warm and dry.   Neurological:      General: No focal deficit present.      Mental Status: She is alert and oriented to person, place, and time.   Psychiatric:         Mood and Affect: Mood normal.         Thought Content: Thought content normal.          Result Review :     Common labs    Common Labsle 12/7/21 12/7/21 12/13/21 1/20/22 1/20/22    0842 0842  1045 1045   Glucose     196 (A)   BUN 26 (A)    15   Creatinine  1.12 (A)   1.20 (A)   eGFR Non African Am  50 (A)   46 (A)   Sodium     140   Potassium     4.5   Chloride     105   Calcium     8.9   Albumin     4.10   Total Bilirubin     0.3   Alkaline Phosphatase     101   AST (SGOT)     14   ALT (SGPT)     11   WBC   13.53 (A) 14.31 (A)    Hemoglobin   11.7 (A) 12.2    Hematocrit   36.4 38.2    Platelets   215 216    (A) Abnormal value            Data reviewed: Radiologic studies left hip xray from [facility] 2020--degenerative and demineralization     XR Hand 3+ View Left    Result Date: 11/17/2021  No evidence for acute bony abnormality involving the left hand. Electronically signed by:  Edvin Wang MD  11/17/2021 8:38 PM CST Workstation: [id number]        .  PROCEDURE: XR KNEE 3 VW LEFT     VIEWS: 3     INDICATION: Pain     COMPARISON: None     FINDINGS:     - fracture: None    - alignment: Within normal limits    - misc: Degenerative changes with osteophytes in the  femoropatellar joint. No effusion.   Mild vascular calcification noted        IMPRESSION:  Mild degenerative changes as above..        Note:  if pain or symptoms persist  beyond reasonable expectations     and follow-up imaging is anticipated,  cross sectional imaging   (CT and/or MRI) is suggested, as is deemed clinically   appropriate.     Electronically signed by:  Amira Ross MD  6/17/2021 3:21 PM CDT  Workstation: [id number]       Assessment and Plan    Diagnoses and all orders for this visit:    1. Left hand pain (Primary)  -     Diclofenac Sodium (VOLTAREN) 1 % gel gel; Apply 4 g topically to the appropriate area as directed 4 (Four) Times a Day As Needed (knee/hip/hand pain).  Dispense: 350 g; Refill: 1  -     MRI hand left wo contrast; Future    2. Diabetic polyneuropathy associated with type 2 diabetes mellitus (HCC)  -     pregabalin (LYRICA) 300 MG capsule; Take 1 capsule by mouth 2 (Two) Times a Day.  Dispense: 60 capsule; Refill: 1    3. Left knee pain, unspecified chronicity  -     Diclofenac Sodium (VOLTAREN) 1 % gel gel; Apply 4 g topically to the appropriate area as directed 4 (Four) Times a Day As Needed (knee/hip/hand pain).  Dispense: 350 g; Refill: 1    4. Left hip pain  -     Diclofenac Sodium (VOLTAREN) 1 % gel gel; Apply 4 g topically to the appropriate area as directed 4 (Four) Times a Day As Needed (knee/hip/hand pain).  Dispense: 350 g; Refill: 1    5. Urge incontinence  -     solifenacin (VESIcare) 5 MG tablet; Take 1 tablet by mouth Daily.  Dispense: 30 tablet; Refill: 2          Rx for Diclofenac gel for knee, hip, hand pain.   Continue with Tylenol PRN    D/C Oxybutin since it no longer seems to be beneficial, Rx for Vesicare to try is sent.  Incontinence supplies have already been sent in by PCP.     Refill of Lyrica provided until she can f/u with PCP in March.  Miguel reviewed.      Will defer repeat xrays of hand, knee and hip at this time, no new injury    Referral for left hand MRI per PCP notes as discussed previously.  If not approved, can see if she would like to see orthopedics for evaluation.     Form for temporary disabled placard x 3  months provided until she can recheck with PCP in March    Return to [facility] Clinic [facility]  See PCP for routine f/u visit and management of chronic medical conditions      This document has been electronically signed by CONNOR Palacios on January 20, 2022 19:15 CST,.            I spent 45 minutes caring for Marta on this date of service. This time includes time spent by me in the following activities:preparing for the visit, reviewing tests, performing a medically appropriate examination and/or evaluation , counseling and educating the patient/family/caregiver, ordering medications, tests, or procedures and documenting information in the medical record

## 2022-01-25 ENCOUNTER — INFUSION (OUTPATIENT)
Dept: ONCOLOGY | Facility: HOSPITAL | Age: 57
End: 2022-01-25

## 2022-01-25 ENCOUNTER — LAB (OUTPATIENT)
Dept: LAB | Facility: HOSPITAL | Age: 57
End: 2022-01-25

## 2022-01-25 ENCOUNTER — APPOINTMENT (OUTPATIENT)
Dept: ONCOLOGY | Facility: HOSPITAL | Age: 57
End: 2022-01-25

## 2022-01-25 VITALS
RESPIRATION RATE: 20 BRPM | SYSTOLIC BLOOD PRESSURE: 176 MMHG | DIASTOLIC BLOOD PRESSURE: 73 MMHG | HEART RATE: 85 BPM | TEMPERATURE: 97.3 F

## 2022-01-25 DIAGNOSIS — E53.8 B12 DEFICIENCY: Primary | ICD-10-CM

## 2022-01-25 LAB — SARS-COV-2 N GENE RESP QL NAA+PROBE: NOT DETECTED

## 2022-01-25 PROCEDURE — C9803 HOPD COVID-19 SPEC COLLECT: HCPCS

## 2022-01-25 PROCEDURE — 25010000002 CYANOCOBALAMIN PER 1000 MCG: Performed by: INTERNAL MEDICINE

## 2022-01-25 PROCEDURE — 96372 THER/PROPH/DIAG INJ SC/IM: CPT | Performed by: INTERNAL MEDICINE

## 2022-01-25 PROCEDURE — 87635 SARS-COV-2 COVID-19 AMP PRB: CPT

## 2022-01-25 RX ORDER — CYANOCOBALAMIN 1000 UG/ML
1000 INJECTION, SOLUTION INTRAMUSCULAR; SUBCUTANEOUS ONCE
Status: CANCELLED | OUTPATIENT
Start: 2022-01-31

## 2022-01-25 RX ORDER — CYANOCOBALAMIN 1000 UG/ML
1000 INJECTION, SOLUTION INTRAMUSCULAR; SUBCUTANEOUS ONCE
Status: COMPLETED | OUTPATIENT
Start: 2022-01-25 | End: 2022-01-25

## 2022-01-25 RX ADMIN — CYANOCOBALAMIN 1000 MCG: 1000 INJECTION, SOLUTION INTRAMUSCULAR at 09:33

## 2022-01-27 ENCOUNTER — HOSPITAL ENCOUNTER (OUTPATIENT)
Facility: HOSPITAL | Age: 57
Setting detail: HOSPITAL OUTPATIENT SURGERY
Discharge: HOME | End: 2022-01-27
Attending: INTERNAL MEDICINE | Admitting: INTERNAL MEDICINE

## 2022-01-27 VITALS
BODY MASS INDEX: 34.8 KG/M2 | WEIGHT: 235 LBS | DIASTOLIC BLOOD PRESSURE: 68 MMHG | RESPIRATION RATE: 18 BRPM | HEART RATE: 79 BPM | SYSTOLIC BLOOD PRESSURE: 152 MMHG | OXYGEN SATURATION: 98 % | HEIGHT: 69 IN | TEMPERATURE: 97.8 F

## 2022-01-27 DIAGNOSIS — R07.9 CHEST PAIN, UNSPECIFIED TYPE: ICD-10-CM

## 2022-01-27 DIAGNOSIS — R94.39 ABNORMAL NUCLEAR STRESS TEST: ICD-10-CM

## 2022-01-27 PROBLEM — I25.10 CORONARY ARTERY DISEASE INVOLVING NATIVE CORONARY ARTERY OF NATIVE HEART WITHOUT ANGINA PECTORIS: Status: ACTIVE | Noted: 2022-01-27

## 2022-01-27 LAB
ANION GAP SERPL CALCULATED.3IONS-SCNC: 9 MMOL/L (ref 5–15)
BUN SERPL-MCNC: 17 MG/DL (ref 6–20)
BUN/CREAT SERPL: 17 (ref 7–25)
CALCIUM SPEC-SCNC: 8.7 MG/DL (ref 8.6–10.5)
CHLORIDE SERPL-SCNC: 105 MMOL/L (ref 98–107)
CO2 SERPL-SCNC: 26 MMOL/L (ref 22–29)
CREAT SERPL-MCNC: 1 MG/DL (ref 0.57–1)
CRP SERPL-MCNC: 0.31 MG/DL (ref 0–0.5)
DEPRECATED RDW RBC AUTO: 45.2 FL (ref 37–54)
ERYTHROCYTE [DISTWIDTH] IN BLOOD BY AUTOMATED COUNT: 15.3 % (ref 12.3–15.4)
ERYTHROCYTE [SEDIMENTATION RATE] IN BLOOD: 14 MM/HR (ref 0–20)
GFR SERPL CREATININE-BSD FRML MDRD: 57 ML/MIN/1.73
GLUCOSE SERPL-MCNC: 145 MG/DL (ref 65–99)
HCT VFR BLD AUTO: 36.1 % (ref 34–46.6)
HGB BLD-MCNC: 11.7 G/DL (ref 12–15.9)
HOLD SPECIMEN: NORMAL
HOLD SPECIMEN: NORMAL
INR PPP: 1.08 (ref 0.8–1.2)
MCH RBC QN AUTO: 26.4 PG (ref 26.6–33)
MCHC RBC AUTO-ENTMCNC: 32.4 G/DL (ref 31.5–35.7)
MCV RBC AUTO: 81.5 FL (ref 79–97)
PLATELET # BLD AUTO: 202 10*3/MM3 (ref 140–450)
PMV BLD AUTO: 11.8 FL (ref 6–12)
POTASSIUM SERPL-SCNC: 4.4 MMOL/L (ref 3.5–5.2)
PROTHROMBIN TIME: 13.9 SECONDS (ref 11.1–15.3)
RBC # BLD AUTO: 4.43 10*6/MM3 (ref 3.77–5.28)
SODIUM SERPL-SCNC: 140 MMOL/L (ref 136–145)
WBC NRBC COR # BLD: 13.7 10*3/MM3 (ref 3.4–10.8)

## 2022-01-27 PROCEDURE — 25010000002 MIDAZOLAM PER 1 MG: Performed by: INTERNAL MEDICINE

## 2022-01-27 PROCEDURE — C1769 GUIDE WIRE: HCPCS | Performed by: INTERNAL MEDICINE

## 2022-01-27 PROCEDURE — 0 IOPAMIDOL PER 1 ML: Performed by: INTERNAL MEDICINE

## 2022-01-27 PROCEDURE — 85027 COMPLETE CBC AUTOMATED: CPT | Performed by: INTERNAL MEDICINE

## 2022-01-27 PROCEDURE — 99152 MOD SED SAME PHYS/QHP 5/>YRS: CPT | Performed by: INTERNAL MEDICINE

## 2022-01-27 PROCEDURE — 93458 L HRT ARTERY/VENTRICLE ANGIO: CPT | Performed by: INTERNAL MEDICINE

## 2022-01-27 PROCEDURE — 85610 PROTHROMBIN TIME: CPT | Performed by: INTERNAL MEDICINE

## 2022-01-27 PROCEDURE — 25010000002 FENTANYL CITRATE (PF) 50 MCG/ML SOLUTION: Performed by: INTERNAL MEDICINE

## 2022-01-27 PROCEDURE — 85651 RBC SED RATE NONAUTOMATED: CPT | Performed by: INTERNAL MEDICINE

## 2022-01-27 PROCEDURE — 80048 BASIC METABOLIC PNL TOTAL CA: CPT | Performed by: INTERNAL MEDICINE

## 2022-01-27 PROCEDURE — S0260 H&P FOR SURGERY: HCPCS | Performed by: INTERNAL MEDICINE

## 2022-01-27 PROCEDURE — C1894 INTRO/SHEATH, NON-LASER: HCPCS | Performed by: INTERNAL MEDICINE

## 2022-01-27 PROCEDURE — 86140 C-REACTIVE PROTEIN: CPT | Performed by: INTERNAL MEDICINE

## 2022-01-27 PROCEDURE — 25010000002 HEPARIN (PORCINE) PER 1000 UNITS: Performed by: INTERNAL MEDICINE

## 2022-01-27 RX ORDER — HEPARIN SODIUM 1000 [USP'U]/ML
INJECTION, SOLUTION INTRAVENOUS; SUBCUTANEOUS AS NEEDED
Status: DISCONTINUED | OUTPATIENT
Start: 2022-01-27 | End: 2022-01-27 | Stop reason: HOSPADM

## 2022-01-27 RX ORDER — ACETAMINOPHEN 325 MG/1
650 TABLET ORAL EVERY 4 HOURS PRN
Status: DISCONTINUED | OUTPATIENT
Start: 2022-01-27 | End: 2022-01-27 | Stop reason: HOSPADM

## 2022-01-27 RX ORDER — ASPIRIN 325 MG
325 TABLET ORAL ONCE
Status: COMPLETED | OUTPATIENT
Start: 2022-01-27 | End: 2022-01-27

## 2022-01-27 RX ORDER — FENTANYL CITRATE 50 UG/ML
INJECTION, SOLUTION INTRAMUSCULAR; INTRAVENOUS AS NEEDED
Status: DISCONTINUED | OUTPATIENT
Start: 2022-01-27 | End: 2022-01-27 | Stop reason: HOSPADM

## 2022-01-27 RX ORDER — ASPIRIN 81 MG/1
81 TABLET ORAL DAILY
Status: DISCONTINUED | OUTPATIENT
Start: 2022-01-28 | End: 2022-01-27 | Stop reason: HOSPADM

## 2022-01-27 RX ORDER — SODIUM CHLORIDE 0.9 % (FLUSH) 0.9 %
10 SYRINGE (ML) INJECTION AS NEEDED
Status: DISCONTINUED | OUTPATIENT
Start: 2022-01-27 | End: 2022-01-27 | Stop reason: HOSPADM

## 2022-01-27 RX ORDER — MIDAZOLAM HYDROCHLORIDE 1 MG/ML
INJECTION INTRAMUSCULAR; INTRAVENOUS AS NEEDED
Status: DISCONTINUED | OUTPATIENT
Start: 2022-01-27 | End: 2022-01-27 | Stop reason: HOSPADM

## 2022-01-27 RX ORDER — NITROGLYCERIN 5 MG/ML
INJECTION, SOLUTION INTRAVENOUS AS NEEDED
Status: DISCONTINUED | OUTPATIENT
Start: 2022-01-27 | End: 2022-01-27 | Stop reason: HOSPADM

## 2022-01-27 RX ORDER — SODIUM CHLORIDE 9 MG/ML
50 INJECTION, SOLUTION INTRAVENOUS ONCE
Status: COMPLETED | OUTPATIENT
Start: 2022-01-27 | End: 2022-01-27

## 2022-01-27 RX ORDER — LIDOCAINE HYDROCHLORIDE 20 MG/ML
INJECTION, SOLUTION INFILTRATION; PERINEURAL AS NEEDED
Status: DISCONTINUED | OUTPATIENT
Start: 2022-01-27 | End: 2022-01-27 | Stop reason: HOSPADM

## 2022-01-27 RX ORDER — SODIUM CHLORIDE 0.9 % (FLUSH) 0.9 %
3 SYRINGE (ML) INJECTION EVERY 12 HOURS SCHEDULED
Status: DISCONTINUED | OUTPATIENT
Start: 2022-01-27 | End: 2022-01-27 | Stop reason: HOSPADM

## 2022-01-27 RX ORDER — SODIUM CHLORIDE 9 MG/ML
75 INJECTION, SOLUTION INTRAVENOUS CONTINUOUS
Status: ACTIVE | OUTPATIENT
Start: 2022-01-27 | End: 2022-01-27

## 2022-01-27 RX ADMIN — SODIUM CHLORIDE 50 ML/HR: 9 INJECTION, SOLUTION INTRAVENOUS at 07:18

## 2022-01-27 RX ADMIN — ASPIRIN 325 MG: 325 TABLET ORAL at 07:18

## 2022-02-01 ENCOUNTER — OFFICE VISIT (OUTPATIENT)
Dept: CARDIOLOGY | Facility: CLINIC | Age: 57
End: 2022-02-01

## 2022-02-01 VITALS
DIASTOLIC BLOOD PRESSURE: 72 MMHG | HEART RATE: 85 BPM | OXYGEN SATURATION: 98 % | BODY MASS INDEX: 34.8 KG/M2 | SYSTOLIC BLOOD PRESSURE: 148 MMHG | WEIGHT: 235 LBS | HEIGHT: 69 IN

## 2022-02-01 DIAGNOSIS — I31.39 PERICARDIAL EFFUSION: ICD-10-CM

## 2022-02-01 DIAGNOSIS — I10 HYPERTENSION, UNSPECIFIED TYPE: ICD-10-CM

## 2022-02-01 DIAGNOSIS — E66.2 CLASS 1 OBESITY WITH ALVEOLAR HYPOVENTILATION, SERIOUS COMORBIDITY, AND BODY MASS INDEX (BMI) OF 34.0 TO 34.9 IN ADULT: ICD-10-CM

## 2022-02-01 DIAGNOSIS — Z72.0 TOBACCO USE: ICD-10-CM

## 2022-02-01 DIAGNOSIS — I25.10 CORONARY ARTERY DISEASE INVOLVING NATIVE CORONARY ARTERY OF NATIVE HEART WITHOUT ANGINA PECTORIS: Primary | ICD-10-CM

## 2022-02-01 PROCEDURE — 99214 OFFICE O/P EST MOD 30 MIN: CPT | Performed by: INTERNAL MEDICINE

## 2022-02-01 RX ORDER — ASPIRIN 81 MG/1
81 TABLET ORAL DAILY
Qty: 90 TABLET | Refills: 3 | Status: SHIPPED | OUTPATIENT
Start: 2022-02-01 | End: 2022-05-02

## 2022-02-01 RX ORDER — CARVEDILOL 6.25 MG/1
6.25 TABLET ORAL 2 TIMES DAILY
Qty: 180 TABLET | Refills: 3 | Status: SHIPPED | OUTPATIENT
Start: 2022-02-01

## 2022-02-01 NOTE — PROGRESS NOTES
[facility] Cardiology  OFFICE NOTE    Cardiovascular Medicine  Josias Carpio M.D., [facility]         No referring provider defined for this encounter.    History of Present Illness    Marta Giraldo is a 57 y.o. female who presents for consultation today.  She has diabetes (insulin dependent), hypertension, dyslipidemia, GERD, hypothyroidism, stroke, right BKA, tobacco use per prior documentation.     She was admitted to [facility] in September with chest pain (thought to be reproducible on inspiration), three sets of serum troponins were negative, chest CTA was negative for central pulmonary embolism, she had an echocardiogram which suggested small-moderate pericardial effusion, normal LVEF (>60%), LA dilation, slight RV dilation, and paradoxical wall motion abnormality in the LV at the junction of anterior wall and septum (by report), nasal swab was negative for COVID and multiple other respiratory viruses. She was seen by Dr Grajeda and recommended an outpatient stress test. She was referred to us for further evaluation.     She denies having any prior history of coronary artery disease, congestive heart failure, stent placement, open heart surgeries, heart valve or heart rhythm problems. She is wheelchair bound for the most part. Does report having occasional episodes sharp left sided chest discomfort unrelated to exertion. These episodes usually last for 30 minutes or so. She smokes cigarettes daily and tends to feel short of breath at times. Denies having any palpitations, dizziness, presyncope or syncopal episodes.     2/1/2022:  She presents for follow-up post cardiac catheterization.  Her right radial cath access site was examined, radial pulse was intact, there was no evidence of hematoma or ecchymosis.  She has not had any exertional chest discomfort.  Does report occasional episodes of left-sided sharp/stabbing chest discomfort unrelated to activity.  She denied  having any significant dyspnea.  She has been taking all of her medications as prescribed without any concerns/side effects.  She continues to smoke cigarettes.    Review of Systems - ROS  Constitution: Negative for weakness, weight gain and weight loss.   HENT: Negative for congestion.    Eyes: Negative for blurred vision.   Cardiovascular: As mentioned above  Respiratory: Negative for cough and hemoptysis.    Endocrine: Negative for polydipsia and polyuria.   Hematologic/Lymphatic: Negative for bleeding problem. Does not bruise/bleed easily.   Skin: Negative for flushing.    Musculoskeletal: Negative for neck pain and stiffness.   Gastrointestinal: Negative for abdominal pain, diarrhea, jaundice, melena, nausea and vomiting.   Genitourinary: Negative for dysuria and hematuria.   Neurological: Negative for dizziness, focal weakness and numbness.   Psychiatric/Behavioral: Negative for altered mental status and depression.      All other systems were reviewed and were negative.    Past Medical History:   Diagnosis Date   • Anemia    • Anxiety    • Arthritis    • Cataract    • Depression    • Diabetes mellitus (HCC)    • Disease of thyroid gland    • Family history of thyroid problem    • GERD (gastroesophageal reflux disease)    • Headache    • History of transfusion    • Hyperlipidemia    • Hypertension    • Neuropathy    • Stroke (HCC) 2019   • Urinary tract infection        Family History:  family history includes Diabetes in her brother, father, mother, sister, and another family member; Heart disease in her sister and another family member; Hyperlipidemia in an other family member; Hypertension in an other family member; Other in an other family member; Stroke in an other family member.    Social History:   reports that she has been smoking cigarettes. She has been smoking about 0.50 packs per day. She has never used smokeless tobacco. She reports previous alcohol use. She reports that she does not use  drugs.    Allergies:  Allergies   Allergen Reactions   • Compazine [Prochlorperazine Edisylate] Unknown - High Severity   • Penicillins Other (See Comments) and Unknown - High Severity     unknown         Current Outpatient Medications:   •  atorvastatin (Lipitor) 80 MG tablet, Take 1 tablet by mouth Every Night. (Patient taking differently: Take 80 mg by mouth Daily.), Disp: 30 tablet, Rfl: 3  •  B-D UF III MINI PEN NEEDLES 31G X 5 MM misc, Use as needed, Disp: 100 each, Rfl: 3  •  Blood Glucose Monitoring Suppl w/Device kit, Use for E11.65 diabetes to check sugars 4 times a day., Disp: 1 each, Rfl: 0  •  clotrimazole-betamethasone (Lotrisone) 1-0.05 % cream, Apply  topically to the appropriate area as directed 2 (Two) Times a Day., Disp: 1 each, Rfl: 3  •  Continuous Blood Gluc  (FreeStyle Tae 2 [place]) device, USE AS DIRECTED, Disp: 1 each, Rfl: 11  •  Continuous Blood Gluc Sensor (FreeStyle Tae 2 Sensor) misc, 1 each Every 14 (Fourteen) Days. Use every 14 days, Disp: 2 each, Rfl: 11  •  Diclofenac Sodium (VOLTAREN) 1 % gel gel, Apply 4 g topically to the appropriate area as directed 4 (Four) Times a Day As Needed (knee/hip/hand pain)., Disp: 350 g, Rfl: 1  •  docusate sodium (COLACE) 100 MG capsule, Take 1 capsule by mouth 2 (Two) Times a Day As Needed for Constipation., Disp: 60 capsule, Rfl: 2  •  Dulaglutide (Trulicity) 3 MG/0.5ML solution pen-injector, Inject 0.5 mL under the skin into the appropriate area as directed 1 (One) Time Per Week., Disp: 4 pen, Rfl: 3  •  DULoxetine (CYMBALTA) 60 MG capsule, , Disp: , Rfl:   •  empagliflozin (Jardiance) 25 MG tablet tablet, Take 1 tablet by mouth Daily., Disp: 90 tablet, Rfl: 1  •  ferrous sulfate 325 (65 Fe) MG tablet, Take 1 tablet by mouth 2 (Two) Times a Day With Meals., Disp: 180 tablet, Rfl: 1  •  glucose (DEX4) 4 GM chewable tablet, Chew 4 tablets As Needed for Low Blood Sugar., Disp: 90 tablet, Rfl: 3  •  glucose blood test strip, Use as  "instructed, Disp: 200 each, Rfl: 12  •  Insulin Glargine (LANTUS SOLOSTAR) 100 UNIT/ML injection pen, Inject 35 Units under the skin into the appropriate area as directed Every Night. (Patient taking differently: Inject 35 Units under the skin into the appropriate area as directed Daily.), Disp: 9 mL, Rfl: 3  •  insulin lispro (humaLOG) 100 UNIT/ML injection, INJECT 5 UNITS SUBCUTANEOUSLY PRIOR TO MEALS., Disp: 10 mL, Rfl: 0  •  Insulin Pen Needle 32G X 8 MM misc, Use at bedtime, Disp: 100 each, Rfl: 3  •  Insulin Syringe-Needle U-100 30G X 1/2\" 1 ML misc, Use three times daily with insulin, Disp: 100 each, Rfl: 3  •  Lancets misc, Use for E11.65 diabetes to check sugars 4 times a day., Disp: 200 each, Rfl: 3  •  levothyroxine (SYNTHROID, LEVOTHROID) 25 MCG tablet, TAKE 1 TABLET DAILY. (Patient taking differently: Take 25 mcg by mouth Every Night.), Disp: 30 tablet, Rfl: 2  •  lisinopril (PRINIVIL,ZESTRIL) 20 MG tablet, TAKE 1 TABLET DAILY., Disp: 30 tablet, Rfl: 2  •  methocarbamol (ROBAXIN) 750 MG tablet, Take 1 tablet by mouth 3 (Three) Times a Day., Disp: 90 tablet, Rfl: 4  •  mupirocin (Bactroban) 2 % cream, Apply  topically to the appropriate area as directed 3 (Three) Times a Day., Disp: 30 g, Rfl: 1  •  omeprazole (priLOSEC) 40 MG capsule, Take 1 capsule by mouth Daily., Disp: 30 capsule, Rfl: 11  •  pregabalin (LYRICA) 300 MG capsule, Take 1 capsule by mouth 2 (Two) Times a Day., Disp: 60 capsule, Rfl: 1  •  solifenacin (VESIcare) 5 MG tablet, Take 1 tablet by mouth Daily., Disp: 30 tablet, Rfl: 2  •  TRUEplus Insulin Syringe 30G X 5/16\" 0.5 ML misc, , Disp: , Rfl:   •  vilazodone (Viibryd) 20 MG tablet tablet, Take 1 tablet by mouth Daily., Disp: 90 tablet, Rfl: 1  •  vitamin D (ERGOCALCIFEROL) 1.25 MG (93905 UT) capsule capsule, TAKE 1 CAPSULE WEEKLY, Disp: 12 capsule, Rfl: 0  •  aspirin 81 MG EC tablet, Take 1 tablet by mouth Daily for 90 days., Disp: 90 tablet, Rfl: 3  •  carvedilol (COREG) 6.25 MG " "tablet, Take 1 tablet by mouth 2 (Two) Times a Day., Disp: 180 tablet, Rfl: 3    Physical Exam:  Vitals:    02/01/22 0954   BP: 148/72   BP Location: Left arm   Patient Position: Sitting   Cuff Size: Adult   Pulse: 85   SpO2: 98%   Weight: 107 kg (235 lb)   Height: 175.3 cm (69\")   PainSc: 0-No pain     Current Pain Level: none  Pulse Ox: Normal  on room air  General: alert, appears stated age and cooperative     Body Habitus: Obese  HEENT: Head: Normocephalic, no lesions, without obvious abnormality.     Neuro: alert, oriented x3  Pulses: 2+ and symmetric  JVP: Volume/Pulsation: Normal.  Normal waveforms.   Appropriate inspiratory decrease.     Carotid Exam: no bruit normal pulsation bilaterally   Carotid Volume: normal.     Respirations: no increased work of breathing   Chest:  Normal, chest discomfort is not reproducible on palpation    Pulmonary:Normal   Precordium: Normal impulses.   Heart rate: normal    Heart Rhythm: regular     Heart Sounds: S1: normal  S2: normal  S3: absent     Abdomen:   Appearance: normal .  Palpation: Soft, non-tender to palpation, bowel sounds positive in all four quadrants; no guarding or rebound tenderness  Extremity: no edema. Right BKA.        DATA REVIEWED:     EKG. I personally reviewed and interpreted the EKG.  Normal sinus rhythm, low voltage QRS on 12/1/2021    ECG/EMG Results (all)     Procedure Component Value Units Date/Time    ECG 12 Lead [[id number]] Collected: 12/01/21 1448     Updated: 12/01/21 1450     QT Interval 368 ms      QTC Interval 432 ms     Narrative:      Test Reason : pericardial effusion  Blood Pressure :   */*   mmHG  Vent. Rate :  83 BPM     Atrial Rate :  83 BPM     P-R Int : 158 ms          QRS Dur :  86 ms      QT Int : 368 ms       P-R-T Axes :  55  61  52 degrees     QTc Int : 432 ms    Normal sinus rhythm  Low voltage QRS  Borderline ECG  No previous ECGs available    Referred By: MICHELLE           Confirmed By:     "     ---------------------------------------------------  TTE/FRANCISCO:  Results for orders placed in visit on 01/11/22    Adult Transthoracic Echo Complete w/ Color, Spectral and Contrast if Necessary Per Protocol    Interpretation Summary  · Left ventricular wall thickness is consistent with mild concentric hypertrophy.  · Estimated left ventricular EF = 58% Left ventricular ejection fraction appears to be 56 - 60%. Left ventricular systolic function is normal. Left ventricular diastolic function was normal.  · No significant valvular abnormalities of visualized valves.  · There is a small (<1cm) pericardial effusion adjacent to the left ventricle. The effusion is fluid filled. There is no evidence of cardiac tamponade.    -----------------------------------------------------  CXR/Imaging:   Imaging Results (Most Recent)     None          -----------------------------------------------------  CT:   No radiology results for the last 30 days.    ----------------------------------------------------      --------------------------------------------------------------------------------------------------  LABS:     The 10-year CVD risk score (Je et al., 2008) is: 17.8%    Values used to calculate the score:      Age: 56 years      Sex: Female      Diabetic: Yes      Tobacco smoker: Yes      Systolic Blood Pressure: 116 mmHg      Is BP treated: Yes      HDL Cholesterol: 47 mg/dL      Total Cholesterol: 170 mg/dL         Lab Results   Component Value Date    GLUCOSE 145 (H) 01/27/2022    BUN 17 01/27/2022    CREATININE 1.00 01/27/2022    EGFRIFNONA 57 (L) 01/27/2022    BCR 17.0 01/27/2022    K 4.4 01/27/2022    CO2 26.0 01/27/2022    CALCIUM 8.7 01/27/2022    ALBUMIN 4.10 01/20/2022    AST 14 01/20/2022    ALT 11 01/20/2022     Lab Results   Component Value Date    WBC 13.70 (H) 01/27/2022    HGB 11.7 (L) 01/27/2022    HCT 36.1 01/27/2022    MCV 81.5 01/27/2022     01/27/2022     Lab Results   Component Value  Date    CHOL 133 01/20/2022    TRIG 77 01/20/2022    HDL 39 (L) 01/20/2022    LDL 79 01/20/2022     Lab Results   Component Value Date    TSH 1.070 01/20/2022     No results found for: CKTOTAL, CKMB, CKMBINDEX, TROPONINI, TROPONINT  Lab Results   Component Value Date    HGBA1C 9.37 (H) 01/20/2022     No results found for: DDIMER  Lab Results   Component Value Date    ALT 11 01/20/2022     Lab Results   Component Value Date    HGBA1C 9.37 (H) 01/20/2022    HGBA1C 7.98 (H) 09/08/2021    HGBA1C 7.90 (H) 06/14/2021     Lab Results   Component Value Date    MICROALBUR 1.5 01/20/2022    CREATININE 1.00 01/27/2022     Lab Results   Component Value Date    IRON 52 01/20/2022    TIBC 340 01/20/2022    FERRITIN 88.72 01/20/2022     Lab Results   Component Value Date    INR 1.08 01/27/2022    PROTIME 13.9 01/27/2022       Assessment/Plan     1. Coronary artery disease involving native coronary artery of native heart without angina pectoris  She was admitted to [facility] in September 2021 with chest pain (thought to be reproducible on inspiration), three sets of serum troponins were negative, chest CTA was negative for central pulmonary embolism, she had an echocardiogram which suggested small-moderate pericardial effusion, normal LVEF (>60%), LA dilation, slight RV dilation, and paradoxical wall motion abnormality in the LV at the junction of anterior wall and septum (by report), nasal swab was negative for COVID and multiple other respiratory viruses. She was seen by Dr Grajeda and recommended an outpatient stress test. She was referred to us for further evaluation.  She underwent Lexiscan nuclear stress testing in January 2022, there was evidence of a medium sized, mild to moderately severe area of ischemia in basal to mid inferolateral wall extending to apical lateral and anterolateral walls.  Subsequent coronary angiography demonstrated mild coronary artery disease involving mid LAD (40% diffuse  stenosis after the takeoff of the third diagonal branch), OM1 branch of LCx (30 to 40% tubular stenosis in the proximal segment) and RPDA (discrete 30% stenosis).  Medical management was pursued.  She has had a few episodes of aching left-sided chest discomfort unrelated to exertion.  These are likely not related to coronary artery disease.  Will start her on baby aspirin.  Add carvedilol to optimize hypertension control.  Continue Lipitor.  Optimization of CAD risk factors is recommended.  - aspirin 81 MG EC tablet; Take 1 tablet by mouth Daily for 90 days.  Dispense: 90 tablet; Refill: 3  - carvedilol (COREG) 6.25 MG tablet; Take 1 tablet by mouth 2 (Two) Times a Day.  Dispense: 180 tablet; Refill: 3    2. Pericardial effusion  Transthoracic echocardiogram in January 2022 showed normal LV systolic and diastolic function, mild concentric left ventricular hypertrophy, no significant valvular abnormalities of visualized valves and a small (<1 cm) pericardial effusion adjacent to the left ventricle.  There was no evidence of cardiac tamponade.  Serum TSH was normal in January 2022.  Serum CRP and ESR levels were also normal in January 2022, her ECGs have not suggested any evidence of pericarditis.  We will continue to monitor this.    3. Hypertension, unspecified type  Clinic BP was 148/72 mmHg today.  Will add carvedilol 6.25 mg orally twice daily to her regimen.  She is also on lisinopril 20 mg oral daily.  Will defer further management of hypertension to primary care.  - carvedilol (COREG) 6.25 MG tablet; Take 1 tablet by mouth 2 (Two) Times a Day.  Dispense: 180 tablet; Refill: 3    4. Class 1 obesity with alveolar hypoventilation, serious comorbidity, and body mass index (BMI) of 34.0 to 34.9 in adult (HCC)  Dietary modification was discussed.    5. Tobacco use  She was counseled to quit as detailed below.  She does not appear to be willing to quit.    Prevention:  Patient's Body mass index is 34.7 kg/m².  indicating that she is obese. Dietary modification was discussed.       Marta Giraldo  reports that she has been smoking cigarettes. She has been smoking about 0.50 packs per day. She has never used smokeless tobacco.. I have educated her on the risk of diseases from using tobacco products such as cancer, COPD and heart disease.     I advised her to quit and she is not willing to quit.    I spent 5 minutes counseling the patient.         Return in about 6 months (around 8/1/2022).            Electronically signed by Josias Carpio MD on 02/01/22 at 15:22 CST

## 2022-02-02 ENCOUNTER — APPOINTMENT (OUTPATIENT)
Dept: ONCOLOGY | Facility: HOSPITAL | Age: 57
End: 2022-02-02

## 2022-02-09 ENCOUNTER — INFUSION (OUTPATIENT)
Dept: ONCOLOGY | Facility: HOSPITAL | Age: 57
End: 2022-02-09

## 2022-02-09 VITALS
TEMPERATURE: 96.5 F | SYSTOLIC BLOOD PRESSURE: 124 MMHG | DIASTOLIC BLOOD PRESSURE: 57 MMHG | HEART RATE: 81 BPM | RESPIRATION RATE: 18 BRPM

## 2022-02-09 DIAGNOSIS — E53.8 B12 DEFICIENCY: Primary | ICD-10-CM

## 2022-02-09 PROCEDURE — 25010000002 CYANOCOBALAMIN PER 1000 MCG: Performed by: INTERNAL MEDICINE

## 2022-02-09 PROCEDURE — 96372 THER/PROPH/DIAG INJ SC/IM: CPT | Performed by: INTERNAL MEDICINE

## 2022-02-09 RX ORDER — CYANOCOBALAMIN 1000 UG/ML
1000 INJECTION, SOLUTION INTRAMUSCULAR; SUBCUTANEOUS ONCE
Status: CANCELLED | OUTPATIENT
Start: 2022-02-16

## 2022-02-09 RX ORDER — CYANOCOBALAMIN 1000 UG/ML
1000 INJECTION, SOLUTION INTRAMUSCULAR; SUBCUTANEOUS ONCE
Status: COMPLETED | OUTPATIENT
Start: 2022-02-09 | End: 2022-02-09

## 2022-02-09 RX ADMIN — CYANOCOBALAMIN 1000 MCG: 1000 INJECTION, SOLUTION INTRAMUSCULAR at 08:55

## 2022-02-15 ENCOUNTER — TELEPHONE (OUTPATIENT)
Dept: FAMILY MEDICINE CLINIC | Facility: CLINIC | Age: 57
End: 2022-02-15

## 2022-02-17 ENCOUNTER — OFFICE VISIT (OUTPATIENT)
Dept: FAMILY MEDICINE CLINIC | Facility: CLINIC | Age: 57
End: 2022-02-17

## 2022-02-17 VITALS
HEIGHT: 69 IN | DIASTOLIC BLOOD PRESSURE: 84 MMHG | BODY MASS INDEX: 34.8 KG/M2 | WEIGHT: 235 LBS | TEMPERATURE: 98.7 F | SYSTOLIC BLOOD PRESSURE: 132 MMHG | OXYGEN SATURATION: 95 % | HEART RATE: 82 BPM

## 2022-02-17 DIAGNOSIS — G89.29 CHRONIC HAND PAIN, LEFT: Primary | Chronic | ICD-10-CM

## 2022-02-17 DIAGNOSIS — M79.642 CHRONIC HAND PAIN, LEFT: Primary | Chronic | ICD-10-CM

## 2022-02-17 DIAGNOSIS — T14.8XXA MUSCLE STRAIN: ICD-10-CM

## 2022-02-17 DIAGNOSIS — L97.429 HEEL ULCERATION, LEFT, WITH UNSPECIFIED SEVERITY: ICD-10-CM

## 2022-02-17 DIAGNOSIS — E11.65 UNCONTROLLED TYPE 2 DIABETES MELLITUS WITH HYPERGLYCEMIA: Chronic | ICD-10-CM

## 2022-02-17 DIAGNOSIS — E11.42 DIABETIC POLYNEUROPATHY ASSOCIATED WITH TYPE 2 DIABETES MELLITUS: Chronic | ICD-10-CM

## 2022-02-17 PROCEDURE — 99214 OFFICE O/P EST MOD 30 MIN: CPT | Performed by: NURSE PRACTITIONER

## 2022-02-17 RX ORDER — SULFAMETHOXAZOLE AND TRIMETHOPRIM 800; 160 MG/1; MG/1
1 TABLET ORAL 2 TIMES DAILY
Qty: 14 TABLET | Refills: 0 | Status: SHIPPED | OUTPATIENT
Start: 2022-02-17 | End: 2022-03-04

## 2022-02-17 RX ORDER — MAG HYDROX/ALUMINUM HYD/SIMETH 400-400-40
SUSPENSION, ORAL (FINAL DOSE FORM) ORAL
Qty: 210 ML | Refills: 0 | Status: SHIPPED | OUTPATIENT
Start: 2022-02-17 | End: 2022-06-30

## 2022-02-17 RX ORDER — FLUCONAZOLE 150 MG/1
TABLET ORAL
Qty: 2 TABLET | Refills: 0 | Status: SHIPPED | OUTPATIENT
Start: 2022-02-17 | End: 2022-03-04

## 2022-02-17 RX ORDER — TRAMADOL HYDROCHLORIDE 50 MG/1
50 TABLET ORAL 2 TIMES DAILY PRN
Qty: 20 TABLET | Refills: 0 | Status: SHIPPED | OUTPATIENT
Start: 2022-02-17 | End: 2022-02-25 | Stop reason: SDUPTHER

## 2022-02-18 NOTE — PROGRESS NOTES
"Chief Complaint  Pain (L hand/R rib x weeks, no known injury) and Skin Concern (L foot x 1 week, \"skin came off\"; no known injury/no abrasion/some redness)    Subjective          Marta Giraldo presents to [facility] - [facility]    PCP: Dr. Crane    CC: \"left hand pain, side pain, skin concern\"    Seen for f/u on left hand pain.  Seen by me on 1- for same, had seen PCP prior to that and had normal xrays.  MRI ordered, denied by insurance.  C/O persistent/worsening pain, primarily affecting right 2nd finger, pain worse with movement.  Non dominant hand, denies repetitive use or injury.  Due to comorbidities, unable to take NSAIDs, trial of Diclofenac gel and tylenol has provided minimal relief.      C/O right torso pain x 5-6 days after turning/stretching to put groceries away.  Has had pain in area since that time to touch or any movement.  Denies rash on area.  Currently taking Robaxin TID and tylenol with no relief.  Denies fall or hitting anything.      C/O skin lesion to left heel for the last week with some redness.  Unsure of injury or if was blister, reports \"skin peeled off.\".  Hx of uncontrolled diabetes, followed by endocrinology, polyneuropathy (on Lyrica).  Just happened to see lesion when changing socks.  No fever. Reports she taking all insulin, diabetes meds as prescribed.  Last A1C: 9.37 in Jan 2022.  Hx of BKA on right.      Pain  Chronicity: chronic left hand; new right torso. Episode onset: several months: hand; 5-6 days, torso. The problem occurs constantly. The problem has been gradually worsening. Associated symptoms include arthralgias (left hand, 2nd finger) and myalgias (right torso). Pertinent negatives include no fever or headaches. Exacerbated by: movement. She has tried acetaminophen (muscle relaxers; diclofenac gel) for the symptoms. The treatment provided no relief.       Review of Systems " "  Constitutional: Negative for fever and unexpected weight change.   HENT: Negative.    Respiratory: Negative.    Cardiovascular: Negative.    Gastrointestinal: Negative.    Genitourinary: Negative.    Musculoskeletal: Positive for arthralgias (left hand, 2nd finger) and myalgias (right torso).   Skin: Positive for wound (left heel).   Neurological: Negative for headaches.        Objective   Vital Signs:   /84 (BP Location: Right arm, Patient Position: Sitting)   Pulse 82   Temp 98.7 °F (37.1 °C) (Oral)   Ht 175.3 cm (69\")   Wt 107 kg (235 lb)   SpO2 95%   BMI 34.70 kg/m²       Physical Exam  Vitals and nursing note reviewed.   Constitutional:       General: She is not in acute distress.     Appearance: Normal appearance. She is obese. She is not ill-appearing.   HENT:      Head: Normocephalic and atraumatic.   Cardiovascular:      Rate and Rhythm: Normal rate and regular rhythm.   Pulmonary:      Effort: Pulmonary effort is normal. No respiratory distress.      Breath sounds: Normal breath sounds. No wheezing, rhonchi or rales.   Musculoskeletal:      Left hand: Swelling (mild) and tenderness (2nd finger) present. Decreased range of motion (2nd finger). Decreased strength (2nd finger). Normal capillary refill. Normal pulse.      Cervical back: Neck supple.      Thoracic back: Tenderness present. Decreased range of motion.        Back:    Skin:     General: Skin is warm and dry.      Findings: Lesion present.          Neurological:      General: No focal deficit present.      Mental Status: She is alert and oriented to person, place, and time.   Psychiatric:         Mood and Affect: Mood normal.         Thought Content: Thought content normal.          Result Review :     Common labs    Common Labsle 12/13/21 1/20/22 1/20/22 1/20/22 1/20/22 1/20/22 1/27/22 1/27/22     1045 1045 1045 1045 1045 0708 0708   Glucose   196 (A)     145 (A)   BUN   15     17   Creatinine   1.20 (A)     1.00   eGFR Non  " Am   46 (A)     57 (A)   Sodium   140     140   Potassium   4.5     4.4   Chloride   105     105   Calcium   8.9     8.7   Albumin   4.10        Total Bilirubin   0.3        Alkaline Phosphatase   101        AST (SGOT)   14        ALT (SGPT)   11        WBC 13.53 (A) 14.31 (A)     13.70 (A)    Hemoglobin 11.7 (A) 12.2     11.7 (A)    Hematocrit 36.4 38.2     36.1    Platelets 215 216     202    Total Cholesterol     133      Triglycerides     77      HDL Cholesterol     39 (A)      LDL Cholesterol      79      Hemoglobin A1C    9.37 (A)       Microalbumin, Urine      1.5     (A) Abnormal value                     EXAMINATION:  XR HAND 3+ VW LEFT     CLINICAL HISTORY:  56 years Female,left hand pain, M79.642 Pain  in left hand     COMPARISON:  11/16/2021 plain films of the left hand     FINDINGS:  The left hand is negative for acute fracture. No  dislocation. No radiopaque foreign body. Bony demineralization.  No significant degenerative changes.     IMPRESSION:  No evidence for acute bony abnormality involving the  left hand.     Electronically signed by:  Edvin Wang MD  11/17/2021 8:38 PM  [facility] Workstation: [id number]      Assessment and Plan    Diagnoses and all orders for this visit:    1. Chronic hand pain, left (Primary)  -     traMADol (ULTRAM) 50 MG tablet; Take 1 tablet by mouth 2 (Two) Times a Day As Needed for Moderate Pain .  Dispense: 20 tablet; Refill: 0    2. Muscle strain  -     traMADol (ULTRAM) 50 MG tablet; Take 1 tablet by mouth 2 (Two) Times a Day As Needed for Moderate Pain .  Dispense: 20 tablet; Refill: 0    3. Heel ulceration, left, with unspecified severity (HCC)  -     sulfamethoxazole-trimethoprim (Bactrim DS) 800-160 MG per tablet; Take 1 tablet by mouth 2 (Two) Times a Day.  Dispense: 14 tablet; Refill: 0  -     Sodium Chloride (Saline Wound Wash) 0.9 % solution; Saline wet to dry dressings BID x 7 days.  Dispense: 210 mL; Refill: 0    4. Uncontrolled type 2 diabetes mellitus with  hyperglycemia (HCC)    5. Diabetic polyneuropathy associated with type 2 diabetes mellitus (HCC)    Other orders  -     fluconazole (Diflucan) 150 MG tablet; 1 tab po x 1 now, may repeat in 4 days prn yeast  Dispense: 2 tablet; Refill: 0      MRI left hand previously denied by insurance.  Possible tendinitis.  Wishes to hold off on ortho referral for now.   Rx for Tramadol #20 PRN provided for pain not relieved by tyelnol. Miguel reviewed.   Ice over hand, right torso as needed.   If torso pain persist, will need rib xrays.      Continue with all routine medications as prescribed.     Heel ulceration: saline wet to dry dressings BID x 1 week with recheck to be scheduled at that time.  First done in office.   Rx for Bactrim DS x 7 days given.   If not significantly improved, will refer to wound care for evaluation/treatment due to history of previous RBKA.     Plan to see PCP when back in office.  Continue with specialty appointments as scheduled.       This document has been electronically signed by CONNOR Palacios on February 17, 2022 19:18 CST,.    I spent 39 minutes caring for Marta on this date of service. This time includes time spent by me in the following activities:preparing for the visit, reviewing tests, performing a medically appropriate examination and/or evaluation , counseling and educating the patient/family/caregiver, ordering medications, tests, or procedures and documenting information in the medical record

## 2022-02-23 ENCOUNTER — INFUSION (OUTPATIENT)
Dept: ONCOLOGY | Facility: HOSPITAL | Age: 57
End: 2022-02-23

## 2022-02-23 ENCOUNTER — TELEMEDICINE (OUTPATIENT)
Dept: ENDOCRINOLOGY | Facility: CLINIC | Age: 57
End: 2022-02-23

## 2022-02-23 VITALS
RESPIRATION RATE: 20 BRPM | TEMPERATURE: 97.4 F | DIASTOLIC BLOOD PRESSURE: 60 MMHG | HEART RATE: 67 BPM | SYSTOLIC BLOOD PRESSURE: 128 MMHG

## 2022-02-23 DIAGNOSIS — E55.9 VITAMIN D DEFICIENCY: ICD-10-CM

## 2022-02-23 DIAGNOSIS — Z79.4 TYPE 2 DIABETES MELLITUS WITH HYPOGLYCEMIA WITHOUT COMA, WITH LONG-TERM CURRENT USE OF INSULIN: Primary | ICD-10-CM

## 2022-02-23 DIAGNOSIS — E03.9 ACQUIRED HYPOTHYROIDISM: ICD-10-CM

## 2022-02-23 DIAGNOSIS — E53.8 B12 DEFICIENCY: Primary | ICD-10-CM

## 2022-02-23 DIAGNOSIS — E11.42 DIABETIC POLYNEUROPATHY ASSOCIATED WITH TYPE 2 DIABETES MELLITUS: ICD-10-CM

## 2022-02-23 DIAGNOSIS — E11.649 TYPE 2 DIABETES MELLITUS WITH HYPOGLYCEMIA WITHOUT COMA, WITH LONG-TERM CURRENT USE OF INSULIN: Primary | ICD-10-CM

## 2022-02-23 PROCEDURE — 99214 OFFICE O/P EST MOD 30 MIN: CPT | Performed by: NURSE PRACTITIONER

## 2022-02-23 PROCEDURE — 96372 THER/PROPH/DIAG INJ SC/IM: CPT | Performed by: NURSE PRACTITIONER

## 2022-02-23 PROCEDURE — 25010000002 CYANOCOBALAMIN PER 1000 MCG: Performed by: INTERNAL MEDICINE

## 2022-02-23 RX ORDER — CYANOCOBALAMIN 1000 UG/ML
1000 INJECTION, SOLUTION INTRAMUSCULAR; SUBCUTANEOUS ONCE
Status: CANCELLED | OUTPATIENT
Start: 2022-03-02

## 2022-02-23 RX ORDER — CYANOCOBALAMIN 1000 UG/ML
1000 INJECTION, SOLUTION INTRAMUSCULAR; SUBCUTANEOUS ONCE
Status: COMPLETED | OUTPATIENT
Start: 2022-02-23 | End: 2022-02-23

## 2022-02-23 RX ADMIN — CYANOCOBALAMIN 1000 MCG: 1000 INJECTION, SOLUTION INTRAMUSCULAR at 11:17

## 2022-02-23 NOTE — PROGRESS NOTES
Chief Complaint  Diabetes    Subjective          Marta Travis Giraldo presents to [facility] ENDOCRINOLOGY  History of Present Illness      You have chosen to receive care through a telehealth visit.  Do you consent to use a video/audio connection for your medical care today? Yes            TELEHEALTH VIDEO VISIT     This a video visit due to [facility] current guidelines for social distancing due to the COVID 19 pandemic    57-year-old female presents for follow-up     Reason diabetes mellitus type 2    Diagnosed in 1980 s     Timing constant     Quality improved control          Severity high     Lab Results   Component Value Date    HGBA1C 9.37 (H) 01/20/2022             Macrovascular complications--CVA      Microvascular complications--neuropathy , diabetic retinopathy           Blood glucose monitoring fingersticks     Checks 4 times daily     Has nigel but could not download       she has been having lows sugars at night and after meals                            Alleviating factors-- compliance with medication      Aggravating factors-- none          Review of Systems - General ROS: negative        Objective   Vital Signs:   There were no vitals taken for this visit.    Physical Exam  Neurological:      General: No focal deficit present.      Mental Status: She is alert.   Psychiatric:         Mood and Affect: Mood normal.         Thought Content: Thought content normal.         Judgment: Judgment normal.        Result Review :   The following data was reviewed by: CONNOR Concepcion on 02/23/2022:  Common labs    Common Labsle 12/13/21 1/20/22 1/20/22 1/20/22 1/20/22 1/20/22 1/27/22 1/27/22     1045 1045 1045 1045 1045 [id number] 0708   Glucose   196 (A)     145 (A)   BUN   15     17   Creatinine   1.20 (A)     1.00   eGFR Non  Am   46 (A)     57 (A)   Sodium   140     140   Potassium   4.5     4.4   Chloride   105     105   Calcium   8.9     8.7   Albumin   4.10        Total  Bilirubin   0.3        Alkaline Phosphatase   101        AST (SGOT)   14        ALT (SGPT)   11        WBC 13.53 (A) 14.31 (A)     13.70 (A)    Hemoglobin 11.7 (A) 12.2     11.7 (A)    Hematocrit 36.4 38.2     36.1    Platelets 215 216     202    Total Cholesterol     133      Triglycerides     77      HDL Cholesterol     39 (A)      LDL Cholesterol      79      Hemoglobin A1C    9.37 (A)       Microalbumin, Urine      1.5     (A) Abnormal value                      Assessment and Plan    Diagnoses and all orders for this visit:    1. Type 2 diabetes mellitus with hypoglycemia without coma, with long-term current use of insulin (HCC) (Primary)    2. Vitamin D deficiency    3. Acquired hypothyroidism    4. Diabetic polyneuropathy associated with type 2 diabetes mellitus (HCC)    Other orders  -     empagliflozin (Jardiance) 25 MG tablet tablet; Take 1 tablet by mouth Daily.  Dispense: 90 tablet; Refill: 3         Glycemic management     Diabetes mellitus type 2              Lab Results   Component Value Date    HGBA1C 9.37 (H) 01/20/2022          Taking Jardiance 25 mg daily-keep      Taking Trulicity 3 mg weekly --keep      Taking Basaglar--- 36 units decrease to 32 units            Take humalog before meals 10 units ---decrease to 6 units                       Goals for sugar     Fasting and before meals 80 to 130     2 hours after meals 180 or less        Aim for 45 grams of carbohydrate per meal     Aim for 15 grams of carbohydrate per snack                     Lipid management     Taking Lipitor 40 mg 1 at night        Total Cholesterol   Date Value Ref Range Status   01/20/2022 133 0 - 200 mg/dL Final     Triglycerides   Date Value Ref Range Status   01/20/2022 77 0 - 150 mg/dL Final     HDL Cholesterol   Date Value Ref Range Status   01/20/2022 39 (L) 40 - 60 mg/dL Final     LDL Cholesterol    Date Value Ref Range Status   01/20/2022 79 0 - 100 mg/dL Final                 Blood pressure management     Taking  lisinopril 20 mg 1 daily      taking carvedilol 6.25 mg bid            Microvascular monitoring     Last eye exam--- Jan. 2021 ,  , monthly injection      Neuropathy     Taking Lyrica              Thyroid     Hypothyroidism     Taking levothyroxine 25 mcg daily        Lab Results   Component Value Date    TSH 1.070 01/20/2022          Bone health     Vitamin D deficiency     Taking vitamin D 50,000 units weekly     Weight management       Obesity                          Follow Up   No follow-ups on file.  Patient was given instructions and counseling regarding her condition or for health maintenance advice. Please see specific information pulled into the AVS if appropriate.         This document has been electronically signed by CONNOR Concepcion on February 23, 2022 14:54 CST.

## 2022-02-25 ENCOUNTER — OFFICE VISIT (OUTPATIENT)
Dept: FAMILY MEDICINE CLINIC | Facility: CLINIC | Age: 57
End: 2022-02-25

## 2022-02-25 VITALS
OXYGEN SATURATION: 96 % | BODY MASS INDEX: 34.8 KG/M2 | DIASTOLIC BLOOD PRESSURE: 70 MMHG | WEIGHT: 235 LBS | HEART RATE: 87 BPM | SYSTOLIC BLOOD PRESSURE: 130 MMHG | HEIGHT: 69 IN | TEMPERATURE: 98.4 F

## 2022-02-25 DIAGNOSIS — L97.521 ULCER OF LEFT FOOT, LIMITED TO BREAKDOWN OF SKIN: ICD-10-CM

## 2022-02-25 DIAGNOSIS — E11.65 UNCONTROLLED TYPE 2 DIABETES MELLITUS WITH HYPERGLYCEMIA: Chronic | ICD-10-CM

## 2022-02-25 DIAGNOSIS — G89.29 CHRONIC HAND PAIN, LEFT: Primary | Chronic | ICD-10-CM

## 2022-02-25 DIAGNOSIS — M79.642 CHRONIC HAND PAIN, LEFT: Primary | Chronic | ICD-10-CM

## 2022-02-25 PROCEDURE — 99214 OFFICE O/P EST MOD 30 MIN: CPT | Performed by: NURSE PRACTITIONER

## 2022-02-25 RX ORDER — TRAMADOL HYDROCHLORIDE 50 MG/1
50 TABLET ORAL 2 TIMES DAILY PRN
Qty: 20 TABLET | Refills: 0 | Status: CANCELLED | OUTPATIENT
Start: 2022-02-25

## 2022-02-25 RX ORDER — TRAMADOL HYDROCHLORIDE 50 MG/1
50 TABLET ORAL 2 TIMES DAILY PRN
Qty: 20 TABLET | Refills: 0 | Status: SHIPPED | OUTPATIENT
Start: 2022-02-25 | End: 2022-06-28

## 2022-02-25 NOTE — PROGRESS NOTES
"Chief Complaint  Follow-up (OV on 2/17, foot looks much better/pain is hand improved some but not gone)    Subjective          Marta Giraldo presents to [facility]IMARY [facility] - [facility]    PCP: Dr. Crane    CC: \"recheck hand pain; recheck foot ulcer\"    Chronic hand pain:  Given Tramadol at previous visit which has helped some, but agreeable to now seeing ortho for evaluation.  Would like refill of Tramadol as it does at least provide some relief.      Foot ulcer: Noted at previous visit on 2-17.  At that time, appeared to be possible blister that had broken open with mild erythema/swelling/warmth.  Treated with Bactrim which she just finished and started on saline wet to dry dressings.  Area is improving, infection is gone.  Denies pain, bleeding or purulent discharge from the area.  Has been able to dressings without problems.  Hx of RBKA.  Diabetes not optimally controlled--9.37 A1C in Jan 2022.  Had telehealth visit with Endocrinology yesterday for diabetes management.      Right muscle strain that was noted at visit on 2- has improved.     No other c/o voiced today.       Review of Systems   Constitutional: Negative.    Respiratory: Negative.    Cardiovascular: Negative.    Genitourinary: Negative.    Musculoskeletal: Positive for arthralgias (left hand pain).   Skin: Positive for wound (left heel).   Neurological: Negative.         Objective   Vital Signs:   /70 (BP Location: Left arm, Patient Position: Sitting)   Pulse 87   Temp 98.4 °F (36.9 °C) (Oral)   Ht 175.3 cm (69\")   Wt 107 kg (235 lb)   SpO2 96%   BMI 34.70 kg/m²       Physical Exam  Vitals and nursing note reviewed.   Constitutional:       General: She is not in acute distress.     Appearance: Normal appearance. She is obese. She is not ill-appearing.   HENT:      Head: Normocephalic and atraumatic.   Musculoskeletal:      Left hand: Tenderness (mostly along " 2nd finger/metacarpal) present. Decreased range of motion. Normal capillary refill. Normal pulse.      Cervical back: Neck supple.   Skin:     General: Skin is warm and dry.      Findings: Wound present.          Neurological:      Mental Status: She is alert and oriented to person, place, and time.   Psychiatric:         Mood and Affect: Mood normal.         Thought Content: Thought content normal.          Result Review :     Common labs    Common Labsle 12/13/21 1/20/22 1/20/22 1/20/22 1/20/22 1/20/22 1/27/22 1/27/22     1045 1045 1045 1045 1045 0708 0708   Glucose   196 (A)     145 (A)   BUN   15     17   Creatinine   1.20 (A)     1.00   eGFR Non  Am   46 (A)     57 (A)   Sodium   140     140   Potassium   4.5     4.4   Chloride   105     105   Calcium   8.9     8.7   Albumin   4.10        Total Bilirubin   0.3        Alkaline Phosphatase   101        AST (SGOT)   14        ALT (SGPT)   11        WBC 13.53 (A) 14.31 (A)     13.70 (A)    Hemoglobin 11.7 (A) 12.2     11.7 (A)    Hematocrit 36.4 38.2     36.1    Platelets 215 216     202    Total Cholesterol     133      Triglycerides     77      HDL Cholesterol     39 (A)      LDL Cholesterol      79      Hemoglobin A1C    9.37 (A)       Microalbumin, Urine      1.5     (A) Abnormal value                 EXAMINATION:  XR HAND 3+ VW LEFT     CLINICAL HISTORY:  56 years Female,left hand pain, M79.642 Pain  in left hand     COMPARISON:  11/16/2021 plain films of the left hand     FINDINGS:  The left hand is negative for acute fracture. No  dislocation. No radiopaque foreign body. Bony demineralization.  No significant degenerative changes.     IMPRESSION:  No evidence for acute bony abnormality involving the  left hand.     Electronically signed by:  Edvin Wang MD  11/17/2021 8:38 PM  [facility]ST Workstation: [id number]     Assessment and Plan    Diagnoses and all orders for this visit:    1. Chronic hand pain, left (Primary)  -     Ambulatory Referral to  Orthopedic Surgery  -     traMADol (ULTRAM) 50 MG tablet; Take 1 tablet by mouth 2 (Two) Times a Day As Needed for Moderate Pain .  Dispense: 20 tablet; Refill: 0    2. Uncontrolled type 2 diabetes mellitus with hyperglycemia (HCC)    3. Ulcer of left foot, limited to breakdown of skin (HCC)      Referral to orthopedics for chronic left hand pain.   Refill of Tramadol PRN until ortho evaluation. Miguel reviewed.     Ulcer of left foot does seem to be improving, continue with saline wet/dry dressings for another week and will recheck in 1 week.  If limited improvement at that time, will refer to wound care for evaluation/treatment.      Continue with diabetes management/medications as instructed per endocrinology.     See PCP for routine f/u visit and management of chronic medical conditions      This document has been electronically signed by CONNOR Palacios on February 25, 2022 09:53 CST

## 2022-03-01 RX ORDER — ATORVASTATIN CALCIUM 80 MG/1
TABLET, FILM COATED ORAL
Qty: 30 TABLET | Refills: 1 | Status: SHIPPED | OUTPATIENT
Start: 2022-03-01 | End: 2022-08-04

## 2022-03-02 ENCOUNTER — INFUSION (OUTPATIENT)
Dept: ONCOLOGY | Facility: HOSPITAL | Age: 57
End: 2022-03-02

## 2022-03-02 ENCOUNTER — OFFICE VISIT (OUTPATIENT)
Dept: ORTHOPEDIC SURGERY | Facility: CLINIC | Age: 57
End: 2022-03-02

## 2022-03-02 VITALS — BODY MASS INDEX: 34.8 KG/M2 | WEIGHT: 235 LBS | HEIGHT: 69 IN

## 2022-03-02 DIAGNOSIS — M25.642 DECREASED RANGE OF MOTION OF FINGER OF LEFT HAND: ICD-10-CM

## 2022-03-02 DIAGNOSIS — E53.8 B12 DEFICIENCY: Primary | ICD-10-CM

## 2022-03-02 DIAGNOSIS — M79.645 PAIN OF FINGER OF LEFT HAND: ICD-10-CM

## 2022-03-02 DIAGNOSIS — M79.642 LEFT HAND PAIN: Primary | ICD-10-CM

## 2022-03-02 PROCEDURE — 25010000002 CYANOCOBALAMIN PER 1000 MCG: Performed by: INTERNAL MEDICINE

## 2022-03-02 PROCEDURE — 99204 OFFICE O/P NEW MOD 45 MIN: CPT | Performed by: NURSE PRACTITIONER

## 2022-03-02 PROCEDURE — 96372 THER/PROPH/DIAG INJ SC/IM: CPT | Performed by: INTERNAL MEDICINE

## 2022-03-02 RX ORDER — CYANOCOBALAMIN 1000 UG/ML
1000 INJECTION, SOLUTION INTRAMUSCULAR; SUBCUTANEOUS ONCE
Status: CANCELLED | OUTPATIENT
Start: 2022-03-14

## 2022-03-02 RX ORDER — PYRAZINAMIDE 500 MG/1
1-2 TABLET ORAL EVERY 6 HOURS PRN
Qty: 40 TABLET | Refills: 0 | Status: SHIPPED | OUTPATIENT
Start: 2022-03-02 | End: 2022-03-07

## 2022-03-02 RX ORDER — CYANOCOBALAMIN 1000 UG/ML
1000 INJECTION, SOLUTION INTRAMUSCULAR; SUBCUTANEOUS ONCE
Status: COMPLETED | OUTPATIENT
Start: 2022-03-02 | End: 2022-03-02

## 2022-03-02 RX ADMIN — CYANOCOBALAMIN 1000 MCG: 1000 INJECTION, SOLUTION INTRAMUSCULAR at 09:10

## 2022-03-02 NOTE — PROGRESS NOTES
Marta Giraldo is a 57 y.o. female   Primary provider:  Gato Crane MD       Chief Complaint   Patient presents with   • Left Hand - Hand Pain       HISTORY OF PRESENT ILLNESS:    Patient is a 57-year-old female who presents today with complaints of left hand pain.  Pain has been present for 2 months.  She denies known injury or precipitating factor.  Patient reports originally pain was in entire hand, now pain is only in index finger.  Pain is exacerbated by bending finger.  She had x-rays in November which did not show acute bony abnormality or significant degenerative change.  She reports her pain is moderate.  She denies triggering.  She denies fever, nausea, vomiting.  She has tried topical NSAIDs, patient has increased creatinine and decreased GFR.  She is also tried heat without relief of symptoms.  Patient is a diabetic, her most recent hemoglobin A1c was 9.37.  She denies burning, tingling, numbness.      Hand Pain   Incident onset: 2 MONTHS. There was no injury mechanism. The pain is present in the left hand. The pain is moderate. The pain has been intermittent since the incident. She has tried NSAIDs and heat for the symptoms.        CONCURRENT MEDICAL HISTORY:    Past Medical History:   Diagnosis Date   • Anemia    • Anxiety    • Arthritis    • Cataract    • Depression    • Diabetes mellitus (HCC)    • Disease of thyroid gland    • Family history of thyroid problem    • GERD (gastroesophageal reflux disease)    • Headache    • History of transfusion    • Hyperlipidemia    • Hypertension    • Neuropathy    • Stroke (HCC) 2019   • Urinary tract infection        Allergies   Allergen Reactions   • Compazine [Prochlorperazine Edisylate] Unknown - High Severity   • Penicillins Other (See Comments) and Unknown - High Severity     unknown         Current Outpatient Medications:   •  aspirin 81 MG EC tablet, Take 1 tablet by mouth Daily for 90 days., Disp: 90 tablet, Rfl: 3  •  atorvastatin (LIPITOR)  80 MG tablet, TAKE 1 TABLET NIGHTLY, Disp: 30 tablet, Rfl: 1  •  B-D UF III MINI PEN NEEDLES 31G X 5 MM misc, Use as needed, Disp: 100 each, Rfl: 3  •  Blood Glucose Monitoring Suppl w/Device kit, Use for E11.65 diabetes to check sugars 4 times a day., Disp: 1 each, Rfl: 0  •  carvedilol (COREG) 6.25 MG tablet, Take 1 tablet by mouth 2 (Two) Times a Day., Disp: 180 tablet, Rfl: 3  •  clotrimazole-betamethasone (Lotrisone) 1-0.05 % cream, Apply  topically to the appropriate area as directed 2 (Two) Times a Day., Disp: 1 each, Rfl: 3  •  Continuous Blood Gluc  (FreeStyle Tae 2 [place]) device, USE AS DIRECTED, Disp: 1 each, Rfl: 11  •  Continuous Blood Gluc Sensor (FreeStyle Tae 2 Sensor) misc, 1 each Every 14 (Fourteen) Days. Use every 14 days, Disp: 2 each, Rfl: 11  •  Diclofenac Sodium (VOLTAREN) 1 % gel gel, Apply 4 g topically to the appropriate area as directed 4 (Four) Times a Day As Needed (knee/hip/hand pain)., Disp: 350 g, Rfl: 1  •  docusate sodium (COLACE) 100 MG capsule, Take 1 capsule by mouth 2 (Two) Times a Day As Needed for Constipation., Disp: 60 capsule, Rfl: 2  •  Dulaglutide (Trulicity) 3 MG/0.5ML solution pen-injector, Inject 0.5 mL under the skin into the appropriate area as directed 1 (One) Time Per Week., Disp: 4 pen, Rfl: 3  •  DULoxetine (CYMBALTA) 60 MG capsule, , Disp: , Rfl:   •  empagliflozin (Jardiance) 25 MG tablet tablet, Take 1 tablet by mouth Daily., Disp: 90 tablet, Rfl: 3  •  ferrous sulfate 325 (65 Fe) MG tablet, Take 1 tablet by mouth 2 (Two) Times a Day With Meals., Disp: 180 tablet, Rfl: 1  •  fluconazole (Diflucan) 150 MG tablet, 1 tab po x 1 now, may repeat in 4 days prn yeast, Disp: 2 tablet, Rfl: 0  •  glucose (DEX4) 4 GM chewable tablet, Chew 4 tablets As Needed for Low Blood Sugar., Disp: 90 tablet, Rfl: 3  •  glucose blood test strip, Use as instructed, Disp: 200 each, Rfl: 12  •  Insulin Glargine (LANTUS SOLOSTAR) 100 UNIT/ML injection pen, Inject 35 Units  "under the skin into the appropriate area as directed Every Night. (Patient taking differently: Inject 35 Units under the skin into the appropriate area as directed Daily.), Disp: 9 mL, Rfl: 3  •  insulin lispro (humaLOG) 100 UNIT/ML injection, INJECT 5 UNITS SUBCUTANEOUSLY PRIOR TO MEALS., Disp: 10 mL, Rfl: 0  •  Insulin Pen Needle 32G X 8 MM misc, Use at bedtime, Disp: 100 each, Rfl: 3  •  Insulin Syringe-Needle U-100 30G X 1/2\" 1 ML misc, Use three times daily with insulin, Disp: 100 each, Rfl: 3  •  Lancets misc, Use for E11.65 diabetes to check sugars 4 times a day., Disp: 200 each, Rfl: 3  •  levothyroxine (SYNTHROID, LEVOTHROID) 25 MCG tablet, TAKE 1 TABLET DAILY. (Patient taking differently: Take 25 mcg by mouth Every Night.), Disp: 30 tablet, Rfl: 2  •  lisinopril (PRINIVIL,ZESTRIL) 20 MG tablet, TAKE 1 TABLET DAILY., Disp: 30 tablet, Rfl: 2  •  methocarbamol (ROBAXIN) 750 MG tablet, Take 1 tablet by mouth 3 (Three) Times a Day., Disp: 90 tablet, Rfl: 4  •  mupirocin (Bactroban) 2 % cream, Apply  topically to the appropriate area as directed 3 (Three) Times a Day., Disp: 30 g, Rfl: 1  •  omeprazole (priLOSEC) 40 MG capsule, Take 1 capsule by mouth Daily., Disp: 30 capsule, Rfl: 11  •  pregabalin (LYRICA) 300 MG capsule, Take 1 capsule by mouth 2 (Two) Times a Day., Disp: 60 capsule, Rfl: 1  •  Sodium Chloride (Saline Wound Wash) 0.9 % solution, Saline wet to dry dressings BID x 7 days., Disp: 210 mL, Rfl: 0  •  solifenacin (VESIcare) 5 MG tablet, Take 1 tablet by mouth Daily., Disp: 30 tablet, Rfl: 2  •  traMADol (ULTRAM) 50 MG tablet, Take 1 tablet by mouth 2 (Two) Times a Day As Needed for Moderate Pain ., Disp: 20 tablet, Rfl: 0  •  TRUEplus Insulin Syringe 30G X 5/16\" 0.5 ML misc, , Disp: , Rfl:   •  vilazodone (Viibryd) 20 MG tablet tablet, Take 1 tablet by mouth Daily., Disp: 90 tablet, Rfl: 1  •  vitamin D (ERGOCALCIFEROL) 1.25 MG (72969 UT) capsule capsule, TAKE 1 CAPSULE WEEKLY, Disp: 12 capsule, " Rfl: 0  •  acetaminophen-codeine (TYLENOL/CODEINE #3) 300-30 MG per tablet, Take 1-2 tablets by mouth Every 6 (Six) Hours As Needed for Moderate Pain  for up to 5 days., Disp: 40 tablet, Rfl: 0  •  sulfamethoxazole-trimethoprim (Bactrim DS) 800-160 MG per tablet, Take 1 tablet by mouth 2 (Two) Times a Day., Disp: 14 tablet, Rfl: 0  No current facility-administered medications for this visit.    Past Surgical History:   Procedure Laterality Date   • APPENDECTOMY     • BELOW KNEE AMPUTATION     • BLADDER SURGERY     • CARDIAC CATHETERIZATION N/A 1/27/2022    Procedure: Left Heart Cath;  Surgeon: Josias Carpio MD;  Location: [facility] CATH INVASIVE LOCATION;  Service: Cardiology;  Laterality: N/A;   • COLONOSCOPY N/A 9/2/2020    Procedure: COLONOSCOPY;  Surgeon: Ashli Gonzalez MD;  Location: [facility] ENDOSCOPY;  Service: Gastroenterology;  Laterality: N/A;   • ENDOSCOPY N/A 9/2/2020    Procedure: ESOPHAGOGASTRODUODENOSCOPY;  Surgeon: Ashli Gonzalez MD;  Location: [facility] ENDOSCOPY;  Service: Gastroenterology;  Laterality: N/A;       Family History   Problem Relation Age of Onset   • Diabetes Other    • Hyperlipidemia Other    • Hypertension Other    • Stroke Other    • Heart disease Other    • Other Other    • Diabetes Mother    • Diabetes Father    • Diabetes Sister    • Heart disease Sister    • Diabetes Brother         Social History     Socioeconomic History   • Marital status:    Tobacco Use   • Smoking status: Current Some Day Smoker     Packs/day: 0.50     Types: Cigarettes   • Smokeless tobacco: Never Used   Vaping Use   • Vaping Use: Never used   Substance and Sexual Activity   • Alcohol use: Not Currently   • Drug use: Never   • Sexual activity: Defer        Review of Systems   Constitutional: Negative.    HENT: Negative.    Eyes: Negative.    Respiratory: Negative.    Cardiovascular: Negative.    Gastrointestinal: Negative.    Endocrine: Negative.    Genitourinary: Negative.   "  Musculoskeletal:        Pain index finger, left hand.    Skin: Negative.    Allergic/Immunologic: Negative.    Neurological: Negative.    Hematological: Negative.    Psychiatric/Behavioral: Negative.        PHYSICAL EXAMINATION:       Ht 175.3 cm (69\")   Wt 107 kg (235 lb)   BMI 34.70 kg/m²     Physical Exam  Vitals and nursing note reviewed.   Constitutional:       General: She is not in acute distress.     Appearance: She is well-developed. She is not toxic-appearing.   HENT:      Head: Normocephalic.   Pulmonary:      Effort: Pulmonary effort is normal. No respiratory distress.   Skin:     General: Skin is warm and dry.   Neurological:      Mental Status: She is alert and oriented to person, place, and time.   Psychiatric:         Behavior: Behavior normal.         Thought Content: Thought content normal.         Judgment: Judgment normal.         GAIT:     []  Normal  []  Antalgic    Assistive device: []  None  []  Walker     [x]  Crutches  []  Cane     []  Wheelchair  []  Stretcher    Left Hand Exam     Tenderness   The patient is experiencing no tenderness.     Tests   Finkelstein's test: negative    Other   Erythema: absent  Sensation: normal  Pulse: present    Comments:  No painful nodule at base of finger.  No triggering.  No evidence of infection.  No swelling.  Skin is intact.  Neurovascular is intact  Patient has increased pain with bending index finger.  Patient is unable to make tight fist with any of her fingers today.              No results found.    Study Result    Narrative & Impression   EXAMINATION:  XR HAND 3+ VW LEFT     CLINICAL HISTORY:  56 years Female,left hand pain, M79.642 Pain  in left hand     COMPARISON:  11/16/2021 plain films of the left hand     FINDINGS:  The left hand is negative for acute fracture. No  dislocation. No radiopaque foreign body. Bony demineralization.  No significant degenerative changes.     IMPRESSION:  No evidence for acute bony abnormality involving " the  left hand.     Electronically signed by:  Edvin Wang MD  11/17/2021 8:38 PM  [facility] [place] Workstation: [id number]         ASSESSMENT:    Diagnoses and all orders for this visit:    Left hand pain  -     Ambulatory Referral to Physical Therapy Evaluate and treat; (as indicated); Stretching, ROM    Decreased range of motion of finger of left hand  -     Ambulatory Referral to Physical Therapy Evaluate and treat; (as indicated); Stretching, ROM    Pain of finger of left hand  -     Ambulatory Referral to Physical Therapy Evaluate and treat; (as indicated); Stretching, ROM  -     acetaminophen-codeine (TYLENOL/CODEINE #3) 300-30 MG per tablet; Take 1-2 tablets by mouth Every 6 (Six) Hours As Needed for Moderate Pain  for up to 5 days.          PLAN    Pain is reproducible with bending of finger.  Suspect possible early trigger finger, though no painful nodule or triggering seen today.  Patient has decreased kidney function and is diabetic.  Hemoglobin A1c 9.37.  David to treat with physical therapy initially, however may consider steroids in the future if refractory to PT, rice therapy, activity modification.  Patient lives in [place] but desires to do PT in [place].  PT order placed.  Patient reports her pain is a 5 out of 10.  She reports tramadol given her does not help much with symptoms.  After discussing risk, benefits, alternatives patient prescribed a short course of Tylenol with codeine.  Internal Miguel reviewed through epic PDMP.    EMR Dragon/Transciption Disclaimer: Some of this note may be an electronic transcription/translation of spoken language to printed text.  The electronic translation of spoken language may permit erroneous, or at times, nonsensical words or phrases to be inadvertently transcribed. Although I have reviewed the note for such errors, some may still exist.       Return in about 4 weeks (around 3/30/2022).      This document has been electronically signed by CONNOR Oliver  on March 2, 2022 09:32 CST

## 2022-03-04 ENCOUNTER — OFFICE VISIT (OUTPATIENT)
Dept: FAMILY MEDICINE CLINIC | Facility: CLINIC | Age: 57
End: 2022-03-04

## 2022-03-04 VITALS
HEIGHT: 69 IN | OXYGEN SATURATION: 100 % | HEART RATE: 78 BPM | SYSTOLIC BLOOD PRESSURE: 104 MMHG | DIASTOLIC BLOOD PRESSURE: 62 MMHG | BODY MASS INDEX: 34.7 KG/M2 | TEMPERATURE: 96.6 F

## 2022-03-04 DIAGNOSIS — L97.521 ULCER OF LEFT FOOT, LIMITED TO BREAKDOWN OF SKIN: Primary | ICD-10-CM

## 2022-03-04 DIAGNOSIS — T14.8XXA SUPERFICIAL LACERATION: ICD-10-CM

## 2022-03-04 PROCEDURE — 99213 OFFICE O/P EST LOW 20 MIN: CPT | Performed by: NURSE PRACTITIONER

## 2022-03-04 NOTE — PROGRESS NOTES
"Chief Complaint  Wound Check (foot ulcer)    Subjective          Marta Giraldo presents to [facility] PRIMARY CARE - [facility] Day[facility]    PCP: Dr. Crane    CC: \"recheck foot ulcer; cut on finger\"    Here for recheck of left foot ulcer/posterior heel area.  Has been doing saline wet to dry dressings x 2 weeks.  Area significantly improved and healing.  Has neuropathy, so stressed importance of frequent foot checks.  Has a sister who helps her.  Hx of RBKA. Diabetic--followed by endocrinology.     Reports she caught her right 5th finger on van door yesterday and has small cut.  Minimal bleeding.  No treatment at this point.  TDap in 2020.    Saw ortho for left hand pain.  Will be starting therapy.         Wound Check  She was originally treated more than 14 days ago. Prior ED Treatment: doing saline wet to dry dressings BID. Her temperature was unmeasured prior to arrival. There has been no drainage from the wound. The redness has improved. The swelling has improved. The pain has improved.       Review of Systems   Constitutional: Negative.    Respiratory: Negative.    Cardiovascular: Negative.    Musculoskeletal: Positive for arthralgias (left hand pain--now seeing ortho).   Skin: Positive for wound.   Neurological: Negative for dizziness and headaches.        Objective   Vital Signs:   /62 (BP Location: Left arm, Patient Position: Sitting, Cuff Size: Large Adult)   Pulse 78   Temp 96.6 °F (35.9 °C)   Ht 175.3 cm (69\")   SpO2 100%   BMI 34.70 kg/m²       Physical Exam  Vitals and nursing note reviewed.   Constitutional:       General: She is not in acute distress.     Appearance: Normal appearance. She is obese. She is not ill-appearing.   Musculoskeletal:      Cervical back: Neck supple.      Right Lower Extremity: Right leg is amputated below knee.   Skin:     General: Skin is warm and dry.      Findings: Laceration and wound present.        "   Neurological:      Mental Status: She is alert and oriented to person, place, and time.   Psychiatric:         Mood and Affect: Mood normal.         Thought Content: Thought content normal.          Result Review :     Common labs    Common Labsle 12/13/21 1/20/22 1/20/22 1/20/22 1/20/22 1/20/22 1/27/22 1/27/22     1045 1045 1045 1045 1045 0708 0708   Glucose   196 (A)     145 (A)   BUN   15     17   Creatinine   1.20 (A)     1.00   eGFR Non  Am   46 (A)     57 (A)   Sodium   140     140   Potassium   4.5     4.4   Chloride   105     105   Calcium   8.9     8.7   Albumin   4.10        Total Bilirubin   0.3        Alkaline Phosphatase   101        AST (SGOT)   14        ALT (SGPT)   11        WBC 13.53 (A) 14.31 (A)     13.70 (A)    Hemoglobin 11.7 (A) 12.2     11.7 (A)    Hematocrit 36.4 38.2     36.1    Platelets 215 216     202    Total Cholesterol     133      Triglycerides     77      HDL Cholesterol     39 (A)      LDL Cholesterol      79      Hemoglobin A1C    9.37 (A)       Microalbumin, Urine      1.5     (A) Abnormal value                      Assessment and Plan    Diagnoses and all orders for this visit:    1. Ulcer of left foot, limited to breakdown of skin (HCC) (Primary)  Comments:  improved    2. Superficial laceration  Comments:  right 5th finger    Orders:  -     mupirocin (BACTROBAN) 2 % ointment; Apply 1 application topically to the appropriate area as directed 3 (Three) Times a Day for 7 days.  Dispense: 15 g; Refill: 0      No further treatment to left foot, area well healed.  Stressed importance of continuous/frequent foot exams to watch for any changes in skin/ulcer formation. If additional problems in the same area, she is to notify me and we will refer to wound care for further evaluation.      Area to right hand cleansed with saline in office, bacitracin applied.    Rx for Bactroban provided.  Keep area with saline or warm water at home.   Tdap UTD    Return to Same Day Clinic  PRN    Schedule routine f/u with PCP prior to leaving today.       This document has been electronically signed by CONNOR Palacios on March 4, 2022 10:04 CST,.

## 2022-03-09 DIAGNOSIS — M25.552 LEFT HIP PAIN: ICD-10-CM

## 2022-03-09 DIAGNOSIS — M79.642 LEFT HAND PAIN: ICD-10-CM

## 2022-03-09 DIAGNOSIS — M25.562 LEFT KNEE PAIN, UNSPECIFIED CHRONICITY: ICD-10-CM

## 2022-03-09 RX ORDER — VILAZODONE HYDROCHLORIDE 20 MG/1
TABLET ORAL
Qty: 30 TABLET | Refills: 1 | Status: SHIPPED | OUTPATIENT
Start: 2022-03-09 | End: 2022-08-26 | Stop reason: SDUPTHER

## 2022-03-10 ENCOUNTER — HOSPITAL ENCOUNTER (OUTPATIENT)
Dept: PHYSICAL THERAPY | Facility: HOSPITAL | Age: 57
Setting detail: THERAPIES SERIES
Discharge: HOME | End: 2022-03-10

## 2022-03-10 DIAGNOSIS — M79.645 PAIN IN FINGER OF LEFT HAND: ICD-10-CM

## 2022-03-10 DIAGNOSIS — M79.642 LEFT HAND PAIN: Primary | ICD-10-CM

## 2022-03-10 DIAGNOSIS — M25.642 DECREASED RANGE OF MOTION OF FINGER OF LEFT HAND: ICD-10-CM

## 2022-03-10 PROCEDURE — 97162 PT EVAL MOD COMPLEX 30 MIN: CPT | Performed by: PHYSICAL THERAPIST

## 2022-03-10 NOTE — THERAPY EVALUATION
Outpatient Physical Therapy Hand Initial Evaluation   [facility]     Patient Name: Marta Giraldo  : 1965  MRN: [id number]  Today's Date: 3/10/2022         Visit Date: 03/10/2022    Attendance:  (authorization required)  Subjective Improvement: n/a  Next MD Appt: 22  Recert Date: 3/31/22    Therapy Diagnosis: L hand pain; possible IF trigger finger       Past Medical History:   Diagnosis Date   • Anemia    • Anxiety    • Arthritis    • Cataract    • Depression    • Diabetes mellitus (HCC)    • Disease of thyroid gland    • Family history of thyroid problem    • GERD (gastroesophageal reflux disease)    • Headache    • History of transfusion    • Hyperlipidemia    • Hypertension    • Neuropathy    • Stroke (HCC) 2019   • Urinary tract infection         Past Surgical History:   Procedure Laterality Date   • APPENDECTOMY     • BELOW KNEE AMPUTATION     • BLADDER SURGERY     • CARDIAC CATHETERIZATION N/A 2022    Procedure: Left Heart Cath;  Surgeon: Josias Carpio MD;  Location: [facility] CATH INVASIVE LOCATION;  Service: Cardiology;  Laterality: N/A;   • COLONOSCOPY N/A 2020    Procedure: COLONOSCOPY;  Surgeon: Ashli Gonzalez MD;  Location: [facility] ENDOSCOPY;  Service: Gastroenterology;  Laterality: N/A;   • ENDOSCOPY N/A 2020    Procedure: ESOPHAGOGASTRODUODENOSCOPY;  Surgeon: Ashli Gonzalez MD;  Location: [facility] ENDOSCOPY;  Service: Gastroenterology;  Laterality: N/A;       Allergies   Allergen Reactions   • Compazine [Prochlorperazine Edisylate] Unknown - High Severity   • Penicillins Other (See Comments) and Unknown - High Severity     unknown       Visit Dx:    ICD-10-CM ICD-9-CM   1. Left hand pain  M79.642 729.5   2. Decreased range of motion of finger of left hand  M25.642 719.54   3. Pain in finger of left hand  M79.645 729.5        Patient History     Row Name 03/10/22 1100             History    Chief Complaint Pain  -SS      Type of Pain Hand pain  left   "-      Date Current Problem(s) Began --  4+ months  -      Brief Description of Current Complaint Patient presents c/o pain in the dorsal and volar L hand and IF of unknown cause. Initially pain in every finger as well as the hand. Pain has settled into hand and IF. Increased pain when flexes fingers and grasps. Has not tried heat or ice for hand. Voltaren gel helps some.  female.  -      Patient/Caregiver Goals Relieve pain  -      Patient/Caregiver Goals Comment \"To be able to use it without pain.\"  -      Current Tobacco Use cigarette smoker  -      Smoking Status 0.5 ppd  -      Patient's Rating of General Health Fair  -      Hand Dominance right-handed  -      Occupation/sports/leisure activities Disabled. Hobbies: none at present  -      What clinical tests have you had for this problem? X-ray  November 2021  -      Results of Clinical Tests negative  -              Pain     Pain Location Hand;Finger (Comment which one)  left hand and IF  -      Pain at Present 6  -SS      Pain at Best 5  over past 1 month  -      Pain at Worst 8  over past 1 month  -      Pain Frequency Constant/continuous  -      Pain Description Throbbing;Sharp  -      What Performance Factors Make the Current Problem(s) WORSE? see above  -SS      What Performance Factors Make the Current Problem(s) BETTER? see above  -      Is your sleep disturbed? --  \"not really\"  -SS      Is medication used to assist with sleep? No  -SS      Difficulties at work? n/a  -SS      Difficulties with ADL's? pain with grasping and movement  -      Difficulties with recreational activities? n/a  -              Fall Risk Assessment    Any falls in the past year: Yes  -      Number of falls reported in the last 12 months 2  -      Factors that contributed to the fall: --  \"missing the chair\"  -SS      Does patient have a fear of falling Yes (comment)  -              Daily Activities    Primary Language English  " -SS              Safety    Are you being hurt, hit, or frightened by anyone at home or in your life? No  -SS      Are you being neglected by a caregiver No  -SS      Have you had any of the following issues with Depression;Panic Attacks;Anxiety  -SS            User Key  (r) = Recorded By, (t) = Taken By, (c) = Cosigned By    Initials Name Provider Type    Ernst Huang, PT DPT Physical Therapist                Hand Therapy (last 24 hours)     Hand Eval     Row Name 03/10/22 1100             Hand ROM Tested?    Hand ROM Tested? Left Extension- AROM;Left Flexion- AROM;Left Extension- PROM;Left Flexion- PROM  -SS              Left Extension AROM    II- MP AROM -15  -SS      II- PIP AROM -10  -SS      II- DIP AROM -5  -SS      II- MO Left Extension AROM -30  -SS              Left Extension PROM    II- MP PROM 0  -SS      II- PIP PROM 0  -SS      II- DIP PROM 0  -SS              Left Flexion AROM    II- MP AROM 65  -SS      II- PIP AROM 85  -SS      II- DIP AROM 45  -SS      II- MO Left Flexion AROM 195  -SS              Left Flexion PROM    II- MP PROM 80  -SS      II- PIP PROM 85  -SS      II- DIP PROM 55  -SS            User Key  (r) = Recorded By, (t) = Taken By, (c) = Cosigned By    Initials Name Provider Type    Ernst Huang, PT DPT Physical Therapist               PT Ortho     Row Name 03/10/22 1100       Subjective Comments    Subjective Comments see Therapy Patient History  -SS       Subjective Pain    Able to rate subjective pain? yes  -SS    Pre-Treatment Pain Level 6  -SS    Post-Treatment Pain Level 6  -SS       Posture/Observations    Posture/Observations Comments Presents this date in a power wheelchair. R BKA.  -SS       Wrist/Hand Special Tests    Wrist/Hand Special Tests Comments Diffusely TTP dorsal and volar L hand. TTP over each MC head volarly. Palpable nodule in finger flexor tendon at A1 pulley, which is most painful and replicates her complaints. No palpable triggering  at present.  -SS       Left Upper Ext    Lt Wrist Flexion AROM 72 deg, dorsal wrist/hand pain  -SS    Lt Wrist Extension AROM 53 deg  -SS    Lt  Ulnar Deviation AROM 32 deg, dorsal wrist/hand pain  -SS    Lt  Radial Deviation AROM 20 deg, dorsal wrist/hand pain  -SS    Lt Upper Extremity Comments  MF-SF AROM grossly WNLs. See Hand Eval for IF motion. Pain dorsal and volar IF with active and passive motion.  -SS        Strength Right    # Reps 1  -SS    Right Rung 2  -SS    Right  Test 1 27  -SS     Strength Average Right 27  -SS        Strength Left    # Reps 1  -SS    Left Rung 2  -SS    Left  Test 1 18  pain volar palm along 2nd MC  -SS     Strength Average Left 18  -SS       Sensation    Additional Comments WEST Monofilament Test L Hand: Th 0.2g, IF 0.07g, MF 2g, RF 0.2g, SF 2g  -SS       Hand  Strength     Strength Affected Side Bilateral  -SS          User Key  (r) = Recorded By, (t) = Taken By, (c) = Cosigned By    Initials Name Provider Type    Ernst Huang, PT DPT Physical Therapist                          Therapy Education  Education Details: therapy plan  How Provided: Verbal  Provided to: Patient  Level of Understanding: Verbalized     PT OP Goals     Row Name 03/10/22 1100          PT Short Term Goals    STG Date to Achieve 03/31/22  -SS     STG 1 Note a >/= 25% subjective improvement.  -SS     STG 2 Independent with orthosis wear and care.  -SS            Long Term Goals    LTG Date to Achieve 04/21/22  -SS     LTG 1 Independent with HEP/self-management.  -SS     LTG 2 L IF active extension to 0 deg each joint.  -SS     LTG 3 L IF active flexion WNLs.  -SS     LTG 4 Minimal pain with gripping and use.  -            Time Calculation    PT Goal Re-Cert Due Date 03/31/22  -           User Key  (r) = Recorded By, (t) = Taken By, (c) = Cosigned By    Initials Name Provider Type    Ernst Huang, PT DPT Physical Therapist                 PT  Assessment/Plan     Row Name 03/10/22 1100          PT Assessment    Functional Limitations Limitation in home management;Performance in self-care ADL  -     Impairments Dexterity;Muscle strength;Pain;Range of motion  -     Assessment Comments Patient has an apparent developing trigger in the L IF.  -     Rehab Potential Good  barrier: diabetic history  -     Patient/caregiver participated in establishment of treatment plan and goals Yes  -SS     Patient would benefit from skilled therapy intervention Yes  -SS            PT Plan    PT Frequency 1x/week;2x/week  -     Predicted Duration of Therapy Intervention (PT) 4-6 weeks  -     Planned CPT's? PT EVAL MOD COMPLEXITY: 98287;PT THER PROC EA 15 MIN: 25859;PT THER ACT EA 15 MIN: 85810;PT MANUAL THERAPY EA 15 MIN: 11158;PT ULTRASOUND EA 15 MIN: 70027;PT INITIAL ORTHOTIC MGMT/TRAIN EA 15 MIN: 53461  -     PT Plan Comments Fluidotherapy, blocked ROM, flexor tendon stretching, US, MFR/CFM, dry needling over A1 pulley region, PIP extension immobilization orthosis  -           User Key  (r) = Recorded By, (t) = Taken By, (c) = Cosigned By    Initials Name Provider Type     Ernst Lopez, PT DPT Physical Therapist                                   Outcome Measure Options: Quick DASH  Quick DASH  Open a tight or new jar.: Moderate Difficulty  Carry a shopping bag or briefcase: Severe Difficulty  Wash your back: Moderate Difficulty  Use a knife to cut food: Mild Difficulty  Recreational activities in which you take some force or impact through your arm, should or hand (e.g. golf, hammering, tennis, etc.): Severe Difficulty  During the past week, to what extent has your arm, shoulder, or hand problem interfered with your normal social activities with family, friends, neighbors or groups?: Moderately  During the past week, were you limited in your work or other regular daily activities as a result of your arm, shoulder or hand problem?: Moderately  Limited  Arm, Shoulder, or hand pain: Moderate  Tingling (pins and needles) in your arm, shoulder, or hand: Mild  During the past week, how much difficulty have you had sleeping because of the pain in your arm, shoulder or hand?: No difficulty  Number of Questions Answered: 10  Quick DASH Score: 45         Time Calculation:   Start Time: 1101  Stop Time: 1141  Time Calculation (min): 40 min     Therapy Charges for Today     Code Description Service Date Service Provider Modifiers Qty    32629480895 HC PT EVAL MOD COMPLEXITY 3 3/10/2022 Ernst Lopez, PT DPT GP 1                   Ernst Lopez, PT, DPT, CHT  3/10/2022

## 2022-03-16 ENCOUNTER — APPOINTMENT (OUTPATIENT)
Dept: ONCOLOGY | Facility: HOSPITAL | Age: 57
End: 2022-03-16

## 2022-03-17 DIAGNOSIS — N39.41 URGE INCONTINENCE: Chronic | ICD-10-CM

## 2022-03-18 RX ORDER — SOLIFENACIN SUCCINATE 5 MG/1
TABLET, FILM COATED ORAL
Qty: 30 TABLET | Refills: 0 | Status: SHIPPED | OUTPATIENT
Start: 2022-03-18 | End: 2022-04-29

## 2022-03-18 RX ORDER — LISINOPRIL 20 MG/1
TABLET ORAL
Qty: 30 TABLET | Refills: 1 | Status: SHIPPED | OUTPATIENT
Start: 2022-03-18 | End: 2022-05-04

## 2022-03-21 ENCOUNTER — HOSPITAL ENCOUNTER (OUTPATIENT)
Dept: PHYSICAL THERAPY | Facility: HOSPITAL | Age: 57
Setting detail: THERAPIES SERIES
Discharge: HOME | End: 2022-03-21

## 2022-03-21 ENCOUNTER — TELEPHONE (OUTPATIENT)
Dept: ENDOCRINOLOGY | Facility: CLINIC | Age: 57
End: 2022-03-21

## 2022-03-21 DIAGNOSIS — E11.649 TYPE 2 DIABETES MELLITUS WITH HYPOGLYCEMIA WITHOUT COMA, WITH LONG-TERM CURRENT USE OF INSULIN: Primary | ICD-10-CM

## 2022-03-21 DIAGNOSIS — E11.649 TYPE 2 DIABETES MELLITUS WITH HYPOGLYCEMIA WITHOUT COMA, WITH LONG-TERM CURRENT USE OF INSULIN: ICD-10-CM

## 2022-03-21 DIAGNOSIS — Z79.4 TYPE 2 DIABETES MELLITUS WITH HYPOGLYCEMIA WITHOUT COMA, WITH LONG-TERM CURRENT USE OF INSULIN: ICD-10-CM

## 2022-03-21 DIAGNOSIS — Z79.4 TYPE 2 DIABETES MELLITUS WITH HYPOGLYCEMIA WITHOUT COMA, WITH LONG-TERM CURRENT USE OF INSULIN: Primary | ICD-10-CM

## 2022-03-21 DIAGNOSIS — M79.645 PAIN IN FINGER OF LEFT HAND: ICD-10-CM

## 2022-03-21 DIAGNOSIS — M25.642 DECREASED RANGE OF MOTION OF FINGER OF LEFT HAND: ICD-10-CM

## 2022-03-21 DIAGNOSIS — M79.642 LEFT HAND PAIN: Primary | ICD-10-CM

## 2022-03-21 PROCEDURE — 97110 THERAPEUTIC EXERCISES: CPT | Performed by: PHYSICAL THERAPIST

## 2022-03-21 NOTE — TELEPHONE ENCOUNTER
Incoming Refill Request      Medication requested (name and dose): FS LENNY 2 SENSOR     Pharmacy where request should be sent: [facility]     Additional details provided by patient:     Best call back number: [phone]    Does the patient have less than a 3 day supply:  [x] Yes  [] No    Lorene Akbar Rep  03/21/22, 13:30 CDT

## 2022-03-21 NOTE — TELEPHONE ENCOUNTER
Incoming Refill Request      Medication requested (name and dose): Continuous Blood Gluc Sensor (FreeStyle Tae 2 Sensor) [facility] [place]    [facility]    Additional details provided by patient: needs to day if possible  Best call back number: [phone]    Does the patient have less than a 3 day supply:  [x] Yes  [] No    Simona Wong  03/21/22, 09:38 CDT

## 2022-03-21 NOTE — THERAPY TREATMENT NOTE
Outpatient Physical Therapy Hand Treatment Note   [facility]     Patient Name: Marta Giraldo  : 1965  MRN: [id number]  Today's Date: 3/21/2022         Visit Date: 2022    Attendance: / (eval + 4 through )  Subjective Improvement: 0%  Next MD Appt: 22  Recert Date: 3/31/22     Therapy Diagnosis: L hand pain; possible IF trigger finger     Visit Dx:    ICD-10-CM ICD-9-CM   1. Left hand pain  M79.642 729.5   2. Decreased range of motion of finger of left hand  M25.642 719.54   3. Pain in finger of left hand  M79.645 729.5               PT Ortho     Row Name 22 0800       Subjective Comments    Subjective Comments Not feeling any different at this time. Continued pain with grasping. Pain more with activity. 0% subjective improvement.  -SS       Subjective Pain    Able to rate subjective pain? yes  -SS    Pre-Treatment Pain Level 5  -SS    Post-Treatment Pain Level 3  -SS       Posture/Observations    Posture/Observations Comments Presents in power wheelchair. R BK prosthesis.  -SS       Wrist/Hand Special Tests    Wrist/Hand Special Tests Comments Palpable nodule L 1st MP joint volarly in region of A1 pulley.  -SS          User Key  (r) = Recorded By, (t) = Taken By, (c) = Cosigned By    Initials Name Provider Type     Ernst Lopez, PT DPT Physical Therapist                         PT Assessment/Plan     Row Name 22 0800          PT Assessment    Functional Limitations Limitation in home management;Performance in self-care ADL  -     Impairments Dexterity;Muscle strength;Pain;Range of motion  -     Assessment Comments Fabricated a L IF PIP immobilization orthosis from [employer] to immobilize IF PIP during activity. Orthosis intended to rest finger and decrease pain.  -SS     Rehab Potential Good  barrier: diabetic history  -     Patient/caregiver participated in establishment of treatment plan and goals Yes  -SS     Patient would benefit from skilled  therapy intervention Yes  -SS            PT Plan    PT Frequency 1x/week;2x/week  -SS     Predicted Duration of Therapy Intervention (PT) 4-6 weeks  -SS     PT Plan Comments Monitor orthosis and refabricate as needed. Next: add US to volar 1st MP and palm  -SS           User Key  (r) = Recorded By, (t) = Taken By, (c) = Cosigned By    Initials Name Provider Type    Ernst Huang, PT DPT Physical Therapist                   OP Exercises     Row Name 03/21/22 0800             Subjective Comments    Subjective Comments Not feeling any different at this time. Continued pain with grasping. Pain more with activity. 0% subjective improvement.  -SS              Subjective Pain    Able to rate subjective pain? yes  -SS      Pre-Treatment Pain Level 5  -SS      Post-Treatment Pain Level 3  -SS              Exercise 1    Exercise Name 1 Fluidotherapy with AROM  -SS      Cueing 1 Verbal  -SS      Time 1 15 mins  -SS              Exercise 2    Exercise Name 2 IF flexor stretch  -SS      Cueing 2 Verbal;Demo  -SS      Sets 2 1  -SS      Time 2 1 min hold  -SS              Exercise 3    Exercise Name 3 Block and bend IF DIP  -SS      Cueing 3 Verbal;Demo;Tactile  -SS      Sets 3 1  -SS      Reps 3 15  -SS              Exercise 4    Exercise Name 4 Block and bend IF PIP  -SS      Cueing 4 Tactile;Demo;Auditory  -SS      Sets 4 1  -SS      Reps 4 10  -SS              Exercise 5    Exercise Name 5 MFR volar L 1st MP region  -SS      Cueing 5 Verbal;Tactile  -SS      Time 5 3 mins  -SS              Exercise 6    Exercise Name 6 L IF PIP immobilization orthosis  -SS            User Key  (r) = Recorded By, (t) = Taken By, (c) = Cosigned By    Initials Name Provider Type     Ernst Lopez, PT DPT Physical Therapist                              PT OP Goals     Row Name 03/21/22 0800          PT Short Term Goals    STG Date to Achieve 03/31/22  -SS     STG 1 Note a >/= 25% subjective improvement.  -SS     STG 1  Progress Ongoing  -SS     STG 2 Independent with orthosis wear and care.  -SS     STG 2 Progress Ongoing  -SS            Long Term Goals    LTG Date to Achieve 04/21/22  -SS     LTG 1 Independent with HEP/self-management.  -SS     LTG 1 Progress Ongoing  -SS     LTG 2 L IF active extension to 0 deg each joint.  -SS     LTG 2 Progress Ongoing  -SS     LTG 3 L IF active flexion WNLs.  -SS     LTG 3 Progress Ongoing  -SS     LTG 4 Minimal pain with gripping and use.  -SS     LTG 4 Progress Ongoing  -SS            Time Calculation    PT Goal Re-Cert Due Date 03/31/22  -           User Key  (r) = Recorded By, (t) = Taken By, (c) = Cosigned By    Initials Name Provider Type    Ernst Huang, PT DPT Physical Therapist                Therapy Education  Education Details: PIP immobilization orthosis donning, doffing, and wear  How Provided: Verbal, Demonstration  Provided to: Patient  Level of Understanding: Verbalized              Time Calculation:   Start Time: 0848  Stop Time: 0926  Time Calculation (min): 38 min  Total Timed Code Minutes- PT: 38 minute(s)     Therapy Charges for Today     Code Description Service Date Service Provider Modifiers Qty    51308046593 HC PT THER PROC EA 15 MIN 3/21/2022 Ernst Lopez, PT DPT GP 3                   Ernst Lopez, PT, DPT, CHT  3/21/2022

## 2022-03-21 NOTE — TELEPHONE ENCOUNTER
Pt need freestyle nigel sensors sent to [facility] in [place]. Thank  you

## 2022-03-23 DIAGNOSIS — H91.93 DECREASED HEARING OF BOTH EARS: Primary | ICD-10-CM

## 2022-03-25 NOTE — TELEPHONE ENCOUNTER
Pt wants refills on Insulin Lispro (humaLOG) sent to [facility]. PT IS OUT OF MEDICATION, PLEASE SEND ASAP.       THANKS

## 2022-03-28 ENCOUNTER — APPOINTMENT (OUTPATIENT)
Dept: PHYSICAL THERAPY | Facility: HOSPITAL | Age: 57
End: 2022-03-28

## 2022-03-28 ENCOUNTER — TELEPHONE (OUTPATIENT)
Dept: FAMILY MEDICINE CLINIC | Facility: CLINIC | Age: 57
End: 2022-03-28

## 2022-03-28 NOTE — TELEPHONE ENCOUNTER
"Patient stated she was taking a medication that starts with \"G\" that made her use the restroom (\"to pee not number 2\"), but that it is not helping her anymore and would like to be prescribed something else.    Call back number: [phone]  "

## 2022-03-30 ENCOUNTER — APPOINTMENT (OUTPATIENT)
Dept: ONCOLOGY | Facility: HOSPITAL | Age: 57
End: 2022-03-30

## 2022-03-30 ENCOUNTER — DOCUMENTATION (OUTPATIENT)
Dept: ENDOCRINOLOGY | Facility: CLINIC | Age: 57
End: 2022-03-30

## 2022-03-30 NOTE — PROGRESS NOTES
CGM WITH PHYSICIANS ORDER SENT TO [facility] MED[facility]     CONFIRMATION RECEIVED     SENT TO MR 3/31/22

## 2022-03-31 ENCOUNTER — OFFICE VISIT (OUTPATIENT)
Dept: FAMILY MEDICINE CLINIC | Facility: CLINIC | Age: 57
End: 2022-03-31

## 2022-03-31 VITALS
TEMPERATURE: 96.9 F | SYSTOLIC BLOOD PRESSURE: 122 MMHG | BODY MASS INDEX: 34.7 KG/M2 | HEIGHT: 69 IN | HEART RATE: 65 BPM | OXYGEN SATURATION: 94 % | DIASTOLIC BLOOD PRESSURE: 70 MMHG

## 2022-03-31 DIAGNOSIS — R35.89 POLYURIA: ICD-10-CM

## 2022-03-31 DIAGNOSIS — E11.42 DIABETIC POLYNEUROPATHY ASSOCIATED WITH TYPE 2 DIABETES MELLITUS: Chronic | ICD-10-CM

## 2022-03-31 DIAGNOSIS — Z99.3 WHEELCHAIR BOUND: ICD-10-CM

## 2022-03-31 DIAGNOSIS — S88.119A BELOW KNEE AMPUTATION: ICD-10-CM

## 2022-03-31 DIAGNOSIS — E11.65 UNCONTROLLED TYPE 2 DIABETES MELLITUS WITH HYPERGLYCEMIA: ICD-10-CM

## 2022-03-31 DIAGNOSIS — F17.200 SMOKER: Primary | ICD-10-CM

## 2022-03-31 PROCEDURE — 99214 OFFICE O/P EST MOD 30 MIN: CPT | Performed by: FAMILY MEDICINE

## 2022-03-31 RX ORDER — PYRAZINAMIDE 500 MG/1
TABLET ORAL
COMMUNITY
Start: 2022-03-02 | End: 2022-04-01 | Stop reason: SDUPTHER

## 2022-03-31 RX ORDER — PREGABALIN 300 MG/1
300 CAPSULE ORAL 2 TIMES DAILY
Qty: 60 CAPSULE | Refills: 2 | Status: SHIPPED | OUTPATIENT
Start: 2022-03-31 | End: 2022-06-17

## 2022-03-31 NOTE — PROGRESS NOTES
Chief Complaint  discuss medicaton, Wound Check (Has small area on forehead would like looked at/), Diabetes, and Polyuria (Vesicare has not helped)    Subjective          Review of Systems   Endocrine: Positive for polydipsia and polyuria.   Genitourinary: Positive for frequency. Negative for dysuria and vaginal discharge.   Musculoskeletal:        R BKA   Skin: Positive for wound.        Central forehead    Neurological:        H/O CVA   Hematological: Negative for adenopathy.   Psychiatric/Behavioral: Positive for dysphoric mood.       Marta Giraldo presents to [facility] [place]  Wound Check  She was originally treated 3 to 5 days ago (scratch forehead). There has been colored (scab) discharge from the wound. The redness has improved. The swelling has improved. The pain has improved.   Diabetes  She presents for her follow-up diabetic visit. She has type 2 diabetes mellitus. Her disease course has been fluctuating. Associated symptoms include polydipsia and polyuria. Symptoms are improving. Diabetic complications include a CVA, heart disease, nephropathy and peripheral neuropathy. Risk factors for coronary artery disease include tobacco exposure. Current diabetic treatment includes insulin injections and oral agent (dual therapy). She is compliant with treatment most of the time. She is following a generally unhealthy diet. She rarely participates in exercise. Her overall blood glucose range is 140-180 mg/dl. An ACE inhibitor/angiotensin II receptor blocker is being taken.   Hypertension  This is a chronic problem. The current episode started more than 1 year ago. The problem is controlled. Current antihypertension treatment includes ACE inhibitors. The current treatment provides moderate improvement. Hypertensive end-organ damage includes CVA.       Objective   Vital Signs:   /70 (BP Location: Right arm, Patient Position: Sitting, Cuff Size: Adult)   " Pulse 65   Temp 96.9 °F (36.1 °C) (Temporal)   Ht 175.3 cm (69\")   SpO2 94%   BMI 34.70 kg/m²     Physical Exam  Vitals and nursing note reviewed.   Constitutional:       Appearance: Normal appearance. She is well-developed. She is obese.   HENT:      Head: Normocephalic and atraumatic.      Right Ear: Tympanic membrane, ear canal and external ear normal. There is no impacted cerumen.      Left Ear: Tympanic membrane, ear canal and external ear normal.      Nose: Nose normal. No congestion.      Mouth/Throat:      Mouth: Mucous membranes are moist.      Pharynx: Oropharynx is clear.   Eyes:      Extraocular Movements: Extraocular movements intact.      Conjunctiva/sclera: Conjunctivae normal.      Pupils: Pupils are equal, round, and reactive to light.   Cardiovascular:      Rate and Rhythm: Normal rate and regular rhythm.      Heart sounds: Normal heart sounds.   Pulmonary:      Effort: Pulmonary effort is normal.      Breath sounds: Normal breath sounds.   Abdominal:      General: Bowel sounds are normal. There is no distension.      Palpations: Abdomen is soft.      Tenderness: There is no abdominal tenderness. There is no right CVA tenderness or left CVA tenderness.   Musculoskeletal:      Cervical back: Normal range of motion and neck supple.      Comments: PMD bound, R BKA   Skin:     General: Skin is warm and dry.      Capillary Refill: Capillary refill takes 2 to 3 seconds.      Coloration: Skin is not jaundiced or pale.      Findings: No bruising or erythema.   Neurological:      Mental Status: She is alert and oriented to person, place, and time.      Cranial Nerves: No cranial nerve deficit.      Gait: Gait abnormal.   Psychiatric:         Speech: Speech normal.         Behavior: Behavior normal.         Thought Content: Thought content normal.         Judgment: Judgment normal.      Comments: Affect flat         Result Review :                 Assessment and Plan    Diagnoses and all orders for " this visit:    1. Diabetic polyneuropathy associated with type 2 diabetes mellitus (HCC)  -     pregabalin (LYRICA) 300 MG capsule; Take 1 capsule by mouth 2 (Two) Times a Day.  Dispense: 60 capsule; Refill: 2    2. Below knee amputation (HCC)    3. Wheelchair bound    4. Uncontrolled type 2 diabetes mellitus with hyperglycemia (HCC)    5. Polyuria    Other orders  -     mupirocin (BACTROBAN) 2 % ointment; Apply 1 application topically to the appropriate area as directed 3 (Three) Times a Day.  Dispense: 30 g; Refill: 1    Discussed exam, health problems, meds, indications, Tx plan. Discussed increased thirst and urination, with uncontrolled DM, Vesicare less likely to help this. Discussed safety with controlled meds, Compliance, Smoking cessation x 3 mins'  I spent 30 minutes caring for Marta on this date of service. This time includes time spent by me in the following activities:reviewing tests, performing a medically appropriate examination and/or evaluation , counseling and educating the patient/family/caregiver, ordering medications, tests, or procedures and documenting information in the medical record  Follow Up   Return in about 8 weeks (around 5/26/2022).  Patient was given instructions and counseling regarding her condition or for health maintenance advice. Please see specific information pulled into the AVS if appropriate.         This document has been electronically signed by Jeramie Hope MD on March 31, 2022 12:12 CDT

## 2022-04-01 ENCOUNTER — INFUSION (OUTPATIENT)
Dept: ONCOLOGY | Facility: HOSPITAL | Age: 57
End: 2022-04-01

## 2022-04-01 ENCOUNTER — OFFICE VISIT (OUTPATIENT)
Dept: ORTHOPEDIC SURGERY | Facility: CLINIC | Age: 57
End: 2022-04-01

## 2022-04-01 VITALS — HEIGHT: 69 IN | BODY MASS INDEX: 34.8 KG/M2 | WEIGHT: 235 LBS

## 2022-04-01 VITALS
SYSTOLIC BLOOD PRESSURE: 131 MMHG | RESPIRATION RATE: 18 BRPM | DIASTOLIC BLOOD PRESSURE: 61 MMHG | HEART RATE: 69 BPM | TEMPERATURE: 96.9 F

## 2022-04-01 DIAGNOSIS — M79.645 PAIN OF FINGER OF LEFT HAND: ICD-10-CM

## 2022-04-01 DIAGNOSIS — M25.642 DECREASED RANGE OF MOTION OF FINGER OF LEFT HAND: ICD-10-CM

## 2022-04-01 DIAGNOSIS — M79.642 LEFT HAND PAIN: Primary | ICD-10-CM

## 2022-04-01 DIAGNOSIS — E53.8 B12 DEFICIENCY: Primary | ICD-10-CM

## 2022-04-01 PROCEDURE — 96372 THER/PROPH/DIAG INJ SC/IM: CPT | Performed by: INTERNAL MEDICINE

## 2022-04-01 PROCEDURE — 25010000002 CYANOCOBALAMIN PER 1000 MCG: Performed by: INTERNAL MEDICINE

## 2022-04-01 PROCEDURE — 99214 OFFICE O/P EST MOD 30 MIN: CPT | Performed by: NURSE PRACTITIONER

## 2022-04-01 RX ORDER — PYRAZINAMIDE 500 MG/1
1-2 TABLET ORAL EVERY 6 HOURS PRN
Qty: 40 TABLET | Refills: 0 | Status: SHIPPED | OUTPATIENT
Start: 2022-04-01 | End: 2022-04-06

## 2022-04-01 RX ORDER — CYANOCOBALAMIN 1000 UG/ML
1000 INJECTION, SOLUTION INTRAMUSCULAR; SUBCUTANEOUS ONCE
Status: CANCELLED | OUTPATIENT
Start: 2022-04-15

## 2022-04-01 RX ORDER — CYANOCOBALAMIN 1000 UG/ML
1000 INJECTION, SOLUTION INTRAMUSCULAR; SUBCUTANEOUS ONCE
Status: COMPLETED | OUTPATIENT
Start: 2022-04-01 | End: 2022-04-01

## 2022-04-01 RX ADMIN — CYANOCOBALAMIN 1000 MCG: 1000 INJECTION, SOLUTION INTRAMUSCULAR at 11:09

## 2022-04-04 ENCOUNTER — HOSPITAL ENCOUNTER (OUTPATIENT)
Dept: PHYSICAL THERAPY | Facility: HOSPITAL | Age: 57
Setting detail: THERAPIES SERIES
Discharge: HOME | End: 2022-04-04

## 2022-04-04 DIAGNOSIS — M79.642 LEFT HAND PAIN: Primary | ICD-10-CM

## 2022-04-04 DIAGNOSIS — M25.642 DECREASED RANGE OF MOTION OF FINGER OF LEFT HAND: ICD-10-CM

## 2022-04-04 DIAGNOSIS — M79.645 PAIN IN FINGER OF LEFT HAND: ICD-10-CM

## 2022-04-04 PROCEDURE — 97110 THERAPEUTIC EXERCISES: CPT | Performed by: PHYSICAL THERAPIST

## 2022-04-04 NOTE — THERAPY PROGRESS REPORT/RE-CERT
Outpatient Physical Therapy Hand Progress Note   [facility]     Patient Name: Marta Giraldo  : 1965  MRN: [id number]  Today's Date: 2022         Visit Date: 2022    Attendance: 3/4 (eval + 4 through )  Subjective Improvement: 25%  Next MD Appt: after MRI  Recert Date: 22     Therapy Diagnosis: L hand pain; possible IF trigger finger     Changes in Medications: none noted  Changes in MD Orders: none noted  Number of Work Days Lost: n/a       Past Medical History:   Diagnosis Date   • Anemia    • Anxiety    • Arthritis    • Cataract    • Depression    • Diabetes mellitus (HCC)    • Disease of thyroid gland    • Family history of thyroid problem    • GERD (gastroesophageal reflux disease)    • Headache    • History of transfusion    • Hyperlipidemia    • Hypertension    • Neuropathy    • Stroke (HCC) 2019   • Urinary tract infection         Past Surgical History:   Procedure Laterality Date   • APPENDECTOMY     • BELOW KNEE AMPUTATION     • BLADDER SURGERY     • CARDIAC CATHETERIZATION N/A 2022    Procedure: Left Heart Cath;  Surgeon: Josias Carpio MD;  Location: [facility] CATH INVASIVE LOCATION;  Service: Cardiology;  Laterality: N/A;   • COLONOSCOPY N/A 2020    Procedure: COLONOSCOPY;  Surgeon: Ashli Gonzalez MD;  Location: [facility] ENDOSCOPY;  Service: Gastroenterology;  Laterality: N/A;   • ENDOSCOPY N/A 2020    Procedure: ESOPHAGOGASTRODUODENOSCOPY;  Surgeon: Ashli Gonzalez MD;  Location: [facility] ENDOSCOPY;  Service: Gastroenterology;  Laterality: N/A;         Visit Dx:    ICD-10-CM ICD-9-CM   1. Left hand pain  M79.642 729.5   2. Decreased range of motion of finger of left hand  M25.642 719.54   3. Pain in finger of left hand  M79.645 729.5           Hand Therapy (last 24 hours)     Hand Eval     Row Name 22 0900             Left Extension AROM    II- MP AROM 0  -SS      II- PIP AROM 5  -SS      II- DIP AROM 0  -SS      II- OM Left Extension  AROM 5  -SS              Left Flexion AROM    II- MP AROM 75  -SS      II- PIP AROM 75  -SS      II- DIP AROM 45  -SS      II- MO Left Flexion AROM 195  -SS      III- MP AROM 70  -SS      III- PIP AROM 90  -SS      III- DIP AROM 55  -SS      III- MO Left Flexion AROM 215  -SS      IV- MP AROM 70  -SS      IV- PIP AROM 85  -SS      IV- DIP AROM 50  -SS      IV- MO Left Flexion AROM 205  -SS      V- MP AROM 80  -SS      V- PIP AROM 85  -SS      V- DIP AROM 60  -SS      V- MO Left Flexion AROM 225  -SS               Strength Right    # Reps 1  -SS      Right Rung 2  -SS      Right  Test 1 45  -SS               Strength Left    # Reps 1  -SS      Left Rung 2  -SS      Left  Test 1 15  -SS            User Key  (r) = Recorded By, (t) = Taken By, (c) = Cosigned By    Initials Name Provider Type    Ernst Huang, PT DPT Physical Therapist               PT Ortho     Row Name 04/04/22 0900       Subjective Comments    Subjective Comments Occasional improvement in the hand. Gripping increases her pain. Wearing the IF PIP immobilization orthosis has helped some. 25% subjective improvement. No medication changes.  -       Subjective Pain    Able to rate subjective pain? yes  -    Pre-Treatment Pain Level 5  -SS    Post-Treatment Pain Level 3  -SS       Posture/Observations    Posture/Observations Comments Presents in power wheelchair. Not wearing prosthesis R LE this date.  -SS       Wrist/Hand Special Tests    Wrist/Hand Special Tests Comments Diffusely TTP L palm. Hypertrophy noted in IF MP joint with nodule in region of A1 pulley.  -SS       Left Upper Ext    Lt Wrist Flexion AROM 80 deg  -SS    Lt Wrist Extension AROM 60 deg  -SS    Lt Upper Extremity Comments  see Hand Eval  -SS          User Key  (r) = Recorded By, (t) = Taken By, (c) = Cosigned By    Initials Name Provider Type    Ernst Huang, PT DPT Physical Therapist                          Therapy Education  Education  Details: progress, plan; continue orthosis wear  How Provided: Verbal  Provided to: Patient  Level of Understanding: Verbalized     PT OP Goals     Row Name 04/04/22 0900          PT Short Term Goals    STG Date to Achieve 03/31/22  -     STG 1 Note a >/= 25% subjective improvement.  -SS     STG 1 Progress Met  -     STG 2 Independent with orthosis wear and care.  -     STG 2 Progress Met  -            Long Term Goals    LTG Date to Achieve 04/25/22  -     LTG 1 Independent with HEP/self-management.  -SS     LTG 1 Progress Ongoing  -SS     LTG 2 L IF active extension to 0 deg each joint.  -SS     LTG 2 Progress Ongoing  -     LTG 3 L IF active flexion WNLs.  -SS     LTG 3 Progress Ongoing  -     LTG 4 Minimal pain with gripping and use.  -     LTG 4 Progress Ongoing  -            Time Calculation    PT Goal Re-Cert Due Date 04/25/22  -           User Key  (r) = Recorded By, (t) = Taken By, (c) = Cosigned By    Initials Name Provider Type     Ernst Lopez, PT DPT Physical Therapist                 PT Assessment/Plan     Row Name 04/04/22 0900          PT Assessment    Functional Limitations Limitation in home management;Performance in self-care ADL  -     Impairments Dexterity;Muscle strength;Pain;Range of motion  -     Assessment Comments Tolerated treatment well. Continued pain in the palm of the hand. Decreased MF-SF AROM this date compared to initial evaluation.  -     Rehab Potential Good  barrier: diabetic history  -     Patient/caregiver participated in establishment of treatment plan and goals Yes  -     Patient would benefit from skilled therapy intervention Yes  -            PT Plan    PT Frequency 1x/week;2x/week  -     Predicted Duration of Therapy Intervention (PT) 3-4 weeks with further to be determined  -     PT Plan Comments US palm, Fluidotherapy, blocked ROM, dry needling over A1 pulley/MC head, MFR  -           User Key  (r) = Recorded By, (t) =  Taken By, (c) = Cosigned By    Initials Name Provider Type    Ernst Huang, PT DPT Physical Therapist                 Modalities     Row Name 04/04/22 0900             Ultrasound 77057    Location L palm  -SS      Duty Cycle 100  -SS      Frequency 3.0 MHz  -SS      Intensity - Wts/cm 1  -SS      82818 - PT Ultrasound Minutes 8  -SS            User Key  (r) = Recorded By, (t) = Taken By, (c) = Cosigned By    Initials Name Provider Type    Ernst Huang, PT DPT Physical Therapist               OP Exercises     Row Name 04/04/22 0900             Subjective Comments    Subjective Comments Occasional improvement in the hand. Gripping increases her pain. Wearing the IF PIP immobilization orthosis has helped some. 25% subjective improvement. No medication changes.  -SS              Subjective Pain    Able to rate subjective pain? yes  -SS      Pre-Treatment Pain Level 5  -SS      Post-Treatment Pain Level 3  -SS              Total Minutes    95935 - PT Therapeutic Exercise Minutes 40  -SS              Exercise 1    Exercise Name 1 recheck  -SS              Exercise 2    Exercise Name 2 Fluidotherapy with AROM  -SS      Cueing 2 Verbal  -SS      Time 2 15 mins  -SS              Exercise 3    Exercise Name 3 IF flexor stretch  -SS      Cueing 3 Verbal;Tactile  -SS      Time 3 1 min hold  -SS              Exercise 4    Exercise Name 4 US -- see Modalities  -SS            User Key  (r) = Recorded By, (t) = Taken By, (c) = Cosigned By    Initials Name Provider Type    Ernst Huang, PT DPT Physical Therapist                                       Time Calculation:   Start Time: 0935  Stop Time: 1023  Time Calculation (min): 48 min  Total Timed Code Minutes- PT: 48 minute(s)  Timed Charges  90373 - PT Ultrasound Minutes: 8  83291 - PT Therapeutic Exercise Minutes: 40  Total Minutes  Timed Charges Total Minutes: 48   Total Minutes: 48     Therapy Charges for Today     Code Description Service  Date Service Provider Modifiers Qty    [id number] HC PT THER PROC EA 15 MIN 4/4/2022 Ernst Lopez, PT DPT GP 3                    Ernst Lopez, PT, DPT, CHT  4/4/2022

## 2022-04-11 ENCOUNTER — DOCUMENTATION (OUTPATIENT)
Dept: ENDOCRINOLOGY | Facility: CLINIC | Age: 57
End: 2022-04-11

## 2022-04-11 ENCOUNTER — APPOINTMENT (OUTPATIENT)
Dept: ONCOLOGY | Facility: CLINIC | Age: 57
End: 2022-04-11

## 2022-04-11 ENCOUNTER — TELEPHONE (OUTPATIENT)
Dept: ORTHOPEDIC SURGERY | Facility: CLINIC | Age: 57
End: 2022-04-11

## 2022-04-11 NOTE — TELEPHONE ENCOUNTER
AJ       Pt CALLED REQUESTING MEDICATION TO TAKE DURING HER MRI TO CALM HER NERVES     TO [facility]   Pt CALL BACK # [phone]

## 2022-04-12 DIAGNOSIS — E61.1 IRON DEFICIENCY: Primary | ICD-10-CM

## 2022-04-12 RX ORDER — DIAZEPAM 10 MG/1
TABLET ORAL
Qty: 1 TABLET | Refills: 0 | Status: SHIPPED | OUTPATIENT
Start: 2022-04-12 | End: 2022-06-28

## 2022-04-13 ENCOUNTER — OFFICE VISIT (OUTPATIENT)
Dept: ONCOLOGY | Facility: CLINIC | Age: 57
End: 2022-04-13

## 2022-04-13 ENCOUNTER — LAB (OUTPATIENT)
Dept: ONCOLOGY | Facility: HOSPITAL | Age: 57
End: 2022-04-13

## 2022-04-13 VITALS
HEART RATE: 77 BPM | SYSTOLIC BLOOD PRESSURE: 128 MMHG | DIASTOLIC BLOOD PRESSURE: 76 MMHG | TEMPERATURE: 97.4 F | OXYGEN SATURATION: 94 %

## 2022-04-13 DIAGNOSIS — E61.1 IRON DEFICIENCY: ICD-10-CM

## 2022-04-13 DIAGNOSIS — E61.1 IRON DEFICIENCY: Chronic | ICD-10-CM

## 2022-04-13 DIAGNOSIS — D72.829 LEUKOCYTOSIS, UNSPECIFIED TYPE: Primary | Chronic | ICD-10-CM

## 2022-04-13 LAB
BASOPHILS # BLD AUTO: 0.11 10*3/MM3 (ref 0–0.2)
BASOPHILS NFR BLD AUTO: 0.7 % (ref 0–1.5)
DEPRECATED RDW RBC AUTO: 46.1 FL (ref 37–54)
EOSINOPHIL # BLD AUTO: 0.33 10*3/MM3 (ref 0–0.4)
EOSINOPHIL NFR BLD AUTO: 2.2 % (ref 0.3–6.2)
ERYTHROCYTE [DISTWIDTH] IN BLOOD BY AUTOMATED COUNT: 15.2 % (ref 12.3–15.4)
FERRITIN SERPL-MCNC: 68.9 NG/ML (ref 13–150)
FOLATE SERPL-MCNC: 17.5 NG/ML (ref 4.78–24.2)
HCT VFR BLD AUTO: 36.5 % (ref 34–46.6)
HGB BLD-MCNC: 11.6 G/DL (ref 12–15.9)
IMM GRANULOCYTES # BLD AUTO: 0.09 10*3/MM3 (ref 0–0.05)
IMM GRANULOCYTES NFR BLD AUTO: 0.6 % (ref 0–0.5)
IRON 24H UR-MRATE: 54 MCG/DL (ref 37–145)
IRON SATN MFR SERPL: 18 % (ref 20–50)
LYMPHOCYTES # BLD AUTO: 2.46 10*3/MM3 (ref 0.7–3.1)
LYMPHOCYTES NFR BLD AUTO: 16.4 % (ref 19.6–45.3)
MCH RBC QN AUTO: 26.5 PG (ref 26.6–33)
MCHC RBC AUTO-ENTMCNC: 31.8 G/DL (ref 31.5–35.7)
MCV RBC AUTO: 83.3 FL (ref 79–97)
MONOCYTES # BLD AUTO: 0.69 10*3/MM3 (ref 0.1–0.9)
MONOCYTES NFR BLD AUTO: 4.6 % (ref 5–12)
NEUTROPHILS NFR BLD AUTO: 11.28 10*3/MM3 (ref 1.7–7)
NEUTROPHILS NFR BLD AUTO: 75.5 % (ref 42.7–76)
NRBC BLD AUTO-RTO: 0 /100 WBC (ref 0–0.2)
PLATELET # BLD AUTO: 211 10*3/MM3 (ref 140–450)
PMV BLD AUTO: 11.7 FL (ref 6–12)
RBC # BLD AUTO: 4.38 10*6/MM3 (ref 3.77–5.28)
TIBC SERPL-MCNC: 299 MCG/DL (ref 298–536)
TRANSFERRIN SERPL-MCNC: 201 MG/DL (ref 200–360)
VIT B12 BLD-MCNC: 836 PG/ML (ref 211–946)
WBC NRBC COR # BLD: 14.96 10*3/MM3 (ref 3.4–10.8)

## 2022-04-13 PROCEDURE — 1157F ADVNC CARE PLAN IN RCRD: CPT | Performed by: INTERNAL MEDICINE

## 2022-04-13 PROCEDURE — G0463 HOSPITAL OUTPT CLINIC VISIT: HCPCS | Performed by: INTERNAL MEDICINE

## 2022-04-13 PROCEDURE — 1125F AMNT PAIN NOTED PAIN PRSNT: CPT | Performed by: INTERNAL MEDICINE

## 2022-04-13 PROCEDURE — 83540 ASSAY OF IRON: CPT

## 2022-04-13 PROCEDURE — 82607 VITAMIN B-12: CPT

## 2022-04-13 PROCEDURE — 84466 ASSAY OF TRANSFERRIN: CPT

## 2022-04-13 PROCEDURE — 99214 OFFICE O/P EST MOD 30 MIN: CPT | Performed by: INTERNAL MEDICINE

## 2022-04-13 PROCEDURE — 82746 ASSAY OF FOLIC ACID SERUM: CPT

## 2022-04-13 PROCEDURE — 82728 ASSAY OF FERRITIN: CPT

## 2022-04-13 PROCEDURE — 85025 COMPLETE CBC W/AUTO DIFF WBC: CPT

## 2022-04-13 NOTE — PROGRESS NOTES
DATE OF VISIT: 4/14/2022      REASON FOR VISIT: Leukocytosis, iron deficiency anemia      HISTORY OF PRESENT ILLNESS:   57-year-old female with medical problem consisting of diabetes mellitus with neuropathy, hypothyroidism, hypertension, dyslipidemia, history of left lower extremity amputation, iron deficiency for which patient was initially seen in consultation on April 21, 2021.  Patient is here for follow-up appointment today.  Patient has been started on intramuscular B12 injection along with ferrous sulfate twice daily since last clinic visit.  Denies any excessive nausea or vomiting or stomach upset with iron.  Denies any bleeding.  Denies any new lymph node enlargement or any recurrent infections.          Past Medical History, Past Surgical History, Social History, Family History have been reviewed and are without significant changes except as mentioned.    Review of Systems   A comprehensive 14 point review of systems was performed and was negative except as mentioned in HPI.    Medications:  The current medication list was reviewed in the EMR    ALLERGIES:    Allergies   Allergen Reactions   • Compazine [Prochlorperazine Edisylate] Unknown - High Severity   • Penicillins Other (See Comments) and Unknown - High Severity     unknown       Objective      Vitals:    04/13/22 1057   BP: 128/76   Pulse: 77   Temp: 97.4 °F (36.3 °C)   TempSrc: Temporal   SpO2: 94%   PainSc:   5   PainLoc: Hand  Comment: BILATERAL HAND PAIN & LEG PAIN     Current Status 2/23/2022   ECOG score 3       Physical Exam  Pulmonary:      Breath sounds: Normal breath sounds.   Neurological:      Mental Status: She is alert and oriented to person, place, and time.           RECENT LABS:  Glucose   Date Value Ref Range Status   01/27/2022 145 (H) 65 - 99 mg/dL Final     Sodium   Date Value Ref Range Status   01/27/2022 140 136 - 145 mmol/L Final     Potassium   Date Value Ref Range Status   01/27/2022 4.4 3.5 - 5.2 mmol/L Final     CO2    Date Value Ref Range Status   01/27/2022 26.0 22.0 - 29.0 mmol/L Final     Chloride   Date Value Ref Range Status   01/27/2022 105 98 - 107 mmol/L Final     Anion Gap   Date Value Ref Range Status   01/27/2022 9.0 5.0 - 15.0 mmol/L Final     Creatinine   Date Value Ref Range Status   01/27/2022 1.00 0.57 - 1.00 mg/dL Final     BUN   Date Value Ref Range Status   01/27/2022 17 6 - 20 mg/dL Final     BUN/Creatinine Ratio   Date Value Ref Range Status   01/27/2022 17.0 7.0 - 25.0 Final     Calcium   Date Value Ref Range Status   01/27/2022 8.7 8.6 - 10.5 mg/dL Final     eGFR Non  Amer   Date Value Ref Range Status   01/27/2022 57 (L) >60 mL/min/1.73 Final     Alkaline Phosphatase   Date Value Ref Range Status   01/20/2022 101 39 - 117 U/L Final     Total Protein   Date Value Ref Range Status   01/20/2022 7.3 6.0 - 8.5 g/dL Final     ALT (SGPT)   Date Value Ref Range Status   01/20/2022 11 1 - 33 U/L Final     AST (SGOT)   Date Value Ref Range Status   01/20/2022 14 1 - 32 U/L Final     Total Bilirubin   Date Value Ref Range Status   01/20/2022 0.3 0.0 - 1.2 mg/dL Final     Albumin   Date Value Ref Range Status   01/20/2022 4.10 3.50 - 5.20 g/dL Final     Globulin   Date Value Ref Range Status   01/20/2022 3.2 gm/dL Final     Lab Results   Component Value Date    WBC 14.96 (H) 04/13/2022    HGB 11.6 (L) 04/13/2022    HCT 36.5 04/13/2022    MCV 83.3 04/13/2022     04/13/2022     Lab Results   Component Value Date    NEUTROABS 11.28 (H) 04/13/2022    IRON 54 04/13/2022    IRON 52 01/20/2022    IRON 48 12/13/2021    TIBC 299 04/13/2022    TIBC 340 01/20/2022    TIBC 304 12/13/2021    LABIRON 18 (L) 04/13/2022    LABIRON 15 (L) 01/20/2022    LABIRON 16 (L) 12/13/2021    FERRITIN 68.90 04/13/2022    FERRITIN 88.72 01/20/2022    FERRITIN 52.45 12/13/2021    NMNHDLIU29 836 04/13/2022    TENUJTZH91 >2,000 (H) 01/20/2022    FHOIAKGU75 337 12/13/2021    FOLATE 17.50 04/13/2022    FOLATE 11.60 01/20/2022    FOLATE  15.40 12/13/2021     No results found for: , LABCA2, AFPTM, HCGQUANT, , CHROMGRNA, [id number], CEA, REFLABREPO      PATHOLOGY:  * Cannot find OR log *         RADIOLOGY DATA :  No radiology results for the last 7 days        Assessment/Plan     1.  Leukocytosis:  -Patient has been having ongoing leukocytosis since December 2020  -White blood cell count today's 14.96 which is predominantly increase in neutrophil  -Work-up in the past including peripheral blood flow cytometry in April 2021 was negative for any kind of leukemia or lymphoma  -We will continue with clinical monitoring for now  -We will have patient return to clinic in 4 months with repeat CBC, iron studies, ferritin, B12 and folate to be done on that day.    2.  Iron deficiency anemia:  -Hemoglobin today is 11.6  -Patient is currently on ferrous sulfate 1 tablet twice daily.  -Iron studies shows improvement in iron saturation at 18%.  Recommend continue with ferrous sulfate twice daily  -Patient is currently getting monthly intramuscular B12 injection due to iron malabsorption  -We will continue with monthly B12 injection for now  -We will repeat anemia work-up upon next clinic visit in 4 months    3.  Diabetes mellitus with neuropathy    4.  Health maintenance: Patient quit smoking in 2019.  Had a colonoscopy in August 2020    5. Advance Care Planning   ACP discussion was held with the patient during this visit. Patient has an advance directive in EMR which is still valid.        6.  Prescriptions: Patient has enough prescription for ferrous sulfate and Colace             PHQ-9 Total Score: 0   -Patient is not homicidal or suicidal.  No acute intervention required.    Marta Giraldo reports a pain score of 5.  Given her pain assessment as noted, treatment options were discussed and the following options were decided upon as a follow-up plan to address the patient's pain: continuation of current treatment plan for pain.         Adalid MACKEY  MD Paulo  4/14/2022  07:02 CDT        Part of this note may be an electronic transcription/translation of spoken language to printed text using the Dragon Dictation System.          CC:

## 2022-04-14 ENCOUNTER — HOSPITAL ENCOUNTER (OUTPATIENT)
Dept: PHYSICAL THERAPY | Facility: HOSPITAL | Age: 57
Setting detail: THERAPIES SERIES
Discharge: HOME | End: 2022-04-14

## 2022-04-14 ENCOUNTER — TELEPHONE (OUTPATIENT)
Dept: ONCOLOGY | Facility: HOSPITAL | Age: 57
End: 2022-04-14

## 2022-04-14 DIAGNOSIS — M25.642 DECREASED RANGE OF MOTION OF FINGER OF LEFT HAND: ICD-10-CM

## 2022-04-14 DIAGNOSIS — M79.645 PAIN IN FINGER OF LEFT HAND: ICD-10-CM

## 2022-04-14 DIAGNOSIS — M79.642 LEFT HAND PAIN: Primary | ICD-10-CM

## 2022-04-14 PROCEDURE — 97035 APP MDLTY 1+ULTRASOUND EA 15: CPT

## 2022-04-14 PROCEDURE — 97110 THERAPEUTIC EXERCISES: CPT

## 2022-04-14 NOTE — TELEPHONE ENCOUNTER
----- Message from Adalid Bates MD sent at 4/14/2022  7:02 AM CDT -----  Regarding: labs  Please let patient know, she needs to continue taking B12 injection monthly along with ferrous sulfate twice daily.  Thank you

## 2022-04-14 NOTE — THERAPY TREATMENT NOTE
Outpatient Physical Therapy Ortho Treatment Note  [facility]     Patient Name: Marta Giraldo  : 1965  MRN: [id number]  Today's Date: 2022      Visit Date: 2022  Attendance:   Subjective improvement: 50%  Recert: 22  MD Appointment: After MRI    Visit Dx:    ICD-10-CM ICD-9-CM   1. Left hand pain  M79.642 729.5   2. Pain in finger of left hand  M79.645 729.5   3. Decreased range of motion of finger of left hand  M25.642 719.54       Patient Active Problem List   Diagnosis   • Class 1 obesity with serious comorbidity and body mass index (BMI) of 32.0 to 32.9 in adult   • Moderate episode of recurrent major depressive disorder (HCC)   • Encounter for screening for malignant neoplasm of colon   • Gastroesophageal reflux disease   • Hypothyroidism   • Microcytic anemia   • Vitamin D deficiency   • Uncontrolled type 2 diabetes mellitus with hyperglycemia (HCC)   • Class 1 obesity with body mass index (BMI) of 32.0 to 32.9 in adult   • Diabetic polyneuropathy associated with type 2 diabetes mellitus (HCC)   • Gastritis without bleeding   • Class 1 obesity with serious comorbidity and body mass index (BMI) of 33.0 to 33.9 in adult   • At high risk for falls   • Gait instability   • Below knee amputation (HCC)   • Type 2 diabetes mellitus with hypoglycemia without coma, with long-term current use of insulin (HCC)   • Essential hypertension   • Mixed hyperlipidemia   • Cervical radiculopathy   • DDD (degenerative disc disease), cervical   • Chronic neck pain   • Leukocytosis   • Iron deficiency   • Wheelchair bound   • B12 deficiency   • Chest pain   • Abnormal nuclear stress test   • Coronary artery disease involving native coronary artery of native heart without angina pectoris   • Polyuria   • Smoker   • Left hand pain   • Decreased range of motion of finger of left hand   • Pain of finger of left hand        Past Medical History:   Diagnosis Date   • Anemia    • Anxiety    •  Arthritis    • Cataract    • Depression    • Diabetes mellitus (HCC)    • Disease of thyroid gland    • Family history of thyroid problem    • GERD (gastroesophageal reflux disease)    • Headache    • History of transfusion    • Hyperlipidemia    • Hypertension    • Neuropathy    • Stroke (HCC) 2019   • Urinary tract infection         Past Surgical History:   Procedure Laterality Date   • APPENDECTOMY     • BELOW KNEE AMPUTATION     • BLADDER SURGERY     • CARDIAC CATHETERIZATION N/A 1/27/2022    Procedure: Left Heart Cath;  Surgeon: Josias Carpio MD;  Location: [facility] CATH INVASIVE LOCATION;  Service: Cardiology;  Laterality: N/A;   • COLONOSCOPY N/A 9/2/2020    Procedure: COLONOSCOPY;  Surgeon: Ashli Gonzalez MD;  Location: [facility] ENDOSCOPY;  Service: Gastroenterology;  Laterality: N/A;   • ENDOSCOPY N/A 9/2/2020    Procedure: ESOPHAGOGASTRODUODENOSCOPY;  Surgeon: Ashli Gonzalez MD;  Location: [facility] ENDOSCOPY;  Service: Gastroenterology;  Laterality: N/A;        PT Ortho     Row Name 04/14/22 0800       Posture/Observations    Posture/Observations Comments Presents in power wheelchair. Not wearing prosthesis R LE this date.  -EM          User Key  (r) = Recorded By, (t) = Taken By, (c) = Cosigned By    Initials Name Provider Type    EM Bruce Euceda, PTA Physical Therapist Assistant                             PT Assessment/Plan     Row Name 04/14/22 0900          PT Assessment    Functional Limitations Limitation in home management;Performance in self-care ADL  -EM     Impairments Dexterity;Muscle strength;Pain;Range of motion  -EM     Assessment Comments Pt kristal tx well with reduced pain post tx. Pt reports her triggers has reduced from 9-10/day to only 2-3/day .  -EM     Rehab Potential Good  -EM     Patient/caregiver participated in establishment of treatment plan and goals Yes  -EM     Patient would benefit from skilled therapy intervention Yes  -EM            PT Plan    PT Frequency  "1x/week;2x/week  -EM     Predicted Duration of Therapy Intervention (PT) 3-4 weeks with further TBD  -EM     PT Plan Comments Request additional visits from insurance. Cont current POC, progress as kristal   -EM           User Key  (r) = Recorded By, (t) = Taken By, (c) = Cosigned By    Initials Name Provider Type    Bruce Rosales, ELIESER Physical Therapist Assistant                 Modalities     Row Name 04/14/22 0800             Ultrasound 63125    Location L palm  -EM      Duty Cycle 100  -EM      Frequency 3.0 MHz  -EM      Intensity - Wts/cm 1.5  -EM            User Key  (r) = Recorded By, (t) = Taken By, (c) = Cosigned By    Initials Name Provider Type    Bruce Rosales, ELIESER Physical Therapist Assistant               OP Exercises     Row Name 04/14/22 0800             Subjective Comments    Subjective Comments Pt feels she is better with second digit DIP, PIP joints, however, no improvement with MCP.  -EM              Subjective Pain    Able to rate subjective pain? yes  -EM      Pre-Treatment Pain Level 5  -EM      Post-Treatment Pain Level 3  -EM              Exercise 1    Exercise Name 1 US  -EM      Time 1 8'  -EM              Exercise 2    Exercise Name 2 Fluidotherapy with AROM  -EM      Time 2 15'  -EM              Exercise 3    Exercise Name 3 IF flexor stretch  -EM      Sets 3 3  -EM      Time 3 60\" Hold  -EM              Exercise 4    Exercise Name 4 Blocked AROM: MCP, PIP, DIP  -EM      Sets 4 1  -EM      Reps 4 20  -EM            User Key  (r) = Recorded By, (t) = Taken By, (c) = Cosigned By    Initials Name Provider Type    EM Bruce Euceda PTA Physical Therapist Assistant                              PT OP Goals     Row Name 04/14/22 0900          PT Short Term Goals    STG Date to Achieve 03/31/22  -EM     STG 1 Note a >/= 25% subjective improvement.  -EM     STG 1 Progress Met   -EM     STG 2 Independent with orthosis wear and care.  -EM     STG 2 Progress Met   -EM            Long Term " Goals    LTG Date to Achieve 04/25/22  -EM     LTG 1 Independent with HEP/self-management.  -EM     LTG 1 Progress Ongoing  -EM     LTG 2 L IF active extension to 0 deg each joint.  -EM     LTG 2 Progress Ongoing  -EM     LTG 3 L IF active flexion WNLs.  -EM     LTG 3 Progress Ongoing  -EM     LTG 4 Minimal pain with gripping and use.  -EM     LTG 4 Progress Ongoing  -EM            Time Calculation    PT Goal Re-Cert Due Date 04/25/22  -EM           User Key  (r) = Recorded By, (t) = Taken By, (c) = Cosigned By    Initials Name Provider Type    EM Bruce Euceda, ELIESER Physical Therapist Assistant                               Time Calculation:   Start Time: 0848  Stop Time: 0932  Time Calculation (min): 44 min  Total Timed Code Minutes- PT: 44 minute(s)  Therapy Charges for Today     Code Description Service Date Service Provider Modifiers Qty    41227067756 HC PT THER PROC EA 15 MIN 4/14/2022 Bruce Euceda, ELIESER GP, CQ 2    34006686626 HC PT ULTRASOUND EA 15 MIN 4/14/2022 Bruce Euceda PTA GP, CQ 1                    Bruce Euceda PTA  4/14/2022

## 2022-04-15 ENCOUNTER — APPOINTMENT (OUTPATIENT)
Dept: MRI IMAGING | Facility: HOSPITAL | Age: 57
End: 2022-04-15

## 2022-04-19 RX ORDER — OMEPRAZOLE 40 MG/1
CAPSULE, DELAYED RELEASE ORAL
Qty: 30 CAPSULE | Refills: 0 | OUTPATIENT
Start: 2022-04-19

## 2022-04-20 NOTE — PROGRESS NOTES
Chief Complaint  No chief complaint on file.    Subjective     {Problem List  Visit Diagnosis   Encounters  Notes  Medications  Labs  Result Review Imaging  Media :23}     Marta Giraldo presents to [facility] [place]  History of Present Illness     Pt is 55 yo female with management of obesity IDDM type 2 , HLP, diabetic neuropathy,  HTN, major depression  type 2, former tobacco smoker, sp below right knee amputation of right leg , sp appendectomy, sp bladder surgery,diabetic retinopathy of right eye, vitamin D deficiency, hypothryoidism, microcytic anemia , colonic polyps, diverticulosis, internal hemorrhoids/GERD with esophagitis, gastritis. Diabetic retinopathy, cataracts, iron deficiency, CKD stage 2, chronic neck pain (DDD cervical spine,cervical spondylosis at C5-C6) cervical radiculopathy, leukocytosis      9/16/21 in office visit for recheck on pt's above medical issues. Pt had labwork done on 9/8/21 that showed normal thyroid studies. Vitamin D was at 34.3. Vitamin B12 was stable lipid panel  Showed LDL at 111. hga1c was at  7.98 from 11.30. CMp showed CR At 1.24 from 1.17 with GFR at 45 from 48. CBC showed WBC At 12.36 with hemoglobin at 11.7.  Pt is doing well although she did hit her left hand a few weeks ago. She hurt it on a window.       11/16/21 in office visit for recheck on pt's above medical issues. Pt continues to take her medications for DM type 2, diabetic neuropathy, HTN, HLP, iron deficiency,  Hyopthyroidism, urinary incontinence, GERD,  Pt was recentlyd admitted to [facility] for chest pain and had negative troponin. She was found to have CAP and treated with levaquin for 7days. She also had echo that showed small to moderate pericardial effusion along with left atrial dilation and right ventricle dilation. Pt  Is doing better now. Her breathing is better. No chest pain currently no fever. Pt doing well on medicatoins. She continues  to have pain in left hand around index finger      Heart Problem  This is a recurrent problem. The current episode started more than 1 month ago. The problem occurs constantly. The problem has been unchanged. Associated symptoms include arthralgias. Pertinent negatives include no abdominal pain, anorexia, change in bowel habit, chest pain, chills, congestion, coughing, diaphoresis, fatigue, fever, headaches, joint swelling, myalgias, nausea, neck pain, numbness, rash, sore throat, swollen glands, urinary symptoms, vertigo, visual change, vomiting or weakness. Nothing aggravates the symptoms. She has tried nothing for the symptoms. The treatment provided no relief.   Chronic Kidney Disease  This is a chronic problem. The current episode started more than 1 year ago. The problem occurs constantly. The problem has been unchanged. Pertinent negatives include no abdominal pain, anorexia, arthralgias, change in bowel habit, chest pain, chills, congestion, coughing, diaphoresis, fatigue, fever, headaches, joint swelling, myalgias, nausea, neck pain, numbness, rash, sore throat, swollen glands, urinary symptoms, vertigo, visual change, vomiting or weakness. Nothing aggravates the symptoms. She has tried nothing for the symptoms. The treatment provided no relief.   Diabetes  She presents for her follow-up diabetic visit. She has type 2 diabetes mellitus. Her disease course has been waxing and waning . Pertinent negatives for hypoglycemia include no confusion, dizziness, headaches, hunger, mood changes, nervousness/anxiousness, pallor or seizures. Associated symptoms include fatigue and weakness. Pertinent negatives for diabetes include no blurred vision, no chest pain, no foot paresthesias, no foot ulcerations, no polydipsia, no polyphagia and no polyuria. Pertinent negatives for hypoglycemia complications include no blackouts, no hospitalization, no nocturnal hypoglycemia and no required assistance. Symptoms are stable.  Pertinent negatives for diabetic complications include no autonomic neuropathy, CVA, heart disease, impotence, nephropathy or peripheral neuropathy. Risk factors for coronary artery disease include diabetes mellitus and dyslipidemia. Current diabetic treatment includes insulin injections and oral agent (dual therapy). She is compliant with treatment most of the time. Her weight is stable. She has not had a previous visit with a dietitian. She never participates in exercise. Her home blood glucose trend is decreasing steadily. An ACE inhibitor/angiotensin II receptor blocker is being taken. She does not see a podiatrist.Eye exam is not current.   Hypothyroidism   This is a new problem. The current episode started more than 1 year ago. The problem occurs constantly. The problem has been improved Associated symptoms include arthralgias, fatigue, numbness and weakness. Pertinent negatives include no abdominal pain, anorexia, chest pain, chills, congestion, coughing, diaphoresis, fever, headaches, nausea, sore throat or vomiting. Nothing aggravates the symptoms. She has tried nothing for the symptoms. The treatment provided no relief.    Obesity   This is a chronic problem. The problem occurs constantly. The problem has been unchanged. Associated symptoms include arthralgias, fatigue, numbness and weakness. Pertinent negatives include no chest pain, chills, congestion, coughing, diaphoresis, fever, headaches, nausea, sore throat or vomiting. Nothing aggravates the symptoms. She has tried nothing for the symptoms. The treatment provided no relief  Objective   Vital Signs:   There were no vitals taken for this visit.    {BMI is above normal parameters. Recommendations (Optional):[id number]}       Physical Exam   Result Review :{Labs  Result Review  Imaging  Med Tab  Media  Procedures :23}   {The following data was reviewed by (Optional):66030}  {Ambulatory Labs (Optional):22593}  {Data reviewed (Optional):04638:::1}           Assessment and Plan {CC Problem List  Visit Diagnosis   ROS  Review (Popup)  Health Maintenance  Quality  BestPractice  Medications  SmartSets  SnapShot Encounters  Media :23}   There are no diagnoses linked to this encounter.      -went over labwork   -recommend COVID-19 vaccination .  -influenza vaccination today   --pt is here for face to face wheelchair evaluation today. The patient's home will not accommodate a scooter, patient requires assistance with transfers. Use of a power wheelchair will improve patient's ability to participate in activities of daily living and give them independence of mobility within their home such as toileting, groomigin self care. The patient desires to use and has the mental and physical capabilities to safely use the power wheelchair within their home  -left atrial dilation/right ventricular dilation/pericardial effusion - reviewed last Echo at [facility]. Will refer to Cardiology   -chronic neck pain/DDD cervical spine/cervical radiculopathy -reviewed x-ray of neck. Referred to Neurology   -diabetic retinopathy/catatract  - Ophthalmology following in [place] she has pending surgery   -vitamin B12 deficiency - on vitamin b12 once a week   -pruritc rash on elbows -  Triamcinolone topical BID  -gait instability/ high fall risk-  Uses wheelchair   -urinary incontinence - currently uses pullups  -colonic/rectal polyps, internal hemorrhoids/GERD with esophagitis/gastritis  - GI following  Next colonsocopy in 2025   -CKD stage 3a - Nephrology following. Stressed importance of BP and blood sugar control   -vitamin D deficiency -vitamin D once a week  -hypothyroidism - on synthroid 25 mcg PO q daily.   -DM type 2  - on trulicity 3 mg subq weekly.  On jardiance 25 mg daily.  on basaglar  35 units at bedtime.on humalog 5 units before meals. Endocrinology following. Has Dexcom monitor ordered   -microcytic anemia/iron deficiency anemialleukocytosis  - on ferrous sulfate 325  mg PO q daily. Hematology following   -HTN -  Stable  on lisinopril  20 mg PO q daily.   -HLP - on lipitor 80 mg PO qhs. Recommend heart health ydiet   -major depression - on viibryd 20 mg Po q daily.    -obesity - counseled weight loss >5 minutes BMI at 34.70   -diabetic neuropathy - on lyrica 300 PO BID will go up to QID. On cymbalta  -chronic pain  Was on Norco 7.5/325 mg every 12 hours PRN. Will refer to Pain Management.   on robaxin TID     -recommend pap smear  -advised pt to be safe and call with questions and concerns  -advised pt to go to ER or call 911 if symptoms worrisome or severe  -advised pt to followup with specialist and referrals  -advised pt to be safe during COVID-19 pandemic  I spent 30 minutes caring for Marta on this date of service. This time includes time spent by me in the following activities: preparing for the visit, reviewing tests, obtaining and/or reviewing a separately obtained history, performing a medically appropriate examination and/or evaluation, counseling and educating the patient/family/caregiver, ordering medications, tests, or procedures, referring and communicating with other health care professionals, documenting information in the medical record, independently interpreting results and communicating that information with the patient/family/caregiver and care coordination.  {Time Spent (Optional):56866}  Follow Up {Instructions Charge Capture  Follow-up Communications :23}  No follow-ups on file.  Patient was given instructions and counseling regarding her condition or for health maintenance advice. Please see specific information pulled into the AVS if appropriate.

## 2022-04-21 ENCOUNTER — HOSPITAL ENCOUNTER (OUTPATIENT)
Dept: MRI IMAGING | Facility: HOSPITAL | Age: 57
Discharge: HOME | End: 2022-04-21
Admitting: NURSE PRACTITIONER

## 2022-04-21 DIAGNOSIS — M79.645 PAIN OF FINGER OF LEFT HAND: ICD-10-CM

## 2022-04-21 DIAGNOSIS — M25.642 DECREASED RANGE OF MOTION OF FINGER OF LEFT HAND: ICD-10-CM

## 2022-04-21 DIAGNOSIS — M79.642 LEFT HAND PAIN: ICD-10-CM

## 2022-04-21 PROCEDURE — 73218 MRI UPPER EXTREMITY W/O DYE: CPT

## 2022-04-25 ENCOUNTER — HOSPITAL ENCOUNTER (OUTPATIENT)
Dept: PHYSICAL THERAPY | Facility: HOSPITAL | Age: 57
Setting detail: THERAPIES SERIES
Discharge: HOME | End: 2022-04-25

## 2022-04-25 DIAGNOSIS — M25.642 DECREASED RANGE OF MOTION OF FINGER OF LEFT HAND: ICD-10-CM

## 2022-04-25 DIAGNOSIS — M79.642 LEFT HAND PAIN: Primary | ICD-10-CM

## 2022-04-25 DIAGNOSIS — M79.645 PAIN IN FINGER OF LEFT HAND: ICD-10-CM

## 2022-04-25 PROCEDURE — 97035 APP MDLTY 1+ULTRASOUND EA 15: CPT | Performed by: PHYSICAL THERAPIST

## 2022-04-25 NOTE — THERAPY PROGRESS REPORT/RE-CERT
Outpatient Physical Therapy Ortho Progress Note  [facility]     Patient Name: Marta Giraldo  : 1965  MRN: [id number]  Today's Date: 2022      Visit Date: 2022    Attendance:  (8 visits)  Subjective Improvement: 50%  Next MD Appt: after MRI  Recert Date: 22     Therapy Diagnosis: L hand pain; possible IF trigger finger     Changes in Medications: none noted  Changes in MD Orders: none noted  Number of Work Days Lost: n/a     Past Medical History:   Diagnosis Date   • Anemia    • Anxiety    • Arthritis    • Cataract    • Depression    • Diabetes mellitus (HCC)    • Disease of thyroid gland    • Family history of thyroid problem    • GERD (gastroesophageal reflux disease)    • Headache    • History of transfusion    • Hyperlipidemia    • Hypertension    • Neuropathy    • Stroke (HCC) 2019   • Urinary tract infection         Past Surgical History:   Procedure Laterality Date   • APPENDECTOMY     • BELOW KNEE AMPUTATION     • BLADDER SURGERY     • CARDIAC CATHETERIZATION N/A 2022    Procedure: Left Heart Cath;  Surgeon: Josias Carpio MD;  Location: [facility] CATH INVASIVE LOCATION;  Service: Cardiology;  Laterality: N/A;   • COLONOSCOPY N/A 2020    Procedure: COLONOSCOPY;  Surgeon: Ashli Gonzalez MD;  Location: [facility] ENDOSCOPY;  Service: Gastroenterology;  Laterality: N/A;   • ENDOSCOPY N/A 2020    Procedure: ESOPHAGOGASTRODUODENOSCOPY;  Surgeon: Ashli Gonzalez MD;  Location: [facility] ENDOSCOPY;  Service: Gastroenterology;  Laterality: N/A;       Visit Dx:     ICD-10-CM ICD-9-CM   1. Left hand pain  M79.642 729.5   2. Pain in finger of left hand  M79.645 729.5   3. Decreased range of motion of finger of left hand  M25.642 719.54              PT Ortho     Row Name 22 1000       Subjective Comments    Subjective Comments Patient reports that her pain in the IF is better overall. However, pain in the palm is worse. Not noticing the finger locking or  clicking. Pain usually at night. 50% subjective improvement.  -SS       Subjective Pain    Able to rate subjective pain? yes  -    Pre-Treatment Pain Level 7  -SS    Post-Treatment Pain Level --  not rated post-treatment  -SS       Posture/Observations    Posture/Observations Comments Presents in power wheelchair. Not wearing prosthesis R LE this date.  -SS       Wrist/Hand Special Tests    Wrist/Hand Special Tests Comments TTP L 2nd MP joint volarly. Palpable click noted in region of IF A1 pulley with active clawing L hand.  -SS       Left Upper Ext    Lt Wrist Flexion AROM 73 deg  -SS    Lt Wrist Extension AROM 60 deg  -SS    Lt  Ulnar Deviation AROM 15 deg, pain about 1st MP  -SS    Lt  Radial Deviation AROM 15 deg  -SS    Lt Upper Extremity Comments  see Hand Eval; pain in volar IF with composite flexion  -SS        Strength Left    # Reps 1  -SS    Left Rung 2  -SS    Left  Test 1 35  pain IF and palm  -SS     Strength Average Left 35  -SS          User Key  (r) = Recorded By, (t) = Taken By, (c) = Cosigned By    Initials Name Provider Type    Ernst Huang, PT DPT Physical Therapist              Hand Therapy (last 24 hours)     Hand Eval     Row Name 04/25/22 1000             Left Flexion AROM    II- MP AROM 75  -SS      II- PIP AROM 75  -SS      II- DIP AROM 45  -SS      II- MO Left Flexion AROM 195  -SS            User Key  (r) = Recorded By, (t) = Taken By, (c) = Cosigned By    Initials Name Provider Type    Ernst Huang, PT DPT Physical Therapist                          Therapy Education  Education Details: therapy plan to hold pending consult with referring provider  How Provided: Verbal  Provided to: Patient  Level of Understanding: Verbalized      PT OP Goals     Row Name 04/25/22 1000          PT Short Term Goals    STG Date to Achieve --  STGs deferred  -     STG 1 Note a >/= 25% subjective improvement.  -SS     STG 1 Progress Met  -     STG 2 Independent  with orthosis wear and care.  -     STG 2 Progress Met  -            Long Term Goals    LTG Date to Achieve 05/16/22  -     LTG 1 Independent with HEP/self-management.  -SS     LTG 1 Progress Ongoing  -SS     LTG 2 L IF active extension to 0 deg each joint.  -SS     LTG 2 Progress Ongoing  -SS     LTG 3 L IF active flexion WNLs.  -SS     LTG 3 Progress Ongoing  -SS     LTG 4 Minimal pain with gripping and use.  -SS     LTG 4 Progress Ongoing  -SS            Time Calculation    PT Goal Re-Cert Due Date 05/16/22  -           User Key  (r) = Recorded By, (t) = Taken By, (c) = Cosigned By    Initials Name Provider Type    Ernst Huang, PT DPT Physical Therapist                 PT Assessment/Plan     Row Name 04/25/22 1000          PT Assessment    Functional Limitations Limitation in home management;Performance in self-care ADL  -     Impairments Dexterity;Muscle strength;Pain;Range of motion  -     Assessment Comments Continued pain with palpable nodule & click in region of IF A1 pulley. Clicking noted during claw motion. Clicking not noted in composite flexion, but I think this is due to lack of excursion when attempting to perform composite IF flexion. I have sent message with findings and therapy plan to referral.  -     Rehab Potential Good  barrier: ongoing symptoms and diabetic history  -     Patient/caregiver participated in establishment of treatment plan and goals Yes  -     Patient would benefit from skilled therapy intervention Yes  -SS            PT Plan    PT Plan Comments Hold therapy pending availability of MRI results and consultation with referring provider.  -           User Key  (r) = Recorded By, (t) = Taken By, (c) = Cosigned By    Initials Name Provider Type    Ernst Huang, PT DPT Physical Therapist                 Modalities     Row Name 04/25/22 1000             Ultrasound 58213    Location L palm and volar IF  -      Duty Cycle 100  -       Frequency 3.0 MHz  -SS      Intensity - Wts/cm 1.5  -SS      04384 - PT Ultrasound Minutes 8  -SS            User Key  (r) = Recorded By, (t) = Taken By, (c) = Cosigned By    Initials Name Provider Type    SS Ernst Lopez, PT DPT Physical Therapist                                          Time Calculation:     Start Time: 1028  Stop Time: 1100  Time Calculation (min): 32 min  PT Non-Billable Time (min): 24 min  Total Timed Code Minutes- PT: 8 minute(s)  Timed Charges  09026 - PT Ultrasound Minutes: 8  Untimed Charges  PT Eval/Re-eval Minutes: 24  Total Minutes  Timed Charges Total Minutes: 8  Untimed Charges Total Minutes: 24   Total Minutes: 32     Therapy Charges for Today     Code Description Service Date Service Provider Modifiers Qty    39478515397 HC PT ULTRASOUND EA 15 MIN 4/25/2022 Ernst Lopez, PT DPT GP 1    [id number] HC PT THER SUPP EA 15 MIN 4/25/2022 Ernst Lopez, PT DPT GP 1                    Ernst Lopez, PT, DPT, CHT  4/25/2022

## 2022-04-27 ENCOUNTER — APPOINTMENT (OUTPATIENT)
Dept: ONCOLOGY | Facility: HOSPITAL | Age: 57
End: 2022-04-27

## 2022-04-29 ENCOUNTER — OFFICE VISIT (OUTPATIENT)
Dept: FAMILY MEDICINE CLINIC | Facility: CLINIC | Age: 57
End: 2022-04-29

## 2022-04-29 ENCOUNTER — TELEPHONE (OUTPATIENT)
Dept: ENDOCRINOLOGY | Facility: CLINIC | Age: 57
End: 2022-04-29

## 2022-04-29 VITALS
OXYGEN SATURATION: 98 % | HEART RATE: 66 BPM | BODY MASS INDEX: 34.7 KG/M2 | SYSTOLIC BLOOD PRESSURE: 116 MMHG | DIASTOLIC BLOOD PRESSURE: 66 MMHG | HEIGHT: 69 IN | TEMPERATURE: 97.3 F

## 2022-04-29 DIAGNOSIS — N18.31 STAGE 3A CHRONIC KIDNEY DISEASE: ICD-10-CM

## 2022-04-29 DIAGNOSIS — R32 URINARY INCONTINENCE, UNSPECIFIED TYPE: ICD-10-CM

## 2022-04-29 DIAGNOSIS — E11.65 UNCONTROLLED TYPE 2 DIABETES MELLITUS WITH HYPERGLYCEMIA: ICD-10-CM

## 2022-04-29 DIAGNOSIS — Z72.0 TOBACCO USER: ICD-10-CM

## 2022-04-29 DIAGNOSIS — E11.649 TYPE 2 DIABETES MELLITUS WITH HYPOGLYCEMIA WITHOUT COMA, WITH LONG-TERM CURRENT USE OF INSULIN: Primary | ICD-10-CM

## 2022-04-29 DIAGNOSIS — E61.1 IRON DEFICIENCY: Chronic | ICD-10-CM

## 2022-04-29 DIAGNOSIS — E78.2 MIXED HYPERLIPIDEMIA: ICD-10-CM

## 2022-04-29 DIAGNOSIS — E66.9 CLASS 1 OBESITY WITH SERIOUS COMORBIDITY AND BODY MASS INDEX (BMI) OF 34.0 TO 34.9 IN ADULT, UNSPECIFIED OBESITY TYPE: ICD-10-CM

## 2022-04-29 DIAGNOSIS — D72.829 LEUKOCYTOSIS, UNSPECIFIED TYPE: Chronic | ICD-10-CM

## 2022-04-29 DIAGNOSIS — Z99.3 WHEELCHAIR BOUND: ICD-10-CM

## 2022-04-29 DIAGNOSIS — R35.0 INCREASED URINARY FREQUENCY: Primary | ICD-10-CM

## 2022-04-29 DIAGNOSIS — E03.9 ACQUIRED HYPOTHYROIDISM: ICD-10-CM

## 2022-04-29 DIAGNOSIS — Z79.4 TYPE 2 DIABETES MELLITUS WITH HYPOGLYCEMIA WITHOUT COMA, WITH LONG-TERM CURRENT USE OF INSULIN: Primary | ICD-10-CM

## 2022-04-29 DIAGNOSIS — I25.10 CORONARY ARTERY DISEASE INVOLVING NATIVE CORONARY ARTERY OF NATIVE HEART WITHOUT ANGINA PECTORIS: ICD-10-CM

## 2022-04-29 DIAGNOSIS — I10 ESSENTIAL HYPERTENSION: ICD-10-CM

## 2022-04-29 PROCEDURE — 99214 OFFICE O/P EST MOD 30 MIN: CPT | Performed by: FAMILY MEDICINE

## 2022-04-29 RX ORDER — OMEPRAZOLE 40 MG/1
40 CAPSULE, DELAYED RELEASE ORAL DAILY
Qty: 30 CAPSULE | Refills: 3 | Status: SHIPPED | OUTPATIENT
Start: 2022-04-29 | End: 2022-08-24

## 2022-04-29 RX ORDER — OXYBUTYNIN CHLORIDE 5 MG/1
5 TABLET, EXTENDED RELEASE ORAL DAILY
Qty: 30 TABLET | Refills: 3 | Status: SHIPPED | OUTPATIENT
Start: 2022-04-29 | End: 2022-05-10 | Stop reason: DRUGHIGH

## 2022-04-29 RX ORDER — NICOTINE 21 MG/24HR
1 PATCH, TRANSDERMAL 24 HOURS TRANSDERMAL EVERY 24 HOURS
Qty: 30 PATCH | Refills: 3 | Status: SHIPPED | OUTPATIENT
Start: 2022-04-29 | End: 2022-06-10 | Stop reason: SDUPTHER

## 2022-04-29 NOTE — PROGRESS NOTES
Subjective:  Marta Giraldo is a 57 y.o. female who presents for       Patient Active Problem List   Diagnosis   • Class 1 obesity with serious comorbidity and body mass index (BMI) of 32.0 to 32.9 in adult   • Moderate episode of recurrent major depressive disorder (HCC)   • Encounter for screening for malignant neoplasm of colon   • Gastroesophageal reflux disease   • Hypothyroidism   • Microcytic anemia   • Vitamin D deficiency   • Uncontrolled type 2 diabetes mellitus with hyperglycemia (HCC)   • Class 1 obesity with body mass index (BMI) of 32.0 to 32.9 in adult   • Diabetic polyneuropathy associated with type 2 diabetes mellitus (HCC)   • Gastritis without bleeding   • Class 1 obesity with serious comorbidity and body mass index (BMI) of 33.0 to 33.9 in adult   • At high risk for falls   • Gait instability   • Below knee amputation (HCC)   • Type 2 diabetes mellitus with hypoglycemia without coma, with long-term current use of insulin (HCC)   • Essential hypertension   • Mixed hyperlipidemia   • Cervical radiculopathy   • DDD (degenerative disc disease), cervical   • Chronic neck pain   • Leukocytosis   • Iron deficiency   • Wheelchair bound   • B12 deficiency   • Chest pain   • Abnormal nuclear stress test   • Coronary artery disease involving native coronary artery of native heart without angina pectoris   • Polyuria   • Smoker   • Left hand pain   • Decreased range of motion of finger of left hand   • Pain of finger of left hand           Current Outpatient Medications:   •  aspirin 81 MG EC tablet, Take 1 tablet by mouth Daily for 90 days., Disp: 90 tablet, Rfl: 3  •  atorvastatin (LIPITOR) 80 MG tablet, TAKE 1 TABLET NIGHTLY, Disp: 30 tablet, Rfl: 1  •  B-D UF III MINI PEN NEEDLES 31G X 5 MM misc, Use as needed, Disp: 100 each, Rfl: 3  •  carvedilol (COREG) 6.25 MG tablet, Take 1 tablet by mouth 2 (Two) Times a Day., Disp: 180 tablet, Rfl: 3  •  clotrimazole-betamethasone (Lotrisone) 1-0.05 %  cream, Apply  topically to the appropriate area as directed 2 (Two) Times a Day., Disp: 1 each, Rfl: 3  •  Continuous Blood Gluc  (FreeStyle Tae 2 Bonaparte) device, USE AS DIRECTED, Disp: 1 each, Rfl: 11  •  Continuous Blood Gluc Sensor (FreeStyle Tae 2 Sensor) misc, 1 each Every 14 (Fourteen) Days. Use every 14 days, Disp: 2 each, Rfl: 11  •  diazePAM (Valium) 10 MG tablet, Take 1 pill approximately 30 minutes prior to MRI.  Medication can cause drowsiness, please have someone drive you to them from your appointment., Disp: 1 tablet, Rfl: 0  •  Diclofenac Sodium (VOLTAREN) 1 % gel gel, APPLY TO THE APPROPRIATE AREA AS DIRECTED 4 TIMES AS DAY AS NEEDED, Disp: 300 g, Rfl: 0  •  docusate sodium (COLACE) 100 MG capsule, Take 1 capsule by mouth 2 (Two) Times a Day As Needed for Constipation., Disp: 60 capsule, Rfl: 2  •  Dulaglutide (Trulicity) 3 MG/0.5ML solution pen-injector, Inject 0.5 mL under the skin into the appropriate area as directed 1 (One) Time Per Week., Disp: 4 pen, Rfl: 3  •  DULoxetine (CYMBALTA) 60 MG capsule, , Disp: , Rfl:   •  empagliflozin (Jardiance) 25 MG tablet tablet, Take 1 tablet by mouth Daily., Disp: 90 tablet, Rfl: 3  •  ferrous sulfate 325 (65 Fe) MG tablet, Take 1 tablet by mouth 2 (Two) Times a Day With Meals., Disp: 180 tablet, Rfl: 1  •  glucose (DEX4) 4 GM chewable tablet, Chew 4 tablets As Needed for Low Blood Sugar., Disp: 90 tablet, Rfl: 3  •  glucose blood test strip, Use as instructed, Disp: 200 each, Rfl: 12  •  Insulin Glargine (LANTUS SOLOSTAR) 100 UNIT/ML injection pen, Inject 35 Units under the skin into the appropriate area as directed Every Night. (Patient taking differently: Inject 35 Units under the skin into the appropriate area as directed Daily.), Disp: 9 mL, Rfl: 3  •  insulin lispro (humaLOG) 100 UNIT/ML injection, INJECT 5 UNITS SUBCUTANEOUSLY PRIOR TO MEALS., Disp: 10 mL, Rfl: 0  •  Insulin Pen Needle 32G X 8 MM misc, Use at bedtime, Disp: 100 each, Rfl:  "3  •  Insulin Syringe-Needle U-100 30G X 1/2\" 1 ML misc, Use three times daily with insulin, Disp: 100 each, Rfl: 3  •  Lancets misc, Use for E11.65 diabetes to check sugars 4 times a day., Disp: 200 each, Rfl: 3  •  levothyroxine (SYNTHROID, LEVOTHROID) 25 MCG tablet, TAKE 1 TABLET DAILY. (Patient taking differently: Take 25 mcg by mouth Every Night.), Disp: 30 tablet, Rfl: 2  •  lisinopril (PRINIVIL,ZESTRIL) 20 MG tablet, TAKE 1 TABLET DAILY., Disp: 30 tablet, Rfl: 1  •  methocarbamol (ROBAXIN) 750 MG tablet, Take 1 tablet by mouth 3 (Three) Times a Day., Disp: 90 tablet, Rfl: 4  •  mupirocin (BACTROBAN) 2 % ointment, Apply 1 application topically to the appropriate area as directed 3 (Three) Times a Day., Disp: 30 g, Rfl: 1  •  omeprazole (priLOSEC) 40 MG capsule, Take 1 capsule by mouth Daily., Disp: 30 capsule, Rfl: 11  •  pregabalin (LYRICA) 300 MG capsule, Take 1 capsule by mouth 2 (Two) Times a Day., Disp: 60 capsule, Rfl: 2  •  Sodium Chloride (Saline Wound Wash) 0.9 % solution, Saline wet to dry dressings BID x 7 days., Disp: 210 mL, Rfl: 0  •  solifenacin (VESICARE) 5 MG tablet, TAKE 1 TABLET DAILY., Disp: 30 tablet, Rfl: 0  •  traMADol (ULTRAM) 50 MG tablet, Take 1 tablet by mouth 2 (Two) Times a Day As Needed for Moderate Pain ., Disp: 20 tablet, Rfl: 0  •  TRUEplus Insulin Syringe 30G X 5/16\" 0.5 ML misc, , Disp: , Rfl:   •  Viibryd 20 MG tablet tablet, TAKE 1 TABLET DAILY., Disp: 30 tablet, Rfl: 1  •  vitamin D (ERGOCALCIFEROL) 1.25 MG (91766 UT) capsule capsule, TAKE 1 CAPSULE WEEKLY, Disp: 12 capsule, Rfl: 0     Pt is 58 yo female with management of obesity IDDM type 2 , HLP, diabetic neuropathy,  HTN, major depression  type 2, tobacco smoker , sp below right knee amputation, sp appendectomy, sp bladder surgery,diabetic retinopathy of right eye, vitamin D deficiency, acquired  hypothryoidism, microcytic anemia , colonic polyps, diverticulosis, internal hemorrhoids/GERD with esophagitis, gastritis. " Diabetic retinopathy, cataracts, iron deficiency, CKD stage 2, chronic neck pain (DDD cervical spine,cervical spondylosis at C5-C6) cervical radiculopathy, leukocytosis, CAD, echo in January 2022( pericardial effusion,, mild LVH),         11/16/21 in office visit for recheck on pt's above medical issues. Pt continues to take her medications for DM type 2, diabetic neuropathy, HTN, HLP, iron deficiency,  Hyopthyroidism, urinary incontinence, GERD,  Pt was recentlyd admitted to [facility] for chest pain and had negative troponin. She was found to have CAP and treated with levaquin for 7days. She also had echo that showed small to moderate pericardial effusion along with left atrial dilation and right ventricle dilation. Pt  Is doing better now. Her breathing is better. No chest pain currently no fever. Pt doing well on medicatoins. She continues to have pain in left hand around index finger     4/29/22 in office visit for recheck. sicne last visit pt has seen Hematology on 4/13/22 for leukocytosis and iron deficiency anemia. She is currently on iron supplements and getting b12 injections.  Saw Orthopedic on 4//22 for her left hand pain and MRI of hand was ordered.  Results from 4/1/22 showed joint effusion of second metcarpophalangeal joint along with arthritis erosive changes at 2nd metacarpal joint base of the proximal phalange of the index finger and DIP joint  She was given Tylenol 3 with codein for pain. She last saw Nephrology on 3/17/22 for her CKD stage 3a. She has been to [facility] in clinic several times in regards to foot ulcer and cut on finger.  Pt  Saw [facility]ology on 2/1/22.   She did have stres test on 1/4/22 that showed intermediate risk study.  She did have cardiac catherization on 1/27/22 that showed mild CAD involving mid LAD OM1 and branch of LCX and RPDA.  She was recommended medical management for her CAD. She is to contineu aspirin coreg for her CAD, she also was found to have pericardial effusion on echo  in 2022. She does not endorse chest pain today. States she continues to smoke tobacco about 1/2 ppd. She does want to quit. She also states she has an overactive bladder. She is on jardianc      Coronary Artery Disease  Presents for follow-up visit. Symptoms include chest pain. Pertinent negatives include no chest pressure, chest tightness, dizziness, leg swelling, muscle weakness, palpitations, shortness of breath or weight gain. Risk factors include hyperlipidemia and obesity. The symptoms have been stable. Compliance with diet is variable. Compliance with exercise is variable. Compliance with medications is variable.   Hyperlipidemia  This is a chronic problem. The current episode started more than 1 year ago. Recent lipid tests were reviewed and are variable. Exacerbating diseases include chronic renal disease, diabetes and obesity. She has no history of hypothyroidism, liver disease or nephrotic syndrome. Associated symptoms include chest pain. Pertinent negatives include no shortness of breath. The current treatment provides mild improvement of lipids. Risk factors for coronary artery disease include diabetes mellitus, hypertension, obesity, dyslipidemia and a sedentary lifestyle.   Female  Problem  The patient's pertinent negatives include no genital itching, genital lesions, genital rash, missed menses, pelvic pain, vaginal bleeding or vaginal discharge. This is a recurrent problem. The current episode started more than 1 month ago. The problem occurs constantly. The problem has been unchanged. The pain is moderate. Pertinent negatives include no abdominal pain, anorexia, back pain, chills, constipation, diarrhea, discolored urine, dysuria, fever, flank pain, frequency, headaches, hematuria, joint pain, joint swelling, nausea, painful intercourse, rash, sore throat, urgency or vomiting. Associated symptoms comments: Urinary incontinence . There is no history of an abdominal surgery, a   section, an ectopic pregnancy, endometriosis, a gynecological surgery, herpes simplex, menorrhagia, metrorrhagia, miscarriage, ovarian cysts, perineal abscess, PID, an STD, a terminated pregnancy or vaginosis.   Heart Problem  This is a recurrent problem. The current episode started more than 1 month ago. The problem occurs constantly. The problem has been unchanged. Associated symptoms include arthralgias. Pertinent negatives include no abdominal pain, anorexia, change in bowel habit, chest pain, chills, congestion, coughing, diaphoresis, fatigue, fever, headaches, joint swelling, myalgias, nausea, neck pain, numbness, rash, sore throat, swollen glands, urinary symptoms, vertigo, visual change, vomiting or weakness. Nothing aggravates the symptoms. She has tried nothing for the symptoms. The treatment provided no relief.   Chronic Kidney Disease  This is a chronic problem. The current episode started more than 1 year ago. The problem occurs constantly. The problem has been unchanged. Pertinent negatives include no abdominal pain, anorexia, arthralgias, change in bowel habit, chest pain, chills, congestion, coughing, diaphoresis, fatigue, fever, headaches, joint swelling, myalgias, nausea, neck pain, numbness, rash, sore throat, swollen glands, urinary symptoms, vertigo, visual change, vomiting or weakness. Nothing aggravates the symptoms. She has tried nothing for the symptoms. The treatment provided no relief.   Diabetes  She presents for her follow-up diabetic visit. She has type 2 diabetes mellitus. Her disease course has been waxing and waning . Pertinent negatives for hypoglycemia include no confusion, dizziness, headaches, hunger, mood changes, nervousness/anxiousness, pallor or seizures. Associated symptoms include fatigue and weakness. Pertinent negatives for diabetes include no blurred vision, no chest pain, no foot paresthesias, no foot ulcerations, no polydipsia, no polyphagia and no polyuria. Pertinent  negatives for hypoglycemia complications include no blackouts, no hospitalization, no nocturnal hypoglycemia and no required assistance. Symptoms are stable. Pertinent negatives for diabetic complications include no autonomic neuropathy, CVA, heart disease, impotence, nephropathy or peripheral neuropathy. Risk factors for coronary artery disease include diabetes mellitus and dyslipidemia. Current diabetic treatment includes insulin injections and oral agent (dual therapy). She is compliant with treatment most of the time. Her weight is stable. She has not had a previous visit with a dietitian. She never participates in exercise. Her home blood glucose trend is decreasing steadily. An ACE inhibitor/angiotensin II receptor blocker is being taken. She does not see a podiatrist.Eye exam is not current.   Hypothyroidism   This is a new problem. The current episode started more than 1 year ago. The problem occurs constantly. The problem has been improved Associated symptoms include arthralgias, fatigue, numbness and weakness. Pertinent negatives include no abdominal pain, anorexia, chest pain, chills, congestion, coughing, diaphoresis, fever, headaches, nausea, sore throat or vomiting. Nothing aggravates the symptoms. She has tried nothing for the symptoms. The treatment provided no relief.    Obesity   This is a chronic problem. The problem occurs constantly. The problem has been unchanged. Associated symptoms include arthralgias, fatigue, numbness and weakness. Pertinent negatives include no chest pain, chills, congestion, coughing, diaphoresis, fever, headaches, nausea, sore throat or vomiting. Nothing aggravates the symptoms. She has tried nothing for the symptoms. The treatment provided no relief       Review of Systems  Review of Systems   Constitutional: Negative for activity change, appetite change, chills, diaphoresis, fatigue, fever and weight gain.   HENT: Negative for congestion, postnasal drip, rhinorrhea,  sinus pressure, sinus pain, sneezing, sore throat, trouble swallowing and voice change.    Respiratory: Negative for cough, choking, chest tightness, shortness of breath, wheezing and stridor.    Cardiovascular: Positive for chest pain. Negative for palpitations and leg swelling.   Gastrointestinal: Negative for abdominal pain, anorexia, constipation, diarrhea, nausea and vomiting.   Genitourinary: Negative for dysuria, flank pain, frequency, hematuria, menorrhagia, missed menses, pelvic pain, urgency and vaginal discharge.   Musculoskeletal: Negative for back pain, joint pain and muscle weakness.   Skin: Negative for rash.   Neurological: Negative for dizziness, weakness and headaches.       Patient Active Problem List   Diagnosis   • Class 1 obesity with serious comorbidity and body mass index (BMI) of 32.0 to 32.9 in adult   • Moderate episode of recurrent major depressive disorder (HCC)   • Encounter for screening for malignant neoplasm of colon   • Gastroesophageal reflux disease   • Hypothyroidism   • Microcytic anemia   • Vitamin D deficiency   • Uncontrolled type 2 diabetes mellitus with hyperglycemia (HCC)   • Class 1 obesity with body mass index (BMI) of 32.0 to 32.9 in adult   • Diabetic polyneuropathy associated with type 2 diabetes mellitus (HCC)   • Gastritis without bleeding   • Class 1 obesity with serious comorbidity and body mass index (BMI) of 33.0 to 33.9 in adult   • At high risk for falls   • Gait instability   • Below knee amputation (HCC)   • Type 2 diabetes mellitus with hypoglycemia without coma, with long-term current use of insulin (HCC)   • Essential hypertension   • Mixed hyperlipidemia   • Cervical radiculopathy   • DDD (degenerative disc disease), cervical   • Chronic neck pain   • Leukocytosis   • Iron deficiency   • Wheelchair bound   • B12 deficiency   • Chest pain   • Abnormal nuclear stress test   • Coronary artery disease involving native coronary artery of native heart without  angina pectoris   • Polyuria   • Smoker   • Left hand pain   • Decreased range of motion of finger of left hand   • Pain of finger of left hand     Past Surgical History:   Procedure Laterality Date   • APPENDECTOMY     • BELOW KNEE AMPUTATION     • BLADDER SURGERY     • CARDIAC CATHETERIZATION N/A 1/27/2022    Procedure: Left Heart Cath;  Surgeon: Josias Carpio MD;  Location: [facility] CATH INVASIVE LOCATION;  Service: Cardiology;  Laterality: N/A;   • COLONOSCOPY N/A 9/2/2020    Procedure: COLONOSCOPY;  Surgeon: Ashli Gonzalez MD;  Location: [facility] ENDOSCOPY;  Service: Gastroenterology;  Laterality: N/A;   • ENDOSCOPY N/A 9/2/2020    Procedure: ESOPHAGOGASTRODUODENOSCOPY;  Surgeon: Ashli Gonzalez MD;  Location: [facility] ENDOSCOPY;  Service: Gastroenterology;  Laterality: N/A;     Social History     Socioeconomic History   • Marital status:    Tobacco Use   • Smoking status: Current Some Day Smoker     Packs/day: 0.50     Types: Cigarettes   • Smokeless tobacco: Never Used   Vaping Use   • Vaping Use: Never used   Substance and Sexual Activity   • Alcohol use: Not Currently   • Drug use: Never   • Sexual activity: Defer     Family History   Problem Relation Age of Onset   • Diabetes Other    • Hyperlipidemia Other    • Hypertension Other    • Stroke Other    • Heart disease Other    • Other Other    • Diabetes Mother    • Diabetes Father    • Diabetes Sister    • Heart disease Sister    • Diabetes Brother      Lab on 04/13/2022   Component Date Value Ref Range Status   • Iron 04/13/2022 54  37 - 145 mcg/dL Final   • Iron Saturation 04/13/2022 18 (A) 20 - 50 % Final   • Transferrin 04/13/2022 201  200 - 360 mg/dL Final   • TIBC 04/13/2022 299  298 - 536 mcg/dL Final   • Ferritin 04/13/2022 68.90  13.00 - 150.00 ng/mL Final   • Vitamin B-12 04/13/2022 836  211 - 946 pg/mL Final   • Folate 04/13/2022 17.50  4.78 - 24.20 ng/mL Final   • WBC 04/13/2022 14.96 (A) 3.40 - 10.80 10*3/mm3 Final   • RBC  04/13/2022 4.38  3.77 - 5.28 10*6/mm3 Final   • Hemoglobin 04/13/2022 11.6 (A) 12.0 - 15.9 g/dL Final   • Hematocrit 04/13/2022 36.5  34.0 - 46.6 % Final   • MCV 04/13/2022 83.3  79.0 - 97.0 fL Final   • MCH 04/13/2022 26.5 (A) 26.6 - 33.0 pg Final   • MCHC 04/13/2022 31.8  31.5 - 35.7 g/dL Final   • RDW 04/13/2022 15.2  12.3 - 15.4 % Final   • RDW-SD 04/13/2022 46.1  37.0 - 54.0 fl Final   • MPV 04/13/2022 11.7  6.0 - 12.0 fL Final   • Platelets 04/13/2022 211  140 - 450 10*3/mm3 Final   • Neutrophil % 04/13/2022 75.5  42.7 - 76.0 % Final   • Lymphocyte % 04/13/2022 16.4 (A) 19.6 - 45.3 % Final   • Monocyte % 04/13/2022 4.6 (A) 5.0 - 12.0 % Final   • Eosinophil % 04/13/2022 2.2  0.3 - 6.2 % Final   • Basophil % 04/13/2022 0.7  0.0 - 1.5 % Final   • Immature Grans % 04/13/2022 0.6 (A) 0.0 - 0.5 % Final   • Neutrophils, Absolute 04/13/2022 11.28 (A) 1.70 - 7.00 10*3/mm3 Final   • Lymphocytes, Absolute 04/13/2022 2.46  0.70 - 3.10 10*3/mm3 Final   • Monocytes, Absolute 04/13/2022 0.69  0.10 - 0.90 10*3/mm3 Final   • Eosinophils, Absolute 04/13/2022 0.33  0.00 - 0.40 10*3/mm3 Final   • Basophils, Absolute 04/13/2022 0.11  0.00 - 0.20 10*3/mm3 Final   • Immature Grans, Absolute 04/13/2022 0.09 (A) 0.00 - 0.05 10*3/mm3 Final   • nRBC 04/13/2022 0.0  0.0 - 0.2 /100 WBC Final   Admission on 01/27/2022, Discharged on 01/27/2022   Component Date Value Ref Range Status   • Glucose 01/27/2022 145 (A) 65 - 99 mg/dL Final   • BUN 01/27/2022 17  6 - 20 mg/dL Final   • Creatinine 01/27/2022 1.00  0.57 - 1.00 mg/dL Final   • Sodium 01/27/2022 140  136 - 145 mmol/L Final   • Potassium 01/27/2022 4.4  3.5 - 5.2 mmol/L Final   • Chloride 01/27/2022 105  98 - 107 mmol/L Final   • CO2 01/27/2022 26.0  22.0 - 29.0 mmol/L Final   • Calcium 01/27/2022 8.7  8.6 - 10.5 mg/dL Final   • eGFR Non  Amer 01/27/2022 57 (A) >60 mL/min/1.73 Final   • BUN/Creatinine Ratio 01/27/2022 17.0  7.0 - 25.0 Final   • Anion Gap 01/27/2022 9.0  5.0 -  15.0 mmol/L Final   • WBC 01/27/2022 13.70 (A) 3.40 - 10.80 10*3/mm3 Final   • RBC 01/27/2022 4.43  3.77 - 5.28 10*6/mm3 Final   • Hemoglobin 01/27/2022 11.7 (A) 12.0 - 15.9 g/dL Final   • Hematocrit 01/27/2022 36.1  34.0 - 46.6 % Final   • MCV 01/27/2022 81.5  79.0 - 97.0 fL Final   • MCH 01/27/2022 26.4 (A) 26.6 - 33.0 pg Final   • MCHC 01/27/2022 32.4  31.5 - 35.7 g/dL Final   • RDW 01/27/2022 15.3  12.3 - 15.4 % Final   • RDW-SD 01/27/2022 45.2  37.0 - 54.0 fl Final   • MPV 01/27/2022 11.8  6.0 - 12.0 fL Final   • Platelets 01/27/2022 202  140 - 450 10*3/mm3 Final   • Protime 01/27/2022 13.9  11.1 - 15.3 Seconds Final   • INR 01/27/2022 1.08  0.80 - 1.20 Final   • C-Reactive Protein 01/27/2022 0.31  0.00 - 0.50 mg/dL Final   • Sed Rate 01/27/2022 14  0 - 20 mm/hr Final   • Extra Tube 01/27/2022 Hold for add-ons.   Final    Auto resulted.   • Extra Tube 01/27/2022 Hold for add-ons.   Final    Auto resulted.   Lab on 01/20/2022   Component Date Value Ref Range Status   • Glucose 01/20/2022 196 (A) 65 - 99 mg/dL Final   • BUN 01/20/2022 15  6 - 20 mg/dL Final   • Creatinine 01/20/2022 1.20 (A) 0.57 - 1.00 mg/dL Final   • Sodium 01/20/2022 140  136 - 145 mmol/L Final   • Potassium 01/20/2022 4.5  3.5 - 5.2 mmol/L Final   • Chloride 01/20/2022 105  98 - 107 mmol/L Final   • CO2 01/20/2022 24.0  22.0 - 29.0 mmol/L Final   • Calcium 01/20/2022 8.9  8.6 - 10.5 mg/dL Final   • Total Protein 01/20/2022 7.3  6.0 - 8.5 g/dL Final   • Albumin 01/20/2022 4.10  3.50 - 5.20 g/dL Final   • ALT (SGPT) 01/20/2022 11  1 - 33 U/L Final   • AST (SGOT) 01/20/2022 14  1 - 32 U/L Final   • Alkaline Phosphatase 01/20/2022 101  39 - 117 U/L Final   • Total Bilirubin 01/20/2022 0.3  0.0 - 1.2 mg/dL Final   • eGFR Non  Amer 01/20/2022 46 (A) >60 mL/min/1.73 Final   • Globulin 01/20/2022 3.2  gm/dL Final   • A/G Ratio 01/20/2022 1.3  g/dL Final   • BUN/Creatinine Ratio 01/20/2022 12.5  7.0 - 25.0 Final   • Anion Gap 01/20/2022 11.0   5.0 - 15.0 mmol/L Final   • Hemoglobin A1C 01/20/2022 9.37 (A) 4.80 - 5.60 % Final   • Total Cholesterol 01/20/2022 133  0 - 200 mg/dL Final   • Triglycerides 01/20/2022 77  0 - 150 mg/dL Final   • HDL Cholesterol 01/20/2022 39 (A) 40 - 60 mg/dL Final   • LDL Cholesterol  01/20/2022 79  0 - 100 mg/dL Final   • VLDL Cholesterol 01/20/2022 15  5 - 40 mg/dL Final   • LDL/HDL Ratio 01/20/2022 2.02   Final   • Microalbumin/Creatinine Ratio 01/20/2022 24.3  mg/g Final   • Creatinine, Urine 01/20/2022 61.8  mg/dL Final   • Microalbumin, Urine 01/20/2022 1.5  mg/dL Final   • Protein/Creatinine Ratio, Urine 01/20/2022 113.3  0.0 - 200.0 mg/G Crea Final   • Creatinine, Urine 01/20/2022 61.8  mg/dL Final   • Total Protein, Urine 01/20/2022 7.0  mg/dL Final   • TSH 01/20/2022 1.070  0.270 - 4.200 uIU/mL Final   • Vitamin B-12 01/20/2022 >2,000 (A) 211 - 946 pg/mL Final   • 25 Hydroxy, Vitamin D 01/20/2022 39.1  30.0 - 100.0 ng/ml Final   • Iron 01/20/2022 52  37 - 145 mcg/dL Final   • Iron Saturation 01/20/2022 15 (A) 20 - 50 % Final   • Transferrin 01/20/2022 228  200 - 360 mg/dL Final   • TIBC 01/20/2022 340  298 - 536 mcg/dL Final   • Ferritin 01/20/2022 88.72  13.00 - 150.00 ng/mL Final   • Folate 01/20/2022 11.60  4.78 - 24.20 ng/mL Final   • WBC 01/20/2022 14.31 (A) 3.40 - 10.80 10*3/mm3 Final   • RBC 01/20/2022 4.65  3.77 - 5.28 10*6/mm3 Final   • Hemoglobin 01/20/2022 12.2  12.0 - 15.9 g/dL Final   • Hematocrit 01/20/2022 38.2  34.0 - 46.6 % Final   • MCV 01/20/2022 82.2  79.0 - 97.0 fL Final   • MCH 01/20/2022 26.2 (A) 26.6 - 33.0 pg Final   • MCHC 01/20/2022 31.9  31.5 - 35.7 g/dL Final   • RDW 01/20/2022 15.3  12.3 - 15.4 % Final   • RDW-SD 01/20/2022 45.6  37.0 - 54.0 fl Final   • MPV 01/20/2022 13.0 (A) 6.0 - 12.0 fL Final   • Platelets 01/20/2022 216  140 - 450 10*3/mm3 Final   • Neutrophil % 01/20/2022 77.1 (A) 42.7 - 76.0 % Final   • Lymphocyte % 01/20/2022 16.1 (A) 19.6 - 45.3 % Final   • Monocyte % 01/20/2022  4.1 (A) 5.0 - 12.0 % Final   • Eosinophil % 01/20/2022 1.5  0.3 - 6.2 % Final   • Basophil % 01/20/2022 0.8  0.0 - 1.5 % Final   • Immature Grans % 01/20/2022 0.4  0.0 - 0.5 % Final   • Neutrophils, Absolute 01/20/2022 11.04 (A) 1.70 - 7.00 10*3/mm3 Final   • Lymphocytes, Absolute 01/20/2022 2.30  0.70 - 3.10 10*3/mm3 Final   • Monocytes, Absolute 01/20/2022 0.58  0.10 - 0.90 10*3/mm3 Final   • Eosinophils, Absolute 01/20/2022 0.22  0.00 - 0.40 10*3/mm3 Final   • Basophils, Absolute 01/20/2022 0.11  0.00 - 0.20 10*3/mm3 Final   • Immature Grans, Absolute 01/20/2022 0.06 (A) 0.00 - 0.05 10*3/mm3 Final   • nRBC 01/20/2022 0.0  0.0 - 0.2 /100 WBC Final   • COVID19 01/25/2022 Not Detected  Not Detected - Ref. Range Final   • RBC Morphology 01/20/2022 Normal  Normal Final   • WBC Morphology 01/20/2022 Normal  Normal Final   • Platelet Estimate 01/20/2022 Adequate  Normal Final   Ancillary Procedure on 01/11/2022   Component Date Value Ref Range Status   • BSA 01/11/2022 2.2  m^2 Final   • RVIDd 01/11/2022 3.1  cm Final   • IVSd 01/11/2022 1.3  cm Final   • LVIDd 01/11/2022 4.2  cm Final   • LVIDs 01/11/2022 3.1  cm Final   • LVPWd 01/11/2022 1.6  cm Final   • IVS/LVPW 01/11/2022 0.82   Final   • FS 01/11/2022 26.7  % Final   • EDV(Teich) 01/11/2022 78.1  ml Final   • ESV(Teich) 01/11/2022 37.0  ml Final   • EF(Teich) 01/11/2022 52.6  % Final   • EDV(cubed) 01/11/2022 73.6  ml Final   • ESV(cubed) 01/11/2022 28.9  ml Final   • EF(cubed) 01/11/2022 60.7  % Final   • LV mass(C)d 01/11/2022 237.2  grams Final   • LV mass(C)dI 01/11/2022 107.2  grams/m^2 Final   • SV(Teich) 01/11/2022 41.1  ml Final   • SI(Teich) 01/11/2022 18.6  ml/m^2 Final   • SV(cubed) 01/11/2022 44.6  ml Final   • SI(cubed) 01/11/2022 20.2  ml/m^2 Final   • EPSS 01/11/2022 0.7  cm Final   • Ao root diam 01/11/2022 2.7  cm Final   • Ao root area 01/11/2022 5.7  cm^2 Final   • ACS 01/11/2022 1.9  cm Final   • LA dimension 01/11/2022 3.6  cm Final   • asc  Aorta Diam 01/11/2022 3.0  cm Final   • LA/Ao 01/11/2022 1.3   Final   • LVOT diam 01/11/2022 1.9  cm Final   • LVOT area 01/11/2022 2.8  cm^2 Final   • LVOT area(traced) 01/11/2022 2.8  cm^2 Final   • LVLd ap4 01/11/2022 7.4  cm Final   • EDV(MOD-sp4) 01/11/2022 57.6  ml Final   • LVLs ap4 01/11/2022 6.6  cm Final   • ESV(MOD-sp4) 01/11/2022 25.0  ml Final   • EF(MOD-sp4) 01/11/2022 56.6  % Final   • LVLd ap2 01/11/2022 7.2  cm Final   • EDV(MOD-sp2) 01/11/2022 50.1  ml Final   • LVLs ap2 01/11/2022 6.1  cm Final   • ESV(MOD-sp2) 01/11/2022 20.7  ml Final   • EF(MOD-sp2) 01/11/2022 58.7  % Final   • SV(MOD-sp4) 01/11/2022 32.6  ml Final   • SI(MOD-sp4) 01/11/2022 14.7  ml/m^2 Final   • SV(MOD-sp2) 01/11/2022 29.4  ml Final   • SI(MOD-sp2) 01/11/2022 13.3  ml/m^2 Final   • Ao root area (BSA corrected) 01/11/2022 1.2   Final   • LV Conn Vol (BSA corrected) 01/11/2022 26.0  ml/m^2 Final   • LV Sys Vol (BSA corrected) 01/11/2022 11.3  ml/m^2 Final   • MV E max mihaela 01/11/2022 74.9  cm/sec Final   • MV A max mihaela 01/11/2022 88.6  cm/sec Final   • MV E/A 01/11/2022 0.85   Final   • MV V2 max 01/11/2022 96.8  cm/sec Final   • MV max PG 01/11/2022 3.7  mmHg Final   • MV V2 mean 01/11/2022 61.6  cm/sec Final   • MV mean PG 01/11/2022 2.0  mmHg Final   • MV V2 VTI 01/11/2022 20.1  cm Final   • MVA(VTI) 01/11/2022 2.4  cm^2 Final   • MV P1/2t max mihaela 01/11/2022 81.1  cm/sec Final   • MV P1/2t 01/11/2022 57.4  msec Final   • MVA(P1/2t) 01/11/2022 3.8  cm^2 Final   • MV dec slope 01/11/2022 414.0  cm/sec^2 Final   • MV dec time 01/11/2022 0.22  sec Final   • Ao pk mihaela 01/11/2022 131.0  cm/sec Final   • Ao max PG 01/11/2022 6.9  mmHg Final   • Ao max PG (full) 01/11/2022 3.7  mmHg Final   • Ao V2 mean 01/11/2022 84.8  cm/sec Final   • Ao mean PG 01/11/2022 3.0  mmHg Final   • Ao mean PG (full) 01/11/2022 1.0  mmHg Final   • Ao V2 VTI 01/11/2022 26.1  cm Final   • DEEPAK(I,A) 01/11/2022 1.9  cm^2 Final   • DEEAPK(I,D) 01/11/2022 1.9   cm^2 Final   • DEEPAK(V,A) 01/11/2022 1.9  cm^2 Final   • DEEPAK(V,D) 01/11/2022 1.9  cm^2 Final   • LV V1 max PG 01/11/2022 3.2  mmHg Final   • LV V1 mean PG 01/11/2022 2.0  mmHg Final   • LV V1 max 01/11/2022 89.6  cm/sec Final   • LV V1 mean 01/11/2022 57.6  cm/sec Final   • LV V1 VTI 01/11/2022 17.2  cm Final   • SV(Ao) 01/11/2022 149.4  ml Final   • SI(Ao) 01/11/2022 67.6  ml/m^2 Final   • SV(LVOT) 01/11/2022 48.8  ml Final   • SI(LVOT) 01/11/2022 22.0  ml/m^2 Final   • PA V2 max 01/11/2022 103.0  cm/sec Final   • PA max PG 01/11/2022 4.2  mmHg Final   • PA max PG (full) 01/11/2022 1.6  mmHg Final   • PA V2 mean 01/11/2022 70.6  cm/sec Final   • PA mean PG 01/11/2022 2.0  mmHg Final   • PA mean PG (full) 01/11/2022 1.0  mmHg Final   • PA V2 VTI 01/11/2022 19.7  cm Final   • RV V1 max PG 01/11/2022 2.6  mmHg Final   • RV V1 mean PG 01/11/2022 1.0  mmHg Final   • RV V1 max 01/11/2022 80.6  cm/sec Final   • RV V1 mean 01/11/2022 53.8  cm/sec Final   • RV V1 VTI 01/11/2022 16.4  cm Final   • MVA P1/2T LCG 01/11/2022 2.7  cm^2 Final   •  CV ECHO KEATON - BZI_BMI 01/11/2022 34.7  kilograms/m^2 Final   •  CV ECHO [place] - BSA([facility]) 01/11/2022 2.3  m^2 Final   •  CV ECHO [place] - BZI_METRIC_WEIGHT 01/11/2022 106.6  kg Final   •  CV ECHO [facility] - BZI_METRIC_HEIGHT 01/11/2022 175.3  cm Final   • BH CV VAS BP LEFT ARM 01/11/2022 138/84  mmHg Final   • Echo EF Estimated 01/11/2022 58  % Final   Hospital Outpatient Visit on 01/04/2022   Component Date Value Ref Range Status   • Target HR (85%) 01/04/2022 138  bpm Final   • Max. Pred. HR (100%) 01/04/2022 162  bpm Final   • Baseline HR 01/04/2022 78  bpm Final   • Baseline BP 01/04/2022 153/66  mmHg Final   • O2 sat rest 01/04/2022 99  % Final   • Peak HR 01/04/2022 94  bpm Final   • Percent Max Pred HR 01/04/2022 58.02  % Final   • Percent Target HR 01/04/2022 68  % Final   • Peak BP 01/04/2022 153/66  mmHg Final   • O2 sat peak 01/04/2022 98  % Final   • Recovery HR  01/04/2022 89  bpm Final   • Recovery BP 01/04/2022 144/47  mmHg Final   • Recovery O2 01/04/2022 98  % Final   • BH CV REST NUCLEAR ISOTOPE DOSE 01/04/2022 10.2  mCi Final   • BH CV STRESS NUCLEAR ISOTOPE DOSE 01/04/2022 32.7  mCi Final   • Nuc Stress EF 01/04/2022 70  % Final   Lab on 12/13/2021   Component Date Value Ref Range Status   • Iron 12/13/2021 48  37 - 145 mcg/dL Final   • Iron Saturation 12/13/2021 16 (A) 20 - 50 % Final   • Transferrin 12/13/2021 204  200 - 360 mg/dL Final   • TIBC 12/13/2021 304  298 - 536 mcg/dL Final   • Ferritin 12/13/2021 52.45  13.00 - 150.00 ng/mL Final   • Folate 12/13/2021 15.40  4.78 - 24.20 ng/mL Final   • Vitamin B-12 12/13/2021 337  211 - 946 pg/mL Final   • WBC 12/13/2021 13.53 (A) 3.40 - 10.80 10*3/mm3 Final   • RBC 12/13/2021 4.44  3.77 - 5.28 10*6/mm3 Final   • Hemoglobin 12/13/2021 11.7 (A) 12.0 - 15.9 g/dL Final   • Hematocrit 12/13/2021 36.4  34.0 - 46.6 % Final   • MCV 12/13/2021 82.0  79.0 - 97.0 fL Final   • MCH 12/13/2021 26.4 (A) 26.6 - 33.0 pg Final   • MCHC 12/13/2021 32.1  31.5 - 35.7 g/dL Final   • RDW 12/13/2021 15.0  12.3 - 15.4 % Final   • RDW-SD 12/13/2021 45.0  37.0 - 54.0 fl Final   • MPV 12/13/2021 12.4 (A) 6.0 - 12.0 fL Final   • Platelets 12/13/2021 215  140 - 450 10*3/mm3 Final   • Neutrophil % 12/13/2021 74.6  42.7 - 76.0 % Final   • Lymphocyte % 12/13/2021 18.0 (A) 19.6 - 45.3 % Final   • Monocyte % 12/13/2021 3.9 (A) 5.0 - 12.0 % Final   • Eosinophil % 12/13/2021 2.2  0.3 - 6.2 % Final   • Basophil % 12/13/2021 0.9  0.0 - 1.5 % Final   • Immature Grans % 12/13/2021 0.4  0.0 - 0.5 % Final   • Neutrophils, Absolute 12/13/2021 10.08 (A) 1.70 - 7.00 10*3/mm3 Final   • Lymphocytes, Absolute 12/13/2021 2.44  0.70 - 3.10 10*3/mm3 Final   • Monocytes, Absolute 12/13/2021 0.53  0.10 - 0.90 10*3/mm3 Final   • Eosinophils, Absolute 12/13/2021 0.30  0.00 - 0.40 10*3/mm3 Final   • Basophils, Absolute 12/13/2021 0.12  0.00 - 0.20 10*3/mm3 Final   • Immature  Grans, Absolute 12/13/2021 0.06 (A) 0.00 - 0.05 10*3/mm3 Final   • nRBC 12/13/2021 0.0  0.0 - 0.2 /100 WBC Final   Lab on 12/07/2021   Component Date Value Ref Range Status   • BUN 12/07/2021 26 (A) 6 - 20 mg/dL Final   • Creatinine 12/07/2021 1.12 (A) 0.57 - 1.00 mg/dL Final   • eGFR Non  Amer 12/07/2021 50 (A) >60 mL/min/1.73 Final   Office Visit on 12/01/2021   Component Date Value Ref Range Status   • QT Interval 12/01/2021 368  ms Final   • QTC Interval 12/01/2021 432  ms Final      MRI Hand Left Without Contrast  Narrative: MRI left hand.    HISTORY: Pain in left hand and index finger.    Prior exam: Left hand November 16, 2021.    FINDINGS:    There is an effusion involving the second metacarpophalangeal  joint.    Subchondral erosions base of the proximal phalange of the index  finger and base of the distal phalange of the index finger at the  DIP joint. This indicates an inflammatory synovitis such as  rheumatoid or rheumatoid variant arthritis.    This is best appreciated series 11 image 28. Series 11 image 12.    There is diffuse fluid surrounding the flexor tendon of the index  finger, (extensive tenosynovitis.) Series 13 image 13.    MRI left hand is otherwise unremarkable.  Impression: 1. Joint effusion second metacarpophalangeal joint.     2.Arthritic erosive changes at the second metacarpal phalangeal  joint, base of the proximal phalange of the index finger and the  DIP joint. Extensive fluid surrounding the flexor tendon of the  index finger, extensive tenosynovitis.    Electronically signed by:  Melchor Mancera MD  4/26/2022 7:40 AM CDT  Workstation: [id number]    @[email or web address]@  Immunization History   Administered Date(s) Administered   • COVID-19 (MODERNA) 1st, 2nd, 3rd Dose Only 04/13/2021, 04/13/2021, 05/13/2021, 05/13/2021   • FluLaval/Fluarix/Fluzone >6 09/14/2020, 11/16/2021   • Pneumococcal Polysaccharide (PPSV23) 09/14/2020   • Shingrix 09/15/2020   • Tdap 09/14/2020       The following  "portions of the patient's history were reviewed and updated as appropriate: allergies, current medications, past family history, past medical history, past social history, past surgical history and problem list.        Physical Exam  /66 (BP Location: Right arm, Patient Position: Sitting, Cuff Size: Large Adult)   Pulse 66   Temp 97.3 °F (36.3 °C)   Ht 175.3 cm (69\")   SpO2 98%   BMI 34.70 kg/m²       Physical Exam  Vitals and nursing note reviewed.   Constitutional:       Appearance: She is well-developed. She is not diaphoretic.   HENT:      Head: Normocephalic and atraumatic.      Right Ear: External ear normal.   Eyes:      Conjunctiva/sclera: Conjunctivae normal.      Pupils: Pupils are equal, round, and reactive to light.   Cardiovascular:      Rate and Rhythm: Normal rate and regular rhythm.      Heart sounds: Normal heart sounds. No murmur heard.  Pulmonary:      Effort: Pulmonary effort is normal. No respiratory distress.      Breath sounds: Normal breath sounds.   Abdominal:      General: Bowel sounds are normal. There is no distension.      Palpations: Abdomen is soft.      Tenderness: There is no abdominal tenderness.   Musculoskeletal:         General: No deformity. Normal range of motion.      Cervical back: Normal range of motion and neck supple.   Skin:     General: Skin is warm.      Coloration: Skin is not pale.      Findings: No erythema or rash.   Neurological:      Mental Status: She is alert and oriented to person, place, and time.      Cranial Nerves: No cranial nerve deficit.   Psychiatric:         Behavior: Behavior normal.         Assessment/Plan    Diagnosis Plan   1. Uncontrolled type 2 diabetes mellitus with hyperglycemia (HCC)  Comprehensive Metabolic Panel    Hemoglobin A1c    Lipid Panel    TSH    T4, Free    Vitamin D 25 Hydroxy    Microalbumin / Creatinine Urine Ratio - Urine, Clean Catch   2. Wheelchair bound  Comprehensive Metabolic Panel    Hemoglobin A1c    Lipid " Panel    TSH    T4, Free    Vitamin D 25 Hydroxy    Microalbumin / Creatinine Urine Ratio - Urine, Clean Catch   3. Mixed hyperlipidemia  Comprehensive Metabolic Panel    Hemoglobin A1c    Lipid Panel    TSH    T4, Free    Vitamin D 25 Hydroxy    Microalbumin / Creatinine Urine Ratio - Urine, Clean Catch   4. Essential hypertension  Comprehensive Metabolic Panel    Hemoglobin A1c    Lipid Panel    TSH    T4, Free    Vitamin D 25 Hydroxy    Microalbumin / Creatinine Urine Ratio - Urine, Clean Catch   5. Iron deficiency  Comprehensive Metabolic Panel    Hemoglobin A1c    Lipid Panel    TSH    T4, Free    Vitamin D 25 Hydroxy    Microalbumin / Creatinine Urine Ratio - Urine, Clean Catch   6. Leukocytosis, unspecified type  Comprehensive Metabolic Panel    Hemoglobin A1c    Lipid Panel    TSH    T4, Free    Vitamin D 25 Hydroxy    Microalbumin / Creatinine Urine Ratio - Urine, Clean Catch   7. Acquired hypothyroidism  Comprehensive Metabolic Panel    Hemoglobin A1c    Lipid Panel    TSH    T4, Free    Vitamin D 25 Hydroxy    Microalbumin / Creatinine Urine Ratio - Urine, Clean Catch   8. Coronary artery disease involving native coronary artery of native heart without angina pectoris  Comprehensive Metabolic Panel    Hemoglobin A1c    Lipid Panel    TSH    T4, Free    Vitamin D 25 Hydroxy    Microalbumin / Creatinine Urine Ratio - Urine, Clean Catch   9. Tobacco user  Comprehensive Metabolic Panel    Hemoglobin A1c    Lipid Panel    TSH    T4, Free    Vitamin D 25 Hydroxy    Microalbumin / Creatinine Urine Ratio - Urine, Clean Catch   10. Stage 3a chronic kidney disease (HCC)  Comprehensive Metabolic Panel    Hemoglobin A1c    Lipid Panel    TSH    T4, Free    Vitamin D 25 Hydroxy    Microalbumin / Creatinine Urine Ratio - Urine, Clean Catch   11. Class 1 obesity with serious comorbidity and body mass index (BMI) of 34.0 to 34.9 in adult, unspecified obesity type             -recommend labowork    -recommend COVID-19  vaccination .  -urinary incontinence - stop vesicare start on ditropan XL 5 mg daily. Drug information provided. Will get urine studies   -CAD/pericardial effusion/LVH-  Cardiology following on aspirin, coreg statin   -chronic neck pain/DDD cervical spine/cervical radiculopathy -reviewed x-ray of neck. Referred to Neurology   -diabetic retinopathy/catatract  - Ophthalmology following in [place] she has pending surgery   -vitamin B12 deficiency - on vitamin b12 once a week   -gait instability/ high fall risk-  Uses wheelchair   -urinary incontinence - currently uses pullups  -colonic/rectal polyps, internal hemorrhoids/GERD with esophagitis/gastritis  - GI following  Next colonsocopy in 2025. On prilosec 40 mg daily.   -CKD stage 3a - Nephrology following. Stressed importance of BP and blood sugar control   -vitamin D deficiency -vitamin D once a week  -hypothyroidism - on synthroid 25 mcg PO q daily.   -DM type 2  - on trulicity 3 mg subq weekly.  On jardiance 25 mg daily.  on basaglar  35 units at bedtime.on humalog 5 units before meals. Endocrinology following. Has Dexcom monitor ordered   -microcytic anemia/iron deficiency anemialleukocytosis  - on ferrous sulfate 325 mg PO q daily on b12 injections .. Hematology following    -HTN -  Stable  on lisinopril  20 mg PO q daily.   -HLP - on lipitor 80 mg PO qhs. Recommend heart health ydiet   -major depression - on viibryd 20 mg Po q daily.    -obesity - counseled weight loss >5 minutes BMI at 34.70   -diabetic neuropathy - on lyrica 300 PO BID will go up to QID. On cymbalta  -chronic pain  Was on Norco 7.5/325 mg every 12 hours PRN. Will refer to Pain Management.   on robaxin TID     -advised pt to be safe and call with questions and concerns  -advised pt to go to ER or call 911 if symptoms worrisome or severe  -advised pt to followup with specialist and referrals  -advised pt to be safe during COVID-19 pandemic  I spent 30 minutes caring for Marta on this date of  service. This time includes time spent by me in the following activities: preparing for the visit, reviewing tests, obtaining and/or reviewing a separately obtained history, performing a medically appropriate examination and/or evaluation, counseling and educating the patient/family/caregiver, ordering medications, tests, or procedures, referring and communicating with other health care professionals, documenting information in the medical record, independently interpreting results and communicating that information with the patient/family/caregiver and care coordination.  -recheck in 6 weeks         This document has been electronically signed by Gato Crane MD on April 29, 2022 11:43 CDT

## 2022-04-29 NOTE — TELEPHONE ENCOUNTER
Pt is needing new script for Humalog  WITH refills sent to [facility] in [place]. PT IS ALMOST OUT OF MEDS       Thanks

## 2022-05-02 ENCOUNTER — APPOINTMENT (OUTPATIENT)
Dept: PHYSICAL THERAPY | Facility: HOSPITAL | Age: 57
End: 2022-05-02

## 2022-05-02 ENCOUNTER — TELEPHONE (OUTPATIENT)
Dept: FAMILY MEDICINE CLINIC | Facility: CLINIC | Age: 57
End: 2022-05-02

## 2022-05-02 NOTE — TELEPHONE ENCOUNTER
Patient is needing a different medication for her UTI. She stated the last two that has been called in isn't working.

## 2022-05-03 ENCOUNTER — LAB (OUTPATIENT)
Dept: LAB | Facility: HOSPITAL | Age: 57
End: 2022-05-03

## 2022-05-03 DIAGNOSIS — Z72.0 TOBACCO USER: ICD-10-CM

## 2022-05-03 DIAGNOSIS — E61.1 IRON DEFICIENCY: Chronic | ICD-10-CM

## 2022-05-03 DIAGNOSIS — Z99.3 WHEELCHAIR BOUND: ICD-10-CM

## 2022-05-03 DIAGNOSIS — R35.0 INCREASED URINARY FREQUENCY: ICD-10-CM

## 2022-05-03 DIAGNOSIS — I25.10 CORONARY ARTERY DISEASE INVOLVING NATIVE CORONARY ARTERY OF NATIVE HEART WITHOUT ANGINA PECTORIS: ICD-10-CM

## 2022-05-03 DIAGNOSIS — E03.9 ACQUIRED HYPOTHYROIDISM: ICD-10-CM

## 2022-05-03 DIAGNOSIS — E11.65 UNCONTROLLED TYPE 2 DIABETES MELLITUS WITH HYPERGLYCEMIA: ICD-10-CM

## 2022-05-03 DIAGNOSIS — D72.829 LEUKOCYTOSIS, UNSPECIFIED TYPE: Chronic | ICD-10-CM

## 2022-05-03 DIAGNOSIS — N18.31 STAGE 3A CHRONIC KIDNEY DISEASE: ICD-10-CM

## 2022-05-03 DIAGNOSIS — E78.2 MIXED HYPERLIPIDEMIA: ICD-10-CM

## 2022-05-03 DIAGNOSIS — I10 ESSENTIAL HYPERTENSION: ICD-10-CM

## 2022-05-03 LAB
25(OH)D3 SERPL-MCNC: 38.2 NG/ML (ref 30–100)
ALBUMIN SERPL-MCNC: 3.7 G/DL (ref 3.5–5.2)
ALBUMIN UR-MCNC: 3.4 MG/DL
ALBUMIN/GLOB SERPL: 1.2 G/DL
ALP SERPL-CCNC: 77 U/L (ref 39–117)
ALT SERPL W P-5'-P-CCNC: 11 U/L (ref 1–33)
ANION GAP SERPL CALCULATED.3IONS-SCNC: 9 MMOL/L (ref 5–15)
AST SERPL-CCNC: 12 U/L (ref 1–32)
BACTERIA UR QL AUTO: NORMAL /HPF
BILIRUB SERPL-MCNC: 0.2 MG/DL (ref 0–1.2)
BILIRUB UR QL STRIP: NEGATIVE
BUN SERPL-MCNC: 17 MG/DL (ref 6–20)
BUN/CREAT SERPL: 17.5 (ref 7–25)
CALCIUM SPEC-SCNC: 8.9 MG/DL (ref 8.6–10.5)
CHLORIDE SERPL-SCNC: 104 MMOL/L (ref 98–107)
CHOLEST SERPL-MCNC: 115 MG/DL (ref 0–200)
CLARITY UR: CLEAR
CO2 SERPL-SCNC: 26 MMOL/L (ref 22–29)
COLOR UR: YELLOW
CREAT SERPL-MCNC: 0.97 MG/DL (ref 0.57–1)
CREAT UR-MCNC: 61.4 MG/DL
EGFRCR SERPLBLD CKD-EPI 2021: 67.9 ML/MIN/1.73
GLOBULIN UR ELPH-MCNC: 3.1 GM/DL
GLUCOSE SERPL-MCNC: 86 MG/DL (ref 65–99)
GLUCOSE UR STRIP-MCNC: ABNORMAL MG/DL
HBA1C MFR BLD: 7.9 % (ref 4.8–5.6)
HDLC SERPL-MCNC: 40 MG/DL (ref 40–60)
HGB UR QL STRIP.AUTO: NEGATIVE
HYALINE CASTS UR QL AUTO: NORMAL /LPF
KETONES UR QL STRIP: NEGATIVE
LDLC SERPL CALC-MCNC: 61 MG/DL (ref 0–100)
LDLC/HDLC SERPL: 1.55 {RATIO}
LEUKOCYTE ESTERASE UR QL STRIP.AUTO: NEGATIVE
MICROALBUMIN/CREAT UR: 55.4 MG/G
NITRITE UR QL STRIP: NEGATIVE
PH UR STRIP.AUTO: 7 [PH] (ref 5–8)
POTASSIUM SERPL-SCNC: 4.9 MMOL/L (ref 3.5–5.2)
PROT SERPL-MCNC: 6.8 G/DL (ref 6–8.5)
PROT UR QL STRIP: NEGATIVE
RBC # UR STRIP: NORMAL /HPF
REF LAB TEST METHOD: NORMAL
SODIUM SERPL-SCNC: 139 MMOL/L (ref 136–145)
SP GR UR STRIP: 1.02 (ref 1–1.03)
SQUAMOUS #/AREA URNS HPF: NORMAL /HPF
T4 FREE SERPL-MCNC: 1.05 NG/DL (ref 0.93–1.7)
TRIGL SERPL-MCNC: 65 MG/DL (ref 0–150)
TSH SERPL DL<=0.05 MIU/L-ACNC: 2.03 UIU/ML (ref 0.27–4.2)
UROBILINOGEN UR QL STRIP: ABNORMAL
VLDLC SERPL-MCNC: 14 MG/DL (ref 5–40)
WBC # UR STRIP: NORMAL /HPF

## 2022-05-03 PROCEDURE — 82570 ASSAY OF URINE CREATININE: CPT

## 2022-05-03 PROCEDURE — 82043 UR ALBUMIN QUANTITATIVE: CPT

## 2022-05-03 PROCEDURE — 84439 ASSAY OF FREE THYROXINE: CPT

## 2022-05-03 PROCEDURE — 82306 VITAMIN D 25 HYDROXY: CPT

## 2022-05-03 PROCEDURE — 87086 URINE CULTURE/COLONY COUNT: CPT

## 2022-05-03 PROCEDURE — 80053 COMPREHEN METABOLIC PANEL: CPT

## 2022-05-03 PROCEDURE — 80061 LIPID PANEL: CPT

## 2022-05-03 PROCEDURE — 83036 HEMOGLOBIN GLYCOSYLATED A1C: CPT

## 2022-05-03 PROCEDURE — 84443 ASSAY THYROID STIM HORMONE: CPT

## 2022-05-03 PROCEDURE — 81001 URINALYSIS AUTO W/SCOPE: CPT

## 2022-05-03 RX ORDER — INSULIN LISPRO 100 [IU]/ML
INJECTION, SOLUTION INTRAVENOUS; SUBCUTANEOUS
Qty: 10 ML | Refills: 0 | Status: SHIPPED | OUTPATIENT
Start: 2022-05-03 | End: 2022-07-05 | Stop reason: SDUPTHER

## 2022-05-04 LAB — BACTERIA SPEC AEROBE CULT: NO GROWTH

## 2022-05-04 RX ORDER — LISINOPRIL 20 MG/1
TABLET ORAL
Qty: 30 TABLET | Refills: 1 | Status: SHIPPED | OUTPATIENT
Start: 2022-05-04 | End: 2022-08-04

## 2022-05-04 RX ORDER — PNV NO.95/FERROUS FUM/FOLIC AC 28MG-0.8MG
TABLET ORAL
Qty: 30 TABLET | Refills: 1 | Status: SHIPPED | OUTPATIENT
Start: 2022-05-04 | End: 2022-08-12 | Stop reason: SDUPTHER

## 2022-05-04 NOTE — TELEPHONE ENCOUNTER
Patient called stated some kidney medication was called into the pharmacy and they aren't going to let her pick it up cause she has already picked some up. She said she hasn't please give her a call.

## 2022-05-05 NOTE — TELEPHONE ENCOUNTER
PATIENT CALLED STATING SHE WAS SUPPOSED TO GET SOME MEDICATION FOR HER KIDNEY. SHE SAID ROSEMARIE HUNG TOLD HER THAT THE MEDICATION, OXYBUTYNINXL 5MG WAS PICKED UP AT A DIFFERENT PHARMACY SOMEWHERE ELSE, BY SOMEONE ELSE. SHE SAID THE PHARMACY TOLD HER SINCE THAT PRESCRIPTION WAS ALREADY PICKED UP, THEY WILL NOT FILL IT AND SHE NEEDS A DIFFERENT MEDICATION/NEW PRESCRIPTION. SHE WANTS SOMEONE TO CALL HER BACK TO DISCUSS THE ISSUE.       THANKS

## 2022-05-06 ENCOUNTER — APPOINTMENT (OUTPATIENT)
Dept: ONCOLOGY | Facility: HOSPITAL | Age: 57
End: 2022-05-06

## 2022-05-09 ENCOUNTER — INFUSION (OUTPATIENT)
Dept: ONCOLOGY | Facility: HOSPITAL | Age: 57
End: 2022-05-09

## 2022-05-09 ENCOUNTER — OFFICE VISIT (OUTPATIENT)
Dept: ORTHOPEDIC SURGERY | Facility: CLINIC | Age: 57
End: 2022-05-09

## 2022-05-09 VITALS — WEIGHT: 235 LBS | BODY MASS INDEX: 34.8 KG/M2 | HEIGHT: 69 IN

## 2022-05-09 DIAGNOSIS — M79.642 LEFT HAND PAIN: Primary | ICD-10-CM

## 2022-05-09 DIAGNOSIS — M65.9 FLEXOR TENOSYNOVITIS OF FINGER: ICD-10-CM

## 2022-05-09 DIAGNOSIS — M25.642 DECREASED RANGE OF MOTION OF FINGER OF LEFT HAND: ICD-10-CM

## 2022-05-09 DIAGNOSIS — E53.8 B12 DEFICIENCY: Primary | ICD-10-CM

## 2022-05-09 PROCEDURE — 20600 DRAIN/INJ JOINT/BURSA W/O US: CPT | Performed by: NURSE PRACTITIONER

## 2022-05-09 PROCEDURE — 99214 OFFICE O/P EST MOD 30 MIN: CPT | Performed by: NURSE PRACTITIONER

## 2022-05-09 PROCEDURE — 96372 THER/PROPH/DIAG INJ SC/IM: CPT | Performed by: INTERNAL MEDICINE

## 2022-05-09 PROCEDURE — 25010000002 CYANOCOBALAMIN PER 1000 MCG: Performed by: INTERNAL MEDICINE

## 2022-05-09 RX ORDER — CYANOCOBALAMIN 1000 UG/ML
1000 INJECTION, SOLUTION INTRAMUSCULAR; SUBCUTANEOUS ONCE
Status: COMPLETED | OUTPATIENT
Start: 2022-05-09 | End: 2022-05-09

## 2022-05-09 RX ORDER — CYANOCOBALAMIN 1000 UG/ML
1000 INJECTION, SOLUTION INTRAMUSCULAR; SUBCUTANEOUS ONCE
Status: CANCELLED | OUTPATIENT
Start: 2022-06-06

## 2022-05-09 RX ADMIN — CYANOCOBALAMIN 1000 MCG: 1000 INJECTION, SOLUTION INTRAMUSCULAR at 09:11

## 2022-05-09 NOTE — PROGRESS NOTES
"Marta Giraldo is a 57 y.o. female returns for     Chief Complaint   Patient presents with   • Left Hand - Follow-up, Pain   • Results       HISTORY OF PRESENT ILLNESS:      Patient is a 57-year-old female who presents today to follow-up on left index finger pain.  Pain originally started in entire hand, however this pain resolved but index finger pain persisted.  Pain has now been present for the past 3+ months.  She continues to deny known injury or precipitating factor.  She has been treated with topical NSAIDs, physical therapy with custom orthosis, heat, Tylenol 3, rice therapy without relief of symptoms.  She is diabetic with the most recent hemoglobin A1c of 9.37.  She has chronic kidney disease and therefore avoids oral NSAIDs.  She reports pain is no better or worse, she is not seen any relief in her symptoms.  She denies new symptoms.  She denies fever/nausea/vomiting/redness/warmth.      CONCURRENT MEDICAL HISTORY:    The following portions of the patient's history were reviewed and updated as appropriate: allergies, current medications, past family history, past medical history, past social history, past surgical history and problem list.     ROS  No fevers or chills.  No chest pain or shortness of air.  No GI or  disturbances. Left index finger pain.     PHYSICAL EXAMINATION:       Ht 175.3 cm (69\")   Wt 107 kg (235 lb)   BMI 34.70 kg/m²     Physical Exam  Vitals and nursing note reviewed.   Constitutional:       General: She is not in acute distress.     Appearance: She is well-developed. She is not toxic-appearing.   HENT:      Head: Normocephalic.   Pulmonary:      Effort: Pulmonary effort is normal. No respiratory distress.   Skin:     General: Skin is warm and dry.   Neurological:      Mental Status: She is alert and oriented to person, place, and time.   Psychiatric:         Behavior: Behavior normal.         Thought Content: Thought content normal.         Judgment: Judgment normal. "         GAIT:     []  Normal  []  Antalgic    Assistive device: []  None  []  Walker     []  Crutches  []  Cane     [x]  Wheelchair  []  Stretcher    Left Hand Exam     Tenderness   The patient is experiencing no tenderness.     Tests   Finkelstein's test: negative    Other   Erythema: absent  Sensation: normal  Pulse: present    Comments:  Pain at base of finger.  Stiffness but no triggering.   No evidence of infection.  No swelling.  Skin is intact.  Neurovascular is intact  Patient has increased pain with bending index finger.  Patient is unable to make tight fist with any of her fingers today.              MRI Hand Left Without Contrast    Result Date: 4/26/2022  Narrative: MRI left hand. HISTORY: Pain in left hand and index finger. Prior exam: Left hand November 16, 2021. FINDINGS: There is an effusion involving the second metacarpophalangeal joint. Subchondral erosions base of the proximal phalange of the index finger and base of the distal phalange of the index finger at the DIP joint. This indicates an inflammatory synovitis such as rheumatoid or rheumatoid variant arthritis. This is best appreciated series 11 image 28. Series 11 image 12. There is diffuse fluid surrounding the flexor tendon of the index finger, (extensive tenosynovitis.) Series 13 image 13. MRI left hand is otherwise unremarkable.     Impression: 1. Joint effusion second metacarpophalangeal joint. 2.Arthritic erosive changes at the second metacarpal phalangeal joint, base of the proximal phalange of the index finger and the DIP joint. Extensive fluid surrounding the flexor tendon of the index finger, extensive tenosynovitis. Electronically signed by:  Melchor Mancera MD  4/26/2022 7:40 AM CDT Workstation: [id number]            ASSESSMENT:    Diagnoses and all orders for this visit:    Left hand pain    Decreased range of motion of finger of left hand    Flexor tenosynovitis of finger          PLAN  Small Joint Arthrocentesis: L index MCP  Consent  given by: patient  Site marked: site marked  Timeout: Immediately prior to procedure a time out was called to verify the correct patient, procedure, equipment, support staff and site/side marked as required   Supporting Documentation  Indications: pain   Procedure Details  Location: index finger - L index MCP  Preparation: Patient was prepped and draped in the usual sterile fashion  Needle size: 25 G  Medication group details: .3cc KENALOG- [id number]/ .3ccMARCAINE- [id number]- [id number].  Patient tolerance: patient tolerated the procedure well with no immediate complications            MRI results explained to patient.  Patient has extensive tenosynovitis at the index finger.  Patient cannot take NSAIDs due to kidney disease.  Patient has tried and failed physical therapy, RICE therapy, bracing, and Tylenol 3 without relief of symptoms.  After discussing risk, benefits, alternatives patient opts to proceed with localized injection today.  Patient tolerated injection well.  Patient to continue RICS therapy and HEP taught to her by PT   Patient to return in 4 weeks for recheck, if not feeling better patient may return to see a surgeon to discuss other options.    Return in about 4 weeks (around 6/6/2022), or if symptoms worsen or fail to improve.    EMR Dragon/Transciption Disclaimer: Some of this note may be an electronic transcription/translation of spoken language to printed text.  The electronic translation of spoken language may permit erroneous, or at times, nonsensical words or phrases to be inadvertently transcribed. Although I have reviewed the note for such errors, some may still exist.       This document has been electronically signed by CONNOR Oliver on May 9, 2022 08:39 CDT

## 2022-05-10 RX ORDER — OXYBUTYNIN CHLORIDE 10 MG/1
10 TABLET, EXTENDED RELEASE ORAL DAILY
Qty: 30 TABLET | Refills: 3 | Status: SHIPPED | OUTPATIENT
Start: 2022-05-10 | End: 2022-06-10

## 2022-05-10 RX ORDER — LEVOTHYROXINE SODIUM 0.03 MG/1
TABLET ORAL
Qty: 30 TABLET | Refills: 0 | Status: SHIPPED | OUTPATIENT
Start: 2022-05-10 | End: 2022-06-08

## 2022-05-10 RX ORDER — LEVOTHYROXINE SODIUM 0.03 MG/1
25 TABLET ORAL DAILY
Qty: 30 TABLET | Refills: 2 | Status: CANCELLED | OUTPATIENT
Start: 2022-05-10

## 2022-05-10 NOTE — TELEPHONE ENCOUNTER
Gato Crane MD  You 16 hours ago (4:58 PM)         Please send ditropan XL 10 mg at bedtime to her pharmacy of choice. Give 30 pills and 3 refills thank you     Message text       Sent [employer] message.

## 2022-05-19 RX ORDER — SOLIFENACIN SUCCINATE 5 MG/1
TABLET, FILM COATED ORAL
Qty: 30 TABLET | Refills: 0 | OUTPATIENT
Start: 2022-05-19

## 2022-05-19 NOTE — TELEPHONE ENCOUNTER
Rx Refill Note  Requested Prescriptions     Refused Prescriptions Disp Refills   • solifenacin (VESICARE) 5 MG tablet [Pharmacy Med Name: SOLIFENACIN 5 MG TABLET] 30 tablet 0     Sig: TAKE 1 TABLET DAILY.      Last office visit with prescribing clinician: 3/4/2022      Next office visit with prescribing clinician: Visit date not found   {TIP  Encounters:    pt should be on ditropan         Anshu Samayoa LPN  05/19/22, 13:26 CDT

## 2022-05-26 RX ORDER — METHOCARBAMOL 750 MG/1
TABLET, FILM COATED ORAL
Qty: 90 TABLET | Refills: 0 | Status: SHIPPED | OUTPATIENT
Start: 2022-05-26 | End: 2022-06-16

## 2022-05-27 ENCOUNTER — TELEPHONE (OUTPATIENT)
Dept: ENDOCRINOLOGY | Facility: CLINIC | Age: 57
End: 2022-05-27

## 2022-06-03 NOTE — PROGRESS NOTES
Subjective:  Marta Giraldo is a 57 y.o. female who presents for       Patient Active Problem List   Diagnosis   • Class 1 obesity with serious comorbidity and body mass index (BMI) of 32.0 to 32.9 in adult   • Moderate episode of recurrent major depressive disorder (HCC)   • Encounter for screening for malignant neoplasm of colon   • Gastroesophageal reflux disease   • Hypothyroidism   • Microcytic anemia   • Vitamin D deficiency   • Uncontrolled type 2 diabetes mellitus with hyperglycemia (HCC)   • Class 1 obesity with body mass index (BMI) of 32.0 to 32.9 in adult   • Diabetic polyneuropathy associated with type 2 diabetes mellitus (HCC)   • Gastritis without bleeding   • Class 1 obesity with serious comorbidity and body mass index (BMI) of 33.0 to 33.9 in adult   • At high risk for falls   • Gait instability   • Below knee amputation (HCC)   • Type 2 diabetes mellitus with hypoglycemia without coma, with long-term current use of insulin (HCC)   • Essential hypertension   • Mixed hyperlipidemia   • Cervical radiculopathy   • DDD (degenerative disc disease), cervical   • Chronic neck pain   • Leukocytosis   • Iron deficiency   • Wheelchair bound   • B12 deficiency   • Chest pain   • Abnormal nuclear stress test   • Coronary artery disease involving native coronary artery of native heart without angina pectoris   • Polyuria   • Smoker   • Left hand pain   • Decreased range of motion of finger of left hand   • Pain of finger of left hand   • Class 1 obesity with serious comorbidity and body mass index (BMI) of 34.0 to 34.9 in adult   • Urge incontinence           Current Outpatient Medications:   •  atorvastatin (LIPITOR) 80 MG tablet, TAKE 1 TABLET NIGHTLY, Disp: 30 tablet, Rfl: 1  •  B-D UF III MINI PEN NEEDLES 31G X 5 MM misc, Use as needed, Disp: 100 each, Rfl: 3  •  carvedilol (COREG) 6.25 MG tablet, Take 1 tablet by mouth 2 (Two) Times a Day., Disp: 180 tablet, Rfl: 3  •  clotrimazole-betamethasone  (Lotrisone) 1-0.05 % cream, Apply  topically to the appropriate area as directed 2 (Two) Times a Day., Disp: 1 each, Rfl: 3  •  Continuous Blood Gluc  (FreeStyle Tae 2 [place]) device, USE AS DIRECTED, Disp: 1 each, Rfl: 11  •  Continuous Blood Gluc Sensor (FreeStyle Tae 2 Sensor) misc, 1 each Every 14 (Fourteen) Days. Use every 14 days, Disp: 2 each, Rfl: 11  •  diazePAM (Valium) 10 MG tablet, Take 1 pill approximately 30 minutes prior to MRI.  Medication can cause drowsiness, please have someone drive you to them from your appointment., Disp: 1 tablet, Rfl: 0  •  Diclofenac Sodium (VOLTAREN) 1 % gel gel, APPLY TO THE APPROPRIATE AREA AS DIRECTED 4 TIMES AS DAY AS NEEDED, Disp: 300 g, Rfl: 0  •  docusate sodium (COLACE) 100 MG capsule, Take 1 capsule by mouth 2 (Two) Times a Day As Needed for Constipation., Disp: 60 capsule, Rfl: 2  •  Dulaglutide (Trulicity) 3 MG/0.5ML solution pen-injector, Inject 0.5 mL under the skin into the appropriate area as directed 1 (One) Time Per Week., Disp: 4 pen, Rfl: 3  •  DULoxetine (CYMBALTA) 60 MG capsule, , Disp: , Rfl:   •  empagliflozin (Jardiance) 25 MG tablet tablet, Take 1 tablet by mouth Daily., Disp: 90 tablet, Rfl: 3  •  ferrous sulfate 325 (65 Fe) MG tablet, TAKE 1 TABLET DAILY WITH BREAKFAST, Disp: 30 tablet, Rfl: 1  •  glucose (DEX4) 4 GM chewable tablet, Chew 4 tablets As Needed for Low Blood Sugar., Disp: 90 tablet, Rfl: 3  •  glucose blood test strip, Use as instructed, Disp: 200 each, Rfl: 12  •  Insulin Glargine (LANTUS SOLOSTAR) 100 UNIT/ML injection pen, Inject 35 Units under the skin into the appropriate area as directed Every Night. (Patient taking differently: Inject 35 Units under the skin into the appropriate area as directed Daily.), Disp: 9 mL, Rfl: 3  •  Insulin Lispro (humaLOG) 100 UNIT/ML injection, INJECT 5 UNITS SUBCUTANEOUSLY PRIOR TO MEALS., Disp: 10 mL, Rfl: 0  •  insulin lispro (humaLOG) 100 UNIT/ML injection, Inject 5 units  "subcutaneously prior to meals, Disp: 10 mL, Rfl: 5  •  Insulin Pen Needle 32G X 8 MM misc, Use at bedtime, Disp: 100 each, Rfl: 3  •  Insulin Syringe-Needle U-100 30G X 1/2\" 1 ML misc, Use three times daily with insulin, Disp: 100 each, Rfl: 3  •  Lancets misc, Use for E11.65 diabetes to check sugars 4 times a day., Disp: 200 each, Rfl: 3  •  levothyroxine (SYNTHROID, LEVOTHROID) 25 MCG tablet, TAKE 1 TABLET DAILY., Disp: 30 tablet, Rfl: 0  •  lisinopril (PRINIVIL,ZESTRIL) 20 MG tablet, TAKE 1 TABLET DAILY., Disp: 30 tablet, Rfl: 1  •  methocarbamol (ROBAXIN) 750 MG tablet, TAKE 1 TABLET THREE TIMES DAILY., Disp: 90 tablet, Rfl: 0  •  mupirocin (BACTROBAN) 2 % ointment, Apply 1 application topically to the appropriate area as directed 3 (Three) Times a Day., Disp: 30 g, Rfl: 1  •  nicotine (Nicoderm CQ) 21 MG/24HR patch, Place 1 patch on the skin as directed by provider Daily., Disp: 30 patch, Rfl: 3  •  omeprazole (priLOSEC) 40 MG capsule, Take 1 capsule by mouth Daily., Disp: 30 capsule, Rfl: 3  •  pregabalin (LYRICA) 300 MG capsule, Take 1 capsule by mouth 2 (Two) Times a Day., Disp: 60 capsule, Rfl: 2  •  Sodium Chloride (Saline Wound Wash) 0.9 % solution, Saline wet to dry dressings BID x 7 days., Disp: 210 mL, Rfl: 0  •  traMADol (ULTRAM) 50 MG tablet, Take 1 tablet by mouth 2 (Two) Times a Day As Needed for Moderate Pain ., Disp: 20 tablet, Rfl: 0  •  TRUEplus Insulin Syringe 30G X 5/16\" 0.5 ML misc, , Disp: , Rfl:   •  Viibryd 20 MG tablet tablet, TAKE 1 TABLET DAILY., Disp: 30 tablet, Rfl: 1  •  vitamin D (ERGOCALCIFEROL) 1.25 MG (65097 UT) capsule capsule, TAKE 1 CAPSULE WEEKLY, Disp: 12 capsule, Rfl: 0  •  oxybutynin XL (Ditropan XL) 15 MG 24 hr tablet, Take 1 tablet by mouth Daily., Disp: 30 tablet, Rfl: 3    Pt is 58 yo female with management of obesity IDDM type 2 , HLP, diabetic neuropathy,  HTN, major depression  type 2, tobacco smoker , sp below right knee amputation, sp appendectomy, sp bladder " surgery,diabetic retinopathy of right eye, vitamin D deficiency, acquired  hypothryoidism, microcytic anemia , colonic polyps, diverticulosis, internal hemorrhoids/GERD with esophagitis, gastritis. Diabetic retinopathy, cataracts, iron deficiency, CKD stage 2, chronic neck pain (DDD cervical spine,cervical spondylosis at C5-C6) cervical radiculopathy, leukocytosis, CAD, echo in January 2022( pericardial effusion,, mild LVH),        4/29/22 in office visit for recheck. navin last visit pt has seen Hematology on 4/13/22 for leukocytosis and iron deficiency anemia. She is currently on iron supplements and getting b12 injections.  Saw Orthopedic on 4//22 for her left hand pain and MRI of hand was ordered.  Results from 4/1/22 showed joint effusion of second metcarpophalangeal joint along with arthritis erosive changes at 2nd metacarpal joint base of the proximal phalange of the index finger and DIP joint  She was given Tylenol 3 with codein for pain. She last saw Nephrology on 3/17/22 for her CKD stage 3a. She has been to [facility] clinic several times in regards to foot ulcer and cut on finger.  Pt  Saw [facility] on 2/1/22.   She did have stres test on 1/4/22 that showed intermediate risk study.  She did have cardiac catherization on 1/27/22 that showed mild CAD involving mid LAD OM1 and branch of LCX and RPDA.  She was recommended medical management for her CAD. She is to contineu aspirin coreg for her CAD, she also was found to have pericardial effusion on echo in January 2022. She does not endorse chest pain today. States she continues to smoke tobacco about 1/2 ppd. She does want to quit. She also states she has an overactive bladder. She is on jardianc    6/10/22 in office visit for recheck. Pt had labwork done on 5/3/22 that showed thyroid studies normal vitamin D levels normal lipid panel stable CMP showed GFR at 67.90 hga1c is at 7.90. she is doing fair. Her urinary issues have improved with ditropan XL. She is  not urinating as much at bedtime. Her urge incontinence has improved  He rmain concern is swelling in both legs/knees. She had right BKA of leg about 5 years ago.  She continues to smoke tobacco about 6 cigarettes a day. No dyspnea. No chest pain no dizziness.  BP is stable        Leg Swelling  This is a recurrent problem. The current episode started more than 1 month ago. The problem occurs constantly. The problem has been unchanged. Associated symptoms include arthralgias, fatigue, neck pain, numbness and weakness. Pertinent negatives include no chest pain, chills, congestion, coughing, diaphoresis, fever, headaches, nausea, sore throat or vomiting. Nothing aggravates the symptoms. She has tried nothing for the symptoms. The treatment provided no relief.    Coronary Artery Disease  Presents for follow-up visit. Symptoms include chest pain. Pertinent negatives include no chest pressure, chest tightness, dizziness, leg swelling, muscle weakness, palpitations, shortness of breath or weight gain. Risk factors include hyperlipidemia and obesity. The symptoms have been stable. Compliance with diet is variable. Compliance with exercise is variable. Compliance with medications is variable.   Hyperlipidemia  This is a chronic problem. The current episode started more than 1 year ago. Recent lipid tests were reviewed and are variable. Exacerbating diseases include chronic renal disease, diabetes and obesity. She has no history of hypothyroidism, liver disease or nephrotic syndrome. Associated symptoms include chest pain. Pertinent negatives include no shortness of breath. The current treatment provides mild improvement of lipids. Risk factors for coronary artery disease include diabetes mellitus, hypertension, obesity, dyslipidemia and a sedentary lifestyle.   Female  Problem  The patient's pertinent negatives include no genital itching, genital lesions, genital rash, missed menses, pelvic pain, vaginal bleeding or  vaginal discharge. This is a recurrent problem. The current episode started more than 1 month ago. The problem occurs constantly. The problem has been improving The pain is moderate. Pertinent negatives include no abdominal pain, anorexia, back pain, chills, constipation, diarrhea, discolored urine, dysuria, fever, flank pain, frequency, headaches, hematuria, joint pain, joint swelling, nausea, painful intercourse, rash, sore throat, urgency or vomiting. Associated symptoms comments: Urinary incontinence . There is no history of an abdominal surgery, a  section, an ectopic pregnancy, endometriosis, a gynecological surgery, herpes simplex, menorrhagia, metrorrhagia, miscarriage, ovarian cysts, perineal abscess, PID, an STD, a terminated pregnancy or vaginosis.   Heart Problem  This is a recurrent problem. The current episode started more than 1 month ago. The problem occurs constantly. The problem has been unchanged. Associated symptoms include arthralgias. Pertinent negatives include no abdominal pain, anorexia, change in bowel habit, chest pain, chills, congestion, coughing, diaphoresis, fatigue, fever, headaches, joint swelling, myalgias, nausea, neck pain, numbness, rash, sore throat, swollen glands, urinary symptoms, vertigo, visual change, vomiting or weakness. Nothing aggravates the symptoms. She has tried nothing for the symptoms. The treatment provided no relief.   Chronic Kidney Disease  This is a chronic problem. The current episode started more than 1 year ago. The problem occurs constantly. The problem has been unchanged. Pertinent negatives include no abdominal pain, anorexia, arthralgias, change in bowel habit, chest pain, chills, congestion, coughing, diaphoresis, fatigue, fever, headaches, joint swelling, myalgias, nausea, neck pain, numbness, rash, sore throat, swollen glands, urinary symptoms, vertigo, visual change, vomiting or weakness. Nothing aggravates the symptoms. She has  tried nothing for the symptoms. The treatment provided no relief.   Diabetes  She presents for her follow-up diabetic visit. She has type 2 diabetes mellitus. Her disease course has been waxing and waning . Pertinent negatives for hypoglycemia include no confusion, dizziness, headaches, hunger, mood changes, nervousness/anxiousness, pallor or seizures. Associated symptoms include fatigue and weakness. Pertinent negatives for diabetes include no blurred vision, no chest pain, no foot paresthesias, no foot ulcerations, no polydipsia, no polyphagia and no polyuria. Pertinent negatives for hypoglycemia complications include no blackouts, no hospitalization, no nocturnal hypoglycemia and no required assistance. Symptoms are stable. Pertinent negatives for diabetic complications include no autonomic neuropathy, CVA, heart disease, impotence, nephropathy or peripheral neuropathy. Risk factors for coronary artery disease include diabetes mellitus and dyslipidemia. Current diabetic treatment includes insulin injections and oral agent (dual therapy). She is compliant with treatment most of the time. Her weight is stable. She has not had a previous visit with a dietitian. She never participates in exercise. Her home blood glucose trend is decreasing steadily. An ACE inhibitor/angiotensin II receptor blocker is being taken. She does not see a podiatrist.Eye exam is not current.   Hypothyroidism   This is a new problem. The current episode started more than 1 year ago. The problem occurs constantly. The problem has been improved Associated symptoms include arthralgias, fatigue, numbness and weakness. Pertinent negatives include no abdominal pain, anorexia, chest pain, chills, congestion, coughing, diaphoresis, fever, headaches, nausea, sore throat or vomiting. Nothing aggravates the symptoms. She has tried nothing for the symptoms. The treatment provided no relief.    Obesity   This is a chronic problem. The problem  occurs constantly. The problem has been unchanged. Associated symptoms include arthralgias, fatigue, numbness and weakness. Pertinent negatives include no chest pain, chills, congestion, coughing, diaphoresis, fever, headaches, nausea, sore throat or vomiting. Nothing aggravates the symptoms. She has tried nothing for the symptoms. The treatment provided no relief    Review of Systems  Review of Systems   Constitutional: Positive for activity change and fatigue. Negative for appetite change, chills, diaphoresis and fever.   HENT: Negative for congestion, postnasal drip, rhinorrhea, sinus pressure, sinus pain, sneezing, sore throat, trouble swallowing and voice change.    Respiratory: Negative for cough, choking, chest tightness, shortness of breath, wheezing and stridor.    Cardiovascular: Positive for leg swelling. Negative for chest pain.   Gastrointestinal: Negative for diarrhea, nausea and vomiting.   Genitourinary: Positive for urgency.   Musculoskeletal: Positive for arthralgias, gait problem and neck pain.   Neurological: Positive for weakness and numbness. Negative for headaches.   Psychiatric/Behavioral: The patient is nervous/anxious.        Patient Active Problem List   Diagnosis   • Class 1 obesity with serious comorbidity and body mass index (BMI) of 32.0 to 32.9 in adult   • Moderate episode of recurrent major depressive disorder (HCC)   • Encounter for screening for malignant neoplasm of colon   • Gastroesophageal reflux disease   • Hypothyroidism   • Microcytic anemia   • Vitamin D deficiency   • Uncontrolled type 2 diabetes mellitus with hyperglycemia (HCC)   • Class 1 obesity with body mass index (BMI) of 32.0 to 32.9 in adult   • Diabetic polyneuropathy associated with type 2 diabetes mellitus (HCC)   • Gastritis without bleeding   • Class 1 obesity with serious comorbidity and body mass index (BMI) of 33.0 to 33.9 in adult   • At high risk for falls   • Gait instability   • Below knee amputation  (HCC)   • Type 2 diabetes mellitus with hypoglycemia without coma, with long-term current use of insulin (HCC)   • Essential hypertension   • Mixed hyperlipidemia   • Cervical radiculopathy   • DDD (degenerative disc disease), cervical   • Chronic neck pain   • Leukocytosis   • Iron deficiency   • Wheelchair bound   • B12 deficiency   • Chest pain   • Abnormal nuclear stress test   • Coronary artery disease involving native coronary artery of native heart without angina pectoris   • Polyuria   • Smoker   • Left hand pain   • Decreased range of motion of finger of left hand   • Pain of finger of left hand   • Class 1 obesity with serious comorbidity and body mass index (BMI) of 34.0 to 34.9 in adult   • Urge incontinence     Past Surgical History:   Procedure Laterality Date   • APPENDECTOMY     • BELOW KNEE AMPUTATION     • BLADDER SURGERY     • CARDIAC CATHETERIZATION N/A 1/27/2022    Procedure: Left Heart Cath;  Surgeon: Josias Carpio MD;  Location: [facility] CATH INVASIVE LOCATION;  Service: Cardiology;  Laterality: N/A;   • COLONOSCOPY N/A 9/2/2020    Procedure: COLONOSCOPY;  Surgeon: Ashli Gonzalez MD;  Location: [facility] ENDOSCOPY;  Service: Gastroenterology;  Laterality: N/A;   • ENDOSCOPY N/A 9/2/2020    Procedure: ESOPHAGOGASTRODUODENOSCOPY;  Surgeon: Ashli Gonzalez MD;  Location: [facility] ENDOSCOPY;  Service: Gastroenterology;  Laterality: N/A;     Social History     Socioeconomic History   • Marital status:    Tobacco Use   • Smoking status: Current Some Day Smoker     Packs/day: 0.50     Types: Cigarettes   • Smokeless tobacco: Never Used   Vaping Use   • Vaping Use: Never used   Substance and Sexual Activity   • Alcohol use: Not Currently   • Drug use: Never   • Sexual activity: Defer     Family History   Problem Relation Age of Onset   • Diabetes Other    • Hyperlipidemia Other    • Hypertension Other    • Stroke Other    • Heart disease Other    • Other Other    • Diabetes Mother    •  Diabetes Father    • Diabetes Sister    • Heart disease Sister    • Diabetes Brother      Lab on 05/03/2022   Component Date Value Ref Range Status   • Glucose 05/03/2022 86  65 - 99 mg/dL Final   • BUN 05/03/2022 17  6 - 20 mg/dL Final   • Creatinine 05/03/2022 0.97  0.57 - 1.00 mg/dL Final   • Sodium 05/03/2022 139  136 - 145 mmol/L Final   • Potassium 05/03/2022 4.9  3.5 - 5.2 mmol/L Final   • Chloride 05/03/2022 104  98 - 107 mmol/L Final   • CO2 05/03/2022 26.0  22.0 - 29.0 mmol/L Final   • Calcium 05/03/2022 8.9  8.6 - 10.5 mg/dL Final   • Total Protein 05/03/2022 6.8  6.0 - 8.5 g/dL Final   • Albumin 05/03/2022 3.70  3.50 - 5.20 g/dL Final   • ALT (SGPT) 05/03/2022 11  1 - 33 U/L Final   • AST (SGOT) 05/03/2022 12  1 - 32 U/L Final   • Alkaline Phosphatase 05/03/2022 77  39 - 117 U/L Final   • Total Bilirubin 05/03/2022 0.2  0.0 - 1.2 mg/dL Final   • Globulin 05/03/2022 3.1  gm/dL Final   • A/G Ratio 05/03/2022 1.2  g/dL Final   • BUN/Creatinine Ratio 05/03/2022 17.5  7.0 - 25.0 Final   • Anion Gap 05/03/2022 9.0  5.0 - 15.0 mmol/L Final   • eGFR 05/03/2022 67.9  >60.0 mL/min/1.73 Final    National Kidney Foundation and American Society of Nephrology (ASN) Task Force recommended calculation based on the Chronic Kidney Disease Epidemiology Collaboration (CKD-EPI) equation refit without adjustment for race.   • Hemoglobin A1C 05/03/2022 7.90 (A) 4.80 - 5.60 % Final   • Total Cholesterol 05/03/2022 115  0 - 200 mg/dL Final   • Triglycerides 05/03/2022 65  0 - 150 mg/dL Final   • HDL Cholesterol 05/03/2022 40  40 - 60 mg/dL Final   • LDL Cholesterol  05/03/2022 61  0 - 100 mg/dL Final   • VLDL Cholesterol 05/03/2022 14  5 - 40 mg/dL Final   • LDL/HDL Ratio 05/03/2022 1.55   Final   • TSH 05/03/2022 2.030  0.270 - 4.200 uIU/mL Final   • Free T4 05/03/2022 1.05  0.93 - 1.70 ng/dL Final   • 25 Hydroxy, Vitamin D 05/03/2022 38.2  30.0 - 100.0 ng/ml Final   • Microalbumin/Creatinine Ratio 05/03/2022 55.4  mg/g Final    • Creatinine, Urine 05/03/2022 61.4  mg/dL Final   • Microalbumin, Urine 05/03/2022 3.4  mg/dL Final   • Urine Culture 05/03/2022 No growth   Final   • Color, UA 05/03/2022 Yellow  Yellow, Straw Final   • Appearance, UA 05/03/2022 Clear  Clear Final   • pH, UA 05/03/2022 7.0  5.0 - 8.0 Final   • Specific Gravity, UA 05/03/2022 1.019  1.005 - 1.030 Final   • Glucose, UA 05/03/2022 >=1000 mg/dL (3+) (A) Negative Final   • Ketones, UA 05/03/2022 Negative  Negative Final   • Bilirubin, UA 05/03/2022 Negative  Negative Final   • Blood, UA 05/03/2022 Negative  Negative Final   • Protein, UA 05/03/2022 Negative  Negative Final   • Leuk Esterase, UA 05/03/2022 Negative  Negative Final   • Nitrite, UA 05/03/2022 Negative  Negative Final   • Urobilinogen, UA 05/03/2022 0.2 E.U./dL  0.2 - 1.0 E.U./dL Final   • RBC, UA 05/03/2022 0-2  None Seen, 0-2 /HPF Final   • WBC, UA 05/03/2022 0-2  None Seen, 0-2 /HPF Final   • Bacteria, UA 05/03/2022 None Seen  None Seen /HPF Final   • Squamous Epithelial Cells, UA 05/03/2022 0-2  None Seen, 0-2 /HPF Final   • Hyaline Casts, UA 05/03/2022 0-2  None Seen /LPF Final   • Methodology 05/03/2022 Automated Microscopy   Final   Lab on 04/13/2022   Component Date Value Ref Range Status   • Iron 04/13/2022 54  37 - 145 mcg/dL Final   • Iron Saturation 04/13/2022 18 (A) 20 - 50 % Final   • Transferrin 04/13/2022 201  200 - 360 mg/dL Final   • TIBC 04/13/2022 299  298 - 536 mcg/dL Final   • Ferritin 04/13/2022 68.90  13.00 - 150.00 ng/mL Final   • Vitamin B-12 04/13/2022 836  211 - 946 pg/mL Final   • Folate 04/13/2022 17.50  4.78 - 24.20 ng/mL Final   • WBC 04/13/2022 14.96 (A) 3.40 - 10.80 10*3/mm3 Final   • RBC 04/13/2022 4.38  3.77 - 5.28 10*6/mm3 Final   • Hemoglobin 04/13/2022 11.6 (A) 12.0 - 15.9 g/dL Final   • Hematocrit 04/13/2022 36.5  34.0 - 46.6 % Final   • MCV 04/13/2022 83.3  79.0 - 97.0 fL Final   • MCH 04/13/2022 26.5 (A) 26.6 - 33.0 pg Final   • MCHC 04/13/2022 31.8  31.5 - 35.7  g/dL Final   • RDW 04/13/2022 15.2  12.3 - 15.4 % Final   • RDW-SD 04/13/2022 46.1  37.0 - 54.0 fl Final   • MPV 04/13/2022 11.7  6.0 - 12.0 fL Final   • Platelets 04/13/2022 211  140 - 450 10*3/mm3 Final   • Neutrophil % 04/13/2022 75.5  42.7 - 76.0 % Final   • Lymphocyte % 04/13/2022 16.4 (A) 19.6 - 45.3 % Final   • Monocyte % 04/13/2022 4.6 (A) 5.0 - 12.0 % Final   • Eosinophil % 04/13/2022 2.2  0.3 - 6.2 % Final   • Basophil % 04/13/2022 0.7  0.0 - 1.5 % Final   • Immature Grans % 04/13/2022 0.6 (A) 0.0 - 0.5 % Final   • Neutrophils, Absolute 04/13/2022 11.28 (A) 1.70 - 7.00 10*3/mm3 Final   • Lymphocytes, Absolute 04/13/2022 2.46  0.70 - 3.10 10*3/mm3 Final   • Monocytes, Absolute 04/13/2022 0.69  0.10 - 0.90 10*3/mm3 Final   • Eosinophils, Absolute 04/13/2022 0.33  0.00 - 0.40 10*3/mm3 Final   • Basophils, Absolute 04/13/2022 0.11  0.00 - 0.20 10*3/mm3 Final   • Immature Grans, Absolute 04/13/2022 0.09 (A) 0.00 - 0.05 10*3/mm3 Final   • nRBC 04/13/2022 0.0  0.0 - 0.2 /100 WBC Final   Admission on 01/27/2022, Discharged on 01/27/2022   Component Date Value Ref Range Status   • Glucose 01/27/2022 145 (A) 65 - 99 mg/dL Final   • BUN 01/27/2022 17  6 - 20 mg/dL Final   • Creatinine 01/27/2022 1.00  0.57 - 1.00 mg/dL Final   • Sodium 01/27/2022 140  136 - 145 mmol/L Final   • Potassium 01/27/2022 4.4  3.5 - 5.2 mmol/L Final   • Chloride 01/27/2022 105  98 - 107 mmol/L Final   • CO2 01/27/2022 26.0  22.0 - 29.0 mmol/L Final   • Calcium 01/27/2022 8.7  8.6 - 10.5 mg/dL Final   • eGFR Non  Amer 01/27/2022 57 (A) >60 mL/min/1.73 Final   • BUN/Creatinine Ratio 01/27/2022 17.0  7.0 - 25.0 Final   • Anion Gap 01/27/2022 9.0  5.0 - 15.0 mmol/L Final   • WBC 01/27/2022 13.70 (A) 3.40 - 10.80 10*3/mm3 Final   • RBC 01/27/2022 4.43  3.77 - 5.28 10*6/mm3 Final   • Hemoglobin 01/27/2022 11.7 (A) 12.0 - 15.9 g/dL Final   • Hematocrit 01/27/2022 36.1  34.0 - 46.6 % Final   • MCV 01/27/2022 81.5  79.0 - 97.0 fL Final   •  MCH 01/27/2022 26.4 (A) 26.6 - 33.0 pg Final   • MCHC 01/27/2022 32.4  31.5 - 35.7 g/dL Final   • RDW 01/27/2022 15.3  12.3 - 15.4 % Final   • RDW-SD 01/27/2022 45.2  37.0 - 54.0 fl Final   • MPV 01/27/2022 11.8  6.0 - 12.0 fL Final   • Platelets 01/27/2022 202  140 - 450 10*3/mm3 Final   • Protime 01/27/2022 13.9  11.1 - 15.3 Seconds Final   • INR 01/27/2022 1.08  0.80 - 1.20 Final   • C-Reactive Protein 01/27/2022 0.31  0.00 - 0.50 mg/dL Final   • Sed Rate 01/27/2022 14  0 - 20 mm/hr Final   • Extra Tube 01/27/2022 Hold for add-ons.   Final    Auto resulted.   • Extra Tube 01/27/2022 Hold for add-ons.   Final    Auto resulted.   Lab on 01/20/2022   Component Date Value Ref Range Status   • Glucose 01/20/2022 196 (A) 65 - 99 mg/dL Final   • BUN 01/20/2022 15  6 - 20 mg/dL Final   • Creatinine 01/20/2022 1.20 (A) 0.57 - 1.00 mg/dL Final   • Sodium 01/20/2022 140  136 - 145 mmol/L Final   • Potassium 01/20/2022 4.5  3.5 - 5.2 mmol/L Final   • Chloride 01/20/2022 105  98 - 107 mmol/L Final   • CO2 01/20/2022 24.0  22.0 - 29.0 mmol/L Final   • Calcium 01/20/2022 8.9  8.6 - 10.5 mg/dL Final   • Total Protein 01/20/2022 7.3  6.0 - 8.5 g/dL Final   • Albumin 01/20/2022 4.10  3.50 - 5.20 g/dL Final   • ALT (SGPT) 01/20/2022 11  1 - 33 U/L Final   • AST (SGOT) 01/20/2022 14  1 - 32 U/L Final   • Alkaline Phosphatase 01/20/2022 101  39 - 117 U/L Final   • Total Bilirubin 01/20/2022 0.3  0.0 - 1.2 mg/dL Final   • eGFR Non  Amer 01/20/2022 46 (A) >60 mL/min/1.73 Final   • Globulin 01/20/2022 3.2  gm/dL Final   • A/G Ratio 01/20/2022 1.3  g/dL Final   • BUN/Creatinine Ratio 01/20/2022 12.5  7.0 - 25.0 Final   • Anion Gap 01/20/2022 11.0  5.0 - 15.0 mmol/L Final   • Hemoglobin A1C 01/20/2022 9.37 (A) 4.80 - 5.60 % Final   • Total Cholesterol 01/20/2022 133  0 - 200 mg/dL Final   • Triglycerides 01/20/2022 77  0 - 150 mg/dL Final   • HDL Cholesterol 01/20/2022 39 (A) 40 - 60 mg/dL Final   • LDL Cholesterol  01/20/2022 79   0 - 100 mg/dL Final   • VLDL Cholesterol 01/20/2022 15  5 - 40 mg/dL Final   • LDL/HDL Ratio 01/20/2022 2.02   Final   • Microalbumin/Creatinine Ratio 01/20/2022 24.3  mg/g Final   • Creatinine, Urine 01/20/2022 61.8  mg/dL Final   • Microalbumin, Urine 01/20/2022 1.5  mg/dL Final   • Protein/Creatinine Ratio, Urine 01/20/2022 113.3  0.0 - 200.0 mg/G Crea Final   • Creatinine, Urine 01/20/2022 61.8  mg/dL Final   • Total Protein, Urine 01/20/2022 7.0  mg/dL Final   • TSH 01/20/2022 1.070  0.270 - 4.200 uIU/mL Final   • Vitamin B-12 01/20/2022 >2,000 (A) 211 - 946 pg/mL Final   • 25 Hydroxy, Vitamin D 01/20/2022 39.1  30.0 - 100.0 ng/ml Final   • Iron 01/20/2022 52  37 - 145 mcg/dL Final   • Iron Saturation 01/20/2022 15 (A) 20 - 50 % Final   • Transferrin 01/20/2022 228  200 - 360 mg/dL Final   • TIBC 01/20/2022 340  298 - 536 mcg/dL Final   • Ferritin 01/20/2022 88.72  13.00 - 150.00 ng/mL Final   • Folate 01/20/2022 11.60  4.78 - 24.20 ng/mL Final   • WBC 01/20/2022 14.31 (A) 3.40 - 10.80 10*3/mm3 Final   • RBC 01/20/2022 4.65  3.77 - 5.28 10*6/mm3 Final   • Hemoglobin 01/20/2022 12.2  12.0 - 15.9 g/dL Final   • Hematocrit 01/20/2022 38.2  34.0 - 46.6 % Final   • MCV 01/20/2022 82.2  79.0 - 97.0 fL Final   • MCH 01/20/2022 26.2 (A) 26.6 - 33.0 pg Final   • MCHC 01/20/2022 31.9  31.5 - 35.7 g/dL Final   • RDW 01/20/2022 15.3  12.3 - 15.4 % Final   • RDW-SD 01/20/2022 45.6  37.0 - 54.0 fl Final   • MPV 01/20/2022 13.0 (A) 6.0 - 12.0 fL Final   • Platelets 01/20/2022 216  140 - 450 10*3/mm3 Final   • Neutrophil % 01/20/2022 77.1 (A) 42.7 - 76.0 % Final   • Lymphocyte % 01/20/2022 16.1 (A) 19.6 - 45.3 % Final   • Monocyte % 01/20/2022 4.1 (A) 5.0 - 12.0 % Final   • Eosinophil % 01/20/2022 1.5  0.3 - 6.2 % Final   • Basophil % 01/20/2022 0.8  0.0 - 1.5 % Final   • Immature Grans % 01/20/2022 0.4  0.0 - 0.5 % Final   • Neutrophils, Absolute 01/20/2022 11.04 (A) 1.70 - 7.00 10*3/mm3 Final   • Lymphocytes, Absolute  01/20/2022 2.30  0.70 - 3.10 10*3/mm3 Final   • Monocytes, Absolute 01/20/2022 0.58  0.10 - 0.90 10*3/mm3 Final   • Eosinophils, Absolute 01/20/2022 0.22  0.00 - 0.40 10*3/mm3 Final   • Basophils, Absolute 01/20/2022 0.11  0.00 - 0.20 10*3/mm3 Final   • Immature Grans, Absolute 01/20/2022 0.06 (A) 0.00 - 0.05 10*3/mm3 Final   • nRBC 01/20/2022 0.0  0.0 - 0.2 /100 WBC Final   • COVID19 01/25/2022 Not Detected  Not Detected - Ref. Range Final   • RBC Morphology 01/20/2022 Normal  Normal Final   • WBC Morphology 01/20/2022 Normal  Normal Final   • Platelet Estimate 01/20/2022 Adequate  Normal Final   Ancillary Procedure on 01/11/2022   Component Date Value Ref Range Status   • BSA 01/11/2022 2.2  m^2 Final   • RVIDd 01/11/2022 3.1  cm Final   • IVSd 01/11/2022 1.3  cm Final   • LVIDd 01/11/2022 4.2  cm Final   • LVIDs 01/11/2022 3.1  cm Final   • LVPWd 01/11/2022 1.6  cm Final   • IVS/LVPW 01/11/2022 0.82   Final   • FS 01/11/2022 26.7  % Final   • EDV(Teich) 01/11/2022 78.1  ml Final   • ESV(Teich) 01/11/2022 37.0  ml Final   • EF(Teich) 01/11/2022 52.6  % Final   • EDV(cubed) 01/11/2022 73.6  ml Final   • ESV(cubed) 01/11/2022 28.9  ml Final   • EF(cubed) 01/11/2022 60.7  % Final   • LV mass(C)d 01/11/2022 237.2  grams Final   • LV mass(C)dI 01/11/2022 107.2  grams/m^2 Final   • SV(Teich) 01/11/2022 41.1  ml Final   • SI(Teich) 01/11/2022 18.6  ml/m^2 Final   • SV(cubed) 01/11/2022 44.6  ml Final   • SI(cubed) 01/11/2022 20.2  ml/m^2 Final   • EPSS 01/11/2022 0.7  cm Final   • Ao root diam 01/11/2022 2.7  cm Final   • Ao root area 01/11/2022 5.7  cm^2 Final   • ACS 01/11/2022 1.9  cm Final   • LA dimension (2D)  01/11/2022 3.6  cm Final   • asc Aorta Diam 01/11/2022 3.0  cm Final   • LA/Ao 01/11/2022 1.3   Final   • LVOT diam 01/11/2022 1.9  cm Final   • LVOT area 01/11/2022 2.8  cm^2 Final   • LVOT area(traced) 01/11/2022 2.8  cm^2 Final   • LVLd ap4 01/11/2022 7.4  cm Final   • EDV(MOD-sp4) 01/11/2022 57.6  ml  Final   • LVLs ap4 01/11/2022 6.6  cm Final   • ESV(MOD-sp4) 01/11/2022 25.0  ml Final   • EF(MOD-sp4) 01/11/2022 56.6  % Final   • LVLd ap2 01/11/2022 7.2  cm Final   • EDV(MOD-sp2) 01/11/2022 50.1  ml Final   • LVLs ap2 01/11/2022 6.1  cm Final   • ESV(MOD-sp2) 01/11/2022 20.7  ml Final   • EF(MOD-sp2) 01/11/2022 58.7  % Final   • SV(MOD-sp4) 01/11/2022 32.6  ml Final   • SI(MOD-sp4) 01/11/2022 14.7  ml/m^2 Final   • SV(MOD-sp2) 01/11/2022 29.4  ml Final   • SI(MOD-sp2) 01/11/2022 13.3  ml/m^2 Final   • Ao root area (BSA corrected) 01/11/2022 1.2   Final   • LV Conn Vol (BSA corrected) 01/11/2022 26.0  ml/m^2 Final   • LV Sys Vol (BSA corrected) 01/11/2022 11.3  ml/m^2 Final   • MV E max mihaela 01/11/2022 74.9  cm/sec Final   • MV A max mihaela 01/11/2022 88.6  cm/sec Final   • MV E/A 01/11/2022 0.85   Final   • MV V2 max 01/11/2022 96.8  cm/sec Final   • MV max PG 01/11/2022 3.7  mmHg Final   • MV V2 mean 01/11/2022 61.6  cm/sec Final   • MV mean PG 01/11/2022 2.0  mmHg Final   • MV V2 VTI 01/11/2022 20.1  cm Final   • MVA(VTI) 01/11/2022 2.4  cm^2 Final   • MV P1/2t max mihaela 01/11/2022 81.1  cm/sec Final   • MV P1/2t 01/11/2022 57.4  msec Final   • MVA(P1/2t) 01/11/2022 3.8  cm^2 Final   • MV dec slope 01/11/2022 414.0  cm/sec^2 Final   • MV dec time 01/11/2022 0.22  sec Final   • Ao pk mihaela 01/11/2022 131.0  cm/sec Final   • Ao max PG 01/11/2022 6.9  mmHg Final   • Ao max PG (full) 01/11/2022 3.7  mmHg Final   • Ao V2 mean 01/11/2022 84.8  cm/sec Final   • Ao mean PG 01/11/2022 3.0  mmHg Final   • Ao mean PG (full) 01/11/2022 1.0  mmHg Final   • Ao V2 VTI 01/11/2022 26.1  cm Final   • DEEPAK(I,A) 01/11/2022 1.9  cm^2 Final   • DEEPAK(I,D) 01/11/2022 1.9  cm^2 Final   • DEEPAK(V,A) 01/11/2022 1.9  cm^2 Final   • DEEPAK(V,D) 01/11/2022 1.9  cm^2 Final   • LV V1 max PG 01/11/2022 3.2  mmHg Final   • LV V1 mean PG 01/11/2022 2.0  mmHg Final   • LV V1 max 01/11/2022 89.6  cm/sec Final   • LV V1 mean 01/11/2022 57.6  cm/sec Final   • LV  V1 VTI 01/11/2022 17.2  cm Final   • SV(Ao) 01/11/2022 149.4  ml Final   • SI(Ao) 01/11/2022 67.6  ml/m^2 Final   • SV(LVOT) 01/11/2022 48.8  ml Final   • SI(LVOT) 01/11/2022 22.0  ml/m^2 Final   • PA V2 max 01/11/2022 103.0  cm/sec Final   • PA max PG 01/11/2022 4.2  mmHg Final   • PA max PG (full) 01/11/2022 1.6  mmHg Final   • PA V2 mean 01/11/2022 70.6  cm/sec Final   • PA mean PG 01/11/2022 2.0  mmHg Final   • PA mean PG (full) 01/11/2022 1.0  mmHg Final   • PA V2 VTI 01/11/2022 19.7  cm Final   • RV V1 max PG 01/11/2022 2.6  mmHg Final   • RV V1 mean PG 01/11/2022 1.0  mmHg Final   • RV V1 max 01/11/2022 80.6  cm/sec Final   • RV V1 mean 01/11/2022 53.8  cm/sec Final   • RV V1 VTI 01/11/2022 16.4  cm Final   • MVA P1/2T LCG 01/11/2022 2.7  cm^2 Final   • BH CV ECHO KEATON - BZI_BMI 01/11/2022 34.7  kilograms/m^2 Final   • BH CV ECHO [place] - BSA([place]) 01/11/2022 2.3  m^2 Final   • BH CV ECHO KE[place]ON - BZI_METRIC_WEIGHT 01/11/2022 106.6  kg Final   • BH CV ECHO KE[place] - BZI_METRIC_HEIGHT 01/11/2022 175.3  cm Final   • BH CV VAS BP LEFT ARM 01/11/2022 138/84  mmHg Final   • Echo EF Estimated 01/11/2022 58  % Final   Hospital Outpatient Visit on 01/04/2022   Component Date Value Ref Range Status   • Target HR (85%) 01/04/2022 138  bpm Final   • Max. Pred. HR (100%) 01/04/2022 162  bpm Final   • Baseline HR 01/04/2022 78  bpm Final   • Baseline BP 01/04/2022 153/66  mmHg Final   • O2 sat rest 01/04/2022 99  % Final   • Peak HR 01/04/2022 94  bpm Final   • Percent Max Pred HR 01/04/2022 58.02  % Final   • Percent Target HR 01/04/2022 68  % Final   • Peak BP 01/04/2022 153/66  mmHg Final   • O2 sat peak 01/04/2022 98  % Final   • Recovery HR 01/04/2022 89  bpm Final   • Recovery BP 01/04/2022 144/47  mmHg Final   • Recovery O2 01/04/2022 98  % Final   • BH CV REST NUCLEAR ISOTOPE DOSE 01/04/2022 10.2  mCi Final   • BH CV STRESS NUCLEAR ISOTOPE DOSE 01/04/2022 32.7  mCi Final   • Nuc Stress EF 01/04/2022 70  % Final   Lab  on 12/13/2021   Component Date Value Ref Range Status   • Iron 12/13/2021 48  37 - 145 mcg/dL Final   • Iron Saturation 12/13/2021 16 (A) 20 - 50 % Final   • Transferrin 12/13/2021 204  200 - 360 mg/dL Final   • TIBC 12/13/2021 304  298 - 536 mcg/dL Final   • Ferritin 12/13/2021 52.45  13.00 - 150.00 ng/mL Final   • Folate 12/13/2021 15.40  4.78 - 24.20 ng/mL Final   • Vitamin B-12 12/13/2021 337  211 - 946 pg/mL Final   • WBC 12/13/2021 13.53 (A) 3.40 - 10.80 10*3/mm3 Final   • RBC 12/13/2021 4.44  3.77 - 5.28 10*6/mm3 Final   • Hemoglobin 12/13/2021 11.7 (A) 12.0 - 15.9 g/dL Final   • Hematocrit 12/13/2021 36.4  34.0 - 46.6 % Final   • MCV 12/13/2021 82.0  79.0 - 97.0 fL Final   • MCH 12/13/2021 26.4 (A) 26.6 - 33.0 pg Final   • MCHC 12/13/2021 32.1  31.5 - 35.7 g/dL Final   • RDW 12/13/2021 15.0  12.3 - 15.4 % Final   • RDW-SD 12/13/2021 45.0  37.0 - 54.0 fl Final   • MPV 12/13/2021 12.4 (A) 6.0 - 12.0 fL Final   • Platelets 12/13/2021 215  140 - 450 10*3/mm3 Final   • Neutrophil % 12/13/2021 74.6  42.7 - 76.0 % Final   • Lymphocyte % 12/13/2021 18.0 (A) 19.6 - 45.3 % Final   • Monocyte % 12/13/2021 3.9 (A) 5.0 - 12.0 % Final   • Eosinophil % 12/13/2021 2.2  0.3 - 6.2 % Final   • Basophil % 12/13/2021 0.9  0.0 - 1.5 % Final   • Immature Grans % 12/13/2021 0.4  0.0 - 0.5 % Final   • Neutrophils, Absolute 12/13/2021 10.08 (A) 1.70 - 7.00 10*3/mm3 Final   • Lymphocytes, Absolute 12/13/2021 2.44  0.70 - 3.10 10*3/mm3 Final   • Monocytes, Absolute 12/13/2021 0.53  0.10 - 0.90 10*3/mm3 Final   • Eosinophils, Absolute 12/13/2021 0.30  0.00 - 0.40 10*3/mm3 Final   • Basophils, Absolute 12/13/2021 0.12  0.00 - 0.20 10*3/mm3 Final   • Immature Grans, Absolute 12/13/2021 0.06 (A) 0.00 - 0.05 10*3/mm3 Final   • nRBC 12/13/2021 0.0  0.0 - 0.2 /100 WBC Final      MRI Hand Left Without Contrast  Narrative: MRI left hand.    HISTORY: Pain in left hand and index finger.    Prior exam: Left hand November 16,  "2021.    FINDINGS:    There is an effusion involving the second metacarpophalangeal  joint.    Subchondral erosions base of the proximal phalange of the index  finger and base of the distal phalange of the index finger at the  DIP joint. This indicates an inflammatory synovitis such as  rheumatoid or rheumatoid variant arthritis.    This is best appreciated series 11 image 28. Series 11 image 12.    There is diffuse fluid surrounding the flexor tendon of the index  finger, (extensive tenosynovitis.) Series 13 image 13.    MRI left hand is otherwise unremarkable.  Impression: 1. Joint effusion second metacarpophalangeal joint.     2.Arthritic erosive changes at the second metacarpal phalangeal  joint, base of the proximal phalange of the index finger and the  DIP joint. Extensive fluid surrounding the flexor tendon of the  index finger, extensive tenosynovitis.    Electronically signed by:  Melchor Mancera MD  4/26/2022 7:40 AM CDT  Workstation: [id number]    @[email or web address]@  Immunization History   Administered Date(s) Administered   • COVID-19 (MODERNA) 1st, 2nd, 3rd Dose Only 04/13/2021, 04/13/2021, 05/13/2021, 05/13/2021, 11/16/2021, 05/04/2022   • FluLaval/Fluarix/Fluzone >6 09/14/2020, 11/16/2021   • Pneumococcal Polysaccharide (PPSV23) 09/14/2020   • Shingrix 09/15/2020   • Tdap 09/14/2020       The following portions of the patient's history were reviewed and updated as appropriate: allergies, current medications, past family history, past medical history, past social history, past surgical history and problem list.        Physical Exam  /64 (BP Location: Right arm, Patient Position: Sitting, Cuff Size: Large Adult)   Pulse 70   Temp 97.7 °F (36.5 °C)   Ht 175.3 cm (69\")   SpO2 98%   BMI 34.70 kg/m²     Physical Exam  Vitals and nursing note reviewed.   Constitutional:       Appearance: She is well-developed. She is not diaphoretic.   HENT:      Head: Normocephalic and atraumatic.      Right Ear: External " ear normal.   Eyes:      Conjunctiva/sclera: Conjunctivae normal.      Pupils: Pupils are equal, round, and reactive to light.   Cardiovascular:      Rate and Rhythm: Normal rate and regular rhythm.      Heart sounds: Normal heart sounds. No murmur heard.  Pulmonary:      Effort: Pulmonary effort is normal. No respiratory distress.      Breath sounds: Normal breath sounds.   Abdominal:      General: Bowel sounds are normal. There is no distension.      Palpations: Abdomen is soft.      Tenderness: There is no abdominal tenderness.      Comments: Obese abdomen    Musculoskeletal:         General: Swelling and tenderness present. No deformity. Normal range of motion.      Cervical back: Normal range of motion and neck supple.      Right lower leg: Swelling, tenderness and bony tenderness present. 2+ Edema present.      Left lower le+ Edema present.        Legs:       Comments: Wheelchair bound    Skin:     General: Skin is warm.      Coloration: Skin is not pale.      Findings: No erythema or rash.   Neurological:      Mental Status: She is alert and oriented to person, place, and time.      Cranial Nerves: No cranial nerve deficit.   Psychiatric:         Behavior: Behavior normal.         [unfilled]   Diagnosis Plan   1. Leg swelling  Duplex Venous Lower Extremity - Bilateral CAR   2. Uncontrolled type 2 diabetes mellitus with hyperglycemia (HCC)     3. At high risk for falls     4. Vitamin D deficiency     5. Acquired hypothyroidism     6. Wheelchair bound     7. B12 deficiency     8. Coronary artery disease involving native coronary artery of native heart without angina pectoris     9. Essential hypertension     10. Mixed hyperlipidemia     11. Iron deficiency     12. Screening mammogram, encounter for  Mammo Screening Bilateral With CAD   13. Class 1 obesity with serious comorbidity and body mass index (BMI) of 34.0 to 34.9 in adult, unspecified obesity type     14. Below knee amputation (HCC)     15.  Urge incontinence             -went over labwork  -recommend diabetic eye exam   -recommend COVID-19 vaccination .  -recommend mammogram screening - schedule at imaging center   -urinary incontinence -go up on ditropan XL from 10 to 15 mg dialy.    -CAD/pericardial effusion/LVH-  Cardiology following on aspirin, coreg statin   -chronic neck pain/DDD cervical spine/cervical radiculopathy -reviewed x-ray of neck. Referred to Neurology   -leg swelling -will get venous doppler recommend low sodium diet more fluids   -diabetic retinopathy/catatract  - Ophthalmology following in [place] she has pending surgery   -gait instability/ high fall risk-  Uses wheelchair   -urinary incontinence - currently uses pullups  -colonic/rectal polyps, internal hemorrhoids/GERD with esophagitis/gastritis  - GI following  Next colonsocopy in 2025. On prilosec 40 mg daily.   -CKD stage 3a - Nephrology following.  -vitamin D deficiency -vitamin D once a week  -hypothyroidism - on synthroid 25 mcg PO q daily.   -DM type 2  - on trulicity 3 mg subq weekly.  On jardiance 25 mg daily.  on basaglar  35 units at bedtime.on humalog 5 units before meals. Endocrinology following. Has Dexcom monitor ordered   -microcytic anemia/iron deficiency anemialleukocytosis  - on ferrous sulfate 325 mg PO q daily on b12 injections .. Hematology following    -HTN -    on lisinopril  20 mg PO q daily.   -HLP - on lipitor 80 mg PO qhs. Recommend heart health ydiet   -major depression - on viibryd 20 mg Po q daily.    -obesity - counseled weight loss >5 minutes BMI at 34.70   -diabetic neuropathy - on lyrica 300 PO BID will go up to QID. On cymbalta  -chronic pain  Was on Norco 7.5/325 mg every 12 hours PRN. Will refer to Pain Management.   on robaxin TID     -advised pt to be safe and call with questions and concerns  -advised pt to go to ER or call 911 if symptoms worrisome or severe  -advised pt to followup with specialist and referrals  -advised pt to be safe  during COVID-19 pandemic  I spent 30 minutes caring for Marta on this date of service. This time includes time spent by me in the following activities: preparing for the visit, reviewing tests, obtaining and/or reviewing a separately obtained history, performing a medically appropriate examination and/or evaluation, counseling and educating the patient/family/caregiver, ordering medications, tests, or procedures, referring and communicating with other health care professionals, documenting information in the medical record, independently interpreting results and communicating that information with the patient/family/caregiver and care coordination.  -recheck in 6 weeks         This document has been electronically signed by Gato Crane MD on Karen 10, 2022 12:21 CDT

## 2022-06-06 ENCOUNTER — INFUSION (OUTPATIENT)
Dept: ONCOLOGY | Facility: HOSPITAL | Age: 57
End: 2022-06-06

## 2022-06-06 DIAGNOSIS — E53.8 B12 DEFICIENCY: Primary | ICD-10-CM

## 2022-06-06 PROCEDURE — 25010000002 CYANOCOBALAMIN PER 1000 MCG: Performed by: INTERNAL MEDICINE

## 2022-06-06 PROCEDURE — 96372 THER/PROPH/DIAG INJ SC/IM: CPT | Performed by: INTERNAL MEDICINE

## 2022-06-06 RX ORDER — CYANOCOBALAMIN 1000 UG/ML
1000 INJECTION, SOLUTION INTRAMUSCULAR; SUBCUTANEOUS ONCE
Status: CANCELLED | OUTPATIENT
Start: 2022-07-07

## 2022-06-06 RX ORDER — CYANOCOBALAMIN 1000 UG/ML
1000 INJECTION, SOLUTION INTRAMUSCULAR; SUBCUTANEOUS ONCE
Status: COMPLETED | OUTPATIENT
Start: 2022-06-06 | End: 2022-06-06

## 2022-06-06 RX ADMIN — CYANOCOBALAMIN 1000 MCG: 1000 INJECTION, SOLUTION INTRAMUSCULAR at 09:29

## 2022-06-07 ENCOUNTER — DOCUMENTATION (OUTPATIENT)
Dept: ENDOCRINOLOGY | Facility: CLINIC | Age: 57
End: 2022-06-07

## 2022-06-08 RX ORDER — LEVOTHYROXINE SODIUM 0.03 MG/1
TABLET ORAL
Qty: 30 TABLET | Refills: 0 | Status: SHIPPED | OUTPATIENT
Start: 2022-06-08 | End: 2022-08-26 | Stop reason: SDUPTHER

## 2022-06-10 ENCOUNTER — OFFICE VISIT (OUTPATIENT)
Dept: FAMILY MEDICINE CLINIC | Facility: CLINIC | Age: 57
End: 2022-06-10

## 2022-06-10 VITALS
TEMPERATURE: 97.7 F | HEIGHT: 69 IN | SYSTOLIC BLOOD PRESSURE: 118 MMHG | HEART RATE: 70 BPM | OXYGEN SATURATION: 98 % | BODY MASS INDEX: 34.7 KG/M2 | DIASTOLIC BLOOD PRESSURE: 64 MMHG

## 2022-06-10 DIAGNOSIS — M79.89 LEG SWELLING: Primary | ICD-10-CM

## 2022-06-10 DIAGNOSIS — Z12.31 SCREENING MAMMOGRAM, ENCOUNTER FOR: ICD-10-CM

## 2022-06-10 DIAGNOSIS — S88.119A BELOW KNEE AMPUTATION: ICD-10-CM

## 2022-06-10 DIAGNOSIS — E53.8 B12 DEFICIENCY: ICD-10-CM

## 2022-06-10 DIAGNOSIS — E78.2 MIXED HYPERLIPIDEMIA: ICD-10-CM

## 2022-06-10 DIAGNOSIS — E55.9 VITAMIN D DEFICIENCY: ICD-10-CM

## 2022-06-10 DIAGNOSIS — N39.41 URGE INCONTINENCE: ICD-10-CM

## 2022-06-10 DIAGNOSIS — Z91.81 AT HIGH RISK FOR FALLS: ICD-10-CM

## 2022-06-10 DIAGNOSIS — I10 ESSENTIAL HYPERTENSION: ICD-10-CM

## 2022-06-10 DIAGNOSIS — E61.1 IRON DEFICIENCY: Chronic | ICD-10-CM

## 2022-06-10 DIAGNOSIS — I25.10 CORONARY ARTERY DISEASE INVOLVING NATIVE CORONARY ARTERY OF NATIVE HEART WITHOUT ANGINA PECTORIS: ICD-10-CM

## 2022-06-10 DIAGNOSIS — E03.9 ACQUIRED HYPOTHYROIDISM: ICD-10-CM

## 2022-06-10 DIAGNOSIS — E66.9 CLASS 1 OBESITY WITH SERIOUS COMORBIDITY AND BODY MASS INDEX (BMI) OF 34.0 TO 34.9 IN ADULT, UNSPECIFIED OBESITY TYPE: ICD-10-CM

## 2022-06-10 DIAGNOSIS — E11.65 UNCONTROLLED TYPE 2 DIABETES MELLITUS WITH HYPERGLYCEMIA: ICD-10-CM

## 2022-06-10 DIAGNOSIS — Z99.3 WHEELCHAIR BOUND: ICD-10-CM

## 2022-06-10 PROCEDURE — 99214 OFFICE O/P EST MOD 30 MIN: CPT | Performed by: FAMILY MEDICINE

## 2022-06-10 RX ORDER — OXYBUTYNIN CHLORIDE 15 MG/1
15 TABLET, EXTENDED RELEASE ORAL DAILY
Qty: 30 TABLET | Refills: 3 | Status: SHIPPED | OUTPATIENT
Start: 2022-06-10 | End: 2022-08-12 | Stop reason: ALTCHOICE

## 2022-06-10 RX ORDER — NICOTINE 21 MG/24HR
1 PATCH, TRANSDERMAL 24 HOURS TRANSDERMAL EVERY 24 HOURS
Qty: 30 PATCH | Refills: 3 | Status: SHIPPED | OUTPATIENT
Start: 2022-06-10 | End: 2022-06-28

## 2022-06-16 ENCOUNTER — TELEPHONE (OUTPATIENT)
Dept: ENDOCRINOLOGY | Facility: CLINIC | Age: 57
End: 2022-06-16

## 2022-06-16 RX ORDER — METHOCARBAMOL 750 MG/1
TABLET, FILM COATED ORAL
Qty: 90 TABLET | Refills: 0 | Status: SHIPPED | OUTPATIENT
Start: 2022-06-16 | End: 2022-08-16

## 2022-06-17 DIAGNOSIS — E11.42 DIABETIC POLYNEUROPATHY ASSOCIATED WITH TYPE 2 DIABETES MELLITUS: Chronic | ICD-10-CM

## 2022-06-17 RX ORDER — PREGABALIN 300 MG/1
CAPSULE ORAL
Qty: 60 CAPSULE | Refills: 0 | Status: SHIPPED | OUTPATIENT
Start: 2022-06-17 | End: 2022-08-16

## 2022-06-24 ENCOUNTER — DOCUMENTATION (OUTPATIENT)
Dept: ENDOCRINOLOGY | Facility: CLINIC | Age: 57
End: 2022-06-24

## 2022-06-28 ENCOUNTER — OFFICE VISIT (OUTPATIENT)
Dept: ENDOCRINOLOGY | Facility: CLINIC | Age: 57
End: 2022-06-28

## 2022-06-28 VITALS
BODY MASS INDEX: 34.8 KG/M2 | SYSTOLIC BLOOD PRESSURE: 128 MMHG | WEIGHT: 235 LBS | HEART RATE: 72 BPM | HEIGHT: 69 IN | OXYGEN SATURATION: 100 % | DIASTOLIC BLOOD PRESSURE: 70 MMHG

## 2022-06-28 DIAGNOSIS — E11.42 DIABETIC POLYNEUROPATHY ASSOCIATED WITH TYPE 2 DIABETES MELLITUS: ICD-10-CM

## 2022-06-28 DIAGNOSIS — E11.65 TYPE 2 DIABETES MELLITUS WITH HYPERGLYCEMIA, WITH LONG-TERM CURRENT USE OF INSULIN: Primary | ICD-10-CM

## 2022-06-28 DIAGNOSIS — Z79.4 TYPE 2 DIABETES MELLITUS WITH HYPERGLYCEMIA, WITH LONG-TERM CURRENT USE OF INSULIN: Primary | ICD-10-CM

## 2022-06-28 DIAGNOSIS — E55.9 VITAMIN D DEFICIENCY: ICD-10-CM

## 2022-06-28 DIAGNOSIS — F17.200 SMOKER: ICD-10-CM

## 2022-06-28 DIAGNOSIS — E78.2 MIXED HYPERLIPIDEMIA: ICD-10-CM

## 2022-06-28 DIAGNOSIS — I10 ESSENTIAL HYPERTENSION: ICD-10-CM

## 2022-06-28 PROCEDURE — 95251 CONT GLUC MNTR ANALYSIS I&R: CPT | Performed by: NURSE PRACTITIONER

## 2022-06-28 PROCEDURE — 99214 OFFICE O/P EST MOD 30 MIN: CPT | Performed by: NURSE PRACTITIONER

## 2022-06-28 RX ORDER — LANCETS 30 GAUGE
EACH MISCELLANEOUS
Qty: 120 EACH | Refills: 11 | Status: SHIPPED | OUTPATIENT
Start: 2022-06-28

## 2022-06-28 RX ORDER — BLOOD-GLUCOSE METER
1 KIT MISCELLANEOUS AS NEEDED
Qty: 1 EACH | Refills: 0 | Status: SHIPPED | OUTPATIENT
Start: 2022-06-28

## 2022-06-28 NOTE — PROGRESS NOTES
"Chief Complaint  Diabetes    Subjective          Marta Giraldo presents to [facility] END[facility]  History of Present Illness      In office visit    57-year-old female presents for follow-up    Reason diabetes mellitus type 2    Diagnosed 1980s    Timing constant    Quality improved    Severity is high      Macrovascular complications--CVA      Microvascular complications--neuropathy , diabetic retinopathy           Blood glucose monitoring fingersticks     Checks 4 times daily       Using nigel     See below           Review of Systems - General ROS: negative          Objective   Vital Signs:   /70   Pulse 72   Ht 175.3 cm (69\")   Wt 107 kg (235 lb)   SpO2 100%   BMI 34.70 kg/m²     Physical Exam  Constitutional:       Appearance: Normal appearance.   Cardiovascular:      Rate and Rhythm: Regular rhythm.      Heart sounds: Normal heart sounds.   Pulmonary:      Breath sounds: Normal breath sounds.   Musculoskeletal:      Cervical back: Normal range of motion.   Neurological:      General: No focal deficit present.      Mental Status: She is alert.   Psychiatric:         Mood and Affect: Mood normal.         Thought Content: Thought content normal.         Judgment: Judgment normal.        Result Review :   The following data was reviewed by: CONNOR Concepcion on 02/23/2022:  Common labs    Common Labsle 1/27/22 1/27/22 4/13/22 5/3/22 5/3/22 5/3/22 5/3/22    0708 0708  0823 0823 0823 0823   Glucose  145 (A)    86    BUN  17    17    Creatinine  1.00    0.97    eGFR Non African Am  57 (A)        Sodium  140    139    Potassium  4.4    4.9    Chloride  105    104    Calcium  8.7    8.9    Albumin      3.70    Total Bilirubin      0.2    Alkaline Phosphatase      77    AST (SGOT)      12    ALT (SGPT)      11    WBC 13.70 (A)  14.96 (A)       Hemoglobin 11.7 (A)  11.6 (A)       Hematocrit 36.1  36.5       Platelets 202  211       Total Cholesterol       115 "   Triglycerides       65   HDL Cholesterol       40   LDL Cholesterol        61   Hemoglobin A1C    7.90 (A)      Microalbumin, Urine     3.4     (A) Abnormal value                        Assessment and Plan    Diagnoses and all orders for this visit:    1. Type 2 diabetes mellitus with hyperglycemia, with long-term current use of insulin (HCC) (Primary)    2. Mixed hyperlipidemia    3. Essential hypertension    4. Smoker    5. Vitamin D deficiency    6. Diabetic polyneuropathy associated with type 2 diabetes mellitus (HCC)    Other orders  -     glucose monitor monitoring kit; 1 each As Needed (to check bg).  Dispense: 1 each; Refill: 0  -     glucose blood test strip; Test 4 times daily , E10.65  Dispense: 120 each; Refill: 11  -     Lancets misc; Test 4 times daily, E10.65  Dispense: 120 each; Refill: 11         Glycemic management     Diabetes mellitus type 2     Lab Results   Component Value Date    HGBA1C 7.90 (H) 05/03/2022         [employer] personal use --CREAT 2      Downloaded and reviewed     Average bg 166    Target range 88 %     High 7%     Very high 5%     Low 0 %     Very low 0 %     She is in target 88% of the time , occasional postprandial hyperglycemia     But no changes in dosages today              Taking Jardiance 25 mg daily-keep      Taking Trulicity 3 mg weekly --keep      Taking Lantus 32 units nightly            Take humalog before meals 10 units       Goals for sugar     Fasting and before meals 80 to 130     2 hours after meals 180 or less        Aim for 45 grams of carbohydrate per meal     Aim for 15 grams of carbohydrate per snack                     Lipid management     Taking Lipitor 40 mg 1 at night        Total Cholesterol   Date Value Ref Range Status   05/03/2022 115 0 - 200 mg/dL Final     Triglycerides   Date Value Ref Range Status   05/03/2022 65 0 - 150 mg/dL Final     HDL Cholesterol   Date Value Ref Range Status   05/03/2022 40 40 - 60 mg/dL Final     LDL Cholesterol    Date  Value Ref Range Status   05/03/2022 61 0 - 100 mg/dL Final                 Blood pressure management     Taking lisinopril 20 mg 1 daily      taking carvedilol 6.25 mg bid            Microvascular monitoring     Last eye exam--- Jan. 2021 ,  , monthly injection      Neuropathy     Taking Lyrica      + microalbuminuria    Follows with nephrology     Component      Latest Ref Rng & Units 5/3/2022   Microalbumin/Creatinine Ratio      mg/g 55.4   Creatinine, Urine      mg/dL 61.4   Microalbumin, Urine      mg/dL 3.4                   Thyroid     Hypothyroidism     Taking levothyroxine 25 mcg daily        Lab Results   Component Value Date    TSH 2.030 05/03/2022          Bone health     Vitamin D deficiency     Taking vitamin D 50,000 units weekly     Weight management       Obesity          Body mass index is 34.7 kg/m².                  Follow Up   Return in about 3 months (around 9/28/2022) for Recheck.  Patient was given instructions and counseling regarding her condition or for health maintenance advice. Please see specific information pulled into the AVS if appropriate.         This document has been electronically signed by CONNOR Concepcion on June 28, 2022 10:04 CDT.

## 2022-06-30 ENCOUNTER — OFFICE VISIT (OUTPATIENT)
Dept: FAMILY MEDICINE CLINIC | Facility: CLINIC | Age: 57
End: 2022-06-30

## 2022-06-30 VITALS
WEIGHT: 235 LBS | HEIGHT: 69 IN | TEMPERATURE: 98.6 F | SYSTOLIC BLOOD PRESSURE: 118 MMHG | HEART RATE: 72 BPM | BODY MASS INDEX: 34.8 KG/M2 | OXYGEN SATURATION: 95 % | DIASTOLIC BLOOD PRESSURE: 68 MMHG

## 2022-06-30 DIAGNOSIS — L98.9 SORE ON TOE: ICD-10-CM

## 2022-06-30 DIAGNOSIS — R09.89 DECREASED PEDAL PULSES: ICD-10-CM

## 2022-06-30 DIAGNOSIS — L81.9 DISCOLORATION OF SKIN OF TOE: ICD-10-CM

## 2022-06-30 DIAGNOSIS — T14.8XXA SUPERFICIAL LACERATION: ICD-10-CM

## 2022-06-30 DIAGNOSIS — M79.89 LEG SWELLING: Primary | ICD-10-CM

## 2022-06-30 PROCEDURE — 99214 OFFICE O/P EST MOD 30 MIN: CPT | Performed by: NURSE PRACTITIONER

## 2022-06-30 RX ORDER — FLUCONAZOLE 150 MG/1
TABLET ORAL
Qty: 2 TABLET | Refills: 0 | Status: SHIPPED | OUTPATIENT
Start: 2022-06-30 | End: 2022-08-26

## 2022-06-30 RX ORDER — SULFAMETHOXAZOLE AND TRIMETHOPRIM 800; 160 MG/1; MG/1
1 TABLET ORAL 2 TIMES DAILY
Qty: 14 TABLET | Refills: 0 | Status: SHIPPED | OUTPATIENT
Start: 2022-06-30 | End: 2022-08-26

## 2022-06-30 NOTE — PROGRESS NOTES
"Chief Complaint  Leg Pain and Foot Swelling    Subjective          Marta Giraldo presents to [facility] - [facility]    PCP: Dr. Crane    CC: \"leg pain, swelling, discoloration, sores\"    Hx of recent leg swelling when seen by PCP on 6-10, referred for venous doppler that was scheduled for yesterday, but reports she wasn't aware of appointment and missed it.  Reports since PCP visit, swelling has improved somewhat.  She had been sleeping with leg dependent in the recliner and has since started elevating leg at night and also drinking more water.  Reports she noted discoloration to her toes and a sore on left great toe a few days ago.  Also reports she bumped into something at home last night and now has a scratch on her leg.  C/O an \"ache\" in her LLE.  She is mostly wheelchair bound.  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  Hx of CAD, T2DM, HTN, Hyperlipidemia.  Continues to smoke. Tdap in 2020.    Accompanied by her niece at visit today.         Review of Systems   Constitutional: Negative.    Respiratory: Negative.    Cardiovascular: Negative.    Gastrointestinal: Negative.    Genitourinary: Negative.    Skin: Positive for color change ( darkened nail beds, dark sore on left great toe) and wound (superficial laceration to LLE).   Neurological: Negative for dizziness and headaches.        Objective   Vital Signs:   /68 (BP Location: Right arm, Patient Position: Sitting, Cuff Size: Adult)   Pulse 72   Temp 98.6 °F (37 °C)   Ht 175.3 cm (69\")   Wt 107 kg (235 lb)   SpO2 95%   BMI 34.70 kg/m²       Physical Exam  Vitals and nursing note reviewed.   Constitutional:       General: She is not in acute distress.     Appearance: She is not ill-appearing.   HENT:      Head: Normocephalic and atraumatic.   Cardiovascular:      Rate and Rhythm: Normal rate and regular rhythm.      Pulses:           Dorsalis pedis pulses " are 1+ on the left side.        Posterior tibial pulses are 1+ on the left side.   Pulmonary:      Effort: Pulmonary effort is normal. No respiratory distress.      Breath sounds: Normal breath sounds. No wheezing, rhonchi or rales.   Musculoskeletal:      Cervical back: Neck supple.        Feet:       Right Lower Extremity: Right leg is amputated below knee.   Skin:     General: Skin is warm and dry.      Findings: Erythema and laceration present.          Neurological:      General: No focal deficit present.      Mental Status: She is alert and oriented to person, place, and time.   Psychiatric:         Mood and Affect: Mood normal.         Thought Content: Thought content normal.          Result Review :     Common labs    Common Labsle 1/27/22 1/27/22 4/13/22 5/3/22 5/3/22 5/3/22 5/3/22    0708 0708  0823 0823 0823 0823   Glucose  145 (A)    86    BUN  17    17    Creatinine  1.00    0.97    eGFR Non African Am  57 (A)        Sodium  140    139    Potassium  4.4    4.9    Chloride  105    104    Calcium  8.7    8.9    Albumin      3.70    Total Bilirubin      0.2    Alkaline Phosphatase      77    AST (SGOT)      12    ALT (SGPT)      11    WBC 13.70 (A)  14.96 (A)       Hemoglobin 11.7 (A)  11.6 (A)       Hematocrit 36.1  36.5       Platelets 202  211       Total Cholesterol       115   Triglycerides       65   HDL Cholesterol       40   LDL Cholesterol        61   Hemoglobin A1C    7.90 (A)      Microalbumin, Urine     3.4     (A) Abnormal value                      Assessment and Plan    Diagnoses and all orders for this visit:    1. Leg swelling (Primary)  -     Ambulatory Referral to Vascular Surgery    2. Decreased pedal pulses  -     Ambulatory Referral to Vascular Surgery    3. Discoloration of skin of toe  -     Ambulatory Referral to Vascular Surgery    4. Superficial laceration  Comments:  LLE  Orders:  -     mupirocin (BACTROBAN) 2 % ointment; Apply 1 application topically to the appropriate area as  directed 3 (Three) Times a Day.  Dispense: 22 g; Refill: 0    5. Sore on toe  Comments:  Left great  Orders:  -     sulfamethoxazole-trimethoprim (Bactrim DS) 800-160 MG per tablet; Take 1 tablet by mouth 2 (Two) Times a Day.  Dispense: 14 tablet; Refill: 0  -     mupirocin (BACTROBAN) 2 % ointment; Apply 1 application topically to the appropriate area as directed 3 (Three) Times a Day.  Dispense: 22 g; Refill: 0    Other orders  -     fluconazole (Diflucan) 150 MG tablet; 1 tab po x 1 now, may repeat in 4 days prn yeast  Dispense: 2 tablet; Refill: 0      Referral to Vascular surgery for evaluation for PVD/PAD  Elevate leg when able.  Stay well hydrated.   Rx for bactrim, bactroban for sore on toe.     See PCP or RTC if symptoms persist/worsen  See PCP for routine f/u visit and management of chronic medical conditions      This document has been electronically signed by CONNOR Palacios on June 30, 2022 13:56 CDT,.    I spent 35   minutes caring for Marta on this date of service. This time includes time spent by me in the following activities:preparing for the visit, reviewing tests, performing a medically appropriate examination and/or evaluation , counseling and educating the patient/family/caregiver, ordering medications, tests, or procedures, referring and communicating with other health care professionals  and documenting information in the medical record

## 2022-07-01 ENCOUNTER — APPOINTMENT (OUTPATIENT)
Dept: ONCOLOGY | Facility: HOSPITAL | Age: 57
End: 2022-07-01

## 2022-07-01 DIAGNOSIS — M79.89 LEG SWELLING: ICD-10-CM

## 2022-07-01 DIAGNOSIS — I73.9 PERIPHERAL VASCULAR DISEASE: Primary | ICD-10-CM

## 2022-07-05 ENCOUNTER — TELEPHONE (OUTPATIENT)
Dept: ENDOCRINOLOGY | Facility: CLINIC | Age: 57
End: 2022-07-05

## 2022-07-05 DIAGNOSIS — Z79.4 TYPE 2 DIABETES MELLITUS WITH HYPERGLYCEMIA, WITH LONG-TERM CURRENT USE OF INSULIN: Primary | ICD-10-CM

## 2022-07-05 DIAGNOSIS — E11.65 TYPE 2 DIABETES MELLITUS WITH HYPERGLYCEMIA, WITH LONG-TERM CURRENT USE OF INSULIN: Primary | ICD-10-CM

## 2022-07-05 RX ORDER — INSULIN LISPRO 100 [IU]/ML
10 INJECTION, SOLUTION INTRAVENOUS; SUBCUTANEOUS
Qty: 10 ML | Refills: 5 | Status: SHIPPED | OUTPATIENT
Start: 2022-07-05 | End: 2022-10-06

## 2022-07-05 RX ORDER — INSULIN ASPART 100 [IU]/ML
10 INJECTION, SOLUTION INTRAVENOUS; SUBCUTANEOUS
Qty: 10 ML | Refills: 9 | Status: SHIPPED | OUTPATIENT
Start: 2022-07-05 | End: 2023-03-29 | Stop reason: SDUPTHER

## 2022-07-05 NOTE — TELEPHONE ENCOUNTER
PT called and is needing an Rx sent in for her Insulin Lispro (humaLOG) 100 UNIT/ML injection     Pharmacy Info:  [facility] - [place], [place] - [address] - [phone]  - [phone] FX   [address], [facility] [address]   Phone:  [phone]  Fax:  [phone]

## 2022-07-07 ENCOUNTER — INFUSION (OUTPATIENT)
Dept: ONCOLOGY | Facility: HOSPITAL | Age: 57
End: 2022-07-07

## 2022-07-07 VITALS — RESPIRATION RATE: 18 BRPM

## 2022-07-07 DIAGNOSIS — E53.8 B12 DEFICIENCY: Primary | ICD-10-CM

## 2022-07-07 PROCEDURE — 25010000002 CYANOCOBALAMIN PER 1000 MCG: Performed by: INTERNAL MEDICINE

## 2022-07-07 PROCEDURE — 96372 THER/PROPH/DIAG INJ SC/IM: CPT | Performed by: INTERNAL MEDICINE

## 2022-07-07 RX ORDER — CYANOCOBALAMIN 1000 UG/ML
1000 INJECTION, SOLUTION INTRAMUSCULAR; SUBCUTANEOUS ONCE
Status: COMPLETED | OUTPATIENT
Start: 2022-07-07 | End: 2022-07-07

## 2022-07-07 RX ORDER — CYANOCOBALAMIN 1000 UG/ML
1000 INJECTION, SOLUTION INTRAMUSCULAR; SUBCUTANEOUS ONCE
Status: CANCELLED | OUTPATIENT
Start: 2022-08-04

## 2022-07-07 RX ADMIN — CYANOCOBALAMIN 1000 MCG: 1000 INJECTION, SOLUTION INTRAMUSCULAR at 15:14

## 2022-07-13 ENCOUNTER — TELEPHONE (OUTPATIENT)
Dept: FAMILY MEDICINE CLINIC | Facility: CLINIC | Age: 57
End: 2022-07-13

## 2022-07-14 ENCOUNTER — TELEPHONE (OUTPATIENT)
Dept: FAMILY MEDICINE CLINIC | Facility: CLINIC | Age: 57
End: 2022-07-14

## 2022-07-14 NOTE — TELEPHONE ENCOUNTER
Returned call to pt. Informed her Dr. Crane is out of the office, however, her message from 7/13/22 was forwarded to him, he is checking message periodically. If he is agreeable to write order, will not have until he returns to the office. Pt voiced understanding. Informed pt would let her know when he responded to message.

## 2022-07-20 RX ORDER — DULOXETIN HYDROCHLORIDE 60 MG/1
60 CAPSULE, DELAYED RELEASE ORAL 2 TIMES DAILY
Qty: 60 CAPSULE | Refills: 3 | Status: SHIPPED | OUTPATIENT
Start: 2022-07-20 | End: 2022-10-21

## 2022-07-20 NOTE — TELEPHONE ENCOUNTER
Patient called stating pain management told her she needed to start getting her duloxetine medication prescribed by her PCP. Patient stated she takes 60 mg tablets 2x/day. Patient also asked about hospital bed from [facility] and was informed our office had already faxed them the script. Patient verbalized understanding.  Call back number: [phone]

## 2022-07-26 ENCOUNTER — OFFICE VISIT (OUTPATIENT)
Dept: CARDIAC SURGERY | Facility: CLINIC | Age: 57
End: 2022-07-26

## 2022-07-26 VITALS
HEART RATE: 80 BPM | DIASTOLIC BLOOD PRESSURE: 69 MMHG | OXYGEN SATURATION: 99 % | SYSTOLIC BLOOD PRESSURE: 124 MMHG | BODY MASS INDEX: 34.7 KG/M2 | HEIGHT: 69 IN

## 2022-07-26 DIAGNOSIS — I10 ESSENTIAL HYPERTENSION: ICD-10-CM

## 2022-07-26 DIAGNOSIS — E78.2 MIXED HYPERLIPIDEMIA: ICD-10-CM

## 2022-07-26 DIAGNOSIS — E11.42 DIABETIC POLYNEUROPATHY ASSOCIATED WITH TYPE 2 DIABETES MELLITUS: ICD-10-CM

## 2022-07-26 DIAGNOSIS — E11.65 TYPE 2 DIABETES MELLITUS WITH HYPERGLYCEMIA, WITH LONG-TERM CURRENT USE OF INSULIN: ICD-10-CM

## 2022-07-26 DIAGNOSIS — F17.200 TOBACCO DEPENDENCE: ICD-10-CM

## 2022-07-26 DIAGNOSIS — M79.605 LEG PAIN, DIFFUSE, LEFT: Primary | ICD-10-CM

## 2022-07-26 DIAGNOSIS — Z79.4 TYPE 2 DIABETES MELLITUS WITH HYPERGLYCEMIA, WITH LONG-TERM CURRENT USE OF INSULIN: ICD-10-CM

## 2022-07-26 PROCEDURE — 99215 OFFICE O/P EST HI 40 MIN: CPT | Performed by: NURSE PRACTITIONER

## 2022-07-26 NOTE — PATIENT INSTRUCTIONS
Nonvascular LEFT lower extremity pain  Normal arterial flow  Normal venous duplex  Follow up PCP      Diabetic foot care referral

## 2022-07-27 NOTE — PROGRESS NOTES
CVTS Office Progress Note       Subjective   Patient ID: Marta Giraldo is a 57 y.o. female is being seen for consultation today at the request of CONNOR Neely    Chief Complaint:    Chief Complaint   Patient presents with   • Leg Swelling       The following portions of the patient's history were reviewed and updated as appropriate: allergies, current medications, past family history, past medical history, past social history, past surgical history and problem list.  Recent images independently reviewed.  Available laboratory values reviewed.    PCP:  Gato Crane MD  Cardiology:  Shirin    57 y.o. female with HTN(stable, increased risk stroke, rupture), Hyperlipidemia(stable, increased risk cardiovascular events), Diabetes Mellitus(stable, increased risk cardiovascular events), Obesity(uncontrolled, increased risk cardiovascular events) and Smoker(uncontrolled, increased risk cardiovascular events)  Neuropathy (uncontrolled). [place] (nonhealing wound) smokes 1 PPD.  Non-ambulatory. Complains of LLE pain (radiating, sharp). Referred for vascular evaluation.  No TIA stroke amaurosis.  No MI claudication. No other associated signs, symptoms or modifying factors.    7/2022: ARMAND: RIGHT BKA LEFT 1.0 Triphasic  7/2022: LLE Venous duplex; No DVT/Reflux       Past Medical History:   Diagnosis Date   • Anemia    • Anxiety    • Arthritis    • Cataract    • Depression    • Diabetes mellitus (HCC)    • Disease of thyroid gland    • Family history of thyroid problem    • GERD (gastroesophageal reflux disease)    • Headache    • History of transfusion    • Hyperlipidemia    • Hypertension    • Neuropathy    • Stroke (HCC) 2019   • Urinary tract infection      Past Surgical History:   Procedure Laterality Date   • APPENDECTOMY     • BELOW KNEE AMPUTATION     • BLADDER SURGERY     • CARDIAC CATHETERIZATION N/A 1/27/2022    Procedure: Left Heart Cath;  Surgeon: Josias Carpio MD;  Location[facility]  CATH INVASIVE LOCATION;  Service: Cardiology;  Laterality: N/A;   • COLONOSCOPY N/A 9/2/2020    Procedure: COLONOSCOPY;  Surgeon: Ashli Gonzalez MD;  Location: [facility] ENDOSCOPY;  Service: Gastroenterology;  Laterality: N/A;   • ENDOSCOPY N/A 9/2/2020    Procedure: ESOPHAGOGASTRODUODENOSCOPY;  Surgeon: Ashli Gonzalez MD;  Location: [facility] ENDOSCOPY;  Service: Gastroenterology;  Laterality: N/A;     Family History   Problem Relation Age of Onset   • Diabetes Other    • Hyperlipidemia Other    • Hypertension Other    • Stroke Other    • Heart disease Other    • Other Other    • Diabetes Mother    • Diabetes Father    • Diabetes Sister    • Heart disease Sister    • Diabetes Brother      Social History     Tobacco Use   • Smoking status: Current Some Day Smoker     Packs/day: 0.50     Types: Cigarettes   • Smokeless tobacco: Never Used   Vaping Use   • Vaping Use: Never used   Substance Use Topics   • Alcohol use: Not Currently   • Drug use: Never       ALLERGIES:   Compazine [prochlorperazine edisylate] and Penicillins    MEDICATIONS:      Current Outpatient Medications:   •  atorvastatin (LIPITOR) 80 MG tablet, TAKE 1 TABLET NIGHTLY, Disp: 30 tablet, Rfl: 1  •  B-D UF III MINI PEN NEEDLES 31G X 5 MM misc, Use as needed, Disp: 100 each, Rfl: 3  •  carvedilol (COREG) 6.25 MG tablet, Take 1 tablet by mouth 2 (Two) Times a Day., Disp: 180 tablet, Rfl: 3  •  clotrimazole-betamethasone (Lotrisone) 1-0.05 % cream, Apply  topically to the appropriate area as directed 2 (Two) Times a Day., Disp: 1 each, Rfl: 3  •  Continuous Blood Gluc  (FreeStyle Tae 2 Kasson) device, USE AS DIRECTED, Disp: 1 each, Rfl: 11  •  Continuous Blood Gluc Sensor (FreeStyle Tae 2 Sensor) misc, 1 each Every 14 (Fourteen) Days. Use every 14 days, Disp: 2 each, Rfl: 11  •  Diclofenac Sodium (VOLTAREN) 1 % gel gel, APPLY TO THE APPROPRIATE AREA AS DIRECTED 4 TIMES AS DAY AS NEEDED, Disp: 300 g, Rfl: 0  •  docusate sodium (COLACE) 100  "MG capsule, Take 1 capsule by mouth 2 (Two) Times a Day As Needed for Constipation., Disp: 60 capsule, Rfl: 2  •  Dulaglutide (Trulicity) 3 MG/0.5ML solution pen-injector, Inject 0.5 mL under the skin into the appropriate area as directed 1 (One) Time Per Week., Disp: 4 pen, Rfl: 3  •  DULoxetine (CYMBALTA) 60 MG capsule, Take 1 capsule by mouth 2 (Two) Times a Day., Disp: 60 capsule, Rfl: 3  •  empagliflozin (Jardiance) 25 MG tablet tablet, Take 1 tablet by mouth Daily., Disp: 90 tablet, Rfl: 3  •  ferrous sulfate 325 (65 Fe) MG tablet, TAKE 1 TABLET DAILY WITH BREAKFAST, Disp: 30 tablet, Rfl: 1  •  fluconazole (Diflucan) 150 MG tablet, 1 tab po x 1 now, may repeat in 4 days prn yeast, Disp: 2 tablet, Rfl: 0  •  glucose (DEX4) 4 GM chewable tablet, Chew 4 tablets As Needed for Low Blood Sugar., Disp: 90 tablet, Rfl: 3  •  glucose blood test strip, Use as instructed, Disp: 200 each, Rfl: 3  •  glucose blood test strip, Test 4 times daily , E10.65, Disp: 120 each, Rfl: 11  •  glucose monitor monitoring kit, 1 each As Needed (to check bg)., Disp: 1 each, Rfl: 0  •  Insulin Aspart (NovoLOG) 100 UNIT/ML injection, Inject 10 Units under the skin into the appropriate area as directed 3 (Three) Times a Day Before Meals., Disp: 10 mL, Rfl: 9  •  Insulin Glargine (LANTUS SOLOSTAR) 100 UNIT/ML injection pen, Inject 35 Units under the skin into the appropriate area as directed Every Night. (Patient taking differently: Inject 35 Units under the skin into the appropriate area as directed Daily.), Disp: 9 mL, Rfl: 3  •  insulin lispro (humaLOG) 100 UNIT/ML injection, Inject 5 units subcutaneously prior to meals, Disp: 10 mL, Rfl: 5  •  Insulin Lispro (humaLOG) 100 UNIT/ML injection, Inject 10 Units under the skin into the appropriate area as directed 3 (Three) Times a Day Before Meals., Disp: 10 mL, Rfl: 5  •  Insulin Pen Needle 32G X 8 MM misc, Use at bedtime, Disp: 100 each, Rfl: 3  •  Insulin Syringe-Needle U-100 30G X 1/2\" 1 " "ML misc, Use three times daily with insulin, Disp: 100 each, Rfl: 3  •  Lancets misc, Use for E11.65 diabetes to check sugars 4 times a day., Disp: 200 each, Rfl: 3  •  Lancets misc, Test 4 times daily, E10.65, Disp: 120 each, Rfl: 11  •  levothyroxine (SYNTHROID, LEVOTHROID) 25 MCG tablet, TAKE 1 TABLET DAILY., Disp: 30 tablet, Rfl: 0  •  lisinopril (PRINIVIL,ZESTRIL) 20 MG tablet, TAKE 1 TABLET DAILY., Disp: 30 tablet, Rfl: 1  •  methocarbamol (ROBAXIN) 750 MG tablet, TAKE 1 TABLET THREE TIMES DAILY., Disp: 90 tablet, Rfl: 0  •  mupirocin (BACTROBAN) 2 % ointment, Apply 1 application topically to the appropriate area as directed 3 (Three) Times a Day., Disp: 22 g, Rfl: 0  •  omeprazole (priLOSEC) 40 MG capsule, Take 1 capsule by mouth Daily., Disp: 30 capsule, Rfl: 3  •  oxybutynin XL (Ditropan XL) 15 MG 24 hr tablet, Take 1 tablet by mouth Daily., Disp: 30 tablet, Rfl: 3  •  pregabalin (LYRICA) 300 MG capsule, TAKE 1 CAPSULETWICE DAILY., Disp: 60 capsule, Rfl: 0  •  sulfamethoxazole-trimethoprim (Bactrim DS) 800-160 MG per tablet, Take 1 tablet by mouth 2 (Two) Times a Day., Disp: 14 tablet, Rfl: 0  •  TRUEplus Insulin Syringe 30G X 5/16\" 0.5 ML misc, , Disp: , Rfl:   •  Viibryd 20 MG tablet tablet, TAKE 1 TABLET DAILY., Disp: 30 tablet, Rfl: 1  •  vitamin D (ERGOCALCIFEROL) 1.25 MG (45347 UT) capsule capsule, TAKE 1 CAPSULE WEEKLY, Disp: 12 capsule, Rfl: 0    Review of Systems   Eyes: Negative for visual disturbance.   Cardiovascular: Positive for leg swelling. Negative for claudication and cyanosis.   Respiratory: Negative for shortness of breath.    Skin: Positive for color change. Negative for nail changes.   Musculoskeletal: Negative for muscle weakness.   Gastrointestinal: Negative for dysphagia.   Neurological: Negative for focal weakness, light-headedness, loss of balance, numbness, paresthesias and weakness.   Psychiatric/Behavioral: Negative for altered mental status.   All other systems reviewed and " "are negative.       Objective   Vitals:    07/26/22 1516   BP: 124/69   BP Location: Right arm   Patient Position: Sitting   Cuff Size: Adult   Pulse: 80   SpO2: 99%   Height: 175.3 cm (69\")     Body mass index is 34.7 kg/m².  Physical Exam  Vitals reviewed.   Constitutional:       Appearance: Normal appearance.   HENT:      Head: Normocephalic.      Mouth/Throat:      Mouth: Mucous membranes are moist.   Eyes:      Extraocular Movements: Extraocular movements intact.   Neck:      Vascular: No carotid bruit.   Cardiovascular:      Rate and Rhythm: Normal rate and regular rhythm.      Pulses:           Dorsalis pedis pulses are 2+ on the left side.        Posterior tibial pulses are 2+ on the left side.      Heart sounds: Normal heart sounds.   Pulmonary:      Effort: Pulmonary effort is normal.      Breath sounds: Normal breath sounds.   Abdominal:      General: Bowel sounds are normal.      Palpations: Abdomen is soft.   Musculoskeletal:         General: Normal range of motion.      Cervical back: Normal range of motion.   Skin:     General: Skin is warm and dry.      Capillary Refill: Capillary refill takes less than 2 seconds.   Neurological:      General: No focal deficit present.      Mental Status: She is alert and oriented to person, place, and time.      Gait: Gait abnormal (WC).   Psychiatric:         Mood and Affect: Mood normal.         Behavior: Behavior normal.           Assessment & Plan     Independent Review of Radiographic Studies:  Detailed discussion regarding risks, benefits, and treatment plan. Images independently reviewed. Patient understands, agrees, and wishes to proceed with plan.     1. Leg pain, diffuse, left  Nonvascular LEFT lower extremity pain  Normal arterial flow  Normal venous duplex  Follow up PCP    2. Essential hypertension  Controlled    3. Mixed hyperlipidemia  Lipid-lowering therapy has been proven beneficial in patients with cardio-vascular disease. Current guidelines " recommend statin treatment for all patients with PAD,CAD and carotid stenosis. Statins are beneficial in preventing cardiovascular events, increasing functional capacity and lower the risk of adverse limb loss in PAD. Statins decrease the progression of plaque formation and may improve peripheral vessel lining, and aid in reversing atherosclerosis.       4. Type 2 diabetes mellitus with hyperglycemia, with long-term current use of insulin (HCC)  Increased risk for worsening PAD, CAD, stent closure, and progressive carotid disease with uncontrolled DM.   Lab Results   Component Value Date    HGBA1C 7.90 (H) 05/03/2022     Medical management: Diet. Oral Medications. Insulin. Other injectables.     - Ambulatory Referral to Podiatry    5. Diabetic polyneuropathy associated with type 2 diabetes mellitus (HCC)      6. Tobacco dependence  Smoking cessation assistance options offered including behavioral counseling (Smoking Cessation Classes), Nicotine replacement therapy (patches or gum), pharmacologic therapy (Chantix, Wellbutrin). Understands tobacco increases risk of expanding AAA, MI, CVA, PAD, carcinoma. Discussion and question answer period 5-7 minutes. Marta Giraldo  reports that she has been smoking cigarettes. She has been smoking about 0.50 packs per day. She has never used smokeless tobacco.. I have educated her on the risk of diseases from using tobacco products such as cancer, COPD and heart disease.     I advised her to quit and she is not willing to quit.    I spent 5 minutes counseling the patient.        Advance Care Planning discussed and  Informational packet given to patient. Advance Care Plan on file: Yes    Your BMI is Body mass index is 34.7 kg/m². and falls within  Obese Class I: 30-34.9kg/m2. BMI is strongly correlated with increased health risks. Review options for weight management, heart healthy diet, and exercise programs.     Time spent 45 minutes in face to face evaluation, reviewing  of medical history, tests, and procedures. Independent interpretation of vascular studies, ordering additional tests, documentation, and coordination of care.   Counseling and education with the patient and family regarding treatment options, plan of care, and hoped for outcomes. All questions answered.            This document has been electronically signed by CONNOR Macario on July 27, 2022 13:01 CDT

## 2022-08-04 DIAGNOSIS — M79.642 LEFT HAND PAIN: ICD-10-CM

## 2022-08-04 DIAGNOSIS — M25.552 LEFT HIP PAIN: ICD-10-CM

## 2022-08-04 DIAGNOSIS — M25.562 LEFT KNEE PAIN, UNSPECIFIED CHRONICITY: ICD-10-CM

## 2022-08-04 RX ORDER — LISINOPRIL 20 MG/1
TABLET ORAL
Qty: 30 TABLET | Refills: 3 | Status: SHIPPED | OUTPATIENT
Start: 2022-08-04 | End: 2022-08-26 | Stop reason: SDUPTHER

## 2022-08-04 RX ORDER — ATORVASTATIN CALCIUM 80 MG/1
TABLET, FILM COATED ORAL
Qty: 30 TABLET | Refills: 3 | Status: SHIPPED | OUTPATIENT
Start: 2022-08-04 | End: 2022-10-26 | Stop reason: SDUPTHER

## 2022-08-12 ENCOUNTER — LAB (OUTPATIENT)
Dept: ONCOLOGY | Facility: HOSPITAL | Age: 57
End: 2022-08-12

## 2022-08-12 ENCOUNTER — OFFICE VISIT (OUTPATIENT)
Dept: ONCOLOGY | Facility: CLINIC | Age: 57
End: 2022-08-12

## 2022-08-12 ENCOUNTER — INFUSION (OUTPATIENT)
Dept: ONCOLOGY | Facility: HOSPITAL | Age: 57
End: 2022-08-12

## 2022-08-12 VITALS
BODY MASS INDEX: 34.56 KG/M2 | WEIGHT: 234 LBS | HEART RATE: 74 BPM | TEMPERATURE: 97.7 F | SYSTOLIC BLOOD PRESSURE: 120 MMHG | DIASTOLIC BLOOD PRESSURE: 58 MMHG | OXYGEN SATURATION: 96 %

## 2022-08-12 DIAGNOSIS — E53.8 B12 DEFICIENCY: Primary | ICD-10-CM

## 2022-08-12 DIAGNOSIS — E53.8 B12 DEFICIENCY: ICD-10-CM

## 2022-08-12 DIAGNOSIS — D72.829 LEUKOCYTOSIS, UNSPECIFIED TYPE: ICD-10-CM

## 2022-08-12 DIAGNOSIS — E61.1 IRON DEFICIENCY: ICD-10-CM

## 2022-08-12 DIAGNOSIS — D72.829 LEUKOCYTOSIS, UNSPECIFIED TYPE: Chronic | ICD-10-CM

## 2022-08-12 LAB
BASOPHILS # BLD AUTO: 0.12 10*3/MM3 (ref 0–0.2)
BASOPHILS NFR BLD AUTO: 0.9 % (ref 0–1.5)
DEPRECATED RDW RBC AUTO: 48.8 FL (ref 37–54)
EOSINOPHIL # BLD AUTO: 0.35 10*3/MM3 (ref 0–0.4)
EOSINOPHIL NFR BLD AUTO: 2.7 % (ref 0.3–6.2)
ERYTHROCYTE [DISTWIDTH] IN BLOOD BY AUTOMATED COUNT: 15.8 % (ref 12.3–15.4)
FERRITIN SERPL-MCNC: 105 NG/ML (ref 13–150)
FOLATE SERPL-MCNC: 11.1 NG/ML (ref 4.78–24.2)
HCT VFR BLD AUTO: 39.1 % (ref 34–46.6)
HGB BLD-MCNC: 12 G/DL (ref 12–15.9)
IMM GRANULOCYTES # BLD AUTO: 0.07 10*3/MM3 (ref 0–0.05)
IMM GRANULOCYTES NFR BLD AUTO: 0.5 % (ref 0–0.5)
IRON 24H UR-MRATE: 51 MCG/DL (ref 37–145)
IRON SATN MFR SERPL: 16 % (ref 20–50)
LYMPHOCYTES # BLD AUTO: 2.32 10*3/MM3 (ref 0.7–3.1)
LYMPHOCYTES NFR BLD AUTO: 18 % (ref 19.6–45.3)
MCH RBC QN AUTO: 26 PG (ref 26.6–33)
MCHC RBC AUTO-ENTMCNC: 30.7 G/DL (ref 31.5–35.7)
MCV RBC AUTO: 84.8 FL (ref 79–97)
MONOCYTES # BLD AUTO: 0.58 10*3/MM3 (ref 0.1–0.9)
MONOCYTES NFR BLD AUTO: 4.5 % (ref 5–12)
NEUTROPHILS NFR BLD AUTO: 73.4 % (ref 42.7–76)
NEUTROPHILS NFR BLD AUTO: 9.42 10*3/MM3 (ref 1.7–7)
NRBC BLD AUTO-RTO: 0 /100 WBC (ref 0–0.2)
PLATELET # BLD AUTO: 207 10*3/MM3 (ref 140–450)
PMV BLD AUTO: 12.1 FL (ref 6–12)
RBC # BLD AUTO: 4.61 10*6/MM3 (ref 3.77–5.28)
TIBC SERPL-MCNC: 320 MCG/DL (ref 298–536)
TRANSFERRIN SERPL-MCNC: 215 MG/DL (ref 200–360)
VIT B12 BLD-MCNC: 546 PG/ML (ref 211–946)
WBC NRBC COR # BLD: 12.86 10*3/MM3 (ref 3.4–10.8)

## 2022-08-12 PROCEDURE — 36415 COLL VENOUS BLD VENIPUNCTURE: CPT | Performed by: INTERNAL MEDICINE

## 2022-08-12 PROCEDURE — 82607 VITAMIN B-12: CPT

## 2022-08-12 PROCEDURE — 82728 ASSAY OF FERRITIN: CPT

## 2022-08-12 PROCEDURE — 96372 THER/PROPH/DIAG INJ SC/IM: CPT | Performed by: INTERNAL MEDICINE

## 2022-08-12 PROCEDURE — 99214 OFFICE O/P EST MOD 30 MIN: CPT | Performed by: INTERNAL MEDICINE

## 2022-08-12 PROCEDURE — 84466 ASSAY OF TRANSFERRIN: CPT

## 2022-08-12 PROCEDURE — 25010000002 CYANOCOBALAMIN PER 1000 MCG: Performed by: INTERNAL MEDICINE

## 2022-08-12 PROCEDURE — 1157F ADVNC CARE PLAN IN RCRD: CPT | Performed by: INTERNAL MEDICINE

## 2022-08-12 PROCEDURE — 85025 COMPLETE CBC W/AUTO DIFF WBC: CPT

## 2022-08-12 PROCEDURE — 1125F AMNT PAIN NOTED PAIN PRSNT: CPT | Performed by: INTERNAL MEDICINE

## 2022-08-12 PROCEDURE — 82746 ASSAY OF FOLIC ACID SERUM: CPT

## 2022-08-12 PROCEDURE — 83540 ASSAY OF IRON: CPT

## 2022-08-12 RX ORDER — OXYBUTYNIN CHLORIDE 10 MG/1
10 TABLET, EXTENDED RELEASE ORAL DAILY
COMMUNITY
Start: 2022-08-04 | End: 2022-08-26 | Stop reason: SDUPTHER

## 2022-08-12 RX ORDER — PNV NO.95/FERROUS FUM/FOLIC AC 28MG-0.8MG
325 TABLET ORAL 2 TIMES DAILY WITH MEALS
Qty: 60 TABLET | Refills: 3 | Status: SHIPPED | OUTPATIENT
Start: 2022-08-12

## 2022-08-12 RX ORDER — CYANOCOBALAMIN 1000 UG/ML
1000 INJECTION, SOLUTION INTRAMUSCULAR; SUBCUTANEOUS ONCE
Status: CANCELLED | OUTPATIENT
Start: 2022-09-09

## 2022-08-12 RX ORDER — BLOOD-GLUCOSE METER
EACH MISCELLANEOUS
COMMUNITY
Start: 2022-06-28

## 2022-08-12 RX ORDER — CYANOCOBALAMIN 1000 UG/ML
1000 INJECTION, SOLUTION INTRAMUSCULAR; SUBCUTANEOUS ONCE
Status: COMPLETED | OUTPATIENT
Start: 2022-08-12 | End: 2022-08-12

## 2022-08-12 RX ADMIN — CYANOCOBALAMIN 1000 MCG: 1000 INJECTION, SOLUTION INTRAMUSCULAR at 12:12

## 2022-08-12 NOTE — TELEPHONE ENCOUNTER
Rx Refill Note  Requested Prescriptions     Pending Prescriptions Disp Refills   • ferrous sulfate 325 (65 Fe) MG tablet 30 tablet 1     Sig: Take 1 tablet by mouth Daily With Breakfast.      Last office visit with prescribing clinician: 8/12/2022      Next office visit with prescribing clinician: 12/9/2022            Nereyda Us  08/12/22, 14:53 CDT

## 2022-08-12 NOTE — TELEPHONE ENCOUNTER
Rx Refill Note  Requested Prescriptions     Pending Prescriptions Disp Refills   • ferrous sulfate 325 (65 Fe) MG tablet 30 tablet 1     Sig: Take 1 tablet by mouth Daily With Breakfast.      Last office visit with prescribing clinician: 8/12/2022      Next office visit with prescribing clinician: Visit date not found            Nereyda Us  08/12/22, 11:49 CDT

## 2022-08-12 NOTE — PROGRESS NOTES
DATE OF VISIT: 8/12/2022      REASON FOR VISIT: Leukocytosis, iron deficiency anemia      HISTORY OF PRESENT ILLNESS:   57-year-old female with medical problem consisting of diabetes mellitus with neuropathy, hypothyroidism, hypertension, dyslipidemia, history of left lower extremity amputation, iron deficiency for which patient was initially seen in consultation on April 21, 2021.  Patient is here for follow-up appointment today.  Currently on intramuscular B12 injection with ferrous sulfate twice daily.  Denies any excessive nausea or vomiting.  Still with iron.  Denies any bleeding.  Denies any new lymph node enlargement or any recurrent infections.              Past Medical History, Past Surgical History, Social History, Family History have been reviewed and are without significant changes except as mentioned.    Review of Systems   A comprehensive 14 point review of systems was performed and was negative except as mentioned in HPI.    Medications:  The current medication list was reviewed in the EMR    ALLERGIES:    Allergies   Allergen Reactions   • Compazine [Prochlorperazine Edisylate] Unknown - High Severity   • Penicillins Other (See Comments) and Unknown - High Severity     unknown       Objective      Vitals:    08/12/22 1145   BP: 120/58   Pulse: 74   Temp: 97.7 °F (36.5 °C)   TempSrc: Temporal   SpO2: 96%   Weight: 106 kg (234 lb)   PainSc:   5   PainLoc: Hand  Comment: LEGS, HANDS     Current Status 7/7/2022   ECOG score 3       Physical Exam  Pulmonary:      Breath sounds: Normal breath sounds.   Neurological:      Mental Status: She is alert and oriented to person, place, and time.           RECENT LABS:  Glucose   Date Value Ref Range Status   05/03/2022 86 65 - 99 mg/dL Final     Sodium   Date Value Ref Range Status   05/03/2022 139 136 - 145 mmol/L Final     Potassium   Date Value Ref Range Status   05/03/2022 4.9 3.5 - 5.2 mmol/L Final     CO2   Date Value Ref Range Status   05/03/2022 26.0 22.0  - 29.0 mmol/L Final     Chloride   Date Value Ref Range Status   05/03/2022 104 98 - 107 mmol/L Final     Anion Gap   Date Value Ref Range Status   05/03/2022 9.0 5.0 - 15.0 mmol/L Final     Creatinine   Date Value Ref Range Status   05/03/2022 0.97 0.57 - 1.00 mg/dL Final     BUN   Date Value Ref Range Status   05/03/2022 17 6 - 20 mg/dL Final     BUN/Creatinine Ratio   Date Value Ref Range Status   05/03/2022 17.5 7.0 - 25.0 Final     Calcium   Date Value Ref Range Status   05/03/2022 8.9 8.6 - 10.5 mg/dL Final     eGFR Non  Amer   Date Value Ref Range Status   01/27/2022 57 (L) >60 mL/min/1.73 Final     Alkaline Phosphatase   Date Value Ref Range Status   05/03/2022 77 39 - 117 U/L Final     Total Protein   Date Value Ref Range Status   05/03/2022 6.8 6.0 - 8.5 g/dL Final     ALT (SGPT)   Date Value Ref Range Status   05/03/2022 11 1 - 33 U/L Final     AST (SGOT)   Date Value Ref Range Status   05/03/2022 12 1 - 32 U/L Final     Total Bilirubin   Date Value Ref Range Status   05/03/2022 0.2 0.0 - 1.2 mg/dL Final     Albumin   Date Value Ref Range Status   05/03/2022 3.70 3.50 - 5.20 g/dL Final     Globulin   Date Value Ref Range Status   05/03/2022 3.1 gm/dL Final     Lab Results   Component Value Date    WBC 12.86 (H) 08/12/2022    HGB 12.0 08/12/2022    HCT 39.1 08/12/2022    MCV 84.8 08/12/2022     08/12/2022     Lab Results   Component Value Date    NEUTROABS 9.42 (H) 08/12/2022    IRON 54 04/13/2022    IRON 52 01/20/2022    IRON 48 12/13/2021    TIBC 299 04/13/2022    TIBC 340 01/20/2022    TIBC 304 12/13/2021    LABIRON 18 (L) 04/13/2022    LABIRON 15 (L) 01/20/2022    LABIRON 16 (L) 12/13/2021    FERRITIN 68.90 04/13/2022    FERRITIN 88.72 01/20/2022    FERRITIN 52.45 12/13/2021    JJVAYLLR22 836 04/13/2022    DHMNGXTJ48 >2,000 (H) 01/20/2022    DWBBTRIR58 337 12/13/2021    FOLATE 17.50 04/13/2022    FOLATE 11.60 01/20/2022    FOLATE 15.40 12/13/2021     No results found for: , LABCA2,  AFPTM, HCGQUANT, , CHROMGRNA, [id number], CEA, REFLABREPO      PATHOLOGY:  * Cannot find OR log *         RADIOLOGY DATA :  No radiology results for the last 7 days        Assessment & Plan     1.  Leukocytosis:  - Patient has been having ongoing leukocytosis since December 2020  - White blood cell count today is 12.86 which is predominantly increase in neutrophils  - Work-up in the past including peripheral blood flow cytometry in April 2021 was negative for any kind of leukemia or lymphoma  - We will continue with clinical monitoring for now  - We will have patient return to clinic in 4 months with repeat CBC, iron studies, ferritin, B12 and folate to be done on that day.    2.  Iron deficiency anemia  - Hemoglobin today is 12.  - Patient is currently on ferrous sulfate 1 tablet twice daily  - Patient is currently on intramuscular B12 injection. Folate level is decreased to 11.0 recommend starting folic acid po daily    3.  Diabetes mellitus with neuropathy    4.  Health maintenance: Patient quit smoking in 2019.  Had a colonoscopy in August 2020    5. Advance Care Planning   ACP discussion was held with the patient during this visit. Patient has an advance directive in EMR which is still valid.        6.  Prescriptions: Prescription for ferrous sulfate and folic acid has been sent to her pharmacy today             PHQ-9 Total Score: 0   -Patient is not homicidal or suicidal.  No acute intervention required.    Marta Giraldo reports a pain score of 5.  Given her pain assessment as noted, treatment options were discussed and the following options were decided upon as a follow-up plan to address the patient's pain: continuation of current treatment plan for pain.         Adalid Bates MD  8/12/2022  11:55 CDT        Part of this note may be an electronic transcription/translation of spoken language to printed text using the Dragon Dictation System.          CC:

## 2022-08-14 RX ORDER — FOLIC ACID 1 MG/1
1 TABLET ORAL DAILY
Qty: 90 TABLET | Refills: 1 | Status: SHIPPED | OUTPATIENT
Start: 2022-08-14

## 2022-08-15 ENCOUNTER — TELEPHONE (OUTPATIENT)
Dept: ONCOLOGY | Facility: HOSPITAL | Age: 57
End: 2022-08-15

## 2022-08-15 NOTE — TELEPHONE ENCOUNTER
----- Message from Gato Crane MD sent at 6/14/2021  9:53 PM CDT -----  Please call pt vitamin D levels normal continue with vitamin D once a week      Vitamin b12 levels stable    Thyroid studies normal    Hga1c down from 11.30 to 7.90. continue good work with diabetes.      On CMP kidney function shows Cr at 1.17 down from 1.36.  GFR up from 40 to 48. Continue followup with her Nephrologist     Lipid panel stable     On CBC hemoglobin stable platelet count stable    WBC up from 12.70 to 14.21 continue followup with Hematologist    Recheck on next visit thanks        Cyclosporine Counseling:  I discussed with the patient the risks of cyclosporine including but not limited to hypertension, gingival hyperplasia,myelosuppression, immunosuppression, liver damage, kidney damage, neurotoxicity, lymphoma, and serious infections. The patient understands that monitoring is required including baseline blood pressure, CBC, CMP, lipid panel and uric acid, and then 1-2 times monthly CMP and blood pressure.

## 2022-08-15 NOTE — TELEPHONE ENCOUNTER
----- Message from Adalid Bates MD sent at 8/14/2022  2:24 PM CDT -----  Regarding: labs  Please let patient know, she needs to increase ferrous sulfate to BID. Recommend continue with b12 injection.recommend starting folic acid po daily.Prescription for ferrous sulfate and folic acid has been sent to her pharmacy.Thanks

## 2022-08-15 NOTE — TELEPHONE ENCOUNTER
Called and spoke with pt regarding lab results and medication instructions as per Dr. Bates, v/u obtained.

## 2022-08-16 DIAGNOSIS — E11.42 DIABETIC POLYNEUROPATHY ASSOCIATED WITH TYPE 2 DIABETES MELLITUS: Chronic | ICD-10-CM

## 2022-08-16 RX ORDER — PREGABALIN 300 MG/1
CAPSULE ORAL
Qty: 60 CAPSULE | Refills: 0 | Status: SHIPPED | OUTPATIENT
Start: 2022-08-16 | End: 2022-09-13

## 2022-08-16 RX ORDER — ERGOCALCIFEROL 1.25 MG/1
CAPSULE ORAL
Qty: 12 CAPSULE | Refills: 0 | Status: SHIPPED | OUTPATIENT
Start: 2022-08-16 | End: 2022-11-04

## 2022-08-16 RX ORDER — METHOCARBAMOL 750 MG/1
TABLET, FILM COATED ORAL
Qty: 90 TABLET | Refills: 0 | Status: SHIPPED | OUTPATIENT
Start: 2022-08-16 | End: 2022-09-13

## 2022-08-16 NOTE — TELEPHONE ENCOUNTER
Rx Refill Note  Requested Prescriptions     Pending Prescriptions Disp Refills   • methocarbamol (ROBAXIN) 750 MG tablet [Pharmacy Med Name: METHOCARBAMOL 750 MG TABLET] 90 tablet 0     Sig: TAKE 1 TABLET THREE TIMES DAILY.   • pregabalin (LYRICA) 300 MG capsule [Pharmacy Med Name: PREGABALIN 300 MG CAPSULE] 60 capsule 0     Sig: TAKE 1 CAPSULE TWICE DAILY.   • vitamin D (ERGOCALCIFEROL) 1.25 MG (02701 UT) capsule capsule [Pharmacy Med Name: VITAMIN D2 1.25MG(50,000 UNIT)] 12 capsule 0     Sig: TAKE 1 CAPSULE WEEKLY      Last office visit with prescribing clinician: 6/10/2022      Next office visit with prescribing clinician: Visit date not found   {TIP  Encounters:     Rx Controlled Substance Protocol Failed 08/16/2022 01:48 PM   Protocol Details  Urine Toxicology Performed in Last 12 Months            {TIP  Please add Last Relevant Lab Date if appropriate  {TIP  Is Refill Pharmacy correct? yes  Anshu Samayoa LPN  08/16/22, 15:15 CDT

## 2022-08-24 RX ORDER — OMEPRAZOLE 40 MG/1
CAPSULE, DELAYED RELEASE ORAL
Qty: 30 CAPSULE | Refills: 0 | Status: SHIPPED | OUTPATIENT
Start: 2022-08-24 | End: 2022-09-26

## 2022-08-25 NOTE — PROGRESS NOTES
Subjective:  Marta Giraldo is a 57 y.o. female who presents for       Patient Active Problem List   Diagnosis   • Class 1 obesity with serious comorbidity and body mass index (BMI) of 32.0 to 32.9 in adult   • Moderate episode of recurrent major depressive disorder (HCC)   • Encounter for screening for malignant neoplasm of colon   • Gastroesophageal reflux disease   • Hypothyroidism   • Microcytic anemia   • Vitamin D deficiency   • Uncontrolled type 2 diabetes mellitus with hyperglycemia (HCC)   • Class 1 obesity with body mass index (BMI) of 32.0 to 32.9 in adult   • Diabetic polyneuropathy associated with type 2 diabetes mellitus (HCC)   • Gastritis without bleeding   • Class 1 obesity with serious comorbidity and body mass index (BMI) of 33.0 to 33.9 in adult   • At high risk for falls   • Gait instability   • Below knee amputation (HCC)   • Type 2 diabetes mellitus with hyperglycemia, with long-term current use of insulin (HCC)   • Essential hypertension   • Mixed hyperlipidemia   • Cervical radiculopathy   • DDD (degenerative disc disease), cervical   • Chronic neck pain   • Leukocytosis   • Iron deficiency   • Wheelchair bound   • B12 deficiency   • Chest pain   • Abnormal nuclear stress test   • Coronary artery disease involving native coronary artery of native heart without angina pectoris   • Polyuria   • Tobacco dependence   • Left hand pain   • Decreased range of motion of finger of left hand   • Pain of finger of left hand   • Class 1 obesity with serious comorbidity and body mass index (BMI) of 34.0 to 34.9 in adult   • Urge incontinence           Current Outpatient Medications:   •  Aspirin Adult Low Strength 81 MG EC tablet, , Disp: , Rfl:   •  atorvastatin (LIPITOR) 80 MG tablet, TAKE 1 TABLET NIGHTLY, Disp: 30 tablet, Rfl: 3  •  B-D UF III MINI PEN NEEDLES 31G X 5 MM misc, Use as needed, Disp: 100 each, Rfl: 3  •  Blood Glucose Monitoring Suppl (Accu-Chek Guide Me) w/Device kit, , Disp:  , Rfl:   •  carvedilol (COREG) 6.25 MG tablet, Take 1 tablet by mouth 2 (Two) Times a Day., Disp: 180 tablet, Rfl: 3  •  clotrimazole-betamethasone (Lotrisone) 1-0.05 % cream, Apply  topically to the appropriate area as directed 2 (Two) Times a Day., Disp: 1 each, Rfl: 3  •  Continuous Blood Gluc  (FreeStyle Tae 2 [place]) device, USE AS DIRECTED, Disp: 1 each, Rfl: 11  •  Continuous Blood Gluc Sensor (FreeStyle Tae 2 Sensor) misc, 1 each Every 14 (Fourteen) Days. Use every 14 days, Disp: 2 each, Rfl: 11  •  Diclofenac Sodium (VOLTAREN) 1 % gel gel, APPLY TO THE APPROPRIATE AREA AS DIRECTED 4 TIMES AS DAY AS NEEDED, Disp: 300 g, Rfl: 0  •  docusate sodium (COLACE) 100 MG capsule, Take 1 capsule by mouth 2 (Two) Times a Day As Needed for Constipation., Disp: 60 capsule, Rfl: 2  •  Dulaglutide (Trulicity) 3 MG/0.5ML solution pen-injector, Inject 0.5 mL under the skin into the appropriate area as directed 1 (One) Time Per Week., Disp: 4 pen, Rfl: 3  •  DULoxetine (CYMBALTA) 60 MG capsule, Take 1 capsule by mouth 2 (Two) Times a Day., Disp: 60 capsule, Rfl: 3  •  empagliflozin (Jardiance) 25 MG tablet tablet, Take 1 tablet by mouth Daily., Disp: 90 tablet, Rfl: 3  •  ferrous sulfate 325 (65 Fe) MG tablet, Take 1 tablet by mouth 2 (Two) Times a Day With Meals., Disp: 60 tablet, Rfl: 3  •  folic acid (FOLVITE) 1 MG tablet, Take 1 tablet by mouth Daily., Disp: 90 tablet, Rfl: 1  •  glucose (DEX4) 4 GM chewable tablet, Chew 4 tablets As Needed for Low Blood Sugar., Disp: 90 tablet, Rfl: 3  •  glucose blood test strip, Test 4 times daily , E10.65, Disp: 120 each, Rfl: 11  •  glucose monitor monitoring kit, 1 each As Needed (to check bg)., Disp: 1 each, Rfl: 0  •  Insulin Aspart (NovoLOG) 100 UNIT/ML injection, Inject 10 Units under the skin into the appropriate area as directed 3 (Three) Times a Day Before Meals., Disp: 10 mL, Rfl: 9  •  Insulin Glargine (LANTUS SOLOSTAR) 100 UNIT/ML injection pen, Inject 35 Units  "under the skin into the appropriate area as directed Every Night. (Patient taking differently: Inject 35 Units under the skin into the appropriate area as directed Daily.), Disp: 9 mL, Rfl: 3  •  insulin lispro (humaLOG) 100 UNIT/ML injection, Inject 5 units subcutaneously prior to meals, Disp: 10 mL, Rfl: 5  •  Insulin Lispro (humaLOG) 100 UNIT/ML injection, Inject 10 Units under the skin into the appropriate area as directed 3 (Three) Times a Day Before Meals., Disp: 10 mL, Rfl: 5  •  Insulin Pen Needle 32G X 8 MM misc, Use at bedtime, Disp: 100 each, Rfl: 3  •  Insulin Syringe-Needle U-100 30G X 1/2\" 1 ML misc, Use three times daily with insulin, Disp: 100 each, Rfl: 3  •  Lancets misc, Use for E11.65 diabetes to check sugars 4 times a day., Disp: 200 each, Rfl: 3  •  Lancets misc, Test 4 times daily, E10.65, Disp: 120 each, Rfl: 11  •  levothyroxine (SYNTHROID, LEVOTHROID) 25 MCG tablet, Take 1 tablet by mouth Daily., Disp: 30 tablet, Rfl: 3  •  lisinopril (PRINIVIL,ZESTRIL) 20 MG tablet, Take 1 tablet by mouth Daily., Disp: 90 tablet, Rfl: 1  •  methocarbamol (ROBAXIN) 750 MG tablet, TAKE 1 TABLET THREE TIMES DAILY., Disp: 90 tablet, Rfl: 0  •  mupirocin (BACTROBAN) 2 % ointment, Apply 1 application topically to the appropriate area as directed 3 (Three) Times a Day. (Patient taking differently: Apply 1 application topically to the appropriate area as directed As Needed.), Disp: 22 g, Rfl: 0  •  omeprazole (priLOSEC) 40 MG capsule, TAKE 1 CAPSULE DAILY., Disp: 30 capsule, Rfl: 0  •  oxybutynin XL (DITROPAN-XL) 10 MG 24 hr tablet, Take 1 tablet by mouth Daily., Disp: 90 tablet, Rfl: 1  •  pregabalin (LYRICA) 300 MG capsule, TAKE 1 CAPSULE TWICE DAILY., Disp: 60 capsule, Rfl: 0  •  TRUEplus Insulin Syringe 30G X 5/16\" 0.5 ML misc, , Disp: , Rfl:   •  vilazodone (Viibryd) 20 MG tablet tablet, Take 1 tablet by mouth Daily., Disp: 90 tablet, Rfl: 1  •  vitamin D (ERGOCALCIFEROL) 1.25 MG (42954 UT) capsule capsule, " TAKE 1 CAPSULE WEEKLY, Disp: 12 capsule, Rfl: 0  •  traMADol (ULTRAM) 50 MG tablet, Take 1 tablet by mouth Every 8 (Eight) Hours As Needed for Moderate Pain ., Disp: 21 tablet, Rfl: 0        Pt is 56 yo female with management of obesity IDDM type 2 , HLP, diabetic neuropathy,  HTN, major depression  type 2, tobacco smoker , sp below right knee amputation, sp appendectomy, sp bladder surgery,diabetic retinopathy of right eye, vitamin D deficiency, acquired  hypothryoidism, microcytic anemia , colonic polyps, diverticulosis, internal hemorrhoids/GERD with esophagitis, gastritis. Diabetic retinopathy, cataracts, iron deficiency, CKD stage 2, chronic neck pain (DDD cervical spine,cervical spondylosis at C5-C6) cervical radiculopathy, leukocytosis, CAD, echo in January 2022( pericardial effusion,, mild LVH),        6/10/22 in office visit for recheck. Pt had labwork done on 5/3/22 that showed thyroid studies normal vitamin D levels normal lipid panel stable CMP showed GFR at 67.90 hga1c is at 7.90. she is doing fair. Her urinary issues have improved with ditropan XL. She is not urinating as much at bedtime. Her urge incontinence has improved  He rmain concern is swelling in both legs/knees. She had right BKA of leg about 5 years ago.  She continues to smoke tobacco about 6 cigarettes a day. No dyspnea. No chest pain no dizziness.  BP is stable     8/26/22 in office visit for recheck. Pt has seen several Specialist since last visit has saw Endocrinoology on 6/28/22 for her DM type 2.  Saw Vascular surgery for her leg pain on 7/26/22 and has normal arterial flow and venous duplex. Saw Hematology on 8/12/22 for her leukocytosis and iron deficeincy anemia. She is also getting injections for b12 deficiency.   Today she reports right arm/shoulder pain for about a week. She reports no trauma no fall. Did not hear any pop on shoulder. It hurts to lift her right arm and has difficulty lifting it up. Pain start from posterior  shoulder and radiates down her right arm      Arm Pain   The incident occurred more than 1 week ago. The incident occurred at home. There was no injury mechanism. The pain is present in the right shoulder and right forearm. The quality of the pain is described as aching. The pain radiates to the right arm. The pain is at a severity of 5/10. The pain is moderate. Associated symptoms include muscle weakness and numbness. Pertinent negatives include no chest pain. The symptoms are aggravated by movement and lifting. She has tried NSAIDs for the symptoms. The treatment provided no relief.   Shoulder Injury   The incident occurred at home. The injury mechanism was repetitive motion and overhead work. The quality of the pain is described as aching. The pain radiates to the right arm. The pain is at a severity of 5/10. The pain is moderate. Associated symptoms include muscle weakness and numbness. Pertinent negatives include no chest pain. The symptoms are aggravated by movement and overhead lifting. She has tried NSAIDs for the symptoms. The treatment provided no relief.   Coronary Artery Disease  Presents for follow-up visit. Symptoms include chest pain. Pertinent negatives include no chest pressure, chest tightness, dizziness, leg swelling, muscle weakness, palpitations, shortness of breath or weight gain. Risk factors include hyperlipidemia and obesity. The symptoms have been stable. Compliance with diet is variable. Compliance with exercise is variable. Compliance with medications is variable.   Hyperlipidemia  This is a chronic problem. The current episode started more than 1 year ago. Recent lipid tests were reviewed and are variable. Exacerbating diseases include chronic renal disease, diabetes and obesity. She has no history of hypothyroidism, liver disease or nephrotic syndrome. Associated symptoms include chest pain. Pertinent negatives include no shortness of breath. The current treatment  provides mild improvement of lipids. Risk factors for coronary artery disease include diabetes mellitus, hypertension, obesity, dyslipidemia and a sedentary lifestyle.   Female  Problem  The patient's pertinent negatives include no genital itching, genital lesions, genital rash, missed menses, pelvic pain, vaginal bleeding or vaginal discharge. This is a recurrent problem. The current episode started more than 1 month ago. The problem occurs constantly. The problem has been improving The pain is moderate. Pertinent negatives include no abdominal pain, anorexia, back pain, chills, constipation, diarrhea, discolored urine, dysuria, fever, flank pain, frequency, headaches, hematuria, joint pain, joint swelling, nausea, painful intercourse, rash, sore throat, urgency or vomiting. Associated symptoms comments: Urinary incontinence . There is no history of an abdominal surgery, a  section, an ectopic pregnancy, endometriosis, a gynecological surgery, herpes simplex, menorrhagia, metrorrhagia, miscarriage, ovarian cysts, perineal abscess, PID, an STD, a terminated pregnancy or vaginosis.   Heart Problem  This is a recurrent problem. The current episode started more than 1 month ago. The problem occurs constantly. The problem has been unchanged. Associated symptoms include arthralgias. Pertinent negatives include no abdominal pain, anorexia, change in bowel habit, chest pain, chills, congestion, coughing, diaphoresis, fatigue, fever, headaches, joint swelling, myalgias, nausea, neck pain, numbness, rash, sore throat, swollen glands, urinary symptoms, vertigo, visual change, vomiting or weakness. Nothing aggravates the symptoms. She has tried nothing for the symptoms. The treatment provided no relief.   Chronic Kidney Disease  This is a chronic problem. The current episode started more than 1 year ago. The problem occurs constantly. The problem has been unchanged. Pertinent negatives include no abdominal  pain, anorexia, arthralgias, change in bowel habit, chest pain, chills, congestion, coughing, diaphoresis, fatigue, fever, headaches, joint swelling, myalgias, nausea, neck pain, numbness, rash, sore throat, swollen glands, urinary symptoms, vertigo, visual change, vomiting or weakness. Nothing aggravates the symptoms. She has tried nothing for the symptoms. The treatment provided no relief.   Diabetes  She presents for her follow-up diabetic visit. She has type 2 diabetes mellitus. Her disease course has been waxing and waning . Pertinent negatives for hypoglycemia include no confusion, dizziness, headaches, hunger, mood changes, nervousness/anxiousness, pallor or seizures. Associated symptoms include fatigue and weakness. Pertinent negatives for diabetes include no blurred vision, no chest pain, no foot paresthesias, no foot ulcerations, no polydipsia, no polyphagia and no polyuria. Pertinent negatives for hypoglycemia complications include no blackouts, no hospitalization, no nocturnal hypoglycemia and no required assistance. Symptoms are stable. Pertinent negatives for diabetic complications include no autonomic neuropathy, CVA, heart disease, impotence, nephropathy or peripheral neuropathy. Risk factors for coronary artery disease include diabetes mellitus and dyslipidemia. Current diabetic treatment includes insulin injections and oral agent (dual therapy). She is compliant with treatment most of the time. Her weight is stable. She has not had a previous visit with a dietitian. She never participates in exercise. Her home blood glucose trend is decreasing steadily. An ACE inhibitor/angiotensin II receptor blocker is being taken. She does not see a podiatrist.Eye exam is not current.   Hypothyroidism   This is a new problem. The current episode started more than 1 year ago. The problem occurs constantly. The problem has been improved Associated symptoms include arthralgias, fatigue, numbness and weakness.  Pertinent negatives include no abdominal pain, anorexia, chest pain, chills, congestion, coughing, diaphoresis, fever, headaches, nausea, sore throat or vomiting. Nothing aggravates the symptoms. She has tried nothing for the symptoms. The treatment provided no relief.    Obesity   This is a chronic problem. The problem occurs constantly. The problem has been unchanged. Associated symptoms include arthralgias, fatigue, numbness and weakness. Pertinent negatives include no chest pain, chills, congestion, coughing, diaphoresis, fever, headaches, nausea, sore throat or vomiting. Nothing aggravates the symptoms. She has tried nothing for the symptoms. The treatment provided no relief    Review of Systems  Review of Systems   Constitutional: Positive for activity change and fatigue. Negative for appetite change, chills, diaphoresis and fever.   HENT: Negative for congestion, postnasal drip, rhinorrhea, sinus pressure, sinus pain, sneezing, sore throat, trouble swallowing and voice change.    Respiratory: Positive for shortness of breath. Negative for cough, choking, chest tightness, wheezing and stridor.    Cardiovascular: Negative for chest pain.   Gastrointestinal: Negative for diarrhea, nausea and vomiting.   Musculoskeletal: Positive for arthralgias and gait problem.        Shoulder pain    Neurological: Positive for weakness and numbness. Negative for headaches.       Patient Active Problem List   Diagnosis   • Class 1 obesity with serious comorbidity and body mass index (BMI) of 32.0 to 32.9 in adult   • Moderate episode of recurrent major depressive disorder (HCC)   • Encounter for screening for malignant neoplasm of colon   • Gastroesophageal reflux disease   • Hypothyroidism   • Microcytic anemia   • Vitamin D deficiency   • Uncontrolled type 2 diabetes mellitus with hyperglycemia (HCC)   • Class 1 obesity with body mass index (BMI) of 32.0 to 32.9 in adult   • Diabetic polyneuropathy associated with type 2  diabetes mellitus (HCC)   • Gastritis without bleeding   • Class 1 obesity with serious comorbidity and body mass index (BMI) of 33.0 to 33.9 in adult   • At high risk for falls   • Gait instability   • Below knee amputation (HCC)   • Type 2 diabetes mellitus with hyperglycemia, with long-term current use of insulin (HCC)   • Essential hypertension   • Mixed hyperlipidemia   • Cervical radiculopathy   • DDD (degenerative disc disease), cervical   • Chronic neck pain   • Leukocytosis   • Iron deficiency   • Wheelchair bound   • B12 deficiency   • Chest pain   • Abnormal nuclear stress test   • Coronary artery disease involving native coronary artery of native heart without angina pectoris   • Polyuria   • Tobacco dependence   • Left hand pain   • Decreased range of motion of finger of left hand   • Pain of finger of left hand   • Class 1 obesity with serious comorbidity and body mass index (BMI) of 34.0 to 34.9 in adult   • Urge incontinence     Past Surgical History:   Procedure Laterality Date   • APPENDECTOMY     • BELOW KNEE AMPUTATION     • BLADDER SURGERY     • CARDIAC CATHETERIZATION N/A 1/27/2022    Procedure: Left Heart Cath;  Surgeon: Josias Carpio MD;  Location: [facility] CATH INVASIVE LOCATION;  Service: Cardiology;  Laterality: N/A;   • COLONOSCOPY N/A 9/2/2020    Procedure: COLONOSCOPY;  Surgeon: Ashli Gonzalez MD;  Location: [facility] ENDOSCOPY;  Service: Gastroenterology;  Laterality: N/A;   • ENDOSCOPY N/A 9/2/2020    Procedure: ESOPHAGOGASTRODUODENOSCOPY;  Surgeon: Ashli Gonzalez MD;  Location: [facility] ENDOSCOPY;  Service: Gastroenterology;  Laterality: N/A;     Social History     Socioeconomic History   • Marital status:    Tobacco Use   • Smoking status: Current Some Day Smoker     Packs/day: 0.50     Types: Cigarettes   • Smokeless tobacco: Never Used   Vaping Use   • Vaping Use: Never used   Substance and Sexual Activity   • Alcohol use: Not Currently   • Drug use: Never   •  Sexual activity: Defer     Family History   Problem Relation Age of Onset   • Diabetes Other    • Hyperlipidemia Other    • Hypertension Other    • Stroke Other    • Heart disease Other    • Other Other    • Diabetes Mother    • Diabetes Father    • Diabetes Sister    • Heart disease Sister    • Diabetes Brother      Lab on 08/12/2022   Component Date Value Ref Range Status   • Iron 08/12/2022 51  37 - 145 mcg/dL Final   • Iron Saturation 08/12/2022 16 (A) 20 - 50 % Final   • Transferrin 08/12/2022 215  200 - 360 mg/dL Final   • TIBC 08/12/2022 320  298 - 536 mcg/dL Final   • Ferritin 08/12/2022 105.00  13.00 - 150.00 ng/mL Final   • Folate 08/12/2022 11.10  4.78 - 24.20 ng/mL Final   • Vitamin B-12 08/12/2022 546  211 - 946 pg/mL Final   • WBC 08/12/2022 12.86 (A) 3.40 - 10.80 10*3/mm3 Final   • RBC 08/12/2022 4.61  3.77 - 5.28 10*6/mm3 Final   • Hemoglobin 08/12/2022 12.0  12.0 - 15.9 g/dL Final   • Hematocrit 08/12/2022 39.1  34.0 - 46.6 % Final   • MCV 08/12/2022 84.8  79.0 - 97.0 fL Final   • MCH 08/12/2022 26.0 (A) 26.6 - 33.0 pg Final   • MCHC 08/12/2022 30.7 (A) 31.5 - 35.7 g/dL Final   • RDW 08/12/2022 15.8 (A) 12.3 - 15.4 % Final   • RDW-SD 08/12/2022 48.8  37.0 - 54.0 fl Final   • MPV 08/12/2022 12.1 (A) 6.0 - 12.0 fL Final   • Platelets 08/12/2022 207  140 - 450 10*3/mm3 Final   • Neutrophil % 08/12/2022 73.4  42.7 - 76.0 % Final   • Lymphocyte % 08/12/2022 18.0 (A) 19.6 - 45.3 % Final   • Monocyte % 08/12/2022 4.5 (A) 5.0 - 12.0 % Final   • Eosinophil % 08/12/2022 2.7  0.3 - 6.2 % Final   • Basophil % 08/12/2022 0.9  0.0 - 1.5 % Final   • Immature Grans % 08/12/2022 0.5  0.0 - 0.5 % Final   • Neutrophils, Absolute 08/12/2022 9.42 (A) 1.70 - 7.00 10*3/mm3 Final   • Lymphocytes, Absolute 08/12/2022 2.32  0.70 - 3.10 10*3/mm3 Final   • Monocytes, Absolute 08/12/2022 0.58  0.10 - 0.90 10*3/mm3 Final   • Eosinophils, Absolute 08/12/2022 0.35  0.00 - 0.40 10*3/mm3 Final   • Basophils, Absolute 08/12/2022 0.12   0.00 - 0.20 10*3/mm3 Final   • Immature Grans, Absolute 08/12/2022 0.07 (A) 0.00 - 0.05 10*3/mm3 Final   • nRBC 08/12/2022 0.0  0.0 - 0.2 /100 WBC Final   Hospital Outpatient Visit on 07/26/2022   Component Date Value Ref Range Status   • Target HR (85%) 07/26/2022 138  bpm Final   • Max. Pred. HR (100%) 07/26/2022 162  bpm Final   Hospital Outpatient Visit on 07/26/2022   Component Date Value Ref Range Status   • Target HR (85%) 07/26/2022 138  bpm Final   • Max. Pred. HR (100%) 07/26/2022 162  bpm Final   • LEFT DORSALIS PEDIS SYS MAX 07/26/2022 167   Final   • LEFT POST TIBIAL SYS MAX 07/26/2022 157   Final   • LEFT ARMAND RATIO 07/26/2022 1.04   Final   • Upper arterial right arm brachial * 07/26/2022 160  mmHg Final   • Upper arterial left arm brachial s* 07/26/2022 144  mmHg Final   Lab on 05/03/2022   Component Date Value Ref Range Status   • Glucose 05/03/2022 86  65 - 99 mg/dL Final   • BUN 05/03/2022 17  6 - 20 mg/dL Final   • Creatinine 05/03/2022 0.97  0.57 - 1.00 mg/dL Final   • Sodium 05/03/2022 139  136 - 145 mmol/L Final   • Potassium 05/03/2022 4.9  3.5 - 5.2 mmol/L Final   • Chloride 05/03/2022 104  98 - 107 mmol/L Final   • CO2 05/03/2022 26.0  22.0 - 29.0 mmol/L Final   • Calcium 05/03/2022 8.9  8.6 - 10.5 mg/dL Final   • Total Protein 05/03/2022 6.8  6.0 - 8.5 g/dL Final   • Albumin 05/03/2022 3.70  3.50 - 5.20 g/dL Final   • ALT (SGPT) 05/03/2022 11  1 - 33 U/L Final   • AST (SGOT) 05/03/2022 12  1 - 32 U/L Final   • Alkaline Phosphatase 05/03/2022 77  39 - 117 U/L Final   • Total Bilirubin 05/03/2022 0.2  0.0 - 1.2 mg/dL Final   • Globulin 05/03/2022 3.1  gm/dL Final   • A/G Ratio 05/03/2022 1.2  g/dL Final   • BUN/Creatinine Ratio 05/03/2022 17.5  7.0 - 25.0 Final   • Anion Gap 05/03/2022 9.0  5.0 - 15.0 mmol/L Final   • eGFR 05/03/2022 67.9  >60.0 mL/min/1.73 Final    National Kidney Foundation and American Society of Nephrology (ASN) Task Force recommended calculation based on the Chronic  Kidney Disease Epidemiology Collaboration (CKD-EPI) equation refit without adjustment for race.   • Hemoglobin A1C 05/03/2022 7.90 (A) 4.80 - 5.60 % Final   • Total Cholesterol 05/03/2022 115  0 - 200 mg/dL Final   • Triglycerides 05/03/2022 65  0 - 150 mg/dL Final   • HDL Cholesterol 05/03/2022 40  40 - 60 mg/dL Final   • LDL Cholesterol  05/03/2022 61  0 - 100 mg/dL Final   • VLDL Cholesterol 05/03/2022 14  5 - 40 mg/dL Final   • LDL/HDL Ratio 05/03/2022 1.55   Final   • TSH 05/03/2022 2.030  0.270 - 4.200 uIU/mL Final   • Free T4 05/03/2022 1.05  0.93 - 1.70 ng/dL Final   • 25 Hydroxy, Vitamin D 05/03/2022 38.2  30.0 - 100.0 ng/ml Final   • Microalbumin/Creatinine Ratio 05/03/2022 55.4  mg/g Final   • Creatinine, Urine 05/03/2022 61.4  mg/dL Final   • Microalbumin, Urine 05/03/2022 3.4  mg/dL Final   • Urine Culture 05/03/2022 No growth   Final   • Color, UA 05/03/2022 Yellow  Yellow, Straw Final   • Appearance, UA 05/03/2022 Clear  Clear Final   • pH, UA 05/03/2022 7.0  5.0 - 8.0 Final   • Specific Gravity, UA 05/03/2022 1.019  1.005 - 1.030 Final   • Glucose, UA 05/03/2022 >=1000 mg/dL (3+) (A) Negative Final   • Ketones, UA 05/03/2022 Negative  Negative Final   • Bilirubin, UA 05/03/2022 Negative  Negative Final   • Blood, UA 05/03/2022 Negative  Negative Final   • Protein, UA 05/03/2022 Negative  Negative Final   • Leuk Esterase, UA 05/03/2022 Negative  Negative Final   • Nitrite, UA 05/03/2022 Negative  Negative Final   • Urobilinogen, UA 05/03/2022 0.2 E.U./dL  0.2 - 1.0 E.U./dL Final   • RBC, UA 05/03/2022 0-2  None Seen, 0-2 /HPF Final   • WBC, UA 05/03/2022 0-2  None Seen, 0-2 /HPF Final   • Bacteria, UA 05/03/2022 None Seen  None Seen /HPF Final   • Squamous Epithelial Cells, UA 05/03/2022 0-2  None Seen, 0-2 /HPF Final   • Hyaline Casts, UA 05/03/2022 0-2  None Seen /LPF Final   • Methodology 05/03/2022 Automated Microscopy   Final   Lab on 04/13/2022   Component Date Value Ref Range Status   • Iron  04/13/2022 54  37 - 145 mcg/dL Final   • Iron Saturation 04/13/2022 18 (A) 20 - 50 % Final   • Transferrin 04/13/2022 201  200 - 360 mg/dL Final   • TIBC 04/13/2022 299  298 - 536 mcg/dL Final   • Ferritin 04/13/2022 68.90  13.00 - 150.00 ng/mL Final   • Vitamin B-12 04/13/2022 836  211 - 946 pg/mL Final   • Folate 04/13/2022 17.50  4.78 - 24.20 ng/mL Final   • WBC 04/13/2022 14.96 (A) 3.40 - 10.80 10*3/mm3 Final   • RBC 04/13/2022 4.38  3.77 - 5.28 10*6/mm3 Final   • Hemoglobin 04/13/2022 11.6 (A) 12.0 - 15.9 g/dL Final   • Hematocrit 04/13/2022 36.5  34.0 - 46.6 % Final   • MCV 04/13/2022 83.3  79.0 - 97.0 fL Final   • MCH 04/13/2022 26.5 (A) 26.6 - 33.0 pg Final   • MCHC 04/13/2022 31.8  31.5 - 35.7 g/dL Final   • RDW 04/13/2022 15.2  12.3 - 15.4 % Final   • RDW-SD 04/13/2022 46.1  37.0 - 54.0 fl Final   • MPV 04/13/2022 11.7  6.0 - 12.0 fL Final   • Platelets 04/13/2022 211  140 - 450 10*3/mm3 Final   • Neutrophil % 04/13/2022 75.5  42.7 - 76.0 % Final   • Lymphocyte % 04/13/2022 16.4 (A) 19.6 - 45.3 % Final   • Monocyte % 04/13/2022 4.6 (A) 5.0 - 12.0 % Final   • Eosinophil % 04/13/2022 2.2  0.3 - 6.2 % Final   • Basophil % 04/13/2022 0.7  0.0 - 1.5 % Final   • Immature Grans % 04/13/2022 0.6 (A) 0.0 - 0.5 % Final   • Neutrophils, Absolute 04/13/2022 11.28 (A) 1.70 - 7.00 10*3/mm3 Final   • Lymphocytes, Absolute 04/13/2022 2.46  0.70 - 3.10 10*3/mm3 Final   • Monocytes, Absolute 04/13/2022 0.69  0.10 - 0.90 10*3/mm3 Final   • Eosinophils, Absolute 04/13/2022 0.33  0.00 - 0.40 10*3/mm3 Final   • Basophils, Absolute 04/13/2022 0.11  0.00 - 0.20 10*3/mm3 Final   • Immature Grans, Absolute 04/13/2022 0.09 (A) 0.00 - 0.05 10*3/mm3 Final   • nRBC 04/13/2022 0.0  0.0 - 0.2 /100 WBC Final      Duplex Venous Lower Extremity - Bilateral CAR  · Negative bilateral lower extremity venous duplex scan for DVT and   reflux.  · Right BKA.     Doppler Ankle Brachial Index Single Level CAR  · Right Conclusion: The right ARMAND is  "unable to be assessed due to a below   the knee amputation.  · Left Conclusion: The left ARMAND is normal.       [unfilled]  Immunization History   Administered Date(s) Administered   • COVID-19 (MODERNA) 1st, 2nd, 3rd Dose Only 04/13/2021, 04/13/2021, 05/13/2021, 05/13/2021, 11/16/2021, 05/04/2022   • FluLaval/Fluzone >6mos 09/14/2020, 11/16/2021   • Pneumococcal Polysaccharide (PPSV23) 09/14/2020   • Shingrix 09/15/2020   • Tdap 09/14/2020       The following portions of the patient's history were reviewed and updated as appropriate: allergies, current medications, past family history, past medical history, past social history, past surgical history and problem list.        Physical Exam  /72 (BP Location: Right arm, Patient Position: Sitting, Cuff Size: Large Adult)   Pulse 74   Temp 98.1 °F (36.7 °C)   Ht 175.3 cm (69\")   SpO2 99%   BMI 34.56 kg/m²     Physical Exam  Vitals and nursing note reviewed.   Constitutional:       Appearance: She is well-developed. She is not diaphoretic.      Comments: In a wheelchair    HENT:      Head: Normocephalic and atraumatic.      Right Ear: External ear normal.   Eyes:      Conjunctiva/sclera: Conjunctivae normal.      Pupils: Pupils are equal, round, and reactive to light.   Cardiovascular:      Rate and Rhythm: Normal rate and regular rhythm.      Heart sounds: Normal heart sounds. No murmur heard.  Pulmonary:      Effort: Pulmonary effort is normal. No respiratory distress.      Breath sounds: Normal breath sounds.   Abdominal:      General: Bowel sounds are normal. There is no distension.      Palpations: Abdomen is soft.      Tenderness: There is no abdominal tenderness.      Comments: Obese abdomen    Musculoskeletal:         General: Tenderness present. No deformity.      Right shoulder: Tenderness and bony tenderness present. Decreased range of motion.        Arms:       Cervical back: Normal range of motion and neck supple.   Skin:     General: Skin is " warm.      Coloration: Skin is not pale.      Findings: No erythema or rash.   Neurological:      Mental Status: She is alert and oriented to person, place, and time.      Cranial Nerves: No cranial nerve deficit.   Psychiatric:         Behavior: Behavior normal.         [unfilled]   Diagnosis Plan   1. Right shoulder pain, unspecified chronicity  XR Shoulder 2+ View Right    MRI Shoulder Right Without Contrast    traMADol (ULTRAM) 50 MG tablet   2. Vitamin D deficiency  Comprehensive Metabolic Panel    Hemoglobin A1c    Lipid Panel    TSH    T4, Free    Vitamin D 25 Hydroxy   3. Uncontrolled type 2 diabetes mellitus with hyperglycemia (HCC)  Comprehensive Metabolic Panel    Hemoglobin A1c    Lipid Panel    TSH    T4, Free    Vitamin D 25 Hydroxy   4. Mixed hyperlipidemia  Comprehensive Metabolic Panel    Hemoglobin A1c    Lipid Panel    TSH    T4, Free    Vitamin D 25 Hydroxy   5. Essential hypertension  Comprehensive Metabolic Panel    Hemoglobin A1c    Lipid Panel    TSH    T4, Free    Vitamin D 25 Hydroxy   6. Gait instability  Comprehensive Metabolic Panel    Hemoglobin A1c    Lipid Panel    TSH    T4, Free    Vitamin D 25 Hydroxy   7. At high risk for falls  Comprehensive Metabolic Panel    Hemoglobin A1c    Lipid Panel    TSH    T4, Free    Vitamin D 25 Hydroxy   8. Gastroesophageal reflux disease with esophagitis without hemorrhage  Comprehensive Metabolic Panel    Hemoglobin A1c    Lipid Panel    TSH    T4, Free    Vitamin D 25 Hydroxy   9. Wheelchair bound  Comprehensive Metabolic Panel    Hemoglobin A1c    Lipid Panel    TSH    T4, Free    Vitamin D 25 Hydroxy   10. Iron deficiency  Comprehensive Metabolic Panel    Hemoglobin A1c    Lipid Panel    TSH    T4, Free    Vitamin D 25 Hydroxy   11. B12 deficiency  Comprehensive Metabolic Panel    Hemoglobin A1c    Lipid Panel    TSH    T4, Free    Vitamin D 25 Hydroxy   12. Coronary artery disease involving native coronary artery of native heart without  angina pectoris  Comprehensive Metabolic Panel    Hemoglobin A1c    Lipid Panel    TSH    T4, Free    Vitamin D 25 Hydroxy   13. Acquired hypothyroidism  Comprehensive Metabolic Panel    Hemoglobin A1c    Lipid Panel    TSH    T4, Free    Vitamin D 25 Hydroxy   14. Class 1 obesity with serious comorbidity and body mass index (BMI) of 34.0 to 34.9 in adult, unspecified obesity type                  -recommend labwork   -recommend diabetic eye exam   -recommend COVID-19 vaccination .  -recommend mammogram screening - schedule at imaging center   -pt had hypoglycemic episode today. She forgot her glucose tabs. Gave some candy for patient   -right shoulder pain - will get x-ray. Suspect rotator cuff tear. Will get MRI at imaging center. Start on tramadol 50 mg every 8 hours. Reviewed MICHAEL. Consider ORthopedic rferral  -urinary incontinence ditropan XL 10mg dialy.    -CAD/pericardial effusion/LVH-  Cardiology following on aspirin, coreg statin   -chronic neck pain/DDD cervical spine/cervical radiculopathy -reviewed x-ray of neck. Referred to Neurology   -leg swelling -will get venous doppler recommend low sodium diet more fluids   -diabetic retinopathy/catatract  - Ophthalmology following in [place] she has pending surgery   -gait instability/ high fall risk-  Uses wheelchair   -urinary incontinence - currently uses pullups  -colonic/rectal polyps, internal hemorrhoids/GERD with esophagitis/gastritis  - GI following  Next colonsocopy in 2025. On prilosec 40 mg daily.   -CKD stage 3a - Nephrology following.  -vitamin D deficiency -vitamin D once a week  -hypothyroidism - on synthroid 25 mcg PO q daily.   -DM type 2  - on trulicity 3 mg subq weekly.  On jardiance 25 mg daily.  on basaglar  35 units at bedtime.on humalog 5 units before meals. Endocrinology following. Has Dexcom monitor ordered   -microcytic anemia/iron deficiency anemialleukocytosis  - on ferrous sulfate 325 mg PO q daily on b12 injections .. Hematology  following    -HTN -    on lisinopril  20 mg PO q daily.   -HLP - on lipitor 80 mg PO qhs. Recommend heart health ydiet   -major depression - on viibryd 20 mg Po q daily.    -obesity - counseled weight loss >5 minutes BMI at 34.56  -diabetic neuropathy - on lyrica 300 PO BID will go up to QID. On cymbalta  -chronic pain  Was on Norco 7.5/325 mg every 12 hours PRN. Will refer to Pain Management.   on robaxin TID     -advised pt to be safe and call with questions and concerns  -advised pt to go to ER or call 911 if symptoms worrisome or severe  -advised pt to followup with specialist and referrals  -advised pt to be safe during COVID-19 pandemic  I spent 30 minutes caring for Marta on this date of service. This time includes time spent by me in the following activities: preparing for the visit, reviewing tests, obtaining and/or reviewing a separately obtained history, performing a medically appropriate examination and/or evaluation, counseling and educating the patient/family/caregiver, ordering medications, tests, or procedures, referring and communicating with other health care professionals, documenting information in the medical record, independently interpreting results and communicating that information with the patient/family/caregiver and care coordination.  -recheck in 2 months         This document has been electronically signed by Gato Crane MD on August 26, 2022 10:43 CDT

## 2022-08-26 ENCOUNTER — OFFICE VISIT (OUTPATIENT)
Dept: FAMILY MEDICINE CLINIC | Facility: CLINIC | Age: 57
End: 2022-08-26

## 2022-08-26 VITALS
OXYGEN SATURATION: 99 % | BODY MASS INDEX: 34.56 KG/M2 | HEART RATE: 74 BPM | DIASTOLIC BLOOD PRESSURE: 72 MMHG | TEMPERATURE: 98.1 F | SYSTOLIC BLOOD PRESSURE: 126 MMHG | HEIGHT: 69 IN

## 2022-08-26 DIAGNOSIS — E53.8 B12 DEFICIENCY: ICD-10-CM

## 2022-08-26 DIAGNOSIS — E61.1 IRON DEFICIENCY: Chronic | ICD-10-CM

## 2022-08-26 DIAGNOSIS — I25.10 CORONARY ARTERY DISEASE INVOLVING NATIVE CORONARY ARTERY OF NATIVE HEART WITHOUT ANGINA PECTORIS: ICD-10-CM

## 2022-08-26 DIAGNOSIS — E78.2 MIXED HYPERLIPIDEMIA: ICD-10-CM

## 2022-08-26 DIAGNOSIS — E03.9 ACQUIRED HYPOTHYROIDISM: ICD-10-CM

## 2022-08-26 DIAGNOSIS — M25.511 RIGHT SHOULDER PAIN, UNSPECIFIED CHRONICITY: Primary | ICD-10-CM

## 2022-08-26 DIAGNOSIS — K21.00 GASTROESOPHAGEAL REFLUX DISEASE WITH ESOPHAGITIS WITHOUT HEMORRHAGE: ICD-10-CM

## 2022-08-26 DIAGNOSIS — E66.9 CLASS 1 OBESITY WITH SERIOUS COMORBIDITY AND BODY MASS INDEX (BMI) OF 34.0 TO 34.9 IN ADULT, UNSPECIFIED OBESITY TYPE: ICD-10-CM

## 2022-08-26 DIAGNOSIS — I10 ESSENTIAL HYPERTENSION: ICD-10-CM

## 2022-08-26 DIAGNOSIS — Z99.3 WHEELCHAIR BOUND: ICD-10-CM

## 2022-08-26 DIAGNOSIS — E11.65 UNCONTROLLED TYPE 2 DIABETES MELLITUS WITH HYPERGLYCEMIA: ICD-10-CM

## 2022-08-26 DIAGNOSIS — Z91.81 AT HIGH RISK FOR FALLS: ICD-10-CM

## 2022-08-26 DIAGNOSIS — E55.9 VITAMIN D DEFICIENCY: ICD-10-CM

## 2022-08-26 DIAGNOSIS — R26.81 GAIT INSTABILITY: ICD-10-CM

## 2022-08-26 PROCEDURE — 99214 OFFICE O/P EST MOD 30 MIN: CPT | Performed by: FAMILY MEDICINE

## 2022-08-26 RX ORDER — OXYBUTYNIN CHLORIDE 10 MG/1
10 TABLET, EXTENDED RELEASE ORAL DAILY
Qty: 90 TABLET | Refills: 1 | Status: SHIPPED | OUTPATIENT
Start: 2022-08-26 | End: 2022-10-26 | Stop reason: SDUPTHER

## 2022-08-26 RX ORDER — ASPIRIN 81 MG/1
TABLET ORAL
COMMUNITY
Start: 2022-08-04

## 2022-08-26 RX ORDER — VILAZODONE HYDROCHLORIDE 20 MG/1
20 TABLET ORAL DAILY
Qty: 90 TABLET | Refills: 1 | Status: SHIPPED | OUTPATIENT
Start: 2022-08-26 | End: 2022-10-26 | Stop reason: SDUPTHER

## 2022-08-26 RX ORDER — TRAMADOL HYDROCHLORIDE 50 MG/1
50 TABLET ORAL EVERY 8 HOURS PRN
Qty: 21 TABLET | Refills: 0 | Status: SHIPPED | OUTPATIENT
Start: 2022-08-26 | End: 2022-09-27 | Stop reason: SDUPTHER

## 2022-08-26 RX ORDER — LISINOPRIL 20 MG/1
20 TABLET ORAL DAILY
Qty: 90 TABLET | Refills: 1 | Status: SHIPPED | OUTPATIENT
Start: 2022-08-26 | End: 2022-10-26 | Stop reason: SDUPTHER

## 2022-08-26 RX ORDER — LEVOTHYROXINE SODIUM 0.03 MG/1
25 TABLET ORAL DAILY
Qty: 30 TABLET | Refills: 3 | Status: SHIPPED | OUTPATIENT
Start: 2022-08-26 | End: 2022-10-26 | Stop reason: SDUPTHER

## 2022-08-26 NOTE — PATIENT INSTRUCTIONS
Get labwork fasting next month    Get xray of shoulder    Will get MRI of shoulder     Start on tramadol 50 mg every 8 hours for pain    Call imaging center for mammogram screening

## 2022-09-03 DIAGNOSIS — M19.019 AC JOINT ARTHROPATHY: ICD-10-CM

## 2022-09-03 DIAGNOSIS — M25.511 RIGHT SHOULDER PAIN, UNSPECIFIED CHRONICITY: Primary | ICD-10-CM

## 2022-09-09 ENCOUNTER — INFUSION (OUTPATIENT)
Dept: ONCOLOGY | Facility: HOSPITAL | Age: 57
End: 2022-09-09

## 2022-09-09 VITALS
RESPIRATION RATE: 18 BRPM | SYSTOLIC BLOOD PRESSURE: 106 MMHG | DIASTOLIC BLOOD PRESSURE: 55 MMHG | HEART RATE: 67 BPM | TEMPERATURE: 97.5 F

## 2022-09-09 DIAGNOSIS — E53.8 B12 DEFICIENCY: Primary | ICD-10-CM

## 2022-09-09 PROCEDURE — 96372 THER/PROPH/DIAG INJ SC/IM: CPT | Performed by: INTERNAL MEDICINE

## 2022-09-09 PROCEDURE — 25010000002 CYANOCOBALAMIN PER 1000 MCG: Performed by: INTERNAL MEDICINE

## 2022-09-09 RX ORDER — CYANOCOBALAMIN 1000 UG/ML
1000 INJECTION, SOLUTION INTRAMUSCULAR; SUBCUTANEOUS ONCE
Status: CANCELLED | OUTPATIENT
Start: 2022-10-07

## 2022-09-09 RX ORDER — CYANOCOBALAMIN 1000 UG/ML
1000 INJECTION, SOLUTION INTRAMUSCULAR; SUBCUTANEOUS ONCE
Status: COMPLETED | OUTPATIENT
Start: 2022-09-09 | End: 2022-09-09

## 2022-09-09 RX ADMIN — CYANOCOBALAMIN 1000 MCG: 1000 INJECTION, SOLUTION INTRAMUSCULAR at 10:31

## 2022-09-13 DIAGNOSIS — E11.42 DIABETIC POLYNEUROPATHY ASSOCIATED WITH TYPE 2 DIABETES MELLITUS: Chronic | ICD-10-CM

## 2022-09-13 DIAGNOSIS — M25.552 LEFT HIP PAIN: ICD-10-CM

## 2022-09-13 DIAGNOSIS — M25.562 LEFT KNEE PAIN, UNSPECIFIED CHRONICITY: ICD-10-CM

## 2022-09-13 DIAGNOSIS — M79.642 LEFT HAND PAIN: ICD-10-CM

## 2022-09-13 RX ORDER — PREGABALIN 300 MG/1
CAPSULE ORAL
Qty: 60 CAPSULE | Refills: 1 | Status: SHIPPED | OUTPATIENT
Start: 2022-09-13 | End: 2022-10-13

## 2022-09-13 RX ORDER — METHOCARBAMOL 750 MG/1
TABLET, FILM COATED ORAL
Qty: 90 TABLET | Refills: 1 | Status: SHIPPED | OUTPATIENT
Start: 2022-09-13 | End: 2022-10-26 | Stop reason: SDUPTHER

## 2022-09-13 RX ORDER — DULAGLUTIDE 3 MG/.5ML
INJECTION, SOLUTION SUBCUTANEOUS
Qty: 2 ML | Refills: 0 | Status: SHIPPED | OUTPATIENT
Start: 2022-09-13 | End: 2023-03-14 | Stop reason: SDUPTHER

## 2022-09-13 NOTE — TELEPHONE ENCOUNTER
Rx Refill Note  Requested Prescriptions     Pending Prescriptions Disp Refills   • Diclofenac Sodium (VOLTAREN) 1 % gel gel [Pharmacy Med Name: DICLOFENAC SODIUM 1% GEL] 300 g 1     Sig: APPLY TO THE APPROPRIATE AREA AS DIRECTED 4 TIMES AS DAY AS NEEDED   • pregabalin (LYRICA) 300 MG capsule [Pharmacy Med Name: PREGABALIN 300 MG CAPSULE] 60 capsule 1     Sig: TAKE 1 CAPSULE TWICE DAILY.   • methocarbamol (ROBAXIN) 750 MG tablet [Pharmacy Med Name: METHOCARBAMOL 750 MG TABLET] 90 tablet 1     Sig: TAKE 1 TABLET THREE TIMES DAILY.      Last office visit with prescribing clinician: 8/26/2022      Next office visit with prescribing clinician: 10/26/2022   {TIP  Encounters:     Rx Controlled Substance Protocol Failed 09/13/2022 01:54 PM   Protocol Details  Urine Toxicology Performed in Last 12 Months    No Matching Controlled Substance on Record in Last 30 Days            {TIP  Please add Last Relevant Lab Date if appropriate  {TIP  Is Refill Pharmacy correct? yes  Anshu Samayoa LPN  09/13/22, 14:21 CDT

## 2022-09-23 DIAGNOSIS — M25.511 RIGHT SHOULDER PAIN, UNSPECIFIED CHRONICITY: ICD-10-CM

## 2022-09-26 RX ORDER — OMEPRAZOLE 40 MG/1
CAPSULE, DELAYED RELEASE ORAL
Qty: 30 CAPSULE | Refills: 0 | Status: SHIPPED | OUTPATIENT
Start: 2022-09-26 | End: 2022-10-26 | Stop reason: SDUPTHER

## 2022-09-27 ENCOUNTER — OFFICE VISIT (OUTPATIENT)
Dept: ORTHOPEDIC SURGERY | Facility: CLINIC | Age: 57
End: 2022-09-27

## 2022-09-27 VITALS — WEIGHT: 234 LBS | BODY MASS INDEX: 34.66 KG/M2 | HEIGHT: 69 IN

## 2022-09-27 DIAGNOSIS — M25.511 ACUTE PAIN OF RIGHT SHOULDER: Primary | ICD-10-CM

## 2022-09-27 DIAGNOSIS — M75.51 BURSITIS OF RIGHT SHOULDER: ICD-10-CM

## 2022-09-27 DIAGNOSIS — M75.41 IMPINGEMENT SYNDROME OF RIGHT SHOULDER: ICD-10-CM

## 2022-09-27 PROBLEM — M25.512 ACUTE PAIN OF LEFT SHOULDER: Status: ACTIVE | Noted: 2022-09-27

## 2022-09-27 PROCEDURE — 20610 DRAIN/INJ JOINT/BURSA W/O US: CPT | Performed by: NURSE PRACTITIONER

## 2022-09-27 PROCEDURE — 99214 OFFICE O/P EST MOD 30 MIN: CPT | Performed by: NURSE PRACTITIONER

## 2022-09-27 RX ORDER — BUPIVACAINE HYDROCHLORIDE 5 MG/ML
2 INJECTION, SOLUTION PERINEURAL
Status: COMPLETED | OUTPATIENT
Start: 2022-09-27 | End: 2022-09-27

## 2022-09-27 RX ORDER — TRAMADOL HYDROCHLORIDE 50 MG/1
50 TABLET ORAL EVERY 8 HOURS PRN
Qty: 21 TABLET | Refills: 0 | Status: SHIPPED | OUTPATIENT
Start: 2022-09-27 | End: 2022-10-04

## 2022-09-27 RX ORDER — TRIAMCINOLONE ACETONIDE 40 MG/ML
40 INJECTION, SUSPENSION INTRA-ARTICULAR; INTRAMUSCULAR
Status: COMPLETED | OUTPATIENT
Start: 2022-09-27 | End: 2022-09-27

## 2022-09-27 RX ADMIN — TRIAMCINOLONE ACETONIDE 40 MG: 40 INJECTION, SUSPENSION INTRA-ARTICULAR; INTRAMUSCULAR at 14:37

## 2022-09-27 RX ADMIN — BUPIVACAINE HYDROCHLORIDE 2 ML: 5 INJECTION, SOLUTION PERINEURAL at 14:37

## 2022-09-27 NOTE — PROGRESS NOTES
Marta Giraldo is a 57 y.o. female   Primary provider:  Gato Crane MD       Chief Complaint   Patient presents with   • Right Shoulder - Pain       HISTORY OF PRESENT ILLNESS:    Mrs. Giraldo is a 57-year-old female who presents today with complaints of right shoulder pain.  Shoulder pain has been present for around the past month.  She denies injury or known precipitating factor.  Pain is made worse with overhead activity and sleeping on affected side.  She describes the pain as moderate and constant.  Pain is a burning pain.  She has tried Tylenol and rice therapy without relief of symptoms.  No fever, nausea, vomiting.  She had recent x-rays and an MRI.  MRI showed bursitis, possible tendinopathy/partial tear but no complete tear or retraction.  MRI also showed mild impingement.    Patient reports symptoms in left index finger for which she was seen at last appointment have resolved with injection.    Her most recent hemoglobin A1c has gone down from 9.37 to 7.9.  She has a history of chronic kidney disease and therefore avoids NSAIDs.    Pain  This is a new problem. The current episode started 1 to 4 weeks ago. The problem occurs constantly. The problem has been gradually worsening. Associated symptoms include arthralgias. Associated symptoms comments: Burning . Nothing aggravates the symptoms. She has tried NSAIDs, acetaminophen and heat for the symptoms. The treatment provided mild relief.        CONCURRENT MEDICAL HISTORY:    Past Medical History:   Diagnosis Date   • Anemia    • Anxiety    • Arthritis    • Cataract    • Depression    • Diabetes mellitus (HCC)    • Disease of thyroid gland    • Family history of thyroid problem    • GERD (gastroesophageal reflux disease)    • Headache    • History of transfusion    • Hyperlipidemia    • Hypertension    • Neuropathy    • Stroke (HCC) 2019   • Urinary tract infection        Allergies   Allergen Reactions   • Compazine [Prochlorperazine Edisylate]  Unknown - High Severity   • Penicillins Other (See Comments) and Unknown - High Severity     unknown         Current Outpatient Medications:   •  Aspirin Adult Low Strength 81 MG EC tablet, , Disp: , Rfl:   •  atorvastatin (LIPITOR) 80 MG tablet, TAKE 1 TABLET NIGHTLY, Disp: 30 tablet, Rfl: 3  •  B-D UF III MINI PEN NEEDLES 31G X 5 MM misc, Use as needed, Disp: 100 each, Rfl: 3  •  Blood Glucose Monitoring Suppl (Accu-Chek Guide Me) w/Device kit, , Disp: , Rfl:   •  carvedilol (COREG) 6.25 MG tablet, Take 1 tablet by mouth 2 (Two) Times a Day., Disp: 180 tablet, Rfl: 3  •  clotrimazole-betamethasone (Lotrisone) 1-0.05 % cream, Apply  topically to the appropriate area as directed 2 (Two) Times a Day., Disp: 1 each, Rfl: 3  •  Continuous Blood Gluc  (FreeStyle Tae 2 [place]) device, USE AS DIRECTED, Disp: 1 each, Rfl: 11  •  Continuous Blood Gluc Sensor (FreeStyle Tae 2 Sensor) misc, 1 each Every 14 (Fourteen) Days. Use every 14 days, Disp: 2 each, Rfl: 11  •  Diclofenac Sodium (VOLTAREN) 1 % gel gel, APPLY TO THE APPROPRIATE AREA AS DIRECTED 4 TIMES AS DAY AS NEEDED, Disp: 300 g, Rfl: 1  •  docusate sodium (COLACE) 100 MG capsule, Take 1 capsule by mouth 2 (Two) Times a Day As Needed for Constipation., Disp: 60 capsule, Rfl: 2  •  DULoxetine (CYMBALTA) 60 MG capsule, Take 1 capsule by mouth 2 (Two) Times a Day., Disp: 60 capsule, Rfl: 3  •  empagliflozin (Jardiance) 25 MG tablet tablet, Take 1 tablet by mouth Daily., Disp: 90 tablet, Rfl: 3  •  ferrous sulfate 325 (65 Fe) MG tablet, Take 1 tablet by mouth 2 (Two) Times a Day With Meals., Disp: 60 tablet, Rfl: 3  •  folic acid (FOLVITE) 1 MG tablet, Take 1 tablet by mouth Daily., Disp: 90 tablet, Rfl: 1  •  glucose (DEX4) 4 GM chewable tablet, Chew 4 tablets As Needed for Low Blood Sugar., Disp: 90 tablet, Rfl: 3  •  glucose blood test strip, Test 4 times daily , E10.65, Disp: 120 each, Rfl: 11  •  glucose monitor monitoring kit, 1 each As Needed (to check  "bg)., Disp: 1 each, Rfl: 0  •  Insulin Aspart (NovoLOG) 100 UNIT/ML injection, Inject 10 Units under the skin into the appropriate area as directed 3 (Three) Times a Day Before Meals., Disp: 10 mL, Rfl: 9  •  Insulin Glargine (LANTUS SOLOSTAR) 100 UNIT/ML injection pen, Inject 35 Units under the skin into the appropriate area as directed Every Night. (Patient taking differently: Inject 35 Units under the skin into the appropriate area as directed Daily.), Disp: 9 mL, Rfl: 3  •  insulin lispro (humaLOG) 100 UNIT/ML injection, Inject 5 units subcutaneously prior to meals, Disp: 10 mL, Rfl: 5  •  Insulin Lispro (humaLOG) 100 UNIT/ML injection, Inject 10 Units under the skin into the appropriate area as directed 3 (Three) Times a Day Before Meals., Disp: 10 mL, Rfl: 5  •  Insulin Pen Needle 32G X 8 MM misc, Use at bedtime, Disp: 100 each, Rfl: 3  •  Insulin Syringe-Needle U-100 30G X 1/2\" 1 ML misc, Use three times daily with insulin, Disp: 100 each, Rfl: 3  •  Lancets misc, Use for E11.65 diabetes to check sugars 4 times a day., Disp: 200 each, Rfl: 3  •  Lancets misc, Test 4 times daily, E10.65, Disp: 120 each, Rfl: 11  •  levothyroxine (SYNTHROID, LEVOTHROID) 25 MCG tablet, Take 1 tablet by mouth Daily., Disp: 30 tablet, Rfl: 3  •  lisinopril (PRINIVIL,ZESTRIL) 20 MG tablet, Take 1 tablet by mouth Daily., Disp: 90 tablet, Rfl: 1  •  methocarbamol (ROBAXIN) 750 MG tablet, TAKE 1 TABLET THREE TIMES DAILY., Disp: 90 tablet, Rfl: 1  •  mupirocin (BACTROBAN) 2 % ointment, Apply 1 application topically to the appropriate area as directed 3 (Three) Times a Day. (Patient taking differently: Apply 1 application topically to the appropriate area as directed As Needed.), Disp: 22 g, Rfl: 0  •  omeprazole (priLOSEC) 40 MG capsule, TAKE 1 CAPSULE DAILY., Disp: 30 capsule, Rfl: 0  •  oxybutynin XL (DITROPAN-XL) 10 MG 24 hr tablet, Take 1 tablet by mouth Daily., Disp: 90 tablet, Rfl: 1  •  pregabalin (LYRICA) 300 MG capsule, TAKE 1 " "CAPSULE TWICE DAILY., Disp: 60 capsule, Rfl: 1  •  traMADol (ULTRAM) 50 MG tablet, Take 1 tablet by mouth Every 8 (Eight) Hours As Needed for Moderate Pain for up to 7 days., Disp: 21 tablet, Rfl: 0  •  TRUEplus Insulin Syringe 30G X 5/16\" 0.5 ML misc, , Disp: , Rfl:   •  Trulicity 3 MG/0.5ML solution pen-injector, INJECT 0.5 ML UNDER THE SKIN INTO THE APPROPRIATE AREA AS DIRECTED ONCE A WEEK, Disp: 2 mL, Rfl: 0  •  vilazodone (Viibryd) 20 MG tablet tablet, Take 1 tablet by mouth Daily., Disp: 90 tablet, Rfl: 1  •  vitamin D (ERGOCALCIFEROL) 1.25 MG (90303 UT) capsule capsule, TAKE 1 CAPSULE WEEKLY, Disp: 12 capsule, Rfl: 0    Past Surgical History:   Procedure Laterality Date   • APPENDECTOMY     • BELOW KNEE AMPUTATION     • BLADDER SURGERY     • CARDIAC CATHETERIZATION N/A 1/27/2022    Procedure: Left Heart Cath;  Surgeon: Josias Carpio MD;  Location: [facility] CATH INVASIVE LOCATION;  Service: Cardiology;  Laterality: N/A;   • COLONOSCOPY N/A 9/2/2020    Procedure: COLONOSCOPY;  Surgeon: Ashli Gonzalez MD;  Location: [facility] ENDOSCOPY;  Service: Gastroenterology;  Laterality: N/A;   • ENDOSCOPY N/A 9/2/2020    Procedure: ESOPHAGOGASTRODUODENOSCOPY;  Surgeon: Ashli Gonzalez MD;  Location: [facility] ENDOSCOPY;  Service: Gastroenterology;  Laterality: N/A;       Family History   Problem Relation Age of Onset   • Diabetes Other    • Hyperlipidemia Other    • Hypertension Other    • Stroke Other    • Heart disease Other    • Other Other    • Diabetes Mother    • Diabetes Father    • Diabetes Sister    • Heart disease Sister    • Diabetes Brother         Social History     Socioeconomic History   • Marital status:    Tobacco Use   • Smoking status: Current Some Day Smoker     Packs/day: 0.50     Types: Cigarettes   • Smokeless tobacco: Never Used   Vaping Use   • Vaping Use: Never used   Substance and Sexual Activity   • Alcohol use: Not Currently   • Drug use: Never   • Sexual activity: Defer    " "    Review of Systems   Constitutional: Negative.    HENT: Negative.    Eyes: Negative.    Respiratory: Negative.    Cardiovascular: Negative.    Gastrointestinal: Negative.    Endocrine: Negative.    Genitourinary: Negative.    Musculoskeletal: Positive for arthralgias.        Right shoulder pain.   Skin: Negative.    Allergic/Immunologic: Negative.    Neurological: Negative.    Hematological: Negative.    Psychiatric/Behavioral: Negative.        PHYSICAL EXAMINATION:       Ht 175.3 cm (69\")   Wt 106 kg (234 lb)   BMI 34.56 kg/m²     Physical Exam  Vitals and nursing note reviewed.   Constitutional:       General: She is not in acute distress.     Appearance: She is well-developed. She is not toxic-appearing or diaphoretic.   HENT:      Head: Normocephalic.   Eyes:      General: No scleral icterus.  Pulmonary:      Effort: Pulmonary effort is normal. No respiratory distress.   Skin:     General: Skin is warm and dry.   Neurological:      Mental Status: She is alert and oriented to person, place, and time.   Psychiatric:         Behavior: Behavior normal.         Thought Content: Thought content normal.         Judgment: Judgment normal.         GAIT:     []  Normal  []  Antalgic    Assistive device: []  None  []  Walker     []  Crutches  []  Cane     []  Wheelchair  []  Stretcher    Right Shoulder Exam     Range of Motion   Active abduction: 110   Extension: 40   External rotation: 70   Forward flexion: 110   Internal rotation 90 degrees: 80     Tests   Impingement: positive    Other   Erythema: absent  Sensation: normal  Pulse: present    Comments:  Positive full can test.  Positive empty can test  No evidence of infection      Left Shoulder Exam     Range of Motion   The patient has normal left shoulder ROM.              No results found.        ASSESSMENT:    Diagnoses and all orders for this visit:    Acute pain of right shoulder  -     traMADol (ULTRAM) 50 MG tablet; Take 1 tablet by mouth Every 8 (Eight) " Hours As Needed for Moderate Pain for up to 7 days.    Impingement syndrome of right shoulder    Bursitis of right shoulder    Other orders  -     Large Joint Arthrocentesis: R subacromial bursa      Large Joint Arthrocentesis: R subacromial bursa  Date/Time: 9/27/2022 2:37 PM  Consent given by: patient  Site marked: site marked  Timeout: Immediately prior to procedure a time out was called to verify the correct patient, procedure, equipment, support staff and site/side marked as required   Supporting Documentation  Indications: pain   Procedure Details  Location: shoulder - R subacromial bursa  Preparation: Patient was prepped and draped in the usual sterile fashion  Needle size: 22 G  Approach: posterior  Medications administered: 40 mg triamcinolone acetonide 40 MG/ML; 2 mL bupivacaine 0.5 %  Patient tolerance: patient tolerated the procedure well with no immediate complications            PLAN    MRI results explained to patient.  After discussing available treatment options including risk, benefits, alternatives, patient desires to proceed with  subacromial injection and home exercise program.  Patient tolerated injection well.  Patient to return in 4 to 6 weeks if not feeling better.    Patient request pain medication.  Pain has been refractory to OTC medications, patient cannot take prescription strength NSAID.  After discussing risk, benefits, alternatives and reviewing internal epic PDMP, patient prescribed Ultram.    Return in about 4 weeks (around 10/25/2022), or if symptoms worsen or fail to improve.    EMR Dragon/Transciption Disclaimer: Some of this note may be an electronic transcription/translation of spoken language to printed text.  The electronic translation of spoken language may permit erroneous, or at times, nonsensical words or phrases to be inadvertently transcribed. Although I have reviewed the note for such errors, some may still exist.     Time spent of a minimum of 30 minutes including  the face to face evaluation, reviewing of medical history and prior medial records, reviewing of diagnostic studies, ordering additional tests, documentation, patient education and coordination of care.         This document has been electronically signed by Glo RYAN on September 27, 2022 14:40 CDT

## 2022-09-29 ENCOUNTER — TELEPHONE (OUTPATIENT)
Dept: FAMILY MEDICINE CLINIC | Facility: CLINIC | Age: 57
End: 2022-09-29

## 2022-09-29 NOTE — TELEPHONE ENCOUNTER
Dr. Crane wanted to inform you of the following:        MRI of right shoulder showed AC degenerative changes along fluid in bursa that can be also seen in partial tear/tedinopathy.         Recommend Orthopedic referral and if agreeable let us know and I will order              Gave pt results and recommendations. She voiced understanding and stated she has already seen Ortho.

## 2022-10-06 ENCOUNTER — LAB (OUTPATIENT)
Dept: LAB | Facility: HOSPITAL | Age: 57
End: 2022-10-06

## 2022-10-06 ENCOUNTER — OFFICE VISIT (OUTPATIENT)
Dept: ENDOCRINOLOGY | Facility: CLINIC | Age: 57
End: 2022-10-06

## 2022-10-06 VITALS
BODY MASS INDEX: 34.8 KG/M2 | HEART RATE: 68 BPM | OXYGEN SATURATION: 99 % | DIASTOLIC BLOOD PRESSURE: 72 MMHG | HEIGHT: 69 IN | SYSTOLIC BLOOD PRESSURE: 116 MMHG | WEIGHT: 235 LBS | RESPIRATION RATE: 18 BRPM

## 2022-10-06 DIAGNOSIS — E78.2 MIXED HYPERLIPIDEMIA: ICD-10-CM

## 2022-10-06 DIAGNOSIS — I10 ESSENTIAL HYPERTENSION: ICD-10-CM

## 2022-10-06 DIAGNOSIS — E11.65 TYPE 2 DIABETES MELLITUS WITH HYPERGLYCEMIA, WITH LONG-TERM CURRENT USE OF INSULIN: ICD-10-CM

## 2022-10-06 DIAGNOSIS — Z79.4 TYPE 2 DIABETES MELLITUS WITH HYPERGLYCEMIA, WITH LONG-TERM CURRENT USE OF INSULIN: ICD-10-CM

## 2022-10-06 DIAGNOSIS — E03.9 ACQUIRED HYPOTHYROIDISM: ICD-10-CM

## 2022-10-06 DIAGNOSIS — K21.00 GASTROESOPHAGEAL REFLUX DISEASE WITH ESOPHAGITIS WITHOUT HEMORRHAGE: ICD-10-CM

## 2022-10-06 DIAGNOSIS — E55.9 VITAMIN D DEFICIENCY: ICD-10-CM

## 2022-10-06 DIAGNOSIS — R26.81 GAIT INSTABILITY: ICD-10-CM

## 2022-10-06 DIAGNOSIS — I25.10 CORONARY ARTERY DISEASE INVOLVING NATIVE CORONARY ARTERY OF NATIVE HEART WITHOUT ANGINA PECTORIS: ICD-10-CM

## 2022-10-06 DIAGNOSIS — E11.42 DIABETIC POLYNEUROPATHY ASSOCIATED WITH TYPE 2 DIABETES MELLITUS: ICD-10-CM

## 2022-10-06 DIAGNOSIS — E53.8 B12 DEFICIENCY: ICD-10-CM

## 2022-10-06 DIAGNOSIS — E61.1 IRON DEFICIENCY: Chronic | ICD-10-CM

## 2022-10-06 DIAGNOSIS — E11.65 UNCONTROLLED TYPE 2 DIABETES MELLITUS WITH HYPERGLYCEMIA: ICD-10-CM

## 2022-10-06 DIAGNOSIS — Z91.81 AT HIGH RISK FOR FALLS: ICD-10-CM

## 2022-10-06 DIAGNOSIS — E11.65 UNCONTROLLED TYPE 2 DIABETES MELLITUS WITH HYPERGLYCEMIA: Primary | ICD-10-CM

## 2022-10-06 DIAGNOSIS — Z99.3 WHEELCHAIR DEPENDENCE: ICD-10-CM

## 2022-10-06 LAB
ALBUMIN SERPL-MCNC: 4.2 G/DL (ref 3.5–5.2)
ALBUMIN/GLOB SERPL: 1.2 G/DL
ALP SERPL-CCNC: 90 U/L (ref 39–117)
ALT SERPL W P-5'-P-CCNC: 27 U/L (ref 1–33)
ANION GAP SERPL CALCULATED.3IONS-SCNC: 9 MMOL/L (ref 5–15)
AST SERPL-CCNC: 31 U/L (ref 1–32)
BILIRUB SERPL-MCNC: 0.2 MG/DL (ref 0–1.2)
BUN SERPL-MCNC: 28 MG/DL (ref 6–20)
BUN/CREAT SERPL: 20.9 (ref 7–25)
CALCIUM SPEC-SCNC: 9 MG/DL (ref 8.6–10.5)
CHLORIDE SERPL-SCNC: 105 MMOL/L (ref 98–107)
CO2 SERPL-SCNC: 25 MMOL/L (ref 22–29)
CREAT SERPL-MCNC: 1.34 MG/DL (ref 0.57–1)
EGFRCR SERPLBLD CKD-EPI 2021: 46.1 ML/MIN/1.73
GLOBULIN UR ELPH-MCNC: 3.6 GM/DL
GLUCOSE SERPL-MCNC: 194 MG/DL (ref 65–99)
POTASSIUM SERPL-SCNC: 5.1 MMOL/L (ref 3.5–5.2)
PROT SERPL-MCNC: 7.8 G/DL (ref 6–8.5)
SODIUM SERPL-SCNC: 139 MMOL/L (ref 136–145)

## 2022-10-06 PROCEDURE — 84439 ASSAY OF FREE THYROXINE: CPT

## 2022-10-06 PROCEDURE — 82306 VITAMIN D 25 HYDROXY: CPT

## 2022-10-06 PROCEDURE — 95251 CONT GLUC MNTR ANALYSIS I&R: CPT | Performed by: NURSE PRACTITIONER

## 2022-10-06 PROCEDURE — 83036 HEMOGLOBIN GLYCOSYLATED A1C: CPT

## 2022-10-06 PROCEDURE — 80053 COMPREHEN METABOLIC PANEL: CPT | Performed by: NURSE PRACTITIONER

## 2022-10-06 PROCEDURE — 36415 COLL VENOUS BLD VENIPUNCTURE: CPT | Performed by: NURSE PRACTITIONER

## 2022-10-06 PROCEDURE — 99214 OFFICE O/P EST MOD 30 MIN: CPT | Performed by: NURSE PRACTITIONER

## 2022-10-06 PROCEDURE — 80061 LIPID PANEL: CPT

## 2022-10-06 PROCEDURE — 84443 ASSAY THYROID STIM HORMONE: CPT

## 2022-10-06 RX ORDER — DIAZEPAM 10 MG/1
TABLET ORAL
COMMUNITY
End: 2023-02-28

## 2022-10-06 NOTE — PROGRESS NOTES
"Chief Complaint  Diabetes and Follow-up (3m0)    Subjective          Marta Giraldo presents to [facility] ENDOCRINOLOGY  Diabetes        In office visit    57-year-old female presents for follow-up    Reason diabetes mellitus type 2    Diagnosed 1980s    Timing constant    Quality improved    Severity is high      Macrovascular complications--CVA      Microvascular complications--neuropathy , diabetic retinopathy           Blood glucose monitoring fingersticks     Checks 4 times daily       Using [employer]       Review of Systems - General ROS: negative          Objective   Vital Signs:   /72   Pulse 68   Resp 18   Ht 175.3 cm (69\")   Wt 107 kg (235 lb)   SpO2 99%   BMI 34.70 kg/m²     Physical Exam  Constitutional:       Appearance: Normal appearance.   Cardiovascular:      Rate and Rhythm: Regular rhythm.      Heart sounds: Normal heart sounds.   Pulmonary:      Breath sounds: Normal breath sounds.   Musculoskeletal:      Cervical back: Normal range of motion.   Neurological:      General: No focal deficit present.      Mental Status: She is alert.   Psychiatric:         Mood and Affect: Mood normal.         Thought Content: Thought content normal.         Judgment: Judgment normal.        Result Review :   The following data was reviewed by: CONNOR Concepcion on 02/23/2022:  Common labs    Common Labs 4/13/22 5/3/22 5/3/22 5/3/22 5/3/22 8/12/22     0823 0823 0823 0823    Glucose    86     BUN    17     Creatinine    0.97     Sodium    139     Potassium    4.9     Chloride    104     Calcium    8.9     Albumin    3.70     Total Bilirubin    0.2     Alkaline Phosphatase    77     AST (SGOT)    12     ALT (SGPT)    11     WBC 14.96 (A)     12.86 (A)   Hemoglobin 11.6 (A)     12.0   Hematocrit 36.5     39.1   Platelets 211     207   Total Cholesterol     115    Triglycerides     65    HDL Cholesterol     40    LDL Cholesterol      61    Hemoglobin A1C  7.90 (A)    "    Microalbumin, Urine   3.4      (A) Abnormal value                        Assessment and Plan    Diagnoses and all orders for this visit:    1. Uncontrolled type 2 diabetes mellitus with hyperglycemia (HCC) (Primary)  -     Hemoglobin A1c  -     Comprehensive Metabolic Panel    2. Type 2 diabetes mellitus with hyperglycemia, with long-term current use of insulin (HCC)    3. Diabetic polyneuropathy associated with type 2 diabetes mellitus (HCC)    4. Essential hypertension    5. Mixed hyperlipidemia         Glycemic management     Diabetes mellitus type 2     Lab Results   Component Value Date    HGBA1C 7.90 (H) 05/03/2022         [employer] personal use --[employer]      Download and reviewed    Dated from Sept 23 to Oct. 6, 2022     Average bg 153     Time in target 79%     High 18%     Very high 3%     Low 0%     Very low 0 %       Overall doing better     She is in target 79% of the time     She had postprandial hyperglycemia with high carb meals         Taking Jardiance 25 mg daily-keep      Taking Trulicity 3 mg weekly --keep      Taking Lantus 32 units nightly            Take humalog before meals 7 TID        Goals for sugar     Fasting and before meals 80 to 130     2 hours after meals 180 or less        Aim for 45 grams of carbohydrate per meal     Aim for 15 grams of carbohydrate per snack                     Lipid management     Taking Lipitor 40 mg 1 at night        Total Cholesterol   Date Value Ref Range Status   05/03/2022 115 0 - 200 mg/dL Final     Triglycerides   Date Value Ref Range Status   05/03/2022 65 0 - 150 mg/dL Final     HDL Cholesterol   Date Value Ref Range Status   05/03/2022 40 40 - 60 mg/dL Final     LDL Cholesterol    Date Value Ref Range Status   05/03/2022 61 0 - 100 mg/dL Final                 Blood pressure management     Taking lisinopril 20 mg 1 daily      taking carvedilol 6.25 mg bid            Microvascular monitoring     Last eye exam--- Jan. 2021 ,  , monthly  injection      Neuropathy     Taking Lyrica      + microalbuminuria    Follows with nephrology     Component      Latest Ref Rng & Units 5/3/2022   Microalbumin/Creatinine Ratio      mg/g 55.4   Creatinine, Urine      mg/dL 61.4   Microalbumin, Urine      mg/dL 3.4                   Thyroid     Hypothyroidism     Taking levothyroxine 25 mcg daily        Lab Results   Component Value Date    TSH 2.030 05/03/2022          Bone health     Vitamin D deficiency     Taking vitamin D 50,000 units weekly     Weight management       Obesity          Body mass index is 34.7 kg/m².                  Follow Up   Return in about 3 months (around 1/6/2023) for Recheck.  Patient was given instructions and counseling regarding her condition or for health maintenance advice. Please see specific information pulled into the AVS if appropriate.         This document has been electronically signed by CONNOR Concepcion on October 6, 2022 10:55 CDT.

## 2022-10-07 ENCOUNTER — INFUSION (OUTPATIENT)
Dept: ONCOLOGY | Facility: HOSPITAL | Age: 57
End: 2022-10-07

## 2022-10-07 ENCOUNTER — TELEPHONE (OUTPATIENT)
Dept: FAMILY MEDICINE CLINIC | Facility: CLINIC | Age: 57
End: 2022-10-07

## 2022-10-07 DIAGNOSIS — E53.8 B12 DEFICIENCY: Primary | ICD-10-CM

## 2022-10-07 LAB
25(OH)D3 SERPL-MCNC: 41.3 NG/ML (ref 30–100)
CHOLEST SERPL-MCNC: 138 MG/DL (ref 0–200)
HBA1C MFR BLD: 7 % (ref 4.8–5.6)
HDLC SERPL-MCNC: 50 MG/DL (ref 40–60)
LDLC SERPL CALC-MCNC: 75 MG/DL (ref 0–100)
LDLC/HDLC SERPL: 1.51 {RATIO}
T4 FREE SERPL-MCNC: 0.95 NG/DL (ref 0.93–1.7)
TRIGL SERPL-MCNC: 62 MG/DL (ref 0–150)
TSH SERPL DL<=0.05 MIU/L-ACNC: 1.19 UIU/ML (ref 0.27–4.2)
VLDLC SERPL-MCNC: 13 MG/DL (ref 5–40)

## 2022-10-07 PROCEDURE — 96372 THER/PROPH/DIAG INJ SC/IM: CPT | Performed by: NURSE PRACTITIONER

## 2022-10-07 PROCEDURE — 25010000002 CYANOCOBALAMIN PER 1000 MCG: Performed by: INTERNAL MEDICINE

## 2022-10-07 RX ORDER — CYANOCOBALAMIN 1000 UG/ML
1000 INJECTION, SOLUTION INTRAMUSCULAR; SUBCUTANEOUS ONCE
Status: CANCELLED | OUTPATIENT
Start: 2022-11-04

## 2022-10-07 RX ORDER — CYANOCOBALAMIN 1000 UG/ML
1000 INJECTION, SOLUTION INTRAMUSCULAR; SUBCUTANEOUS ONCE
Status: COMPLETED | OUTPATIENT
Start: 2022-10-07 | End: 2022-10-07

## 2022-10-07 RX ADMIN — CYANOCOBALAMIN 1000 MCG: 1000 INJECTION, SOLUTION INTRAMUSCULAR at 10:42

## 2022-10-07 NOTE — TELEPHONE ENCOUNTER
----- Message from Gato Crane MD sent at 10/7/2022  1:31 AM CDT -----  Hga1c improved from 7.9 to 7.00 continue good work with diabetic control

## 2022-10-07 NOTE — TELEPHONE ENCOUNTER
----- Message from Gato Crane MD sent at 10/7/2022  1:30 AM CDT -----  Kidney function shows GFR dropping from 60s to 40s range. Has likely acute renal failure. Needs to drink more fluids but see Nephrology let me know I pt agreeable to referral. Thanks

## 2022-10-07 NOTE — TELEPHONE ENCOUNTER
Patient stated she missed a call earlier. She would like a return call in regards to her lab results.  Call back number: [phone]

## 2022-10-07 NOTE — TELEPHONE ENCOUNTER
Gave pt results and recommendations. She voiced understanding and stated she sees Nephrologist at [facility]. She is going to contact them and have last office note faxed to us.

## 2022-10-11 NOTE — PROGRESS NOTES
Subjective:  Marta Giraldo is a 57 y.o. female who presents for       Patient Active Problem List   Diagnosis   • Class 1 obesity with serious comorbidity and body mass index (BMI) of 32.0 to 32.9 in adult   • Moderate episode of recurrent major depressive disorder (HCC)   • Encounter for screening for malignant neoplasm of colon   • Gastroesophageal reflux disease   • Hypothyroidism   • Microcytic anemia   • Vitamin D deficiency   • Uncontrolled type 2 diabetes mellitus with hyperglycemia (HCC)   • Class 1 obesity with body mass index (BMI) of 32.0 to 32.9 in adult   • Diabetic polyneuropathy associated with type 2 diabetes mellitus (HCC)   • Gastritis without bleeding   • Class 1 obesity with serious comorbidity and body mass index (BMI) of 33.0 to 33.9 in adult   • At high risk for falls   • Gait instability   • Below knee amputation (HCC)   • Type 2 diabetes mellitus with hyperglycemia, with long-term current use of insulin (HCC)   • Essential hypertension   • Mixed hyperlipidemia   • Cervical radiculopathy   • DDD (degenerative disc disease), cervical   • Chronic neck pain   • Leukocytosis   • Iron deficiency   • Wheelchair bound   • B12 deficiency   • Chest pain   • Abnormal nuclear stress test   • Coronary artery disease involving native coronary artery of native heart without angina pectoris   • Polyuria   • Tobacco dependence   • Left hand pain   • Decreased range of motion of finger of left hand   • Pain of finger of left hand   • Class 1 obesity with serious comorbidity and body mass index (BMI) of 34.0 to 34.9 in adult   • Urge incontinence   • Acute pain of left shoulder           Current Outpatient Medications:   •  Aspirin Adult Low Strength 81 MG EC tablet, , Disp: , Rfl:   •  atorvastatin (LIPITOR) 80 MG tablet, Take 1 tablet by mouth Every Night., Disp: 90 tablet, Rfl: 1  •  B-D UF III MINI PEN NEEDLES 31G X 5 MM misc, Use as needed, Disp: 100 each, Rfl: 3  •  Blood Glucose Monitoring  Suppl (Accu-Chek Guide Me) w/Device kit, , Disp: , Rfl:   •  carvedilol (COREG) 6.25 MG tablet, Take 1 tablet by mouth 2 (Two) Times a Day., Disp: 180 tablet, Rfl: 3  •  clotrimazole-betamethasone (Lotrisone) 1-0.05 % cream, Apply  topically to the appropriate area as directed 2 (Two) Times a Day., Disp: 1 each, Rfl: 3  •  Continuous Blood Gluc  (FreeStyle Tae 2 Purlear) device, USE AS DIRECTED, Disp: 1 each, Rfl: 11  •  Continuous Blood Gluc Sensor (FreeStyle Tae 2 Sensor) misc, 1 each Every 14 (Fourteen) Days. Use every 14 days, Disp: 2 each, Rfl: 11  •  diazePAM (VALIUM) 10 MG tablet, diazepam 10 mg tablet, Disp: , Rfl:   •  Diclofenac Sodium (VOLTAREN) 1 % gel gel, Apply  topically to the appropriate area as directed 3 (Three) Times a Day., Disp: 300 g, Rfl: 1  •  docusate sodium (COLACE) 100 MG capsule, Take 1 capsule by mouth 2 (Two) Times a Day As Needed for Constipation., Disp: 60 capsule, Rfl: 2  •  DULoxetine (CYMBALTA) 60 MG capsule, TAKE 1 CAPSULE TWICE DAILY., Disp: 60 capsule, Rfl: 0  •  empagliflozin (Jardiance) 25 MG tablet tablet, Take 1 tablet by mouth Daily., Disp: 90 tablet, Rfl: 3  •  ferrous sulfate 325 (65 Fe) MG tablet, Take 1 tablet by mouth 2 (Two) Times a Day With Meals., Disp: 60 tablet, Rfl: 3  •  folic acid (FOLVITE) 1 MG tablet, Take 1 tablet by mouth Daily., Disp: 90 tablet, Rfl: 1  •  glucose (DEX4) 4 GM chewable tablet, Chew 4 tablets As Needed for Low Blood Sugar., Disp: 90 tablet, Rfl: 3  •  glucose blood test strip, Test 4 times daily , E10.65, Disp: 120 each, Rfl: 11  •  glucose monitor monitoring kit, 1 each As Needed (to check bg)., Disp: 1 each, Rfl: 0  •  glucose-vitamin C 4-6 GM-MG chewable tablet chewable tablet, TRUEplus Glucose 4 gram chewable tablet, Disp: , Rfl:   •  Insulin Aspart (NovoLOG) 100 UNIT/ML injection, Inject 10 Units under the skin into the appropriate area as directed 3 (Three) Times a Day Before Meals., Disp: 10 mL, Rfl: 9  •  Insulin Glargine  "(LANTUS SOLOSTAR) 100 UNIT/ML injection pen, Inject 35 Units under the skin into the appropriate area as directed Every Night. (Patient taking differently: Inject 35 Units under the skin into the appropriate area as directed Daily.), Disp: 9 mL, Rfl: 3  •  Insulin Pen Needle 32G X 8 MM misc, Use at bedtime, Disp: 100 each, Rfl: 3  •  Insulin Syringe-Needle U-100 30G X 1/2\" 1 ML misc, Use three times daily with insulin, Disp: 100 each, Rfl: 3  •  Lancets misc, Use for E11.65 diabetes to check sugars 4 times a day., Disp: 200 each, Rfl: 3  •  Lancets misc, Test 4 times daily, E10.65, Disp: 120 each, Rfl: 11  •  levothyroxine (SYNTHROID, LEVOTHROID) 25 MCG tablet, Take 1 tablet by mouth Daily., Disp: 30 tablet, Rfl: 3  •  lisinopril (PRINIVIL,ZESTRIL) 20 MG tablet, Take 1 tablet by mouth Daily., Disp: 90 tablet, Rfl: 1  •  methocarbamol (ROBAXIN) 750 MG tablet, Take 1 tablet by mouth 3 (Three) Times a Day., Disp: 90 tablet, Rfl: 1  •  mupirocin (BACTROBAN) 2 % ointment, Apply 1 application topically to the appropriate area as directed 3 (Three) Times a Day. (Patient taking differently: Apply 1 application topically to the appropriate area as directed As Needed.), Disp: 22 g, Rfl: 0  •  omeprazole (priLOSEC) 40 MG capsule, Take 1 capsule by mouth Daily., Disp: 90 capsule, Rfl: 1  •  pregabalin (LYRICA) 300 MG capsule, TAKE 1 CAPSULE TWICE DAILY., Disp: 60 capsule, Rfl: 0  •  TRUEplus Insulin Syringe 30G X 5/16\" 0.5 ML misc, , Disp: , Rfl:   •  Trulicity 3 MG/0.5ML solution pen-injector, INJECT 0.5 ML UNDER THE SKIN INTO THE APPROPRIATE AREA AS DIRECTED ONCE A WEEK, Disp: 2 mL, Rfl: 0  •  vilazodone (Viibryd) 20 MG tablet tablet, Take 1 tablet by mouth Daily., Disp: 90 tablet, Rfl: 1  •  vitamin D (ERGOCALCIFEROL) 1.25 MG (93322 UT) capsule capsule, TAKE 1 CAPSULE WEEKLY, Disp: 12 capsule, Rfl: 0  •  oxybutynin XL (DITROPAN XL) 15 MG 24 hr tablet, Take 1 tablet by mouth Daily., Disp: 30 tablet, Rfl: 3    HPI        Pt is " 56 yo female with management of obesity IDDM type 2 , HLP, diabetic neuropathy,  HTN, major depression  type 2, tobacco smoker , sp below right knee amputation, sp appendectomy, sp bladder surgery,diabetic retinopathy of right eye, vitamin D deficiency, acquired  hypothryoidism, microcytic anemia , colonic polyps, diverticulosis, internal hemorrhoids/GERD with esophagitis, gastritis. Diabetic retinopathy, cataracts, iron deficiency, CKD stage 2, chronic neck pain (DDD cervical spine,cervical spondylosis at C5-C6) cervical radiculopathy, leukocytosis, CAD, echo in January 2022( pericardial effusion,, mild LVH),     8/26/22 in office visit for recheck. Pt has seen several Specialist since last visit has saw Endocrinoology on 6/28/22 for her DM type 2.  Saw Vascular surgery for her leg pain on 7/26/22 and has normal arterial flow and venous duplex. Saw Hematology on 8/12/22 for her leukocytosis and iron deficeincy anemia. She is also getting injections for b12 deficiency.   Today she reports right arm/shoulder pain for about a week. She reports no trauma no fall. Did not hear any pop on shoulder. It hurts to lift her right arm and has difficulty lifting it up. Pain start from posterior shoulder and radiates down her right arm     10/26/22 in office visit for recheck . Since last visit pt has seen ORthopedic on 9/27/22 and 10/18/22 for her right shoulder pain/bursitis of right shoulder and received right shoulder injection. She was given tramadol.  Had injection on left hand for trigger finger.  She also saw Endocrinology on 10/6/22 and continues to take trulicity, jardiance and Lantus 32 units at bedtime along with humalog 7 units before meals. hga1c on 10/65/22 was at 7.00 CMP showed CR at 1.34 and GFR from 60s to 40s range. She has yet to get mammogram screening .  Pt has had improvement on her urinary issues and is on dtiropan XL 10 mg daily. No pain on urination. Shoulder and hand are feeling better. Does have  episodes of low sugars and gets in the 60-70s range      Arm Pain   The incident occurred more than 1 week ago. The incident occurred at home. There was no injury mechanism. The pain is present in the right shoulder and right forearm. The quality of the pain is described as aching. The pain radiates to the right arm. The pain is at a severity of 5/10. The pain is moderate. Associated symptoms include muscle weakness and numbness. Pertinent negatives include no chest pain. The symptoms are aggravated by movement and lifting. She has tried NSAIDs for the symptoms. The treatment provided no relief.   Shoulder Injury   The incident occurred at home. The injury mechanism was repetitive motion and overhead work. The quality of the pain is described as aching. The pain radiates to the right arm. The pain is at a severity of 5/10. The pain is moderate. Associated symptoms include muscle weakness and numbness. Pertinent negatives include no chest pain. The symptoms are aggravated by movement and overhead lifting. She has tried NSAIDs for the symptoms. The treatment provided no relief.   Coronary Artery Disease  Presents for follow-up visit. Symptoms include chest pain. Pertinent negatives include no chest pressure, chest tightness, dizziness, leg swelling, muscle weakness, palpitations, shortness of breath or weight gain. Risk factors include hyperlipidemia and obesity. The symptoms have been stable. Compliance with diet is variable. Compliance with exercise is variable. Compliance with medications is variable.   Hyperlipidemia  This is a chronic problem. The current episode started more than 1 year ago. Recent lipid tests were reviewed and are variable. Exacerbating diseases include chronic renal disease, diabetes and obesity. She has no history of hypothyroidism, liver disease or nephrotic syndrome. Associated symptoms include chest pain. Pertinent negatives include no shortness of breath. The current treatment  provides mild improvement of lipids. Risk factors for coronary artery disease include diabetes mellitus, hypertension, obesity, dyslipidemia and a sedentary lifestyle.   Female  Problem  The patient's pertinent negatives include no genital itching, genital lesions, genital rash, missed menses, pelvic pain, vaginal bleeding or vaginal discharge. This is a recurrent problem. The current episode started more than 1 month ago. The problem occurs constantly. The problem has been improving The pain is moderate. Pertinent negatives include no abdominal pain, anorexia, back pain, chills, constipation, diarrhea, discolored urine, dysuria, fever, flank pain, frequency, headaches, hematuria, joint pain, joint swelling, nausea, painful intercourse, rash, sore throat, urgency or vomiting. Associated symptoms comments: Urinary incontinence . There is no history of an abdominal surgery, a  section, an ectopic pregnancy, endometriosis, a gynecological surgery, herpes simplex, menorrhagia, metrorrhagia, miscarriage, ovarian cysts, perineal abscess, PID, an STD, a terminated pregnancy or vaginosis.   Heart Problem  This is a recurrent problem. The current episode started more than 1 month ago. The problem occurs constantly. The problem has been unchanged. Associated symptoms include arthralgias. Pertinent negatives include no abdominal pain, anorexia, change in bowel habit, chest pain, chills, congestion, coughing, diaphoresis, fatigue, fever, headaches, joint swelling, myalgias, nausea, neck pain, numbness, rash, sore throat, swollen glands, urinary symptoms, vertigo, visual change, vomiting or weakness. Nothing aggravates the symptoms. She has tried nothing for the symptoms. The treatment provided no relief.   Chronic Kidney Disease  This is a chronic problem. The current episode started more than 1 year ago. The problem occurs constantly. The problem has been unchanged. Pertinent negatives include no abdominal  pain, anorexia, arthralgias, change in bowel habit, chest pain, chills, congestion, coughing, diaphoresis, fatigue, fever, headaches, joint swelling, myalgias, nausea, neck pain, numbness, rash, sore throat, swollen glands, urinary symptoms, vertigo, visual change, vomiting or weakness. Nothing aggravates the symptoms. She has tried nothing for the symptoms. The treatment provided no relief.   Diabetes  She presents for her follow-up diabetic visit. She has type 2 diabetes mellitus. Her disease course has been waxing and waning . Pertinent negatives for hypoglycemia include no confusion, dizziness, headaches, hunger, mood changes, nervousness/anxiousness, pallor or seizures. Associated symptoms include fatigue and weakness. Pertinent negatives for diabetes include no blurred vision, no chest pain, no foot paresthesias, no foot ulcerations, no polydipsia, no polyphagia and no polyuria. Pertinent negatives for hypoglycemia complications include no blackouts, no hospitalization, no nocturnal hypoglycemia and no required assistance. Symptoms are stable. Pertinent negatives for diabetic complications include no autonomic neuropathy, CVA, heart disease, impotence, nephropathy or peripheral neuropathy. Risk factors for coronary artery disease include diabetes mellitus and dyslipidemia. Current diabetic treatment includes insulin injections and oral agent (dual therapy). She is compliant with treatment most of the time. Her weight is stable. She has not had a previous visit with a dietitian. She never participates in exercise. Her home blood glucose trend is decreasing steadily. An ACE inhibitor/angiotensin II receptor blocker is being taken. She does not see a podiatrist.Eye exam is not current.   Hypothyroidism   This is a new problem. The current episode started more than 1 year ago. The problem occurs constantly. The problem has been improved Associated symptoms include arthralgias, fatigue, numbness and weakness.  Pertinent negatives include no abdominal pain, anorexia, chest pain, chills, congestion, coughing, diaphoresis, fever, headaches, nausea, sore throat or vomiting. Nothing aggravates the symptoms. She has tried nothing for the symptoms. The treatment provided no relief.    Obesity   This is a chronic problem. The problem occurs constantly. The problem has been unchanged. Associated symptoms include arthralgias, fatigue, numbness and weakness. Pertinent negatives include no chest pain, chills, congestion, coughing, diaphoresis, fever, headaches, nausea, sore throat or vomiting. Nothing aggravates the symptoms. She has tried nothing for the symptoms. The treatment provided no relief       Review of Systems  Review of Systems   Constitutional: Positive for activity change and fatigue. Negative for appetite change, chills, diaphoresis and fever.   HENT: Negative for congestion, postnasal drip, rhinorrhea, sinus pressure, sinus pain, sneezing, sore throat, trouble swallowing and voice change.    Respiratory: Positive for shortness of breath. Negative for cough, choking, chest tightness, wheezing and stridor.    Cardiovascular: Negative for chest pain.   Gastrointestinal: Negative for diarrhea, nausea and vomiting.   Musculoskeletal: Positive for arthralgias, back pain and gait problem.   Neurological: Positive for weakness and numbness. Negative for headaches.       Patient Active Problem List   Diagnosis   • Class 1 obesity with serious comorbidity and body mass index (BMI) of 32.0 to 32.9 in adult   • Moderate episode of recurrent major depressive disorder (HCC)   • Encounter for screening for malignant neoplasm of colon   • Gastroesophageal reflux disease   • Hypothyroidism   • Microcytic anemia   • Vitamin D deficiency   • Uncontrolled type 2 diabetes mellitus with hyperglycemia (HCC)   • Class 1 obesity with body mass index (BMI) of 32.0 to 32.9 in adult   • Diabetic polyneuropathy associated with type 2 diabetes  mellitus (HCC)   • Gastritis without bleeding   • Class 1 obesity with serious comorbidity and body mass index (BMI) of 33.0 to 33.9 in adult   • At high risk for falls   • Gait instability   • Below knee amputation (HCC)   • Type 2 diabetes mellitus with hyperglycemia, with long-term current use of insulin (HCC)   • Essential hypertension   • Mixed hyperlipidemia   • Cervical radiculopathy   • DDD (degenerative disc disease), cervical   • Chronic neck pain   • Leukocytosis   • Iron deficiency   • Wheelchair bound   • B12 deficiency   • Chest pain   • Abnormal nuclear stress test   • Coronary artery disease involving native coronary artery of native heart without angina pectoris   • Polyuria   • Tobacco dependence   • Left hand pain   • Decreased range of motion of finger of left hand   • Pain of finger of left hand   • Class 1 obesity with serious comorbidity and body mass index (BMI) of 34.0 to 34.9 in adult   • Urge incontinence   • Acute pain of left shoulder     Past Surgical History:   Procedure Laterality Date   • APPENDECTOMY     • BELOW KNEE AMPUTATION     • BLADDER SURGERY     • CARDIAC CATHETERIZATION N/A 1/27/2022    Procedure: Left Heart Cath;  Surgeon: Josias Carpio MD;  Location: [facility] CATH INVASIVE LOCATION;  Service: Cardiology;  Laterality: N/A;   • COLONOSCOPY N/A 9/2/2020    Procedure: COLONOSCOPY;  Surgeon: Ashli Gonzalez MD;  Location: [facility] ENDOSCOPY;  Service: Gastroenterology;  Laterality: N/A;   • ENDOSCOPY N/A 9/2/2020    Procedure: ESOPHAGOGASTRODUODENOSCOPY;  Surgeon: Ashli Gonzalez MD;  Location: [facility] ENDOSCOPY;  Service: Gastroenterology;  Laterality: N/A;     Social History     Socioeconomic History   • Marital status:    Tobacco Use   • Smoking status: Some Days     Packs/day: 0.50     Types: Cigarettes   • Smokeless tobacco: Never   Vaping Use   • Vaping Use: Never used   Substance and Sexual Activity   • Alcohol use: Not Currently   • Drug use: Never   •  Sexual activity: Defer     Family History   Problem Relation Age of Onset   • Diabetes Other    • Hyperlipidemia Other    • Hypertension Other    • Stroke Other    • Heart disease Other    • Other Other    • Diabetes Mother    • Diabetes Father    • Diabetes Sister    • Heart disease Sister    • Diabetes Brother      Lab on 10/06/2022   Component Date Value Ref Range Status   • Hemoglobin A1C 10/06/2022 7.00 (H)  4.80 - 5.60 % Final   • Total Cholesterol 10/06/2022 138  0 - 200 mg/dL Final   • Triglycerides 10/06/2022 62  0 - 150 mg/dL Final   • HDL Cholesterol 10/06/2022 50  40 - 60 mg/dL Final   • LDL Cholesterol  10/06/2022 75  0 - 100 mg/dL Final   • VLDL Cholesterol 10/06/2022 13  5 - 40 mg/dL Final   • LDL/HDL Ratio 10/06/2022 1.51   Final   • TSH 10/06/2022 1.190  0.270 - 4.200 uIU/mL Final   • Free T4 10/06/2022 0.95  0.93 - 1.70 ng/dL Final   • 25 Hydroxy, Vitamin D 10/06/2022 41.3  30.0 - 100.0 ng/ml Final   Office Visit on 10/06/2022   Component Date Value Ref Range Status   • Glucose 10/06/2022 194 (H)  65 - 99 mg/dL Final   • BUN 10/06/2022 28 (H)  6 - 20 mg/dL Final   • Creatinine 10/06/2022 1.34 (H)  0.57 - 1.00 mg/dL Final   • Sodium 10/06/2022 139  136 - 145 mmol/L Final   • Potassium 10/06/2022 5.1  3.5 - 5.2 mmol/L Final   • Chloride 10/06/2022 105  98 - 107 mmol/L Final   • CO2 10/06/2022 25.0  22.0 - 29.0 mmol/L Final   • Calcium 10/06/2022 9.0  8.6 - 10.5 mg/dL Final   • Total Protein 10/06/2022 7.8  6.0 - 8.5 g/dL Final   • Albumin 10/06/2022 4.20  3.50 - 5.20 g/dL Final   • ALT (SGPT) 10/06/2022 27  1 - 33 U/L Final   • AST (SGOT) 10/06/2022 31  1 - 32 U/L Final   • Alkaline Phosphatase 10/06/2022 90  39 - 117 U/L Final   • Total Bilirubin 10/06/2022 0.2  0.0 - 1.2 mg/dL Final   • Globulin 10/06/2022 3.6  gm/dL Final   • A/G Ratio 10/06/2022 1.2  g/dL Final   • BUN/Creatinine Ratio 10/06/2022 20.9  7.0 - 25.0 Final   • Anion Gap 10/06/2022 9.0  5.0 - 15.0 mmol/L Final   • eGFR 10/06/2022 46.1  (L)  >60.0 mL/min/1.73 Final    National Kidney Foundation and American Society of Nephrology (ASN) Task Force recommended calculation based on the Chronic Kidney Disease Epidemiology Collaboration (CKD-EPI) equation refit without adjustment for race.   Lab on 08/12/2022   Component Date Value Ref Range Status   • Iron 08/12/2022 51  37 - 145 mcg/dL Final   • Iron Saturation 08/12/2022 16 (L)  20 - 50 % Final   • Transferrin 08/12/2022 215  200 - 360 mg/dL Final   • TIBC 08/12/2022 320  298 - 536 mcg/dL Final   • Ferritin 08/12/2022 105.00  13.00 - 150.00 ng/mL Final   • Folate 08/12/2022 11.10  4.78 - 24.20 ng/mL Final   • Vitamin B-12 08/12/2022 546  211 - 946 pg/mL Final   • WBC 08/12/2022 12.86 (H)  3.40 - 10.80 10*3/mm3 Final   • RBC 08/12/2022 4.61  3.77 - 5.28 10*6/mm3 Final   • Hemoglobin 08/12/2022 12.0  12.0 - 15.9 g/dL Final   • Hematocrit 08/12/2022 39.1  34.0 - 46.6 % Final   • MCV 08/12/2022 84.8  79.0 - 97.0 fL Final   • MCH 08/12/2022 26.0 (L)  26.6 - 33.0 pg Final   • MCHC 08/12/2022 30.7 (L)  31.5 - 35.7 g/dL Final   • RDW 08/12/2022 15.8 (H)  12.3 - 15.4 % Final   • RDW-SD 08/12/2022 48.8  37.0 - 54.0 fl Final   • MPV 08/12/2022 12.1 (H)  6.0 - 12.0 fL Final   • Platelets 08/12/2022 207  140 - 450 10*3/mm3 Final   • Neutrophil % 08/12/2022 73.4  42.7 - 76.0 % Final   • Lymphocyte % 08/12/2022 18.0 (L)  19.6 - 45.3 % Final   • Monocyte % 08/12/2022 4.5 (L)  5.0 - 12.0 % Final   • Eosinophil % 08/12/2022 2.7  0.3 - 6.2 % Final   • Basophil % 08/12/2022 0.9  0.0 - 1.5 % Final   • Immature Grans % 08/12/2022 0.5  0.0 - 0.5 % Final   • Neutrophils, Absolute 08/12/2022 9.42 (H)  1.70 - 7.00 10*3/mm3 Final   • Lymphocytes, Absolute 08/12/2022 2.32  0.70 - 3.10 10*3/mm3 Final   • Monocytes, Absolute 08/12/2022 0.58  0.10 - 0.90 10*3/mm3 Final   • Eosinophils, Absolute 08/12/2022 0.35  0.00 - 0.40 10*3/mm3 Final   • Basophils, Absolute 08/12/2022 0.12  0.00 - 0.20 10*3/mm3 Final   • Immature Grans,  Absolute 08/12/2022 0.07 (H)  0.00 - 0.05 10*3/mm3 Final   • nRBC 08/12/2022 0.0  0.0 - 0.2 /100 WBC Final   Hospital Outpatient Visit on 07/26/2022   Component Date Value Ref Range Status   • Target HR (85%) 07/26/2022 138  bpm Final   • Max. Pred. HR (100%) 07/26/2022 162  bpm Final   Hospital Outpatient Visit on 07/26/2022   Component Date Value Ref Range Status   • Target HR (85%) 07/26/2022 138  bpm Final   • Max. Pred. HR (100%) 07/26/2022 162  bpm Final   • LEFT DORSALIS PEDIS SYS MAX 07/26/2022 167   Final   • LEFT POST TIBIAL SYS MAX 07/26/2022 157   Final   • LEFT ARMAND RATIO 07/26/2022 1.04   Final   • Upper arterial right arm brachial * 07/26/2022 160  mmHg Final   • Upper arterial left arm brachial s* 07/26/2022 144  mmHg Final   Lab on 05/03/2022   Component Date Value Ref Range Status   • Glucose 05/03/2022 86  65 - 99 mg/dL Final   • BUN 05/03/2022 17  6 - 20 mg/dL Final   • Creatinine 05/03/2022 0.97  0.57 - 1.00 mg/dL Final   • Sodium 05/03/2022 139  136 - 145 mmol/L Final   • Potassium 05/03/2022 4.9  3.5 - 5.2 mmol/L Final   • Chloride 05/03/2022 104  98 - 107 mmol/L Final   • CO2 05/03/2022 26.0  22.0 - 29.0 mmol/L Final   • Calcium 05/03/2022 8.9  8.6 - 10.5 mg/dL Final   • Total Protein 05/03/2022 6.8  6.0 - 8.5 g/dL Final   • Albumin 05/03/2022 3.70  3.50 - 5.20 g/dL Final   • ALT (SGPT) 05/03/2022 11  1 - 33 U/L Final   • AST (SGOT) 05/03/2022 12  1 - 32 U/L Final   • Alkaline Phosphatase 05/03/2022 77  39 - 117 U/L Final   • Total Bilirubin 05/03/2022 0.2  0.0 - 1.2 mg/dL Final   • Globulin 05/03/2022 3.1  gm/dL Final   • A/G Ratio 05/03/2022 1.2  g/dL Final   • BUN/Creatinine Ratio 05/03/2022 17.5  7.0 - 25.0 Final   • Anion Gap 05/03/2022 9.0  5.0 - 15.0 mmol/L Final   • eGFR 05/03/2022 67.9  >60.0 mL/min/1.73 Final    National Kidney Foundation and American Society of Nephrology (ASN) Task Force recommended calculation based on the Chronic Kidney Disease Epidemiology Collaboration  (CKD-EPI) equation refit without adjustment for race.   • Hemoglobin A1C 05/03/2022 7.90 (H)  4.80 - 5.60 % Final   • Total Cholesterol 05/03/2022 115  0 - 200 mg/dL Final   • Triglycerides 05/03/2022 65  0 - 150 mg/dL Final   • HDL Cholesterol 05/03/2022 40  40 - 60 mg/dL Final   • LDL Cholesterol  05/03/2022 61  0 - 100 mg/dL Final   • VLDL Cholesterol 05/03/2022 14  5 - 40 mg/dL Final   • LDL/HDL Ratio 05/03/2022 1.55   Final   • TSH 05/03/2022 2.030  0.270 - 4.200 uIU/mL Final   • Free T4 05/03/2022 1.05  0.93 - 1.70 ng/dL Final   • 25 Hydroxy, Vitamin D 05/03/2022 38.2  30.0 - 100.0 ng/ml Final   • Microalbumin/Creatinine Ratio 05/03/2022 55.4  mg/g Final   • Creatinine, Urine 05/03/2022 61.4  mg/dL Final   • Microalbumin, Urine 05/03/2022 3.4  mg/dL Final   • Urine Culture 05/03/2022 No growth   Final   • Color, UA 05/03/2022 Yellow  Yellow, Straw Final   • Appearance, UA 05/03/2022 Clear  Clear Final   • pH, UA 05/03/2022 7.0  5.0 - 8.0 Final   • Specific Gravity, UA 05/03/2022 1.019  1.005 - 1.030 Final   • Glucose, UA 05/03/2022 >=1000 mg/dL (3+) (A)  Negative Final   • Ketones, UA 05/03/2022 Negative  Negative Final   • Bilirubin, UA 05/03/2022 Negative  Negative Final   • Blood, UA 05/03/2022 Negative  Negative Final   • Protein, UA 05/03/2022 Negative  Negative Final   • Leuk Esterase, UA 05/03/2022 Negative  Negative Final   • Nitrite, UA 05/03/2022 Negative  Negative Final   • Urobilinogen, UA 05/03/2022 0.2 E.U./dL  0.2 - 1.0 E.U./dL Final   • RBC, UA 05/03/2022 0-2  None Seen, 0-2 /HPF Final   • WBC, UA 05/03/2022 0-2  None Seen, 0-2 /HPF Final   • Bacteria, UA 05/03/2022 None Seen  None Seen /HPF Final   • Squamous Epithelial Cells, UA 05/03/2022 0-2  None Seen, 0-2 /HPF Final   • Hyaline Casts, UA 05/03/2022 0-2  None Seen /LPF Final   • Methodology 05/03/2022 Automated Microscopy   Final      XR Hand 3+ View Left  Narrative: PROCEDURE: XR HAND 3+ VW LEFT    VIEWS: 3    INDICATION:  "Pain    COMPARISON: None    FINDINGS:     - fracture: None. No erosions are identified.    - alignment: Within normal limits    - misc: Mild degenerative change of the first carpometacarpal  joint, and the intercarpal joint of the first digit. Milder  degenerative changes of the interphalangeal joints two through  five are present.   Diffusely decreased osseous mineralization.  Impression: Degenerative changes as above. No acute osseous  abnormality..    Note:  if pain or symptoms persist beyond reasonable expectations    and follow-up imaging is anticipated,  cross sectional imaging   (CT and/or MRI) is suggested, as is deemed clinically   appropriate.    Electronically signed by:  Amira Ross MD  10/21/2022 10:52 AM  CDT Workstation: [id number]    @[email or web address]@  Immunization History   Administered Date(s) Administered   • COVID-19 (MODERNA) 1st, 2nd, 3rd Dose Only 04/13/2021, 04/13/2021, 05/13/2021, 05/13/2021, 11/16/2021, 05/04/2022   • COVID-19 (MODERNA) BIVALENT BOOSTER 18+YRS 10/04/2022   • FluLaval/Fluzone >6mos 09/14/2020, 11/16/2021   • Influenza, Unspecified 10/04/2022   • Pneumococcal Polysaccharide (PPSV23) 09/14/2020   • Shingrix 09/15/2020   • Tdap 09/14/2020       The following portions of the patient's history were reviewed and updated as appropriate: allergies, current medications, past family history, past medical history, past social history, past surgical history and problem list.        Physical Exam  /70 (BP Location: Right arm, Patient Position: Sitting, Cuff Size: Large Adult)   Pulse 70   Temp 98.3 °F (36.8 °C)   Ht 175.3 cm (69\")   SpO2 99%   BMI 34.70 kg/m²     Physical Exam  Vitals and nursing note reviewed.   Constitutional:       Appearance: She is well-developed. She is not diaphoretic.   HENT:      Head: Normocephalic and atraumatic.      Right Ear: External ear normal.   Eyes:      Conjunctiva/sclera: Conjunctivae normal.      Pupils: Pupils are equal, round, and " reactive to light.   Cardiovascular:      Rate and Rhythm: Normal rate and regular rhythm.      Heart sounds: Normal heart sounds. No murmur heard.  Pulmonary:      Effort: Pulmonary effort is normal. No respiratory distress.      Breath sounds: Normal breath sounds.   Abdominal:      General: Bowel sounds are normal. There is no distension.      Palpations: Abdomen is soft.      Tenderness: There is no abdominal tenderness.   Musculoskeletal:         General: Tenderness present. No deformity. Normal range of motion.      Cervical back: Normal range of motion and neck supple.   Skin:     General: Skin is warm.      Coloration: Skin is not pale.      Findings: No erythema or rash.   Neurological:      Mental Status: She is alert and oriented to person, place, and time.      Cranial Nerves: No cranial nerve deficit.   Psychiatric:         Behavior: Behavior normal.         [unfilled]   Diagnosis Plan   1. Screening mammogram, encounter for  Mammo Screening Bilateral With CAD      2. Uncontrolled type 2 diabetes mellitus with hyperglycemia (HCC)        3. Vitamin D deficiency        4. Acquired hypothyroidism        5. Gait instability        6. At high risk for falls        7. Mixed hyperlipidemia        8. Essential hypertension        9. Wheelchair bound        10. Iron deficiency        11. B12 deficiency        12. Urge incontinence        13. Acute renal failure superimposed on stage 2 chronic kidney disease, unspecified acute renal failure type (HCC)  Renal Function Panel      14. Left hand pain  Diclofenac Sodium (VOLTAREN) 1 % gel gel      15. Class 1 obesity with serious comorbidity and body mass index (BMI) of 34.0 to 34.9 in adult, unspecified obesity type                  -recommend labwork   -recommend diabetic eye exam   -recommend COVID-19 vaccination .  -acute on chronic kidney disease/failure - check renal function. Advised pt to make appt with Nephrology   -recommend mammogram screening -  schedule at imaging center   -pt had hypoglycemic episode today. She forgot her glucose tabs. Gave some candy for patient   -right shoulder pain - will get x-ray. Suspect rotator cuff tear. Will get MRI at imaging center. Start on tramadol 50 mg every 8 hours. Reviewed MICHAEL. Consider ORthopedic rferral  -CAD/pericardial effusion/LVH-  Cardiology following on aspirin, coreg statin   -chronic neck pain/DDD cervical spine/cervical radiculopathy -reviewed x-ray of neck. Referred to Neurology    -diabetic retinopathy/catatract  - Ophthalmology following in [place] she has pending surgery   -gait instability/ high fall risk-  Uses wheelchair   -urinary incontinence - currently uses pullups go up on dtiropan XL from 10 to 15 mg daily.    -colonic/rectal polyps, internal hemorrhoids/GERD with esophagitis/gastritis  - GI following  Next colonsocopy in 2025. On prilosec 40 mg daily.   -CKD stage 3a - Nephrology following.  -vitamin D deficiency -vitamin D once a week  -hypothyroidism - on synthroid 25 mcg PO q daily.   -DM type 2  - on trulicity 3 mg subq weekly.  On jardiance 25 mg daily.  on basaglar  32 units at bedtime.on humalog 5 units before meals. Endocrinology following. Has Dexcom monitor ordered. Cut back on Lantus from 32 to 28 units nighlty  -microcytic anemia/iron deficiency anemialleukocytosis  - on ferrous sulfate 325 mg PO q daily on b12 injections .. Hematology following    -HTN -    on lisinopril  20 mg PO q daily.   -HLP - on lipitor 80 mg PO qhs. Recommend heart health ydiet   -major depression - on viibryd 20 mg Po q daily.    -obesity - counseled weight loss >5 minutes BMI at 34.70  -diabetic neuropathy - on lyrica 300 PO BID will go up to QID. On cymbalta  -chronic pain  Was on Norco 7.5/325 mg every 12 hours PRN. Will refer to Pain Management.   on robaxin TID     -advised pt to be safe and call with questions and concerns  -advised pt to go to ER or call 911 if symptoms worrisome or  severe  -advised pt to followup with specialist and referrals  -advised pt to be safe during COVID-19 pandemic  I spent 30 minutes caring for Marta on this date of service. This time includes time spent by me in the following activities: preparing for the visit, reviewing tests, obtaining and/or reviewing a separately obtained history, performing a medically appropriate examination and/or evaluation, counseling and educating the patient/family/caregiver, ordering medications, tests, or procedures, referring and communicating with other health care professionals, documenting information in the medical record, independently interpreting results and communicating that information with the patient/family/caregiver and care coordination.        This document has been electronically signed by Gato Crane MD on October 26, 2022 13:24 CDT

## 2022-10-13 DIAGNOSIS — E11.42 DIABETIC POLYNEUROPATHY ASSOCIATED WITH TYPE 2 DIABETES MELLITUS: Chronic | ICD-10-CM

## 2022-10-13 RX ORDER — PREGABALIN 300 MG/1
CAPSULE ORAL
Qty: 60 CAPSULE | Refills: 0 | Status: SHIPPED | OUTPATIENT
Start: 2022-10-13 | End: 2022-12-12

## 2022-10-13 NOTE — TELEPHONE ENCOUNTER
Rx Refill Note  Requested Prescriptions     Pending Prescriptions Disp Refills   • pregabalin (LYRICA) 300 MG capsule [Pharmacy Med Name: PREGABALIN 300 MG CAPSULE] 60 capsule 0     Sig: TAKE 1 CAPSULE TWICE DAILY.      Last office visit with prescribing clinician: 8/26/2022      Next office visit with prescribing clinician: 10/26/2022   {TIP  Encounters:     Rx Controlled Substance Protocol Failed 10/13/2022 10:19 AM   Protocol Details  Urine Toxicology Performed in Last 12 Months    No Benzodiazepines on Active Med List            {TIP  Please add Last Relevant Lab Date if appropriate  {TIP  Is Refill Pharmacy correct? yes  Anshu Samayoa LPN  10/13/22, 11:47 CDT

## 2022-10-18 ENCOUNTER — OFFICE VISIT (OUTPATIENT)
Dept: ORTHOPEDIC SURGERY | Facility: CLINIC | Age: 57
End: 2022-10-18

## 2022-10-18 VITALS — BODY MASS INDEX: 34.8 KG/M2 | HEIGHT: 69 IN | WEIGHT: 235 LBS

## 2022-10-18 DIAGNOSIS — M65.312 TRIGGER FINGER OF LEFT THUMB: ICD-10-CM

## 2022-10-18 DIAGNOSIS — M79.642 LEFT HAND PAIN: Primary | ICD-10-CM

## 2022-10-18 PROCEDURE — 20550 NJX 1 TENDON SHEATH/LIGAMENT: CPT | Performed by: NURSE PRACTITIONER

## 2022-10-18 PROCEDURE — 99214 OFFICE O/P EST MOD 30 MIN: CPT | Performed by: NURSE PRACTITIONER

## 2022-10-18 NOTE — PROGRESS NOTES
Marta Giraldo is a 57 y.o. female   Primary provider:  Gato Crane MD       Chief Complaint   Patient presents with   • Left Hand - Pain       HISTORY OF PRESENT ILLNESS:      Mrs. Giraldo is a 57-year-old female who presents today with complaints of right thumb pain that started approximately a month ago.  Patient reports that thumb locks and then snaps into extension.  Symptoms are similar to symptoms she had in her index finger in the past.  She denies injury or trauma.  No fever, nausea, vomiting.  Pain is moderate and constant and burning.  She also reports clicking and pain with driving.  She has tried Tylenol without relief of symptoms.      Pain  This is a new problem. The current episode started 1 to 4 weeks ago. The problem occurs constantly. The problem has been unchanged. Associated symptoms include arthralgias. The symptoms are aggravated by bending and exertion. She has tried acetaminophen for the symptoms. The treatment provided mild relief.        CONCURRENT MEDICAL HISTORY:    Past Medical History:   Diagnosis Date   • Anemia    • Anxiety    • Arthritis    • Cataract    • Depression    • Diabetes mellitus (HCC)    • Disease of thyroid gland    • Family history of thyroid problem    • GERD (gastroesophageal reflux disease)    • Headache    • History of transfusion    • Hyperlipidemia    • Hypertension    • Neuropathy    • Stroke (HCC) 2019   • Urinary tract infection        Allergies   Allergen Reactions   • Compazine [Prochlorperazine Edisylate] Unknown - High Severity   • Penicillins Other (See Comments) and Unknown - High Severity     unknown   • Prochlorperazine Swelling         Current Outpatient Medications:   •  Aspirin Adult Low Strength 81 MG EC tablet, , Disp: , Rfl:   •  atorvastatin (LIPITOR) 80 MG tablet, TAKE 1 TABLET NIGHTLY, Disp: 30 tablet, Rfl: 3  •  B-D UF III MINI PEN NEEDLES 31G X 5 MM misc, Use as needed, Disp: 100 each, Rfl: 3  •  Blood Glucose Monitoring Suppl  (Accu-Chek Guide Me) w/Device kit, , Disp: , Rfl:   •  carvedilol (COREG) 6.25 MG tablet, Take 1 tablet by mouth 2 (Two) Times a Day., Disp: 180 tablet, Rfl: 3  •  clotrimazole-betamethasone (Lotrisone) 1-0.05 % cream, Apply  topically to the appropriate area as directed 2 (Two) Times a Day., Disp: 1 each, Rfl: 3  •  Continuous Blood Gluc  (FreeStyle Tae 2 [place]) device, USE AS DIRECTED, Disp: 1 each, Rfl: 11  •  Continuous Blood Gluc Sensor (FreeStyle Tae 2 Sensor) misc, 1 each Every 14 (Fourteen) Days. Use every 14 days, Disp: 2 each, Rfl: 11  •  diazePAM (VALIUM) 10 MG tablet, diazepam 10 mg tablet, Disp: , Rfl:   •  Diclofenac Sodium (VOLTAREN) 1 % gel gel, APPLY TO THE APPROPRIATE AREA AS DIRECTED 4 TIMES AS DAY AS NEEDED, Disp: 300 g, Rfl: 1  •  docusate sodium (COLACE) 100 MG capsule, Take 1 capsule by mouth 2 (Two) Times a Day As Needed for Constipation., Disp: 60 capsule, Rfl: 2  •  DULoxetine (CYMBALTA) 60 MG capsule, Take 1 capsule by mouth 2 (Two) Times a Day., Disp: 60 capsule, Rfl: 3  •  empagliflozin (Jardiance) 25 MG tablet tablet, Take 1 tablet by mouth Daily., Disp: 90 tablet, Rfl: 3  •  ferrous sulfate 325 (65 Fe) MG tablet, Take 1 tablet by mouth 2 (Two) Times a Day With Meals., Disp: 60 tablet, Rfl: 3  •  folic acid (FOLVITE) 1 MG tablet, Take 1 tablet by mouth Daily., Disp: 90 tablet, Rfl: 1  •  glucose (DEX4) 4 GM chewable tablet, Chew 4 tablets As Needed for Low Blood Sugar., Disp: 90 tablet, Rfl: 3  •  glucose blood test strip, Test 4 times daily , E10.65, Disp: 120 each, Rfl: 11  •  glucose monitor monitoring kit, 1 each As Needed (to check bg)., Disp: 1 each, Rfl: 0  •  glucose-vitamin C 4-6 GM-MG chewable tablet chewable tablet, TRUEplus Glucose 4 gram chewable tablet, Disp: , Rfl:   •  Insulin Aspart (NovoLOG) 100 UNIT/ML injection, Inject 10 Units under the skin into the appropriate area as directed 3 (Three) Times a Day Before Meals., Disp: 10 mL, Rfl: 9  •  Insulin  "Glargine (LANTUS SOLOSTAR) 100 UNIT/ML injection pen, Inject 35 Units under the skin into the appropriate area as directed Every Night. (Patient taking differently: Inject 35 Units under the skin into the appropriate area as directed Daily.), Disp: 9 mL, Rfl: 3  •  Insulin Pen Needle 32G X 8 MM misc, Use at bedtime, Disp: 100 each, Rfl: 3  •  Insulin Syringe-Needle U-100 30G X 1/2\" 1 ML misc, Use three times daily with insulin, Disp: 100 each, Rfl: 3  •  Lancets misc, Use for E11.65 diabetes to check sugars 4 times a day., Disp: 200 each, Rfl: 3  •  Lancets misc, Test 4 times daily, E10.65, Disp: 120 each, Rfl: 11  •  levothyroxine (SYNTHROID, LEVOTHROID) 25 MCG tablet, Take 1 tablet by mouth Daily., Disp: 30 tablet, Rfl: 3  •  lisinopril (PRINIVIL,ZESTRIL) 20 MG tablet, Take 1 tablet by mouth Daily., Disp: 90 tablet, Rfl: 1  •  methocarbamol (ROBAXIN) 750 MG tablet, TAKE 1 TABLET THREE TIMES DAILY., Disp: 90 tablet, Rfl: 1  •  mupirocin (BACTROBAN) 2 % ointment, Apply 1 application topically to the appropriate area as directed 3 (Three) Times a Day. (Patient taking differently: Apply 1 application topically to the appropriate area as directed As Needed.), Disp: 22 g, Rfl: 0  •  omeprazole (priLOSEC) 40 MG capsule, TAKE 1 CAPSULE DAILY., Disp: 30 capsule, Rfl: 0  •  oxybutynin XL (DITROPAN-XL) 10 MG 24 hr tablet, Take 1 tablet by mouth Daily., Disp: 90 tablet, Rfl: 1  •  pregabalin (LYRICA) 300 MG capsule, TAKE 1 CAPSULE TWICE DAILY., Disp: 60 capsule, Rfl: 0  •  TRUEplus Insulin Syringe 30G X 5/16\" 0.5 ML misc, , Disp: , Rfl:   •  Trulicity 3 MG/0.5ML solution pen-injector, INJECT 0.5 ML UNDER THE SKIN INTO THE APPROPRIATE AREA AS DIRECTED ONCE A WEEK, Disp: 2 mL, Rfl: 0  •  vilazodone (Viibryd) 20 MG tablet tablet, Take 1 tablet by mouth Daily., Disp: 90 tablet, Rfl: 1  •  vitamin D (ERGOCALCIFEROL) 1.25 MG (58636 UT) capsule capsule, TAKE 1 CAPSULE WEEKLY, Disp: 12 capsule, Rfl: 0    Past Surgical History: " "  Procedure Laterality Date   • APPENDECTOMY     • BELOW KNEE AMPUTATION     • BLADDER SURGERY     • CARDIAC CATHETERIZATION N/A 1/27/2022    Procedure: Left Heart Cath;  Surgeon: Josias Carpio MD;  Location: [facility] CATH INVASIVE LOCATION;  Service: Cardiology;  Laterality: N/A;   • COLONOSCOPY N/A 9/2/2020    Procedure: COLONOSCOPY;  Surgeon: Ashli Gonzalez MD;  Location: [facility] ENDOSCOPY;  Service: Gastroenterology;  Laterality: N/A;   • ENDOSCOPY N/A 9/2/2020    Procedure: ESOPHAGOGASTRODUODENOSCOPY;  Surgeon: Ashli Gonzalez MD;  Location: [facility] ENDOSCOPY;  Service: Gastroenterology;  Laterality: N/A;       Family History   Problem Relation Age of Onset   • Diabetes Other    • Hyperlipidemia Other    • Hypertension Other    • Stroke Other    • Heart disease Other    • Other Other    • Diabetes Mother    • Diabetes Father    • Diabetes Sister    • Heart disease Sister    • Diabetes Brother         Social History     Socioeconomic History   • Marital status:    Tobacco Use   • Smoking status: Some Days     Packs/day: 0.50     Types: Cigarettes   • Smokeless tobacco: Never   Vaping Use   • Vaping Use: Never used   Substance and Sexual Activity   • Alcohol use: Not Currently   • Drug use: Never   • Sexual activity: Defer        Review of Systems   Constitutional: Negative.    HENT: Negative.    Eyes: Negative.    Respiratory: Negative.    Cardiovascular: Negative.    Endocrine: Negative.    Genitourinary: Negative.    Musculoskeletal: Positive for arthralgias.   Skin: Negative.    Allergic/Immunologic: Negative.    Neurological: Negative.    Hematological: Negative.    Psychiatric/Behavioral: Negative.        PHYSICAL EXAMINATION:       Ht 175.3 cm (69\")   Wt 107 kg (235 lb)   BMI 34.70 kg/m²     Physical Exam  Vitals and nursing note reviewed.   Constitutional:       General: She is not in acute distress.     Appearance: She is well-developed. She is not toxic-appearing.   HENT:      Head: " "Normocephalic.   Pulmonary:      Effort: Pulmonary effort is normal. No respiratory distress.   Skin:     General: Skin is warm and dry.   Neurological:      Mental Status: She is alert and oriented to person, place, and time.   Psychiatric:         Mood and Affect: Mood normal.         Behavior: Behavior normal.         Thought Content: Thought content normal.         Judgment: Judgment normal.         GAIT:     []  Normal  []  Antalgic    Assistive device: []  None  []  Walker     []  Crutches  []  Cane     []  Wheelchair  []  Stretcher    Ortho Exam    Fingers are warm, pink, with good capillary refill and normal sensation.  Triggering noted to left thumb.  There is a painful nodule on the palmar base of the thumb.  No evidence of infection.    No results found.        ASSESSMENT:    Diagnoses and all orders for this visit:    Left hand pain  -     XR Hand 3+ View Left    Trigger finger of left thumb          PLAN  - Injection Tendon or Ligament    Date/Time: 10/18/2022 2:46 PM  Performed by: Glo Polanco APRN  Authorized by: Glo Polanco APRN   Consent: Verbal consent obtained. Written consent obtained.  Risks and benefits: risks, benefits and alternatives were discussed  Consent given by: patient  Patient understanding: patient states understanding of the procedure being performed  Patient identity confirmed: verbally with patient  Time out: Immediately prior to procedure a \"time out\" was called to verify the correct patient, procedure, equipment, support staff and site/side marked as required.  Preparation: Patient was prepped and draped in the usual sterile fashion.  Local anesthesia used: no    Anesthesia:  Local anesthesia used: no    Sedation:  Patient sedated: no    Patient tolerance: Patient tolerated the procedure well with no immediate complications  Comments: .3cc bupivacaine 0.5 % and .3cc kenalog injected into left hand thumb   20413-591-70   [id number]  And  [id number]    " [id number]          X-rays reviewed, no acute bony abnormality identified.  After discussing risk, benefits, alternatives, patient desires to proceed with trigger finger injection for left thumb.  Patient tolerated injection well.  Signs and symptoms to report and when to seek care explained.  Patient also given thumb spica and instructed on RICE therapy and activity modification.  Patient to return if symptoms persist.    Return if symptoms worsen or fail to improve.    EMR Dragon/Transciption Disclaimer: Some of this note may be an electronic transcription/translation of spoken language to printed text.  The electronic translation of spoken language may permit erroneous, or at times, nonsensical words or phrases to be inadvertently transcribed. Although I have reviewed the note for such errors, some may still exist.     Time spent of a minimum of 30 minutes including the face to face evaluation, reviewing of medical history and prior medial records, reviewing of diagnostic studies, ordering additional tests, documentation, patient education and coordination of care.       This document has been electronically signed by Glo RYAN on October 18, 2022 14:32 CDT

## 2022-10-19 ENCOUNTER — TELEPHONE (OUTPATIENT)
Dept: ORTHOPEDIC SURGERY | Facility: CLINIC | Age: 57
End: 2022-10-19

## 2022-10-19 NOTE — TELEPHONE ENCOUNTER
AJ,    Patient called and stated you were going to call in pain medication for her. I did not see documentation of this.

## 2022-10-20 RX ORDER — PYRAZINAMIDE 500 MG/1
1-2 TABLET ORAL EVERY 6 HOURS PRN
Qty: 20 TABLET | Refills: 0 | Status: SHIPPED | OUTPATIENT
Start: 2022-10-20 | End: 2022-10-25

## 2022-10-21 RX ORDER — DULOXETIN HYDROCHLORIDE 60 MG/1
CAPSULE, DELAYED RELEASE ORAL
Qty: 60 CAPSULE | Refills: 0 | Status: SHIPPED | OUTPATIENT
Start: 2022-10-21 | End: 2022-12-21

## 2022-10-26 ENCOUNTER — LAB (OUTPATIENT)
Dept: LAB | Facility: HOSPITAL | Age: 57
End: 2022-10-26

## 2022-10-26 ENCOUNTER — OFFICE VISIT (OUTPATIENT)
Dept: FAMILY MEDICINE CLINIC | Facility: CLINIC | Age: 57
End: 2022-10-26

## 2022-10-26 VITALS
BODY MASS INDEX: 34.7 KG/M2 | TEMPERATURE: 98.3 F | DIASTOLIC BLOOD PRESSURE: 70 MMHG | OXYGEN SATURATION: 99 % | SYSTOLIC BLOOD PRESSURE: 124 MMHG | HEART RATE: 70 BPM | HEIGHT: 69 IN

## 2022-10-26 DIAGNOSIS — I10 ESSENTIAL HYPERTENSION: ICD-10-CM

## 2022-10-26 DIAGNOSIS — E78.2 MIXED HYPERLIPIDEMIA: ICD-10-CM

## 2022-10-26 DIAGNOSIS — M79.642 LEFT HAND PAIN: ICD-10-CM

## 2022-10-26 DIAGNOSIS — N39.41 URGE INCONTINENCE: ICD-10-CM

## 2022-10-26 DIAGNOSIS — N17.9 ACUTE RENAL FAILURE SUPERIMPOSED ON STAGE 2 CHRONIC KIDNEY DISEASE, UNSPECIFIED ACUTE RENAL FAILURE TYPE: ICD-10-CM

## 2022-10-26 DIAGNOSIS — E53.8 B12 DEFICIENCY: ICD-10-CM

## 2022-10-26 DIAGNOSIS — E61.1 IRON DEFICIENCY: Chronic | ICD-10-CM

## 2022-10-26 DIAGNOSIS — Z99.3 WHEELCHAIR DEPENDENCE: ICD-10-CM

## 2022-10-26 DIAGNOSIS — N18.2 ACUTE RENAL FAILURE SUPERIMPOSED ON STAGE 2 CHRONIC KIDNEY DISEASE, UNSPECIFIED ACUTE RENAL FAILURE TYPE: ICD-10-CM

## 2022-10-26 DIAGNOSIS — E55.9 VITAMIN D DEFICIENCY: ICD-10-CM

## 2022-10-26 DIAGNOSIS — R26.81 GAIT INSTABILITY: ICD-10-CM

## 2022-10-26 DIAGNOSIS — Z12.31 SCREENING MAMMOGRAM, ENCOUNTER FOR: Primary | ICD-10-CM

## 2022-10-26 DIAGNOSIS — E11.65 UNCONTROLLED TYPE 2 DIABETES MELLITUS WITH HYPERGLYCEMIA: ICD-10-CM

## 2022-10-26 DIAGNOSIS — E03.9 ACQUIRED HYPOTHYROIDISM: ICD-10-CM

## 2022-10-26 DIAGNOSIS — Z91.81 AT HIGH RISK FOR FALLS: ICD-10-CM

## 2022-10-26 DIAGNOSIS — E66.9 CLASS 1 OBESITY WITH SERIOUS COMORBIDITY AND BODY MASS INDEX (BMI) OF 34.0 TO 34.9 IN ADULT, UNSPECIFIED OBESITY TYPE: ICD-10-CM

## 2022-10-26 PROCEDURE — 99214 OFFICE O/P EST MOD 30 MIN: CPT | Performed by: FAMILY MEDICINE

## 2022-10-26 PROCEDURE — 80069 RENAL FUNCTION PANEL: CPT | Performed by: FAMILY MEDICINE

## 2022-10-26 RX ORDER — VILAZODONE HYDROCHLORIDE 20 MG/1
20 TABLET ORAL DAILY
Qty: 90 TABLET | Refills: 1 | Status: SHIPPED | OUTPATIENT
Start: 2022-10-26

## 2022-10-26 RX ORDER — LISINOPRIL 20 MG/1
20 TABLET ORAL DAILY
Qty: 90 TABLET | Refills: 1 | Status: SHIPPED | OUTPATIENT
Start: 2022-10-26

## 2022-10-26 RX ORDER — METHOCARBAMOL 750 MG/1
750 TABLET, FILM COATED ORAL 3 TIMES DAILY
Qty: 90 TABLET | Refills: 1 | Status: SHIPPED | OUTPATIENT
Start: 2022-10-26 | End: 2022-12-12

## 2022-10-26 RX ORDER — ATORVASTATIN CALCIUM 80 MG/1
80 TABLET, FILM COATED ORAL NIGHTLY
Qty: 90 TABLET | Refills: 1 | Status: SHIPPED | OUTPATIENT
Start: 2022-10-26 | End: 2023-02-09

## 2022-10-26 RX ORDER — OXYBUTYNIN CHLORIDE 15 MG/1
15 TABLET, EXTENDED RELEASE ORAL DAILY
Qty: 30 TABLET | Refills: 3 | Status: SHIPPED | OUTPATIENT
Start: 2022-10-26 | End: 2023-02-09

## 2022-10-26 RX ORDER — OMEPRAZOLE 40 MG/1
40 CAPSULE, DELAYED RELEASE ORAL DAILY
Qty: 90 CAPSULE | Refills: 1 | Status: SHIPPED | OUTPATIENT
Start: 2022-10-26 | End: 2023-02-09

## 2022-10-26 RX ORDER — OXYBUTYNIN CHLORIDE 10 MG/1
10 TABLET, EXTENDED RELEASE ORAL DAILY
Qty: 90 TABLET | Refills: 1 | Status: SHIPPED | OUTPATIENT
Start: 2022-10-26 | End: 2022-10-26

## 2022-10-26 RX ORDER — LEVOTHYROXINE SODIUM 0.03 MG/1
25 TABLET ORAL DAILY
Qty: 30 TABLET | Refills: 3 | Status: SHIPPED | OUTPATIENT
Start: 2022-10-26

## 2022-10-26 NOTE — PATIENT INSTRUCTIONS
Need mammogram screening     Get labwork    Cut back on lantus from 32 to 28 units daily    Also will go up on dttropan XL from 5 to 10 mg daily.

## 2022-10-27 LAB
ALBUMIN SERPL-MCNC: 3.9 G/DL (ref 3.5–5.2)
ANION GAP SERPL CALCULATED.3IONS-SCNC: 10.2 MMOL/L (ref 5–15)
BUN SERPL-MCNC: 19 MG/DL (ref 6–20)
BUN/CREAT SERPL: 19.6 (ref 7–25)
CALCIUM SPEC-SCNC: 8.7 MG/DL (ref 8.6–10.5)
CHLORIDE SERPL-SCNC: 102 MMOL/L (ref 98–107)
CO2 SERPL-SCNC: 26.8 MMOL/L (ref 22–29)
CREAT SERPL-MCNC: 0.97 MG/DL (ref 0.57–1)
EGFRCR SERPLBLD CKD-EPI 2021: 67.9 ML/MIN/1.73
GLUCOSE SERPL-MCNC: 110 MG/DL (ref 65–99)
PHOSPHATE SERPL-MCNC: 4.2 MG/DL (ref 2.5–4.5)
POTASSIUM SERPL-SCNC: 5.4 MMOL/L (ref 3.5–5.2)
SODIUM SERPL-SCNC: 139 MMOL/L (ref 136–145)

## 2022-10-31 ENCOUNTER — TELEPHONE (OUTPATIENT)
Dept: FAMILY MEDICINE CLINIC | Facility: CLINIC | Age: 57
End: 2022-10-31

## 2022-11-01 ENCOUNTER — TELEPHONE (OUTPATIENT)
Dept: FAMILY MEDICINE CLINIC | Facility: CLINIC | Age: 57
End: 2022-11-01

## 2022-11-01 NOTE — TELEPHONE ENCOUNTER
----- Message from Gato Crane MD sent at 10/28/2022  9:47 AM CDT -----  Please let pt know that kidney function stable and improved from 40s to 60s range. Continue with hydration.    Also potassium slightly high and recommend pt watch potassium intake and high potassium foods like bananas, potatoes and tomatoes    Recheck on next visit thanks

## 2022-11-04 ENCOUNTER — INFUSION (OUTPATIENT)
Dept: ONCOLOGY | Facility: HOSPITAL | Age: 57
End: 2022-11-04

## 2022-11-04 DIAGNOSIS — E53.8 B12 DEFICIENCY: Primary | ICD-10-CM

## 2022-11-04 PROCEDURE — 96372 THER/PROPH/DIAG INJ SC/IM: CPT | Performed by: INTERNAL MEDICINE

## 2022-11-04 PROCEDURE — 25010000002 CYANOCOBALAMIN PER 1000 MCG: Performed by: INTERNAL MEDICINE

## 2022-11-04 RX ORDER — CYANOCOBALAMIN 1000 UG/ML
1000 INJECTION, SOLUTION INTRAMUSCULAR; SUBCUTANEOUS ONCE
Status: CANCELLED | OUTPATIENT
Start: 2022-12-02

## 2022-11-04 RX ORDER — ERGOCALCIFEROL 1.25 MG/1
CAPSULE ORAL
Qty: 12 CAPSULE | Refills: 0 | Status: SHIPPED | OUTPATIENT
Start: 2022-11-04 | End: 2022-12-21

## 2022-11-04 RX ORDER — CYANOCOBALAMIN 1000 UG/ML
1000 INJECTION, SOLUTION INTRAMUSCULAR; SUBCUTANEOUS ONCE
Status: COMPLETED | OUTPATIENT
Start: 2022-11-04 | End: 2022-11-04

## 2022-11-04 RX ADMIN — CYANOCOBALAMIN 1000 MCG: 1000 INJECTION, SOLUTION INTRAMUSCULAR at 10:47

## 2022-11-28 ENCOUNTER — TELEPHONE (OUTPATIENT)
Dept: FAMILY MEDICINE CLINIC | Facility: CLINIC | Age: 57
End: 2022-11-28

## 2022-11-28 RX ORDER — INSULIN GLARGINE 100 [IU]/ML
INJECTION, SOLUTION SUBCUTANEOUS
Qty: 15 ML | Refills: 0 | Status: SHIPPED | OUTPATIENT
Start: 2022-11-28 | End: 2023-01-19

## 2022-11-28 RX ORDER — FLUCONAZOLE 150 MG/1
TABLET ORAL
Qty: 2 TABLET | Refills: 0 | Status: SHIPPED | OUTPATIENT
Start: 2022-11-28 | End: 2022-12-27

## 2022-11-28 NOTE — TELEPHONE ENCOUNTER
Patient is calling wanting to know if something can be called in for a vaginal infection to [facility]. Please return call at [phone].

## 2022-12-06 NOTE — PROGRESS NOTES
Subjective:  Marta Giraldo is a 57 y.o. female who presents for       Patient Active Problem List   Diagnosis   • Class 1 obesity with serious comorbidity and body mass index (BMI) of 32.0 to 32.9 in adult   • Moderate episode of recurrent major depressive disorder (HCC)   • Encounter for screening for malignant neoplasm of colon   • Gastroesophageal reflux disease   • Hypothyroidism   • Microcytic anemia   • Vitamin D deficiency   • Uncontrolled type 2 diabetes mellitus with hyperglycemia (HCC)   • Class 1 obesity with body mass index (BMI) of 32.0 to 32.9 in adult   • Diabetic polyneuropathy associated with type 2 diabetes mellitus (HCC)   • Gastritis without bleeding   • Class 1 obesity with serious comorbidity and body mass index (BMI) of 33.0 to 33.9 in adult   • At high risk for falls   • Gait instability   • Below knee amputation (HCC)   • Type 2 diabetes mellitus with hyperglycemia, with long-term current use of insulin (HCC)   • Essential hypertension   • Mixed hyperlipidemia   • Cervical radiculopathy   • DDD (degenerative disc disease), cervical   • Chronic neck pain   • Leukocytosis   • Iron deficiency   • Wheelchair bound   • B12 deficiency   • Chest pain   • Abnormal nuclear stress test   • Coronary artery disease involving native coronary artery of native heart without angina pectoris   • Polyuria   • Tobacco dependence   • Left hand pain   • Decreased range of motion of finger of left hand   • Pain of finger of left hand   • Class 1 obesity with serious comorbidity and body mass index (BMI) of 34.0 to 34.9 in adult   • Urge incontinence   • Acute pain of left shoulder           Current Outpatient Medications:   •  Aspirin Adult Low Strength 81 MG EC tablet, , Disp: , Rfl:   •  atorvastatin (LIPITOR) 80 MG tablet, Take 1 tablet by mouth Every Night., Disp: 90 tablet, Rfl: 1  •  B-D UF III MINI PEN NEEDLES 31G X 5 MM misc, Use as needed, Disp: 100 each, Rfl: 3  •  Blood Glucose Monitoring  Suppl (Accu-Chek Guide Me) w/Device kit, , Disp: , Rfl:   •  carvedilol (COREG) 6.25 MG tablet, Take 1 tablet by mouth 2 (Two) Times a Day., Disp: 180 tablet, Rfl: 3  •  clotrimazole-betamethasone (Lotrisone) 1-0.05 % cream, Apply  topically to the appropriate area as directed 2 (Two) Times a Day., Disp: 1 each, Rfl: 3  •  Continuous Blood Gluc  (FreeStyle Tae 2 [place]) device, USE AS DIRECTED, Disp: 1 each, Rfl: 11  •  Continuous Blood Gluc Sensor (FreeStyle Tae 2 Sensor) misc, 1 each Every 14 (Fourteen) Days. Use every 14 days, Disp: 2 each, Rfl: 11  •  diazePAM (VALIUM) 10 MG tablet, diazepam 10 mg tablet, Disp: , Rfl:   •  Diclofenac Sodium (VOLTAREN) 1 % gel gel, Apply  topically to the appropriate area as directed 3 (Three) Times a Day., Disp: 300 g, Rfl: 1  •  docusate sodium (COLACE) 100 MG capsule, Take 1 capsule by mouth 2 (Two) Times a Day As Needed for Constipation., Disp: 60 capsule, Rfl: 2  •  DULoxetine (CYMBALTA) 60 MG capsule, TAKE 1 CAPSULE TWICE DAILY., Disp: 60 capsule, Rfl: 0  •  empagliflozin (Jardiance) 25 MG tablet tablet, Take 1 tablet by mouth Daily., Disp: 90 tablet, Rfl: 3  •  ferrous sulfate 325 (65 Fe) MG tablet, Take 1 tablet by mouth 2 (Two) Times a Day With Meals., Disp: 60 tablet, Rfl: 3  •  folic acid (FOLVITE) 1 MG tablet, Take 1 tablet by mouth Daily., Disp: 90 tablet, Rfl: 1  •  glucose (DEX4) 4 GM chewable tablet, Chew 4 tablets As Needed for Low Blood Sugar., Disp: 90 tablet, Rfl: 3  •  glucose blood test strip, Test 4 times daily , E10.65, Disp: 120 each, Rfl: 11  •  glucose monitor monitoring kit, 1 each As Needed (to check bg)., Disp: 1 each, Rfl: 0  •  glucose-vitamin C 4-6 GM-MG chewable tablet chewable tablet, TRUEplus Glucose 4 gram chewable tablet, Disp: , Rfl:   •  Insulin Aspart (NovoLOG) 100 UNIT/ML injection, Inject 10 Units under the skin into the appropriate area as directed 3 (Three) Times a Day Before Meals., Disp: 10 mL, Rfl: 9  •  Insulin Pen  "Needle 32G X 8 MM misc, Use at bedtime, Disp: 100 each, Rfl: 3  •  Insulin Syringe-Needle U-100 30G X 1/2\" 1 ML misc, Use three times daily with insulin, Disp: 100 each, Rfl: 3  •  Lancets misc, Use for E11.65 diabetes to check sugars 4 times a day., Disp: 200 each, Rfl: 3  •  Lancets misc, Test 4 times daily, E10.65, Disp: 120 each, Rfl: 11  •  Lantus SoloStar 100 UNIT/ML injection pen, INJECT 35 UNITS EVERY NIGHT, Disp: 15 mL, Rfl: 0  •  levothyroxine (SYNTHROID, LEVOTHROID) 25 MCG tablet, Take 1 tablet by mouth Daily., Disp: 30 tablet, Rfl: 3  •  lisinopril (PRINIVIL,ZESTRIL) 20 MG tablet, Take 1 tablet by mouth Daily., Disp: 90 tablet, Rfl: 1  •  methocarbamol (ROBAXIN) 750 MG tablet, TAKE 1 TABLET THREE TIMES DAILY., Disp: 90 tablet, Rfl: 0  •  mupirocin (BACTROBAN) 2 % ointment, Apply 1 application topically to the appropriate area as directed 3 (Three) Times a Day. (Patient taking differently: Apply 1 application topically to the appropriate area as directed As Needed.), Disp: 22 g, Rfl: 0  •  omeprazole (priLOSEC) 40 MG capsule, Take 1 capsule by mouth Daily., Disp: 90 capsule, Rfl: 1  •  oxybutynin XL (DITROPAN XL) 15 MG 24 hr tablet, Take 1 tablet by mouth Daily., Disp: 30 tablet, Rfl: 3  •  pregabalin (LYRICA) 300 MG capsule, TAKE 1 CAPSULE TWICE DAILY., Disp: 60 capsule, Rfl: 0  •  TRUEplus Insulin Syringe 30G X 5/16\" 0.5 ML misc, , Disp: , Rfl:   •  Trulicity 3 MG/0.5ML solution pen-injector, INJECT 0.5 ML UNDER THE SKIN INTO THE APPROPRIATE AREA AS DIRECTED ONCE A WEEK, Disp: 2 mL, Rfl: 0  •  vilazodone (Viibryd) 20 MG tablet tablet, Take 1 tablet by mouth Daily., Disp: 90 tablet, Rfl: 1  •  vitamin D (ERGOCALCIFEROL) 1.25 MG (48450 UT) capsule capsule, TAKE 1 CAPSULE WEEKLY, Disp: 12 capsule, Rfl: 0     Pt is 56 yo female with management of obesity IDDM type 2 , HLP, diabetic neuropathy,  HTN, major depression  type 2, tobacco smoker , sp below right knee amputation, sp appendectomy, sp bladder " surgery,diabetic retinopathy of right eye, vitamin D deficiency, acquired  hypothryoidism, microcytic anemia , colonic polyps, diverticulosis, internal hemorrhoids/GERD with esophagitis, gastritis. Diabetic retinopathy, cataracts, iron deficiency, CKD stage 2, chronic neck pain (DDD cervical spine,cervical spondylosis at C5-C6) cervical radiculopathy, leukocytosis, CAD, echo in January 2022( pericardial effusion,, mild LVH),     10/26/22 in office visit for recheck . Since last visit pt has seen ORthopedic on 9/27/22 and 10/18/22 for her right shoulder pain/bursitis of right shoulder and received right shoulder injection. She was given tramadol.  Had injection on left hand for trigger finger.  She also saw Endocrinology on 10/6/22 and continues to take trulicity, jardiance and Lantus 32 units at bedtime along with humalog 7 units before meals. hga1c on 10/6//22 was at 7.00 CMP showed CR at 1.34 and GFR from 60s to 40s range. She has yet to get mammogram screening .  Pt has had improvement on her urinary issues and is on dtiropan XL 10 mg daily. No pain on urination. Shoulder and hand are feeling better. Does have episodes of low sugars and gets in the 60-70s range     12/27/22 in office visit for recheck. jackne last visit pt had a b12 infusion on 11/4/22 she no showed for her appt with Hematology on 12/9/22.  She feels like she has a UTI. No hematuria. She also reports difficulty swallowing fort the past month. She states she feels both solids and liquids not going down throat completely. Her last EGD was in 2020 with DR. Gonzalez that showed esophagitis/gastritis.  She states she develops also cough and choking in the middle of the night while sleeping.      GI Problem  The primary symptoms include fatigue and arthralgias. Primary symptoms do not include fever, weight loss, abdominal pain, nausea, vomiting, diarrhea, melena, hematemesis, jaundice, hematochezia, dysuria, myalgias or rash.   The illness is also  significant for dysphagia and back pain. The illness does not include chills, anorexia, odynophagia, bloating, constipation, tenesmus or itching. Significant associated medical issues include GERD. Associated medical issues do not include inflammatory bowel disease, gallstones, liver disease, alcohol abuse, PUD, gastric bypass, bowel resection, irritable bowel syndrome, hemorrhoids or diverticulitis.   Arm Pain   The incident occurred more than 1 week ago. The incident occurred at home. There was no injury mechanism. The pain is present in the right shoulder and right forearm. The quality of the pain is described as aching. The pain radiates to the right arm. The pain is at a severity of 5/10. The pain is moderate. Associated symptoms include muscle weakness and numbness. Pertinent negatives include no chest pain. The symptoms are aggravated by movement and lifting. She has tried NSAIDs for the symptoms. The treatment provided no relief.   Shoulder Injury   The incident occurred at home. The injury mechanism was repetitive motion and overhead work. The quality of the pain is described as aching. The pain radiates to the right arm. The pain is at a severity of 5/10. The pain is moderate. Associated symptoms include muscle weakness and numbness. Pertinent negatives include no chest pain. The symptoms are aggravated by movement and overhead lifting. She has tried NSAIDs for the symptoms. The treatment provided no relief.   Coronary Artery Disease  Presents for follow-up visit. Symptoms include chest pain. Pertinent negatives include no chest pressure, chest tightness, dizziness, leg swelling, muscle weakness, palpitations, shortness of breath or weight gain. Risk factors include hyperlipidemia and obesity. The symptoms have been stable. Compliance with diet is variable. Compliance with exercise is variable. Compliance with medications is variable.   Hyperlipidemia  This is a chronic problem. The current episode  started more than 1 year ago. Recent lipid tests were reviewed and are variable. Exacerbating diseases include chronic renal disease, diabetes and obesity. She has no history of hypothyroidism, liver disease or nephrotic syndrome. Associated symptoms include chest pain. Pertinent negatives include no shortness of breath. The current treatment provides mild improvement of lipids. Risk factors for coronary artery disease include diabetes mellitus, hypertension, obesity, dyslipidemia and a sedentary lifestyle.   Female  Problem  The patient's pertinent negatives include no genital itching, genital lesions, genital rash, missed menses, pelvic pain, vaginal bleeding or vaginal discharge. This is a recurrent problem. The current episode started more than 1 month ago. The problem occurs constantly. The problem has been improving The pain is moderate. Pertinent negatives include no abdominal pain, anorexia, back pain, chills, constipation, diarrhea, discolored urine, dysuria, fever, flank pain, frequency, headaches, hematuria, joint pain, joint swelling, nausea, painful intercourse, rash, sore throat, urgency or vomiting. Associated symptoms comments: Urinary incontinence . There is no history of an abdominal surgery, a  section, an ectopic pregnancy, endometriosis, a gynecological surgery, herpes simplex, menorrhagia, metrorrhagia, miscarriage, ovarian cysts, perineal abscess, PID, an STD, a terminated pregnancy or vaginosis.   Heart Problem  This is a recurrent problem. The current episode started more than 1 month ago. The problem occurs constantly. The problem has been unchanged. Associated symptoms include arthralgias. Pertinent negatives include no abdominal pain, anorexia, change in bowel habit, chest pain, chills, congestion, coughing, diaphoresis, fatigue, fever, headaches, joint swelling, myalgias, nausea, neck pain, numbness, rash, sore throat, swollen glands, urinary symptoms, vertigo, visual  change, vomiting or weakness. Nothing aggravates the symptoms. She has tried nothing for the symptoms. The treatment provided no relief.   Chronic Kidney Disease  This is a chronic problem. The current episode started more than 1 year ago. The problem occurs constantly. The problem has been unchanged. Pertinent negatives include no abdominal pain, anorexia, arthralgias, change in bowel habit, chest pain, chills, congestion, coughing, diaphoresis, fatigue, fever, headaches, joint swelling, myalgias, nausea, neck pain, numbness, rash, sore throat, swollen glands, urinary symptoms, vertigo, visual change, vomiting or weakness. Nothing aggravates the symptoms. She has tried nothing for the symptoms. The treatment provided no relief.   Diabetes  She presents for her follow-up diabetic visit. She has type 2 diabetes mellitus. Her disease course has been waxing and waning . Pertinent negatives for hypoglycemia include no confusion, dizziness, headaches, hunger, mood changes, nervousness/anxiousness, pallor or seizures. Associated symptoms include fatigue and weakness. Pertinent negatives for diabetes include no blurred vision, no chest pain, no foot paresthesias, no foot ulcerations, no polydipsia, no polyphagia and no polyuria. Pertinent negatives for hypoglycemia complications include no blackouts, no hospitalization, no nocturnal hypoglycemia and no required assistance. Symptoms are stable. Pertinent negatives for diabetic complications include no autonomic neuropathy, CVA, heart disease, impotence, nephropathy or peripheral neuropathy. Risk factors for coronary artery disease include diabetes mellitus and dyslipidemia. Current diabetic treatment includes insulin injections and oral agent (dual therapy). She is compliant with treatment most of the time. Her weight is stable. She has not had a previous visit with a dietitian. She never participates in exercise. Her home blood glucose trend is decreasing steadily. An ACE  inhibitor/angiotensin II receptor blocker is being taken. She does not see a podiatrist.Eye exam is not current.   Hypothyroidism   This is a new problem. The current episode started more than 1 year ago. The problem occurs constantly. The problem has been improved Associated symptoms include arthralgias, fatigue, numbness and weakness. Pertinent negatives include no abdominal pain, anorexia, chest pain, chills, congestion, coughing, diaphoresis, fever, headaches, nausea, sore throat or vomiting. Nothing aggravates the symptoms. She has tried nothing for the symptoms. The treatment provided no relief.    Obesity   This is a chronic problem. The problem occurs constantly. The problem has been unchanged. Associated symptoms include arthralgias, fatigue, numbness and weakness. Pertinent negatives include no chest pain, chills, congestion, coughing, diaphoresis, fever, headaches, nausea, sore throat or vomiting. Nothing aggravates the symptoms. She has tried nothing for the symptoms. The treatment provided no relief       Review of Systems  Review of Systems   Constitutional: Positive for activity change and fatigue. Negative for appetite change, chills, diaphoresis, fever and weight loss.   HENT: Positive for trouble swallowing. Negative for congestion, postnasal drip, rhinorrhea, sinus pressure, sinus pain, sneezing, sore throat and voice change.    Respiratory: Positive for shortness of breath. Negative for cough, choking, chest tightness, wheezing and stridor.    Cardiovascular: Negative for chest pain.   Gastrointestinal: Positive for dysphagia. Negative for abdominal pain, anorexia, bloating, constipation, diarrhea, hematemesis, hematochezia, jaundice, melena, nausea and vomiting.   Genitourinary: Positive for urgency. Negative for dysuria.   Musculoskeletal: Positive for arthralgias, back pain and gait problem. Negative for myalgias.   Skin: Negative for itching and rash.   Neurological: Positive for weakness and  numbness. Negative for headaches.       Patient Active Problem List   Diagnosis   • Class 1 obesity with serious comorbidity and body mass index (BMI) of 32.0 to 32.9 in adult   • Moderate episode of recurrent major depressive disorder (HCC)   • Encounter for screening for malignant neoplasm of colon   • Gastroesophageal reflux disease   • Hypothyroidism   • Microcytic anemia   • Vitamin D deficiency   • Uncontrolled type 2 diabetes mellitus with hyperglycemia (HCC)   • Class 1 obesity with body mass index (BMI) of 32.0 to 32.9 in adult   • Diabetic polyneuropathy associated with type 2 diabetes mellitus (HCC)   • Gastritis without bleeding   • Class 1 obesity with serious comorbidity and body mass index (BMI) of 33.0 to 33.9 in adult   • At high risk for falls   • Gait instability   • Below knee amputation (HCC)   • Type 2 diabetes mellitus with hyperglycemia, with long-term current use of insulin (HCC)   • Essential hypertension   • Mixed hyperlipidemia   • Cervical radiculopathy   • DDD (degenerative disc disease), cervical   • Chronic neck pain   • Leukocytosis   • Iron deficiency   • Wheelchair bound   • B12 deficiency   • Chest pain   • Abnormal nuclear stress test   • Coronary artery disease involving native coronary artery of native heart without angina pectoris   • Polyuria   • Tobacco dependence   • Left hand pain   • Decreased range of motion of finger of left hand   • Pain of finger of left hand   • Class 1 obesity with serious comorbidity and body mass index (BMI) of 34.0 to 34.9 in adult   • Urge incontinence   • Acute pain of left shoulder     Past Surgical History:   Procedure Laterality Date   • APPENDECTOMY     • BELOW KNEE AMPUTATION     • BLADDER SURGERY     • CARDIAC CATHETERIZATION N/A 1/27/2022    Procedure: Left Heart Cath;  Surgeon: Josias Carpio MD;  Location: [facility] INVASIVE LOCATION;  Service: Cardiology;  Laterality: N/A;   • COLONOSCOPY N/A 9/2/2020    Procedure:  COLONOSCOPY;  Surgeon: Ashli Gonzalez MD;  Location: [facility] ENDOSCOPY;  Service: Gastroenterology;  Laterality: N/A;   • ENDOSCOPY N/A 9/2/2020    Procedure: ESOPHAGOGASTRODUODENOSCOPY;  Surgeon: Ashli Gonzalez MD;  Location: [facility] ENDOSCOPY;  Service: Gastroenterology;  Laterality: N/A;     Social History     Socioeconomic History   • Marital status:    Tobacco Use   • Smoking status: Some Days     Packs/day: 0.50     Types: Cigarettes   • Smokeless tobacco: Never   Vaping Use   • Vaping Use: Never used   Substance and Sexual Activity   • Alcohol use: Not Currently   • Drug use: Never   • Sexual activity: Defer     Family History   Problem Relation Age of Onset   • Diabetes Other    • Hyperlipidemia Other    • Hypertension Other    • Stroke Other    • Heart disease Other    • Other Other    • Diabetes Mother    • Diabetes Father    • Diabetes Sister    • Heart disease Sister    • Diabetes Brother      Orders Only on 10/26/2022   Component Date Value Ref Range Status   • Glucose 10/26/2022 110 (H)  65 - 99 mg/dL Final   • BUN 10/26/2022 19  6 - 20 mg/dL Final   • Creatinine 10/26/2022 0.97  0.57 - 1.00 mg/dL Final   • Sodium 10/26/2022 139  136 - 145 mmol/L Final   • Potassium 10/26/2022 5.4 (H)  3.5 - 5.2 mmol/L Final   • Chloride 10/26/2022 102  98 - 107 mmol/L Final   • CO2 10/26/2022 26.8  22.0 - 29.0 mmol/L Final   • Calcium 10/26/2022 8.7  8.6 - 10.5 mg/dL Final   • Albumin 10/26/2022 3.90  3.50 - 5.20 g/dL Final   • Phosphorus 10/26/2022 4.2  2.5 - 4.5 mg/dL Final   • Anion Gap 10/26/2022 10.2  5.0 - 15.0 mmol/L Final   • BUN/Creatinine Ratio 10/26/2022 19.6  7.0 - 25.0 Final   • eGFR 10/26/2022 67.9  >60.0 mL/min/1.73 Final    National Kidney Foundation and American Society of Nephrology (ASN) Task Force recommended calculation based on the Chronic Kidney Disease Epidemiology Collaboration (CKD-EPI) equation refit without adjustment for race.   Lab on 10/06/2022   Component Date Value Ref  Range Status   • Hemoglobin A1C 10/06/2022 7.00 (H)  4.80 - 5.60 % Final   • Total Cholesterol 10/06/2022 138  0 - 200 mg/dL Final   • Triglycerides 10/06/2022 62  0 - 150 mg/dL Final   • HDL Cholesterol 10/06/2022 50  40 - 60 mg/dL Final   • LDL Cholesterol  10/06/2022 75  0 - 100 mg/dL Final   • VLDL Cholesterol 10/06/2022 13  5 - 40 mg/dL Final   • LDL/HDL Ratio 10/06/2022 1.51   Final   • TSH 10/06/2022 1.190  0.270 - 4.200 uIU/mL Final   • Free T4 10/06/2022 0.95  0.93 - 1.70 ng/dL Final   • 25 Hydroxy, Vitamin D 10/06/2022 41.3  30.0 - 100.0 ng/ml Final   Office Visit on 10/06/2022   Component Date Value Ref Range Status   • Glucose 10/06/2022 194 (H)  65 - 99 mg/dL Final   • BUN 10/06/2022 28 (H)  6 - 20 mg/dL Final   • Creatinine 10/06/2022 1.34 (H)  0.57 - 1.00 mg/dL Final   • Sodium 10/06/2022 139  136 - 145 mmol/L Final   • Potassium 10/06/2022 5.1  3.5 - 5.2 mmol/L Final   • Chloride 10/06/2022 105  98 - 107 mmol/L Final   • CO2 10/06/2022 25.0  22.0 - 29.0 mmol/L Final   • Calcium 10/06/2022 9.0  8.6 - 10.5 mg/dL Final   • Total Protein 10/06/2022 7.8  6.0 - 8.5 g/dL Final   • Albumin 10/06/2022 4.20  3.50 - 5.20 g/dL Final   • ALT (SGPT) 10/06/2022 27  1 - 33 U/L Final   • AST (SGOT) 10/06/2022 31  1 - 32 U/L Final   • Alkaline Phosphatase 10/06/2022 90  39 - 117 U/L Final   • Total Bilirubin 10/06/2022 0.2  0.0 - 1.2 mg/dL Final   • Globulin 10/06/2022 3.6  gm/dL Final   • A/G Ratio 10/06/2022 1.2  g/dL Final   • BUN/Creatinine Ratio 10/06/2022 20.9  7.0 - 25.0 Final   • Anion Gap 10/06/2022 9.0  5.0 - 15.0 mmol/L Final   • eGFR 10/06/2022 46.1 (L)  >60.0 mL/min/1.73 Final    National Kidney Foundation and American Society of Nephrology (ASN) Task Force recommended calculation based on the Chronic Kidney Disease Epidemiology Collaboration (CKD-EPI) equation refit without adjustment for race.   Lab on 08/12/2022   Component Date Value Ref Range Status   • Iron 08/12/2022 51  37 - 145 mcg/dL Final   •  Iron Saturation 08/12/2022 16 (L)  20 - 50 % Final   • Transferrin 08/12/2022 215  200 - 360 mg/dL Final   • TIBC 08/12/2022 320  298 - 536 mcg/dL Final   • Ferritin 08/12/2022 105.00  13.00 - 150.00 ng/mL Final   • Folate 08/12/2022 11.10  4.78 - 24.20 ng/mL Final   • Vitamin B-12 08/12/2022 546  211 - 946 pg/mL Final   • WBC 08/12/2022 12.86 (H)  3.40 - 10.80 10*3/mm3 Final   • RBC 08/12/2022 4.61  3.77 - 5.28 10*6/mm3 Final   • Hemoglobin 08/12/2022 12.0  12.0 - 15.9 g/dL Final   • Hematocrit 08/12/2022 39.1  34.0 - 46.6 % Final   • MCV 08/12/2022 84.8  79.0 - 97.0 fL Final   • MCH 08/12/2022 26.0 (L)  26.6 - 33.0 pg Final   • MCHC 08/12/2022 30.7 (L)  31.5 - 35.7 g/dL Final   • RDW 08/12/2022 15.8 (H)  12.3 - 15.4 % Final   • RDW-SD 08/12/2022 48.8  37.0 - 54.0 fl Final   • MPV 08/12/2022 12.1 (H)  6.0 - 12.0 fL Final   • Platelets 08/12/2022 207  140 - 450 10*3/mm3 Final   • Neutrophil % 08/12/2022 73.4  42.7 - 76.0 % Final   • Lymphocyte % 08/12/2022 18.0 (L)  19.6 - 45.3 % Final   • Monocyte % 08/12/2022 4.5 (L)  5.0 - 12.0 % Final   • Eosinophil % 08/12/2022 2.7  0.3 - 6.2 % Final   • Basophil % 08/12/2022 0.9  0.0 - 1.5 % Final   • Immature Grans % 08/12/2022 0.5  0.0 - 0.5 % Final   • Neutrophils, Absolute 08/12/2022 9.42 (H)  1.70 - 7.00 10*3/mm3 Final   • Lymphocytes, Absolute 08/12/2022 2.32  0.70 - 3.10 10*3/mm3 Final   • Monocytes, Absolute 08/12/2022 0.58  0.10 - 0.90 10*3/mm3 Final   • Eosinophils, Absolute 08/12/2022 0.35  0.00 - 0.40 10*3/mm3 Final   • Basophils, Absolute 08/12/2022 0.12  0.00 - 0.20 10*3/mm3 Final   • Immature Grans, Absolute 08/12/2022 0.07 (H)  0.00 - 0.05 10*3/mm3 Final   • nRBC 08/12/2022 0.0  0.0 - 0.2 /100 WBC Final   Hospital Outpatient Visit on 07/26/2022   Component Date Value Ref Range Status   • Target HR (85%) 07/26/2022 138  bpm Final   • Max. Pred. HR (100%) 07/26/2022 162  bpm Final   Hospital Outpatient Visit on 07/26/2022   Component Date Value Ref Range Status    • Target HR (85%) 07/26/2022 138  bpm Final   • Max. Pred. HR (100%) 07/26/2022 162  bpm Final   • LEFT DORSALIS PEDIS SYS MAX 07/26/2022 167   Final   • LEFT POST TIBIAL SYS MAX 07/26/2022 157   Final   • LEFT ARMAND RATIO 07/26/2022 1.04   Final   • Upper arterial right arm brachial * 07/26/2022 160  mmHg Final   • Upper arterial left arm brachial s* 07/26/2022 144  mmHg Final      XR Hand 3+ View Left  Narrative: PROCEDURE: XR HAND 3+ VW LEFT    VIEWS: 3    INDICATION: Pain    COMPARISON: None    FINDINGS:     - fracture: None. No erosions are identified.    - alignment: Within normal limits    - misc: Mild degenerative change of the first carpometacarpal  joint, and the intercarpal joint of the first digit. Milder  degenerative changes of the interphalangeal joints two through  five are present.   Diffusely decreased osseous mineralization.  Impression: Degenerative changes as above. No acute osseous  abnormality..    Note:  if pain or symptoms persist beyond reasonable expectations    and follow-up imaging is anticipated,  cross sectional imaging   (CT and/or MRI) is suggested, as is deemed clinically   appropriate.    Electronically signed by:  Amira Ross MD  10/21/2022 10:52 AM  CDT Workstation: [id number]    @[email or web address]@  Immunization History   Administered Date(s) Administered   • COVID-19 (MODERNA) 1st, 2nd, 3rd Dose Only 04/13/2021, 04/13/2021, 05/13/2021, 05/13/2021, 11/16/2021, 05/04/2022   • COVID-19 (MODERNA) BIVALENT BOOSTER 12+YRS 10/04/2022   • FluLaval/Fluzone >6mos 09/14/2020, 11/16/2021   • Influenza, Unspecified 10/04/2022   • Pneumococcal Polysaccharide (PPSV23) 09/14/2020   • Shingrix 09/15/2020   • Tdap 09/14/2020       The following portions of the patient's history were reviewed and updated as appropriate: allergies, current medications, past family history, past medical history, past social history, past surgical history and problem list.        Physical Exam  /60 (BP Location:  "Right arm, Patient Position: Sitting, Cuff Size: Large Adult)   Pulse 78   Temp 98.3 °F (36.8 °C)   Ht 175.3 cm (69\")   SpO2 99%   BMI 34.70 kg/m²       Physical Exam  Vitals and nursing note reviewed.   Constitutional:       Appearance: She is well-developed. She is not diaphoretic.   HENT:      Head: Normocephalic and atraumatic.      Right Ear: External ear normal.   Eyes:      Conjunctiva/sclera: Conjunctivae normal.      Pupils: Pupils are equal, round, and reactive to light.   Cardiovascular:      Rate and Rhythm: Normal rate and regular rhythm.      Heart sounds: Normal heart sounds. No murmur heard.  Pulmonary:      Effort: Pulmonary effort is normal. No respiratory distress.      Breath sounds: Normal breath sounds.   Abdominal:      General: Bowel sounds are normal. There is no distension.      Palpations: Abdomen is soft.      Tenderness: There is no abdominal tenderness.   Musculoskeletal:         General: Tenderness present. No deformity. Normal range of motion.      Cervical back: Normal range of motion and neck supple.   Skin:     General: Skin is warm.      Coloration: Skin is not pale.      Findings: No erythema or rash.      Comments: Neoplasm on skin on right upper shoulder    Neurological:      Mental Status: She is alert and oriented to person, place, and time.      Cranial Nerves: No cranial nerve deficit.   Psychiatric:         Behavior: Behavior normal.         [unfilled]   Diagnosis Plan   1. Skin neoplasm  Ambulatory Referral to Dermatology      2. Mixed hyperlipidemia  CBC Auto Differential    Comprehensive Metabolic Panel    Hemoglobin A1c    Lipid Panel    Vitamin D,25-Hydroxy    Vitamin B12    Microalbumin / Creatinine Urine Ratio - Urine, Clean Catch      3. Essential hypertension  CBC Auto Differential    Comprehensive Metabolic Panel    Hemoglobin A1c    Lipid Panel    Vitamin D,25-Hydroxy    Vitamin B12    Microalbumin / Creatinine Urine Ratio - Urine, Clean Catch      4. " At high risk for falls  CBC Auto Differential    Comprehensive Metabolic Panel    Hemoglobin A1c    Lipid Panel    Vitamin D,25-Hydroxy    Vitamin B12    Microalbumin / Creatinine Urine Ratio - Urine, Clean Catch      5. Acquired hypothyroidism  CBC Auto Differential    Comprehensive Metabolic Panel    Hemoglobin A1c    Lipid Panel    Vitamin D,25-Hydroxy    Vitamin B12    Microalbumin / Creatinine Urine Ratio - Urine, Clean Catch      6. Wheelchair bound  CBC Auto Differential    Comprehensive Metabolic Panel    Hemoglobin A1c    Lipid Panel    Vitamin D,25-Hydroxy    Vitamin B12    Microalbumin / Creatinine Urine Ratio - Urine, Clean Catch      7. Moderate episode of recurrent major depressive disorder (HCC)  CBC Auto Differential    Comprehensive Metabolic Panel    Hemoglobin A1c    Lipid Panel    Vitamin D,25-Hydroxy    Vitamin B12    Microalbumin / Creatinine Urine Ratio - Urine, Clean Catch      8. Gastroesophageal reflux disease with esophagitis without hemorrhage  CBC Auto Differential    Comprehensive Metabolic Panel    Hemoglobin A1c    Lipid Panel    Vitamin D,25-Hydroxy    Vitamin B12    Microalbumin / Creatinine Urine Ratio - Urine, Clean Catch    Ambulatory Referral to Gastroenterology      9. B12 deficiency  CBC Auto Differential    Comprehensive Metabolic Panel    Hemoglobin A1c    Lipid Panel    Vitamin D,25-Hydroxy    Vitamin B12    Microalbumin / Creatinine Urine Ratio - Urine, Clean Catch      10. Uncontrolled type 2 diabetes mellitus with hyperglycemia (HCC)  CBC Auto Differential    Comprehensive Metabolic Panel    Hemoglobin A1c    Lipid Panel    Vitamin D,25-Hydroxy    Vitamin B12    Microalbumin / Creatinine Urine Ratio - Urine, Clean Catch      11. Below knee amputation (HCC)  CBC Auto Differential    Comprehensive Metabolic Panel    Hemoglobin A1c    Lipid Panel    Vitamin D,25-Hydroxy    Vitamin B12    Microalbumin / Creatinine Urine Ratio - Urine, Clean Catch      12. Class 1 obesity  with serious comorbidity and body mass index (BMI) of 34.0 to 34.9 in adult, unspecified obesity type  CBC Auto Differential    Comprehensive Metabolic Panel    Hemoglobin A1c    Lipid Panel    Vitamin D,25-Hydroxy    Vitamin B12    Microalbumin / Creatinine Urine Ratio - Urine, Clean Catch      13. Dysphagia, unspecified type  Ambulatory Referral to Gastroenterology      14. Urinary tract infection without hematuria, site unspecified  Urinalysis With Microscopic - Urine, Clean Catch    Urine Culture - Urine, Urine, Clean Catch                -recommend labwork   -recommend diabetic eye exam   -recommend COVID-19 vaccination .  -recommend mammogram screening - schedule at imaging center  -UTI - check urine studies    -skin neoplasm- refer to Dermatology   -right shoulder pain/AC hypertrophic changes - Orthopedic following   -CAD/pericardial effusion/LVH-  Cardiology following on aspirin, coreg statin   -chronic neck pain/DDD cervical spine/cervical radiculopathy -reviewed x-ray of neck. Referred to Neurology  on cymbalta 60 mg daily.   -diabetic retinopathy/catatract  - Ophthalmology following in [place] she has pending surgery   -gait instability/ high fall risk-  Uses wheelchair   -urinary incontinence - currently uses pullups go up on dtiropan XL from 10 to 15 mg daily.    -colonic/rectal polyps, internal hemorrhoids/GERD with esophagitis/gastritis/dysphagia   - GI following  Next colonsocopy in 2025. On prilosec 40 mg daily.  will refer back to Gastroenterology for repeat EGD.  Possible gastroparesis.    -CKD stage 3a - Nephrology following.  -vitamin D deficiency -vitamin D once a week  -hypothyroidism - on synthroid 25 mcg PO q daily.   -DM type 2  - on trulicity 3 mg subq weekly.  On jardiance 25 mg daily.  on Lantus 28  units at bedtime.on humalog 5 units before meals. Endocrinology following. Has Dexcom monitor ordered. Cut back on Lantus from 32 to 28 units nighlty  -microcytic anemia/iron deficiency  anemialleukocytosis  - on ferrous sulfate 325 mg PO q daily on b12 injections .. Hematology following    -HTN -    on lisinopril  20 mg PO q daily.   -HLP - on lipitor 80 mg PO qhs. Recommend heart health ydiet   -major depression - on viibryd 20 mg Po q daily.  on cymbalta 60 mg daily.   -obesity - counseled weight loss >5 minutes BMI at 34.70  -diabetic neuropathy - on lyrica 300 POo QID. On cymbalta 60 mg daily   -chronic pain  Was on Norco 7.5/325 mg every 12 hours PRN. Will refer to Pain Management.   on robaxin TID     -advised pt to be safe and call with questions and concerns  -advised pt to go to ER or call 911 if symptoms worrisome or severe  -advised pt to followup with specialist and referrals  -advised pt to be safe during COVID-19 pandemic  I spent 30 minutes caring for Marta on this date of service. This time includes time spent by me in the following activities: preparing for the visit, reviewing tests, obtaining and/or reviewing a separately obtained history, performing a medically appropriate examination and/or evaluation, counseling and educating the patient/family/caregiver, ordering medications, tests, or procedures, referring and communicating with other health care professionals, documenting information in the medical record, independently interpreting results and communicating that information with the patient/family/caregiver and care coordination.  -recheck in 6 weeks         This document has been electronically signed by Gato Crane MD on December 27, 2022 14:23 CST

## 2022-12-12 DIAGNOSIS — E11.42 DIABETIC POLYNEUROPATHY ASSOCIATED WITH TYPE 2 DIABETES MELLITUS: Chronic | ICD-10-CM

## 2022-12-12 RX ORDER — PREGABALIN 300 MG/1
CAPSULE ORAL
Qty: 60 CAPSULE | Refills: 0 | Status: SHIPPED | OUTPATIENT
Start: 2022-12-12 | End: 2023-01-13

## 2022-12-12 RX ORDER — METHOCARBAMOL 750 MG/1
TABLET, FILM COATED ORAL
Qty: 90 TABLET | Refills: 0 | Status: SHIPPED | OUTPATIENT
Start: 2022-12-12 | End: 2023-02-17

## 2022-12-12 NOTE — TELEPHONE ENCOUNTER
Rx Refill Note  Requested Prescriptions     Pending Prescriptions Disp Refills   • pregabalin (LYRICA) 300 MG capsule [Pharmacy Med Name: PREGABALIN 300 MG CAPSULE] 60 capsule 0     Sig: TAKE 1 CAPSULE TWICE DAILY.   • methocarbamol (ROBAXIN) 750 MG tablet [Pharmacy Med Name: METHOCARBAMOL 750 MG TABLET] 90 tablet 0     Sig: TAKE 1 TABLET THREE TIMES DAILY.      Last office visit with prescribing clinician: 10/26/2022   Last telemedicine visit with prescribing clinician: 12/27/2022   Next office visit with prescribing clinician: 12/27/2022   {TIP  Encounters:     Rx Controlled Substance Protocol Failed 12/12/2022 08:37 AM   Protocol Details  Urine Toxicology Performed in Last 12 Months    No Benzodiazepines on Active Med List    Medication not refilled in past 28 days          {TIP  Please add Last Relevant Lab Date if appropriate             {TIP  Is Refill Pharmacy correct? YES    Would you like a call back once the refill request has been completed: [] Yes [] No    If the office needs to give you a call back, can they leave a voicemail: [] Yes [] No    Anshu Samayoa LPN  12/12/22, 09:08 CST

## 2022-12-21 RX ORDER — DULOXETIN HYDROCHLORIDE 60 MG/1
CAPSULE, DELAYED RELEASE ORAL
Qty: 60 CAPSULE | Refills: 0 | Status: SHIPPED | OUTPATIENT
Start: 2022-12-21 | End: 2023-01-20

## 2022-12-21 RX ORDER — ERGOCALCIFEROL 1.25 MG/1
CAPSULE ORAL
Qty: 12 CAPSULE | Refills: 0 | Status: SHIPPED | OUTPATIENT
Start: 2022-12-21 | End: 2023-03-16

## 2022-12-27 ENCOUNTER — LAB (OUTPATIENT)
Dept: LAB | Facility: HOSPITAL | Age: 57
End: 2022-12-27

## 2022-12-27 ENCOUNTER — OFFICE VISIT (OUTPATIENT)
Dept: FAMILY MEDICINE CLINIC | Facility: CLINIC | Age: 57
End: 2022-12-27

## 2022-12-27 ENCOUNTER — TELEPHONE (OUTPATIENT)
Dept: FAMILY MEDICINE CLINIC | Facility: CLINIC | Age: 57
End: 2022-12-27

## 2022-12-27 VITALS
TEMPERATURE: 98.3 F | DIASTOLIC BLOOD PRESSURE: 60 MMHG | HEIGHT: 69 IN | OXYGEN SATURATION: 99 % | HEART RATE: 78 BPM | BODY MASS INDEX: 34.7 KG/M2 | SYSTOLIC BLOOD PRESSURE: 122 MMHG

## 2022-12-27 DIAGNOSIS — Z91.81 AT HIGH RISK FOR FALLS: ICD-10-CM

## 2022-12-27 DIAGNOSIS — F33.1 MODERATE EPISODE OF RECURRENT MAJOR DEPRESSIVE DISORDER: ICD-10-CM

## 2022-12-27 DIAGNOSIS — E78.2 MIXED HYPERLIPIDEMIA: ICD-10-CM

## 2022-12-27 DIAGNOSIS — R13.10 DYSPHAGIA, UNSPECIFIED TYPE: ICD-10-CM

## 2022-12-27 DIAGNOSIS — I10 ESSENTIAL HYPERTENSION: ICD-10-CM

## 2022-12-27 DIAGNOSIS — E03.9 ACQUIRED HYPOTHYROIDISM: ICD-10-CM

## 2022-12-27 DIAGNOSIS — E11.65 UNCONTROLLED TYPE 2 DIABETES MELLITUS WITH HYPERGLYCEMIA: ICD-10-CM

## 2022-12-27 DIAGNOSIS — D49.2 SKIN NEOPLASM: Primary | ICD-10-CM

## 2022-12-27 DIAGNOSIS — E53.8 B12 DEFICIENCY: ICD-10-CM

## 2022-12-27 DIAGNOSIS — Z99.3 WHEELCHAIR DEPENDENCE: ICD-10-CM

## 2022-12-27 DIAGNOSIS — N39.0 URINARY TRACT INFECTION WITHOUT HEMATURIA, SITE UNSPECIFIED: ICD-10-CM

## 2022-12-27 DIAGNOSIS — E66.9 CLASS 1 OBESITY WITH SERIOUS COMORBIDITY AND BODY MASS INDEX (BMI) OF 34.0 TO 34.9 IN ADULT, UNSPECIFIED OBESITY TYPE: ICD-10-CM

## 2022-12-27 DIAGNOSIS — K21.00 GASTROESOPHAGEAL REFLUX DISEASE WITH ESOPHAGITIS WITHOUT HEMORRHAGE: ICD-10-CM

## 2022-12-27 DIAGNOSIS — S88.119A BELOW KNEE AMPUTATION: ICD-10-CM

## 2022-12-27 PROCEDURE — 99214 OFFICE O/P EST MOD 30 MIN: CPT | Performed by: FAMILY MEDICINE

## 2022-12-27 NOTE — PATIENT INSTRUCTIONS
Urine studies today    Will refer back to DR. Gonzalez for swawlloing issues. Likely needs new EGD    Will refer to Dr. Lopez with Dermatology

## 2023-01-05 DIAGNOSIS — M79.642 LEFT HAND PAIN: ICD-10-CM

## 2023-01-05 RX ORDER — EMPAGLIFLOZIN 25 MG/1
TABLET, FILM COATED ORAL
Qty: 90 TABLET | Refills: 0 | Status: SHIPPED | OUTPATIENT
Start: 2023-01-05

## 2023-01-06 ENCOUNTER — LAB (OUTPATIENT)
Dept: ONCOLOGY | Facility: HOSPITAL | Age: 58
End: 2023-01-06
Payer: MEDICARE

## 2023-01-06 ENCOUNTER — OFFICE VISIT (OUTPATIENT)
Dept: ONCOLOGY | Facility: CLINIC | Age: 58
End: 2023-01-06
Payer: MEDICARE

## 2023-01-06 ENCOUNTER — INFUSION (OUTPATIENT)
Dept: ONCOLOGY | Facility: HOSPITAL | Age: 58
End: 2023-01-06
Payer: MEDICARE

## 2023-01-06 VITALS
DIASTOLIC BLOOD PRESSURE: 61 MMHG | TEMPERATURE: 96.4 F | HEART RATE: 71 BPM | OXYGEN SATURATION: 96 % | SYSTOLIC BLOOD PRESSURE: 102 MMHG

## 2023-01-06 DIAGNOSIS — D72.829 LEUKOCYTOSIS, UNSPECIFIED TYPE: ICD-10-CM

## 2023-01-06 DIAGNOSIS — E61.1 IRON DEFICIENCY: Chronic | ICD-10-CM

## 2023-01-06 DIAGNOSIS — D72.829 LEUKOCYTOSIS, UNSPECIFIED TYPE: Primary | Chronic | ICD-10-CM

## 2023-01-06 DIAGNOSIS — E53.8 B12 DEFICIENCY: Primary | ICD-10-CM

## 2023-01-06 DIAGNOSIS — E53.8 B12 DEFICIENCY: ICD-10-CM

## 2023-01-06 DIAGNOSIS — R30.0 DYSURIA: ICD-10-CM

## 2023-01-06 LAB
BASOPHILS # BLD AUTO: 0.15 10*3/MM3 (ref 0–0.2)
BASOPHILS NFR BLD AUTO: 1.1 % (ref 0–1.5)
BILIRUB UR QL STRIP: NEGATIVE
CLARITY UR: CLEAR
COLOR UR: YELLOW
DEPRECATED RDW RBC AUTO: 45.5 FL (ref 37–54)
EOSINOPHIL # BLD AUTO: 0.43 10*3/MM3 (ref 0–0.4)
EOSINOPHIL NFR BLD AUTO: 3.2 % (ref 0.3–6.2)
ERYTHROCYTE [DISTWIDTH] IN BLOOD BY AUTOMATED COUNT: 14.3 % (ref 12.3–15.4)
FERRITIN SERPL-MCNC: 138.6 NG/ML (ref 13–150)
FOLATE SERPL-MCNC: >20 NG/ML (ref 4.78–24.2)
GLUCOSE UR STRIP-MCNC: ABNORMAL MG/DL
HCT VFR BLD AUTO: 36.1 % (ref 34–46.6)
HGB BLD-MCNC: 11.4 G/DL (ref 12–15.9)
HGB UR QL STRIP.AUTO: NEGATIVE
IMM GRANULOCYTES # BLD AUTO: 0.09 10*3/MM3 (ref 0–0.05)
IMM GRANULOCYTES NFR BLD AUTO: 0.7 % (ref 0–0.5)
IRON 24H UR-MRATE: 39 MCG/DL (ref 37–145)
IRON SATN MFR SERPL: 14 % (ref 20–50)
KETONES UR QL STRIP: NEGATIVE
LEUKOCYTE ESTERASE UR QL STRIP.AUTO: NEGATIVE
LYMPHOCYTES # BLD AUTO: 2.83 10*3/MM3 (ref 0.7–3.1)
LYMPHOCYTES NFR BLD AUTO: 21 % (ref 19.6–45.3)
MCH RBC QN AUTO: 27.8 PG (ref 26.6–33)
MCHC RBC AUTO-ENTMCNC: 31.6 G/DL (ref 31.5–35.7)
MCV RBC AUTO: 88 FL (ref 79–97)
MONOCYTES # BLD AUTO: 0.71 10*3/MM3 (ref 0.1–0.9)
MONOCYTES NFR BLD AUTO: 5.3 % (ref 5–12)
NEUTROPHILS NFR BLD AUTO: 68.7 % (ref 42.7–76)
NEUTROPHILS NFR BLD AUTO: 9.27 10*3/MM3 (ref 1.7–7)
NITRITE UR QL STRIP: NEGATIVE
NRBC BLD AUTO-RTO: 0 /100 WBC (ref 0–0.2)
PH UR STRIP.AUTO: <=5 [PH] (ref 5–9)
PLATELET # BLD AUTO: 204 10*3/MM3 (ref 140–450)
PMV BLD AUTO: 11.7 FL (ref 6–12)
PROT UR QL STRIP: NEGATIVE
RBC # BLD AUTO: 4.1 10*6/MM3 (ref 3.77–5.28)
SP GR UR STRIP: 1.02 (ref 1–1.03)
TIBC SERPL-MCNC: 273 MCG/DL (ref 298–536)
TRANSFERRIN SERPL-MCNC: 183 MG/DL (ref 200–360)
UROBILINOGEN UR QL STRIP: ABNORMAL
VIT B12 BLD-MCNC: 716 PG/ML (ref 211–946)
WBC NRBC COR # BLD: 13.48 10*3/MM3 (ref 3.4–10.8)

## 2023-01-06 PROCEDURE — 36415 COLL VENOUS BLD VENIPUNCTURE: CPT | Performed by: INTERNAL MEDICINE

## 2023-01-06 PROCEDURE — 82607 VITAMIN B-12: CPT

## 2023-01-06 PROCEDURE — 84466 ASSAY OF TRANSFERRIN: CPT

## 2023-01-06 PROCEDURE — 82728 ASSAY OF FERRITIN: CPT

## 2023-01-06 PROCEDURE — 81003 URINALYSIS AUTO W/O SCOPE: CPT | Performed by: INTERNAL MEDICINE

## 2023-01-06 PROCEDURE — 96372 THER/PROPH/DIAG INJ SC/IM: CPT | Performed by: INTERNAL MEDICINE

## 2023-01-06 PROCEDURE — 1157F ADVNC CARE PLAN IN RCRD: CPT | Performed by: INTERNAL MEDICINE

## 2023-01-06 PROCEDURE — 1125F AMNT PAIN NOTED PAIN PRSNT: CPT | Performed by: INTERNAL MEDICINE

## 2023-01-06 PROCEDURE — 85025 COMPLETE CBC W/AUTO DIFF WBC: CPT

## 2023-01-06 PROCEDURE — 99214 OFFICE O/P EST MOD 30 MIN: CPT | Performed by: INTERNAL MEDICINE

## 2023-01-06 PROCEDURE — 25010000002 CYANOCOBALAMIN PER 1000 MCG: Performed by: INTERNAL MEDICINE

## 2023-01-06 PROCEDURE — 82746 ASSAY OF FOLIC ACID SERUM: CPT

## 2023-01-06 PROCEDURE — 83540 ASSAY OF IRON: CPT

## 2023-01-06 RX ORDER — CYANOCOBALAMIN 1000 UG/ML
1000 INJECTION, SOLUTION INTRAMUSCULAR; SUBCUTANEOUS ONCE
Status: COMPLETED | OUTPATIENT
Start: 2023-01-06 | End: 2023-01-06

## 2023-01-06 RX ORDER — FLUCONAZOLE 150 MG/1
TABLET ORAL
Qty: 2 TABLET | Refills: 0 | OUTPATIENT
Start: 2023-01-06

## 2023-01-06 RX ADMIN — CYANOCOBALAMIN 1000 MCG: 1000 INJECTION, SOLUTION INTRAMUSCULAR at 10:42

## 2023-01-09 ENCOUNTER — TELEPHONE (OUTPATIENT)
Dept: ONCOLOGY | Facility: CLINIC | Age: 58
End: 2023-01-09
Payer: MEDICARE

## 2023-01-09 NOTE — TELEPHONE ENCOUNTER
----- Message from Adalid Bates MD sent at 1/7/2023  1:28 PM CST -----  Please let patient know, she needs to continue taking his current B12 injection every month along with ferrous sulfate twice daily and folic acid p.o. daily.  Her urine analysis did not show any evidence of infection.  No need for any antibiotics at present.  Thank you

## 2023-01-10 ENCOUNTER — TELEPHONE (OUTPATIENT)
Dept: ONCOLOGY | Facility: HOSPITAL | Age: 58
End: 2023-01-10
Payer: MEDICARE

## 2023-01-10 NOTE — TELEPHONE ENCOUNTER
Contacted pt regarding lab work and supplements. PT verbalizes understanding and denies any further questions.

## 2023-01-13 DIAGNOSIS — E11.42 DIABETIC POLYNEUROPATHY ASSOCIATED WITH TYPE 2 DIABETES MELLITUS: Chronic | ICD-10-CM

## 2023-01-13 RX ORDER — PREGABALIN 300 MG/1
CAPSULE ORAL
Qty: 60 CAPSULE | Refills: 0 | Status: SHIPPED | OUTPATIENT
Start: 2023-01-13 | End: 2023-02-09

## 2023-01-13 NOTE — TELEPHONE ENCOUNTER
Rx Refill Note  Requested Prescriptions     Pending Prescriptions Disp Refills   • pregabalin (LYRICA) 300 MG capsule [Pharmacy Med Name: PREGABALIN 300 MG CAPSULE] 60 capsule 0     Sig: TAKE 1 CAPSULE TWICE DAILY.      Last office visit with prescribing clinician: 12/27/2022   Last telemedicine visit with prescribing clinician: 2/9/2023   Next office visit with prescribing clinician: 2/9/2023   {TIP  Encounters:     Rx Controlled Substance Protocol Failed 01/13/2023 01:57 PM   Protocol Details  Urine Toxicology Performed in Last 12 Months    No Benzodiazepines on Active Med List    Medication not refilled in past 28 days            {TIP  Please add Last Relevant Lab Date if appropriate             {TIP  Is Refill Pharmacy correct? YES    Would you like a call back once the refill request has been completed: [] Yes [] No    If the office needs to give you a call back, can they leave a voicemail: [] Yes [] No    Anshu Samayoa LPN  01/13/23, 14:10 CST

## 2023-01-19 ENCOUNTER — OFFICE VISIT (OUTPATIENT)
Dept: GASTROENTEROLOGY | Facility: CLINIC | Age: 58
End: 2023-01-19
Payer: MEDICARE

## 2023-01-19 VITALS
HEART RATE: 76 BPM | SYSTOLIC BLOOD PRESSURE: 113 MMHG | HEIGHT: 69 IN | DIASTOLIC BLOOD PRESSURE: 66 MMHG | BODY MASS INDEX: 34.7 KG/M2

## 2023-01-19 DIAGNOSIS — R13.19 OTHER DYSPHAGIA: Primary | ICD-10-CM

## 2023-01-19 PROCEDURE — 99213 OFFICE O/P EST LOW 20 MIN: CPT | Performed by: INTERNAL MEDICINE

## 2023-01-19 RX ORDER — SODIUM CHLORIDE 9 MG/ML
40 INJECTION, SOLUTION INTRAVENOUS AS NEEDED
Status: CANCELLED | OUTPATIENT
Start: 2023-03-01

## 2023-01-19 RX ORDER — INSULIN GLARGINE 100 [IU]/ML
INJECTION, SOLUTION SUBCUTANEOUS
Qty: 15 ML | Refills: 0 | Status: SHIPPED | OUTPATIENT
Start: 2023-01-19

## 2023-01-19 RX ORDER — DEXTROSE AND SODIUM CHLORIDE 5; .45 G/100ML; G/100ML
30 INJECTION, SOLUTION INTRAVENOUS CONTINUOUS PRN
Status: CANCELLED | OUTPATIENT
Start: 2023-03-01

## 2023-01-20 RX ORDER — DULOXETIN HYDROCHLORIDE 60 MG/1
CAPSULE, DELAYED RELEASE ORAL
Qty: 60 CAPSULE | Refills: 0 | Status: SHIPPED | OUTPATIENT
Start: 2023-01-20 | End: 2023-02-17

## 2023-01-24 DIAGNOSIS — M79.642 LEFT HAND PAIN: ICD-10-CM

## 2023-01-26 ENCOUNTER — TELEMEDICINE (OUTPATIENT)
Dept: ENDOCRINOLOGY | Facility: CLINIC | Age: 58
End: 2023-01-26
Payer: MEDICARE

## 2023-01-26 DIAGNOSIS — E78.2 MIXED HYPERLIPIDEMIA: ICD-10-CM

## 2023-01-26 DIAGNOSIS — I10 ESSENTIAL HYPERTENSION: ICD-10-CM

## 2023-01-26 DIAGNOSIS — E55.9 VITAMIN D DEFICIENCY: ICD-10-CM

## 2023-01-26 DIAGNOSIS — E11.42 DIABETIC POLYNEUROPATHY ASSOCIATED WITH TYPE 2 DIABETES MELLITUS: ICD-10-CM

## 2023-01-26 DIAGNOSIS — Z79.4 TYPE 2 DIABETES MELLITUS WITH HYPERGLYCEMIA, WITH LONG-TERM CURRENT USE OF INSULIN: Primary | ICD-10-CM

## 2023-01-26 DIAGNOSIS — E11.65 TYPE 2 DIABETES MELLITUS WITH HYPERGLYCEMIA, WITH LONG-TERM CURRENT USE OF INSULIN: Primary | ICD-10-CM

## 2023-01-26 DIAGNOSIS — E03.9 ACQUIRED HYPOTHYROIDISM: ICD-10-CM

## 2023-01-26 PROCEDURE — 99214 OFFICE O/P EST MOD 30 MIN: CPT | Performed by: NURSE PRACTITIONER

## 2023-01-26 NOTE — PROGRESS NOTES
Chief Complaint  Diabetes    Subjective          Marta Giraldo presents to [facility] ENDOCRINOLOGY  Diabetes        You have chosen to receive care through a telehealth visit.  Do you consent to use a video/audio connection for your medical care today? Yes            TELEHEALTH VIDEO VISIT     This a video visit due to [facility] current guidelines for social distancing due to the COVID 19 pandemic      Mode of Visit: Video  Location of patient: home  You have chosen to receive care through a telehealth visit.  Does the patient consent to use a video/audio connection for your medical care today? Yes  The visit included audio and video interaction. No technical issues occurred during this visit.           58 year old female presents for follow up     Reason diabetes mellitus type 2    Diagnosed 1980s    Timing constant    Quality improved    Severity is high      Macrovascular complications--CVA      Microvascular complications--neuropathy , diabetic retinopathy           Blood glucose monitoring fingersticks     Checks 4 times daily    Variable 60 up to 250     Review of Systems - General ROS: negative                   Objective   Vital Signs:   There were no vitals taken for this visit.    Physical Exam  Neurological:      General: No focal deficit present.      Mental Status: She is alert.   Psychiatric:         Mood and Affect: Mood normal.         Thought Content: Thought content normal.         Judgment: Judgment normal.        Result Review :   The following data was reviewed by: CONNOR Concepcion on 02/23/2022:  Common labs    Common Labs 10/6/22 10/6/22 10/6/22 10/26/22 1/6/23    1115 1115 1115     Glucose 194 (A)   110 (A)    BUN 28 (A)   19    Creatinine 1.34 (A)   0.97    Sodium 139   139    Potassium 5.1   5.4 (A)    Chloride 105   102    Calcium 9.0   8.7    Albumin 4.20   3.90    Total Bilirubin 0.2       Alkaline Phosphatase 90       AST (SGOT) 31       ALT  (SGPT) 27       WBC     13.48 (A)   Hemoglobin     11.4 (A)   Hematocrit     36.1   Platelets     204   Total Cholesterol   138     Triglycerides   62     HDL Cholesterol   50     LDL Cholesterol    75     Hemoglobin A1C  7.00 (A)      (A) Abnormal value                        Assessment and Plan    Diagnoses and all orders for this visit:    1. Type 2 diabetes mellitus with hyperglycemia, with long-term current use of insulin ([facility]) (Primary)  -     CBC & Differential  -     Comprehensive Metabolic Panel  -     Hemoglobin A1c  -     Lipid Panel  -     Microalbumin / Creatinine Urine Ratio - Urine, Clean Catch  -     TSH  -     Vitamin B12    2. Acquired hypothyroidism  -     CBC & Differential  -     Comprehensive Metabolic Panel  -     Hemoglobin A1c  -     Lipid Panel  -     Microalbumin / Creatinine Urine Ratio - Urine, Clean Catch  -     TSH  -     Vitamin B12    3. Essential hypertension  -     CBC & Differential  -     Comprehensive Metabolic Panel  -     Hemoglobin A1c  -     Lipid Panel  -     Microalbumin / Creatinine Urine Ratio - Urine, Clean Catch  -     TSH  -     Vitamin B12    4. Mixed hyperlipidemia  -     CBC & Differential  -     Comprehensive Metabolic Panel  -     Hemoglobin A1c  -     Lipid Panel  -     Microalbumin / Creatinine Urine Ratio - Urine, Clean Catch  -     TSH  -     Vitamin B12    5. Vitamin D deficiency    6. Diabetic polyneuropathy associated with type 2 diabetes mellitus ([facility])         Glycemic management     Diabetes mellitus type 2     Lab Results   Component Value Date    HGBA1C 7.00 (H) 10/06/2022         Tae personal use --tae 2    Could not download            Taking Jardiance 25 mg daily-keep      Taking Trulicity 3 mg weekly --keep      Taking Lantus 28 units decrease to 24 units            Take humalog before meals 10  TID -----      6 units for small meal    12 units for large meal            Goals for sugar     Fasting and before meals 80 to 130     2 hours after  meals 180 or less        Aim for 45 grams of carbohydrate per meal     Aim for 15 grams of carbohydrate per snack                     Lipid management     Taking Lipitor 40 mg 1 at night        Total Cholesterol   Date Value Ref Range Status   10/06/2022 138 0 - 200 mg/dL Final     Triglycerides   Date Value Ref Range Status   10/06/2022 62 0 - 150 mg/dL Final     HDL Cholesterol   Date Value Ref Range Status   10/06/2022 50 40 - 60 mg/dL Final     LDL Cholesterol    Date Value Ref Range Status   10/06/2022 75 0 - 100 mg/dL Final                 Blood pressure management     Taking lisinopril 20 mg 1 daily      taking carvedilol 6.25 mg bid            Microvascular monitoring     Last eye exam--- Jan. 2023 ,  , monthly injection      Neuropathy     Taking Lyrica      + microalbuminuria    Follows with nephrology     Component      Latest Ref Rng & Units 5/3/2022   Microalbumin/Creatinine Ratio      mg/g 55.4   Creatinine, Urine      mg/dL 61.4   Microalbumin, Urine      mg/dL 3.4                   Thyroid     Hypothyroidism     Taking levothyroxine 25 mcg daily        Lab Results   Component Value Date    TSH 1.190 10/06/2022          Bone health     Vitamin D deficiency     Taking vitamin D 50,000 units weekly                       Follow Up   No follow-ups on file.  Patient was given instructions and counseling regarding her condition or for health maintenance advice. Please see specific information pulled into the AVS if appropriate.         This document has been electronically signed by CONNOR Concepcion on January 26, 2023 16:29 CST.

## 2023-01-27 ENCOUNTER — TELEPHONE (OUTPATIENT)
Dept: ENDOCRINOLOGY | Facility: CLINIC | Age: 58
End: 2023-01-27
Payer: MEDICARE

## 2023-02-01 RX ORDER — CYANOCOBALAMIN 1000 UG/ML
1000 INJECTION, SOLUTION INTRAMUSCULAR; SUBCUTANEOUS ONCE
OUTPATIENT
Start: 2023-02-03

## 2023-02-07 NOTE — PROGRESS NOTES
Chief Complaint   Patient presents with   • Difficulty Swallowing   • Heartburn       Subjective    Marta Travis Giraldo is a 58 y.o. female.    History of Present Illness  Patient presented to GI clinic for follow-up visit today.  Has dysphagia to solids like meat and peanut butter for past several months.  GERD is well controlled.  Denies abdominal pain, nausea or vomiting.  Bowel movements regular.  Weight is stable.     The following portions of the patient's history were reviewed and updated as appropriate:   Past Medical History:   Diagnosis Date   • Anemia    • Anxiety    • Arthritis    • Cataract    • Depression    • Diabetes mellitus (HCC)    • Disease of thyroid gland    • Family history of thyroid problem    • GERD (gastroesophageal reflux disease)    • Headache    • History of transfusion    • Hyperlipidemia    • Hypertension    • Neuropathy    • Stroke (HCC) 2019   • Urinary tract infection      Past Surgical History:   Procedure Laterality Date   • APPENDECTOMY     • BELOW KNEE AMPUTATION     • BLADDER SURGERY     • CARDIAC CATHETERIZATION N/A 1/27/2022    Procedure: Left Heart Cath;  Surgeon: Josias Carpio MD;  Location: [facility] CATH INVASIVE LOCATION;  Service: Cardiology;  Laterality: N/A;   • COLONOSCOPY N/A 9/2/2020    Procedure: COLONOSCOPY;  Surgeon: Ashli Gonzalez MD;  Location: [facility] ENDOSCOPY;  Service: Gastroenterology;  Laterality: N/A;   • ENDOSCOPY N/A 9/2/2020    Procedure: ESOPHAGOGASTRODUODENOSCOPY;  Surgeon: Ashli Gonzalez MD;  Location: [facility] ENDOSCOPY;  Service: Gastroenterology;  Laterality: N/A;     Family History   Problem Relation Age of Onset   • Diabetes Other    • Hyperlipidemia Other    • Hypertension Other    • Stroke Other    • Heart disease Other    • Other Other    • Diabetes Mother    • Diabetes Father    • Diabetes Sister    • Heart disease Sister    • Diabetes Brother      OB History    No obstetric history on file.       Prior to Admission  medications    Medication Sig Start Date End Date Taking? Authorizing Provider   Aspirin Adult Low Strength 81 MG EC tablet  8/4/22  Yes David Vizcaino MD   atorvastatin (LIPITOR) 80 MG tablet Take 1 tablet by mouth Every Night. 10/26/22  Yes Gato Crane MD   B-D UF III MINI PEN NEEDLES 31G X 5 MM misc Use as needed 11/22/21  Yes Shabbir Euceda APRN   Blood Glucose Monitoring Suppl (Accu-Chek Guide Me) w/Device kit  6/28/22  Yes David Vizcaino MD   carvedilol (COREG) 6.25 MG tablet Take 1 tablet by mouth 2 (Two) Times a Day. 2/1/22  Yes Josias Carpio MD   clotrimazole-betamethasone (Lotrisone) 1-0.05 % cream Apply  topically to the appropriate area as directed 2 (Two) Times a Day. 10/5/20  Yes Morena Sood APRN   Continuous Blood Gluc  (FreeStyle Tae 2 [place]) device USE AS DIRECTED 6/15/21  Yes Shabbir Euceda APRN   Continuous Blood Gluc Sensor (FreeStyle Tae 2 Sensor) misc 1 each Every 14 (Fourteen) Days. Use every 14 days 3/21/22  Yes Shabbir Euceda APRN   diazePAM (VALIUM) 10 MG tablet diazepam 10 mg tablet   Yes David Vizcaino MD   Diclofenac Sodium (VOLTAREN) 1 % gel gel APPLY TO THE APPROPRIATE AREA AS DIRECTED 4 TIMES AS DAY AS NEEDED 1/25/23   Gato Crane MD   docusate sodium (COLACE) 100 MG capsule Take 1 capsule by mouth 2 (Two) Times a Day As Needed for Constipation. 12/14/21  Yes Adalid Bates MD   ferrous sulfate 325 (65 Fe) MG tablet Take 1 tablet by mouth 2 (Two) Times a Day With Meals. 8/12/22  Yes Adalid Bates MD   folic acid (FOLVITE) 1 MG tablet Take 1 tablet by mouth Daily. 8/14/22  Yes Adalid Bates MD   glucose (DEX4) 4 GM chewable tablet Chew 4 tablets As Needed for Low Blood Sugar. 11/16/21  Yes Gato Crane MD   glucose blood test strip Test 4 times daily , E10.65 6/28/22  Yes Shabbir Euceda APRN   glucose monitor monitoring kit 1 each As Needed (to check bg). 6/28/22  Yes Shabbir Euceda  "CONNOR Sow   glucose-vitamin C 4-6 GM-MG chewable tablet chewable tablet TRUEplus Glucose 4 gram chewable tablet   Yes ProviderDavid MD   Insulin Aspart (NovoLOG) 100 UNIT/ML injection Inject 10 Units under the skin into the appropriate area as directed 3 (Three) Times a Day Before Meals. 7/5/22 7/5/23 Yes Shabbir Euceda APRN   Insulin Pen Needle 32G X 8 MM misc Use at bedtime 11/16/21  Yes Gato Crane MD   Insulin Syringe-Needle U-100 30G X 1/2\" 1 ML misc Use three times daily with insulin 11/16/21  Yes Gato Crane MD   Jardiance 25 MG tablet tablet TAKE 1 TABLET DAILY. 1/5/23  Yes Shabbir Euceda APRN   Lancets misc Use for E11.65 diabetes to check sugars 4 times a day. 4/7/21  Yes Gaot Crane MD   Lancets misc Test 4 times daily, E10.65 6/28/22  Yes Shabbir Euceda APRN   Lantus SoloStar 100 UNIT/ML injection pen INJECT 35 UNITS EVERY NIGHT 1/19/23  Yes Shabbir Euceda APRN   levothyroxine (SYNTHROID, LEVOTHROID) 25 MCG tablet Take 1 tablet by mouth Daily. 10/26/22  Yes Gato Crane MD   lisinopril (PRINIVIL,ZESTRIL) 20 MG tablet Take 1 tablet by mouth Daily. 10/26/22  Yes Gato Crane MD   methocarbamol (ROBAXIN) 750 MG tablet TAKE 1 TABLET THREE TIMES DAILY. 12/12/22  Yes Gato Crane MD   mupirocin (BACTROBAN) 2 % ointment Apply 1 application topically to the appropriate area as directed 3 (Three) Times a Day.  Patient taking differently: Apply 1 application topically to the appropriate area as directed As Needed. 6/30/22  Yes Shabbir Lopez APRN   omeprazole (priLOSEC) 40 MG capsule Take 1 capsule by mouth Daily. 10/26/22  Yes Gato Crane MD   oxybutynin XL (DITROPAN XL) 15 MG 24 hr tablet Take 1 tablet by mouth Daily. 10/26/22  Yes Gato Crane MD   pregabalin (LYRICA) 300 MG capsule TAKE 1 CAPSULE TWICE DAILY. 1/13/23  Yes Gato Crane MD   TRUEplus Insulin Syringe 30G X 5/16\" 0.5 ML misc  9/28/21  Yes Provider, Historical, " MD   Trulicity 3 MG/0.5ML solution pen-injector INJECT 0.5 ML UNDER THE SKIN INTO THE APPROPRIATE AREA AS DIRECTED ONCE A WEEK 9/13/22  Yes Shabbir Euceda APRN   vilazodone (Viibryd) 20 MG tablet tablet Take 1 tablet by mouth Daily. 10/26/22  Yes Gato Crane MD   vitamin D (ERGOCALCIFEROL) 1.25 MG (19214 UT) capsule capsule TAKE 1 CAPSULE WEEKLY 12/21/22  Yes Gato Crane MD   DULoxetine (CYMBALTA) 60 MG capsule TAKE 1 CAPSULE TWICE DAILY. 1/20/23   Gato Crane MD     Allergies   Allergen Reactions   • Compazine [Prochlorperazine Edisylate] Unknown - High Severity   • Penicillins Other (See Comments) and Unknown - High Severity     unknown   • Prochlorperazine Swelling     Social History     Socioeconomic History   • Marital status:    Tobacco Use   • Smoking status: Some Days     Packs/day: 0.50     Types: Cigarettes   • Smokeless tobacco: Never   Vaping Use   • Vaping Use: Never used   Substance and Sexual Activity   • Alcohol use: Not Currently   • Drug use: Never   • Sexual activity: Defer       Review of Systems  Review of Systems   Constitutional: Negative for chills, fatigue, fever and unexpected weight change.   HENT: Positive for trouble swallowing. Negative for congestion, ear discharge, hearing loss, nosebleeds and sore throat.    Eyes: Negative for pain, discharge and redness.   Respiratory: Negative for cough, chest tightness, shortness of breath and wheezing.    Cardiovascular: Negative for chest pain and palpitations.   Gastrointestinal: Negative for abdominal distention, abdominal pain, blood in stool, constipation, diarrhea, nausea and vomiting.   Endocrine: Negative for cold intolerance, polydipsia, polyphagia and polyuria.   Genitourinary: Negative for dysuria, flank pain, frequency, hematuria and urgency.   Musculoskeletal: Negative for arthralgias, back pain, joint swelling and myalgias.   Skin: Negative for color change, pallor and rash.   Neurological: Negative  "for tremors, seizures, syncope, weakness and headaches.   Hematological: Negative for adenopathy. Does not bruise/bleed easily.   Psychiatric/Behavioral: Negative for behavioral problems, confusion, dysphoric mood, hallucinations and suicidal ideas. The patient is not nervous/anxious.         /66 (BP Location: Right arm)   Pulse 76   Ht 175.3 cm (69\")   BMI 34.70 kg/m²     Objective    Physical Exam  Constitutional:       Appearance: She is well-developed.   HENT:      Head: Normocephalic and atraumatic.   Eyes:      Conjunctiva/sclera: Conjunctivae normal.      Pupils: Pupils are equal, round, and reactive to light.   Neck:      Thyroid: No thyromegaly.   Cardiovascular:      Rate and Rhythm: Normal rate and regular rhythm.      Heart sounds: Normal heart sounds. No murmur heard.  Pulmonary:      Effort: Pulmonary effort is normal.      Breath sounds: Normal breath sounds. No wheezing.   Abdominal:      General: Bowel sounds are normal. There is no distension.      Palpations: Abdomen is soft. There is no mass.      Tenderness: There is no abdominal tenderness.      Hernia: No hernia is present.   Genitourinary:     Comments: No lesions noted  Musculoskeletal:         General: No tenderness. Normal range of motion.      Cervical back: Normal range of motion and neck supple.   Lymphadenopathy:      Cervical: No cervical adenopathy.   Skin:     General: Skin is warm and dry.      Findings: No rash.   Neurological:      Mental Status: She is alert and oriented to person, place, and time.      Cranial Nerves: No cranial nerve deficit.   Psychiatric:         Thought Content: Thought content normal.       Office Visit on 01/06/2023   Component Date Value Ref Range Status   • Color, UA 01/06/2023 Yellow  Yellow, Straw, Dark Yellow, Nilsa Final   • Appearance, UA 01/06/2023 Clear  Clear Final   • pH, UA 01/06/2023 <=5.0  5.0 - 9.0 Final   • Specific Gravity, UA 01/06/2023 1.018  1.003 - 1.030 Final   • Glucose, UA " 01/06/2023 >=1000 mg/dL (3+) (A)  Negative Final   • Ketones, UA 01/06/2023 Negative  Negative Final   • Bilirubin, UA 01/06/2023 Negative  Negative Final   • Blood, UA 01/06/2023 Negative  Negative Final   • Protein, UA 01/06/2023 Negative  Negative Final   • Leuk Esterase, UA 01/06/2023 Negative  Negative Final   • Nitrite, UA 01/06/2023 Negative  Negative Final   • Urobilinogen, UA 01/06/2023 0.2 E.U./dL  0.2 - 1.0 E.U./dL Final     Assessment & Plan      1. Other dysphagia    1.  Dysphagia rule out stricture, ring and CA.  Continue PPI.  Proceed with EGD for further evaluation management.  2.  GERD, well controlled.  Continue PPI and antireflux lifestyle.  3.  Obesity, recommend exercise and diet control.  4.  Colorectal cancer screening, repeat colonoscopy in 5 years.      Orders placed during this encounter include:  Orders Placed This Encounter   Procedures   • Obtain Informed Consent     Standing Status:   Future     Order Specific Question:   Informed Consent Given For     Answer:   ESOPHAGOGASTRODUODENOSCOPY       ESOPHAGOGASTRODUODENOSCOPY (N/A)    Review and/or summary of lab tests, radiology, procedures, medications. Review and summary of old records and obtaining of history. The risks and benefits of my recommendations, as well as other treatment options were discussed with the patient today. Questions were answered.    No orders of the defined types were placed in this encounter.      Follow-up: Return in about 1 month (around 2/19/2023).               Results for orders placed or performed in visit on 01/06/23   Urinalysis With Culture If Indicated - Urine, Clean Catch    Specimen: Urine, Clean Catch   Result Value Ref Range    Color, UA Yellow Yellow, Straw, Dark Yellow, Nilsa    Appearance, UA Clear Clear    pH, UA <=5.0 5.0 - 9.0    Specific Gravity, UA 1.018 1.003 - 1.030    Glucose, UA >=1000 mg/dL (3+) (A) Negative    Ketones, UA Negative Negative    Bilirubin, UA Negative Negative    Blood, UA  Negative Negative    Protein, UA Negative Negative    Leuk Esterase, UA Negative Negative    Nitrite, UA Negative Negative    Urobilinogen, UA 0.2 E.U./dL 0.2 - 1.0 E.U./dL   Results for orders placed or performed in visit on 01/06/23   CBC Auto Differential    Specimen: Arm, Right; Blood   Result Value Ref Range    WBC 13.48 (H) 3.40 - 10.80 10*3/mm3    RBC 4.10 3.77 - 5.28 10*6/mm3    Hemoglobin 11.4 (L) 12.0 - 15.9 g/dL    Hematocrit 36.1 34.0 - 46.6 %    MCV 88.0 79.0 - 97.0 fL    MCH 27.8 26.6 - 33.0 pg    MCHC 31.6 31.5 - 35.7 g/dL    RDW 14.3 12.3 - 15.4 %    RDW-SD 45.5 37.0 - 54.0 fl    MPV 11.7 6.0 - 12.0 fL    Platelets 204 140 - 450 10*3/mm3    Neutrophil % 68.7 42.7 - 76.0 %    Lymphocyte % 21.0 19.6 - 45.3 %    Monocyte % 5.3 5.0 - 12.0 %    Eosinophil % 3.2 0.3 - 6.2 %    Basophil % 1.1 0.0 - 1.5 %    Immature Grans % 0.7 (H) 0.0 - 0.5 %    Neutrophils, Absolute 9.27 (H) 1.70 - 7.00 10*3/mm3    Lymphocytes, Absolute 2.83 0.70 - 3.10 10*3/mm3    Monocytes, Absolute 0.71 0.10 - 0.90 10*3/mm3    Eosinophils, Absolute 0.43 (H) 0.00 - 0.40 10*3/mm3    Basophils, Absolute 0.15 0.00 - 0.20 10*3/mm3    Immature Grans, Absolute 0.09 (H) 0.00 - 0.05 10*3/mm3    nRBC 0.0 0.0 - 0.2 /100 WBC   Iron and TIBC    Specimen: Arm, Right; Blood   Result Value Ref Range    Iron 39 37 - 145 mcg/dL    Iron Saturation 14 (L) 20 - 50 %    Transferrin 183 (L) 200 - 360 mg/dL    TIBC 273 (L) 298 - 536 mcg/dL   Folate    Specimen: Arm, Right; Blood   Result Value Ref Range    Folate >20.00 4.78 - 24.20 ng/mL   Ferritin    Specimen: Arm, Right; Blood   Result Value Ref Range    Ferritin 138.60 13.00 - 150.00 ng/mL   Vitamin B12    Specimen: Arm, Right; Blood   Result Value Ref Range    Vitamin B-12 716 211 - 946 pg/mL   Results for orders placed or performed in visit on 10/26/22   Renal Function Panel    Specimen: Blood   Result Value Ref Range    Glucose 110 (H) 65 - 99 mg/dL    BUN 19 6 - 20 mg/dL    Creatinine 0.97 0.57 - 1.00  mg/dL    Sodium 139 136 - 145 mmol/L    Potassium 5.4 (H) 3.5 - 5.2 mmol/L    Chloride 102 98 - 107 mmol/L    CO2 26.8 22.0 - 29.0 mmol/L    Calcium 8.7 8.6 - 10.5 mg/dL    Albumin 3.90 3.50 - 5.20 g/dL    Phosphorus 4.2 2.5 - 4.5 mg/dL    Anion Gap 10.2 5.0 - 15.0 mmol/L    BUN/Creatinine Ratio 19.6 7.0 - 25.0    eGFR 67.9 >60.0 mL/min/1.73   Results for orders placed or performed in visit on 10/06/22   Vitamin D 25 Hydroxy    Specimen: Blood   Result Value Ref Range    25 Hydroxy, Vitamin D 41.3 30.0 - 100.0 ng/ml   TSH    Specimen: Blood   Result Value Ref Range    TSH 1.190 0.270 - 4.200 uIU/mL   T4, Free    Specimen: Blood   Result Value Ref Range    Free T4 0.95 0.93 - 1.70 ng/dL   Hemoglobin A1c    Specimen: Blood   Result Value Ref Range    Hemoglobin A1C 7.00 (H) 4.80 - 5.60 %   Lipid Panel    Specimen: Blood   Result Value Ref Range    Total Cholesterol 138 0 - 200 mg/dL    Triglycerides 62 0 - 150 mg/dL    HDL Cholesterol 50 40 - 60 mg/dL    LDL Cholesterol  75 0 - 100 mg/dL    VLDL Cholesterol 13 5 - 40 mg/dL    LDL/HDL Ratio 1.51    Results for orders placed or performed in visit on 10/06/22   Comprehensive Metabolic Panel    Specimen: Blood   Result Value Ref Range    Glucose 194 (H) 65 - 99 mg/dL    BUN 28 (H) 6 - 20 mg/dL    Creatinine 1.34 (H) 0.57 - 1.00 mg/dL    Sodium 139 136 - 145 mmol/L    Potassium 5.1 3.5 - 5.2 mmol/L    Chloride 105 98 - 107 mmol/L    CO2 25.0 22.0 - 29.0 mmol/L    Calcium 9.0 8.6 - 10.5 mg/dL    Total Protein 7.8 6.0 - 8.5 g/dL    Albumin 4.20 3.50 - 5.20 g/dL    ALT (SGPT) 27 1 - 33 U/L    AST (SGOT) 31 1 - 32 U/L    Alkaline Phosphatase 90 39 - 117 U/L    Total Bilirubin 0.2 0.0 - 1.2 mg/dL    Globulin 3.6 gm/dL    A/G Ratio 1.2 g/dL    BUN/Creatinine Ratio 20.9 7.0 - 25.0    Anion Gap 9.0 5.0 - 15.0 mmol/L    eGFR 46.1 (L) >60.0 mL/min/1.73   Results for orders placed or performed in visit on 08/12/22   CBC Auto Differential    Specimen: Blood   Result Value Ref Range     WBC 12.86 (H) 3.40 - 10.80 10*3/mm3    RBC 4.61 3.77 - 5.28 10*6/mm3    Hemoglobin 12.0 12.0 - 15.9 g/dL    Hematocrit 39.1 34.0 - 46.6 %    MCV 84.8 79.0 - 97.0 fL    MCH 26.0 (L) 26.6 - 33.0 pg    MCHC 30.7 (L) 31.5 - 35.7 g/dL    RDW 15.8 (H) 12.3 - 15.4 %    RDW-SD 48.8 37.0 - 54.0 fl    MPV 12.1 (H) 6.0 - 12.0 fL    Platelets 207 140 - 450 10*3/mm3    Neutrophil % 73.4 42.7 - 76.0 %    Lymphocyte % 18.0 (L) 19.6 - 45.3 %    Monocyte % 4.5 (L) 5.0 - 12.0 %    Eosinophil % 2.7 0.3 - 6.2 %    Basophil % 0.9 0.0 - 1.5 %    Immature Grans % 0.5 0.0 - 0.5 %    Neutrophils, Absolute 9.42 (H) 1.70 - 7.00 10*3/mm3    Lymphocytes, Absolute 2.32 0.70 - 3.10 10*3/mm3    Monocytes, Absolute 0.58 0.10 - 0.90 10*3/mm3    Eosinophils, Absolute 0.35 0.00 - 0.40 10*3/mm3    Basophils, Absolute 0.12 0.00 - 0.20 10*3/mm3    Immature Grans, Absolute 0.07 (H) 0.00 - 0.05 10*3/mm3    nRBC 0.0 0.0 - 0.2 /100 WBC     *Note: Due to a large number of results and/or encounters for the requested time period, some results have not been displayed. A complete set of results can be found in Results Review.         This document has been electronically signed by Ashli Gonzalez MD on February 6, 2023 20:03 CST

## 2023-02-09 ENCOUNTER — OFFICE VISIT (OUTPATIENT)
Dept: FAMILY MEDICINE CLINIC | Facility: CLINIC | Age: 58
End: 2023-02-09
Payer: MEDICARE

## 2023-02-09 VITALS
DIASTOLIC BLOOD PRESSURE: 58 MMHG | BODY MASS INDEX: 34.7 KG/M2 | HEART RATE: 80 BPM | OXYGEN SATURATION: 98 % | SYSTOLIC BLOOD PRESSURE: 122 MMHG | TEMPERATURE: 98.7 F | HEIGHT: 69 IN

## 2023-02-09 DIAGNOSIS — E11.65 UNCONTROLLED TYPE 2 DIABETES MELLITUS WITH HYPERGLYCEMIA: ICD-10-CM

## 2023-02-09 DIAGNOSIS — K29.70 GASTRITIS WITHOUT BLEEDING, UNSPECIFIED CHRONICITY, UNSPECIFIED GASTRITIS TYPE: ICD-10-CM

## 2023-02-09 DIAGNOSIS — M54.12 CERVICAL RADICULOPATHY: ICD-10-CM

## 2023-02-09 DIAGNOSIS — I10 ESSENTIAL HYPERTENSION: ICD-10-CM

## 2023-02-09 DIAGNOSIS — N39.41 URGE INCONTINENCE: ICD-10-CM

## 2023-02-09 DIAGNOSIS — I10 HYPERTENSION, UNSPECIFIED TYPE: ICD-10-CM

## 2023-02-09 DIAGNOSIS — E03.9 ACQUIRED HYPOTHYROIDISM: ICD-10-CM

## 2023-02-09 DIAGNOSIS — E11.42 DIABETIC POLYNEUROPATHY ASSOCIATED WITH TYPE 2 DIABETES MELLITUS: Chronic | ICD-10-CM

## 2023-02-09 DIAGNOSIS — S88.119A BELOW KNEE AMPUTATION: ICD-10-CM

## 2023-02-09 DIAGNOSIS — I25.10 CORONARY ARTERY DISEASE INVOLVING NATIVE CORONARY ARTERY OF NATIVE HEART WITHOUT ANGINA PECTORIS: ICD-10-CM

## 2023-02-09 DIAGNOSIS — E78.2 MIXED HYPERLIPIDEMIA: ICD-10-CM

## 2023-02-09 DIAGNOSIS — Z99.3 WHEELCHAIR DEPENDENCE: ICD-10-CM

## 2023-02-09 DIAGNOSIS — R40.0 DAYTIME SLEEPINESS: Primary | ICD-10-CM

## 2023-02-09 DIAGNOSIS — G47.9 SLEEP DISORDER: ICD-10-CM

## 2023-02-09 DIAGNOSIS — K21.00 GASTROESOPHAGEAL REFLUX DISEASE WITH ESOPHAGITIS WITHOUT HEMORRHAGE: ICD-10-CM

## 2023-02-09 DIAGNOSIS — E11.42 DIABETIC POLYNEUROPATHY ASSOCIATED WITH TYPE 2 DIABETES MELLITUS: ICD-10-CM

## 2023-02-09 DIAGNOSIS — E66.9 CLASS 1 OBESITY WITH SERIOUS COMORBIDITY AND BODY MASS INDEX (BMI) OF 34.0 TO 34.9 IN ADULT, UNSPECIFIED OBESITY TYPE: ICD-10-CM

## 2023-02-09 DIAGNOSIS — F33.1 MODERATE EPISODE OF RECURRENT MAJOR DEPRESSIVE DISORDER: ICD-10-CM

## 2023-02-09 DIAGNOSIS — M50.30 DDD (DEGENERATIVE DISC DISEASE), CERVICAL: ICD-10-CM

## 2023-02-09 PROCEDURE — 99214 OFFICE O/P EST MOD 30 MIN: CPT | Performed by: FAMILY MEDICINE

## 2023-02-09 RX ORDER — TOLTERODINE 2 MG/1
2 CAPSULE, EXTENDED RELEASE ORAL DAILY
Qty: 30 CAPSULE | Refills: 3 | Status: SHIPPED | OUTPATIENT
Start: 2023-02-09

## 2023-02-09 RX ORDER — ATORVASTATIN CALCIUM 80 MG/1
TABLET, FILM COATED ORAL
Qty: 90 TABLET | Refills: 0 | Status: SHIPPED | OUTPATIENT
Start: 2023-02-09

## 2023-02-09 RX ORDER — CHLORHEXIDINE GLUCONATE 0.12 MG/ML
RINSE ORAL
COMMUNITY
Start: 2023-01-18

## 2023-02-09 RX ORDER — PREGABALIN 300 MG/1
CAPSULE ORAL
Qty: 60 CAPSULE | Refills: 0 | Status: SHIPPED | OUTPATIENT
Start: 2023-02-09 | End: 2023-03-16

## 2023-02-09 RX ORDER — OMEPRAZOLE 40 MG/1
CAPSULE, DELAYED RELEASE ORAL
Qty: 90 CAPSULE | Refills: 0 | Status: SHIPPED | OUTPATIENT
Start: 2023-02-09

## 2023-02-09 NOTE — PATIENT INSTRUCTIONS
Get labwork fasting      Will refer to DR Medina with Urology for urge incontinence    Start on Detrol LA 2 mg daily for incontinence stop oxybutynin    Will refer to sleep Medicine for sleep study and daytime sleepiness   Recheck in 2 months

## 2023-02-10 RX ORDER — CARVEDILOL 6.25 MG/1
TABLET ORAL
Qty: 180 TABLET | Refills: 0 | OUTPATIENT
Start: 2023-02-10

## 2023-02-10 RX ORDER — ASPIRIN 81 MG/1
TABLET ORAL
Qty: 90 TABLET | Refills: 0 | OUTPATIENT
Start: 2023-02-10

## 2023-02-13 ENCOUNTER — DOCUMENTATION (OUTPATIENT)
Dept: ENDOCRINOLOGY | Facility: CLINIC | Age: 58
End: 2023-02-13
Payer: MEDICARE

## 2023-02-17 RX ORDER — METHOCARBAMOL 750 MG/1
TABLET, FILM COATED ORAL
Qty: 90 TABLET | Refills: 0 | Status: SHIPPED | OUTPATIENT
Start: 2023-02-17 | End: 2023-03-16

## 2023-02-17 RX ORDER — DULOXETIN HYDROCHLORIDE 60 MG/1
CAPSULE, DELAYED RELEASE ORAL
Qty: 60 CAPSULE | Refills: 0 | Status: SHIPPED | OUTPATIENT
Start: 2023-02-17 | End: 2023-03-16

## 2023-02-21 ENCOUNTER — TELEPHONE (OUTPATIENT)
Dept: FAMILY MEDICINE CLINIC | Facility: CLINIC | Age: 58
End: 2023-02-21

## 2023-02-21 NOTE — TELEPHONE ENCOUNTER
[facility] called about a referral for the patient. Stated she faxed it the 16th and again this morning to the back fax. Was it received?

## 2023-03-01 ENCOUNTER — HOSPITAL ENCOUNTER (OUTPATIENT)
Facility: HOSPITAL | Age: 58
Setting detail: HOSPITAL OUTPATIENT SURGERY
Discharge: HOME | End: 2023-03-01
Attending: INTERNAL MEDICINE | Admitting: INTERNAL MEDICINE
Payer: MEDICARE

## 2023-03-01 ENCOUNTER — ANESTHESIA EVENT (OUTPATIENT)
Dept: GASTROENTEROLOGY | Facility: HOSPITAL | Age: 58
End: 2023-03-01
Payer: MEDICARE

## 2023-03-01 ENCOUNTER — ANESTHESIA (OUTPATIENT)
Dept: GASTROENTEROLOGY | Facility: HOSPITAL | Age: 58
End: 2023-03-01
Payer: MEDICARE

## 2023-03-01 VITALS
OXYGEN SATURATION: 97 % | BODY MASS INDEX: 34.07 KG/M2 | WEIGHT: 230 LBS | HEIGHT: 69 IN | DIASTOLIC BLOOD PRESSURE: 63 MMHG | HEART RATE: 74 BPM | TEMPERATURE: 97.6 F | RESPIRATION RATE: 18 BRPM | SYSTOLIC BLOOD PRESSURE: 166 MMHG

## 2023-03-01 DIAGNOSIS — R13.19 OTHER DYSPHAGIA: ICD-10-CM

## 2023-03-01 PROCEDURE — 43239 EGD BIOPSY SINGLE/MULTIPLE: CPT | Performed by: INTERNAL MEDICINE

## 2023-03-01 PROCEDURE — 88305 TISSUE EXAM BY PATHOLOGIST: CPT

## 2023-03-01 PROCEDURE — 25010000002 PROPOFOL 10 MG/ML EMULSION: Performed by: NURSE ANESTHETIST, CERTIFIED REGISTERED

## 2023-03-01 RX ORDER — SODIUM CHLORIDE 9 MG/ML
40 INJECTION, SOLUTION INTRAVENOUS AS NEEDED
Status: DISCONTINUED | OUTPATIENT
Start: 2023-03-01 | End: 2023-03-01 | Stop reason: HOSPADM

## 2023-03-01 RX ORDER — PROPOFOL 10 MG/ML
VIAL (ML) INTRAVENOUS AS NEEDED
Status: DISCONTINUED | OUTPATIENT
Start: 2023-03-01 | End: 2023-03-01 | Stop reason: SURG

## 2023-03-01 RX ORDER — DEXTROSE AND SODIUM CHLORIDE 5; .45 G/100ML; G/100ML
30 INJECTION, SOLUTION INTRAVENOUS CONTINUOUS PRN
Status: DISCONTINUED | OUTPATIENT
Start: 2023-03-01 | End: 2023-03-01 | Stop reason: HOSPADM

## 2023-03-01 RX ADMIN — PROPOFOL 70 MG: 10 INJECTION, EMULSION INTRAVENOUS at 11:10

## 2023-03-01 RX ADMIN — DEXTROSE AND SODIUM CHLORIDE 30 ML/HR: 5; 450 INJECTION, SOLUTION INTRAVENOUS at 10:48

## 2023-03-01 RX ADMIN — LIDOCAINE HYDROCHLORIDE 100 MG: 20 INJECTION INTRAVENOUS at 11:10

## 2023-03-01 NOTE — H&P
No chief complaint on file.      Maddy Giraldo is a 58 y.o. female.    History of Present Illness  Patient presented to GI clinic for follow-up visit today.  Has dysphagia to solids like meat and peanut butter for past several months.  GERD is well controlled.  Denies abdominal pain, nausea or vomiting.  Bowel movements regular.  Weight is stable.     The following portions of the patient's history were reviewed and updated as appropriate:   Past Medical History:   Diagnosis Date   • Anemia    • Anxiety    • Arthritis    • Cataract    • Depression    • Diabetes mellitus (HCC)    • Disease of thyroid gland    • Family history of thyroid problem    • GERD (gastroesophageal reflux disease)    • Headache    • History of transfusion    • Hyperlipidemia    • Hypertension    • Neuropathy    • Stroke (HCC) 2019   • Urinary tract infection      Past Surgical History:   Procedure Laterality Date   • APPENDECTOMY     • BELOW KNEE AMPUTATION     • BLADDER SURGERY     • CARDIAC CATHETERIZATION N/A 01/27/2022    Procedure: Left Heart Cath;  Surgeon: Josias Carpio MD;  Location: [facility] CATH INVASIVE LOCATION;  Service: Cardiology;  Laterality: N/A;   • COLONOSCOPY N/A 09/02/2020    Procedure: COLONOSCOPY;  Surgeon: Ashli Gonzalez MD;  Location: [facility] ENDOSCOPY;  Service: Gastroenterology;  Laterality: N/A;   • ENDOSCOPY N/A 09/02/2020    Procedure: ESOPHAGOGASTRODUODENOSCOPY;  Surgeon: Ashli Gonzalez MD;  Location: [facility] ENDOSCOPY;  Service: Gastroenterology;  Laterality: N/A;     Family History   Problem Relation Age of Onset   • Diabetes Other    • Hyperlipidemia Other    • Hypertension Other    • Stroke Other    • Heart disease Other    • Other Other    • Diabetes Mother    • Diabetes Father    • Diabetes Sister    • Heart disease Sister    • Diabetes Brother      OB History    No obstetric history on file.       Prior to Admission medications    Medication Sig Start Date End Date Taking?  Authorizing Provider   Aspirin Adult Low Strength 81 MG EC tablet  8/4/22  Yes David Vizcaino MD   atorvastatin (LIPITOR) 80 MG tablet Take 1 tablet by mouth Every Night. 10/26/22  Yes Gato Crane MD   B-D UF III MINI PEN NEEDLES 31G X 5 MM misc Use as needed 11/22/21  Yes Shabbir Euceda APRN   Blood Glucose Monitoring Suppl (Accu-Chek Guide Me) w/Device kit  6/28/22  Yes David Vizcaino MD   carvedilol (COREG) 6.25 MG tablet Take 1 tablet by mouth 2 (Two) Times a Day. 2/1/22  Yes Josias Carpio MD   clotrimazole-betamethasone (Lotrisone) 1-0.05 % cream Apply  topically to the appropriate area as directed 2 (Two) Times a Day. 10/5/20  Yes Morena Sood APRN   Continuous Blood Gluc  (FreeStyle Tae 2 Oakley) device USE AS DIRECTED 6/15/21  Yes Shabbir Euceda APRN   Continuous Blood Gluc Sensor (FreeStyle Tae 2 Sensor) misc 1 each Every 14 (Fourteen) Days. Use every 14 days 3/21/22  Yes Shabbir Euceda APRN   diazePAM (VALIUM) 10 MG tablet diazepam 10 mg tablet   Yes David Vizcaino MD   Diclofenac Sodium (VOLTAREN) 1 % gel gel APPLY TO THE APPROPRIATE AREA AS DIRECTED 4 TIMES AS DAY AS NEEDED 1/25/23   Gato Crane MD   docusate sodium (COLACE) 100 MG capsule Take 1 capsule by mouth 2 (Two) Times a Day As Needed for Constipation. 12/14/21  Yes Adalid Bates MD   ferrous sulfate 325 (65 Fe) MG tablet Take 1 tablet by mouth 2 (Two) Times a Day With Meals. 8/12/22  Yes Adalid Bates MD   folic acid (FOLVITE) 1 MG tablet Take 1 tablet by mouth Daily. 8/14/22  Yes Adalid Bates MD   glucose (DEX4) 4 GM chewable tablet Chew 4 tablets As Needed for Low Blood Sugar. 11/16/21  Yes Gato Crane MD   glucose blood test strip Test 4 times daily , E10.65 6/28/22  Yes Shabbir Euceda APRN   glucose monitor monitoring kit 1 each As Needed (to check bg). 6/28/22  Yes Shabbir Euceda APRN   glucose-vitamin C 4-6 GM-MG chewable tablet  "chewable tablet TRUEplus Glucose 4 gram chewable tablet   Yes ProviderDavid MD   Insulin Aspart (NovoLOG) 100 UNIT/ML injection Inject 10 Units under the skin into the appropriate area as directed 3 (Three) Times a Day Before Meals. 7/5/22 7/5/23 Yes Shabbir Euceda APRN   Insulin Pen Needle 32G X 8 MM misc Use at bedtime 11/16/21  Yes Gato Crane MD   Insulin Syringe-Needle U-100 30G X 1/2\" 1 ML misc Use three times daily with insulin 11/16/21  Yes Gato Crane MD   Jardiance 25 MG tablet tablet TAKE 1 TABLET DAILY. 1/5/23  Yes Shabbir Euceda APRN   Lancets misc Use for E11.65 diabetes to check sugars 4 times a day. 4/7/21  Yes Gato Crane MD   Lancets misc Test 4 times daily, E10.65 6/28/22  Yes Shabbir Euceda APRN   Lantus SoloStar 100 UNIT/ML injection pen INJECT 35 UNITS EVERY NIGHT 1/19/23  Yes Shabbir Euceda APRN   levothyroxine (SYNTHROID, LEVOTHROID) 25 MCG tablet Take 1 tablet by mouth Daily. 10/26/22  Yes Gato Crane MD   lisinopril (PRINIVIL,ZESTRIL) 20 MG tablet Take 1 tablet by mouth Daily. 10/26/22  Yes Gato Crane MD   methocarbamol (ROBAXIN) 750 MG tablet TAKE 1 TABLET THREE TIMES DAILY. 12/12/22  Yes Gato Crane MD   mupirocin (BACTROBAN) 2 % ointment Apply 1 application topically to the appropriate area as directed 3 (Three) Times a Day.  Patient taking differently: Apply 1 application topically to the appropriate area as directed As Needed. 6/30/22  Yes Shabbir Lopez APRN   omeprazole (priLOSEC) 40 MG capsule Take 1 capsule by mouth Daily. 10/26/22  Yes Gato Crane MD   oxybutynin XL (DITROPAN XL) 15 MG 24 hr tablet Take 1 tablet by mouth Daily. 10/26/22  Yes Gato Crane MD   pregabalin (LYRICA) 300 MG capsule TAKE 1 CAPSULE TWICE DAILY. 1/13/23  Yes Gato Crane MD   TRUEplus Insulin Syringe 30G X 5/16\" 0.5 ML misc  9/28/21  Yes Provider, MD Sammy Hernandezity 3 MG/0.5ML solution pen-injector INJECT 0.5 " ML UNDER THE SKIN INTO THE APPROPRIATE AREA AS DIRECTED ONCE A WEEK 9/13/22  Yes Shabbir Euceda APRN   vilazodone (Viibryd) 20 MG tablet tablet Take 1 tablet by mouth Daily. 10/26/22  Yes Gato Crane MD   vitamin D (ERGOCALCIFEROL) 1.25 MG (72773 UT) capsule capsule TAKE 1 CAPSULE WEEKLY 12/21/22  Yes Gato Crane MD   DULoxetine (CYMBALTA) 60 MG capsule TAKE 1 CAPSULE TWICE DAILY. 1/20/23   Gato Crane MD     Allergies   Allergen Reactions   • Compazine [Prochlorperazine Edisylate] Unknown - High Severity   • Penicillins Other (See Comments) and Unknown - High Severity     unknown   • Prochlorperazine Swelling     Social History     Socioeconomic History   • Marital status:    Tobacco Use   • Smoking status: Some Days     Packs/day: 0.50     Types: Cigarettes   • Smokeless tobacco: Never   Vaping Use   • Vaping Use: Never used   Substance and Sexual Activity   • Alcohol use: Not Currently   • Drug use: Never   • Sexual activity: Defer       Review of Systems  Review of Systems   Constitutional: Negative for chills, fatigue, fever and unexpected weight change.   HENT: Positive for trouble swallowing. Negative for congestion, ear discharge, hearing loss, nosebleeds and sore throat.    Eyes: Negative for pain, discharge and redness.   Respiratory: Negative for cough, chest tightness, shortness of breath and wheezing.    Cardiovascular: Negative for chest pain and palpitations.   Gastrointestinal: Negative for abdominal distention, abdominal pain, blood in stool, constipation, diarrhea, nausea and vomiting.   Endocrine: Negative for cold intolerance, polydipsia, polyphagia and polyuria.   Genitourinary: Negative for dysuria, flank pain, frequency, hematuria and urgency.   Musculoskeletal: Negative for arthralgias, back pain, joint swelling and myalgias.   Skin: Negative for color change, pallor and rash.   Neurological: Negative for tremors, seizures, syncope, weakness and headaches.  "  Hematological: Negative for adenopathy. Does not bruise/bleed easily.   Psychiatric/Behavioral: Negative for behavioral problems, confusion, dysphoric mood, hallucinations and suicidal ideas. The patient is not nervous/anxious.         /71   Pulse 71   Temp 96.7 °F (35.9 °C)   Resp 20   Ht 175.3 cm (69.02\")   Wt 104 kg (230 lb)   SpO2 96%   BMI 33.95 kg/m²     Objective    Physical Exam  Constitutional:       Appearance: She is well-developed.   HENT:      Head: Normocephalic and atraumatic.   Eyes:      Conjunctiva/sclera: Conjunctivae normal.      Pupils: Pupils are equal, round, and reactive to light.   Neck:      Thyroid: No thyromegaly.   Cardiovascular:      Rate and Rhythm: Normal rate and regular rhythm.      Heart sounds: Normal heart sounds. No murmur heard.  Pulmonary:      Effort: Pulmonary effort is normal.      Breath sounds: Normal breath sounds. No wheezing.   Abdominal:      General: Bowel sounds are normal. There is no distension.      Palpations: Abdomen is soft. There is no mass.      Tenderness: There is no abdominal tenderness.      Hernia: No hernia is present.   Genitourinary:     Comments: No lesions noted  Musculoskeletal:         General: No tenderness. Normal range of motion.      Cervical back: Normal range of motion and neck supple.   Lymphadenopathy:      Cervical: No cervical adenopathy.   Skin:     General: Skin is warm and dry.      Findings: No rash.   Neurological:      Mental Status: She is alert and oriented to person, place, and time.      Cranial Nerves: No cranial nerve deficit.   Psychiatric:         Thought Content: Thought content normal.       Office Visit on 01/06/2023   Component Date Value Ref Range Status   • Color, UA 01/06/2023 Yellow  Yellow, Straw, Dark Yellow, Nilsa Final   • Appearance, UA 01/06/2023 Clear  Clear Final   • pH, UA 01/06/2023 <=5.0  5.0 - 9.0 Final   • Specific Gravity, UA 01/06/2023 1.018  1.003 - 1.030 Final   • Glucose, UA " 01/06/2023 >=1000 mg/dL (3+) (A)  Negative Final   • Ketones, UA 01/06/2023 Negative  Negative Final   • Bilirubin, UA 01/06/2023 Negative  Negative Final   • Blood, UA 01/06/2023 Negative  Negative Final   • Protein, UA 01/06/2023 Negative  Negative Final   • Leuk Esterase, UA 01/06/2023 Negative  Negative Final   • Nitrite, UA 01/06/2023 Negative  Negative Final   • Urobilinogen, UA 01/06/2023 0.2 E.U./dL  0.2 - 1.0 E.U./dL Final     Assessment & Plan      1. Other dysphagia    1.  Dysphagia rule out stricture, ring and CA.  Continue PPI.  Proceed with EGD for further evaluation management.  2.  GERD, well controlled.  Continue PPI and antireflux lifestyle.  3.  Obesity, recommend exercise and diet control.  4.  Colorectal cancer screening, repeat colonoscopy in 5 years.      Orders placed during this encounter include:  Orders Placed This Encounter   Procedures   • Implement Anesthesia Orders Day of Procedure     Standing Status:   Standing     Number of Occurrences:   1   • Obtain Informed Consent     Standing Status:   Standing     Number of Occurrences:   1     Order Specific Question:   Informed Consent Given For     Answer:   egd   • POC Glucose Once     Prior to Procedure on ALL Diabetic Patients     Standing Status:   Standing     Number of Occurrences:   1   • Insert Peripheral IV     Standing Status:   Standing     Number of Occurrences:   1       ESOPHAGOGASTRODUODENOSCOPY (N/A)    Review and/or summary of lab tests, radiology, procedures, medications. Review and summary of old records and obtaining of history. The risks and benefits of my recommendations, as well as other treatment options were discussed with the patient today. Questions were answered.    New Medications Ordered This Visit   Medications   • sodium chloride 0.9 % infusion 40 mL   • dextrose 5 % and sodium chloride 0.45 % infusion       Follow-up: No follow-ups on file.               Results for orders placed or performed in visit on  01/06/23   Urinalysis With Culture If Indicated - Urine, Clean Catch    Specimen: Urine, Clean Catch   Result Value Ref Range    Color, UA Yellow Yellow, Straw, Dark Yellow, Nilsa    Appearance, UA Clear Clear    pH, UA <=5.0 5.0 - 9.0    Specific Gravity, UA 1.018 1.003 - 1.030    Glucose, UA >=1000 mg/dL (3+) (A) Negative    Ketones, UA Negative Negative    Bilirubin, UA Negative Negative    Blood, UA Negative Negative    Protein, UA Negative Negative    Leuk Esterase, UA Negative Negative    Nitrite, UA Negative Negative    Urobilinogen, UA 0.2 E.U./dL 0.2 - 1.0 E.U./dL   Results for orders placed or performed in visit on 01/06/23   CBC Auto Differential    Specimen: Arm, Right; Blood   Result Value Ref Range    WBC 13.48 (H) 3.40 - 10.80 10*3/mm3    RBC 4.10 3.77 - 5.28 10*6/mm3    Hemoglobin 11.4 (L) 12.0 - 15.9 g/dL    Hematocrit 36.1 34.0 - 46.6 %    MCV 88.0 79.0 - 97.0 fL    MCH 27.8 26.6 - 33.0 pg    MCHC 31.6 31.5 - 35.7 g/dL    RDW 14.3 12.3 - 15.4 %    RDW-SD 45.5 37.0 - 54.0 fl    MPV 11.7 6.0 - 12.0 fL    Platelets 204 140 - 450 10*3/mm3    Neutrophil % 68.7 42.7 - 76.0 %    Lymphocyte % 21.0 19.6 - 45.3 %    Monocyte % 5.3 5.0 - 12.0 %    Eosinophil % 3.2 0.3 - 6.2 %    Basophil % 1.1 0.0 - 1.5 %    Immature Grans % 0.7 (H) 0.0 - 0.5 %    Neutrophils, Absolute 9.27 (H) 1.70 - 7.00 10*3/mm3    Lymphocytes, Absolute 2.83 0.70 - 3.10 10*3/mm3    Monocytes, Absolute 0.71 0.10 - 0.90 10*3/mm3    Eosinophils, Absolute 0.43 (H) 0.00 - 0.40 10*3/mm3    Basophils, Absolute 0.15 0.00 - 0.20 10*3/mm3    Immature Grans, Absolute 0.09 (H) 0.00 - 0.05 10*3/mm3    nRBC 0.0 0.0 - 0.2 /100 WBC   Iron and TIBC    Specimen: Arm, Right; Blood   Result Value Ref Range    Iron 39 37 - 145 mcg/dL    Iron Saturation 14 (L) 20 - 50 %    Transferrin 183 (L) 200 - 360 mg/dL    TIBC 273 (L) 298 - 536 mcg/dL   Folate    Specimen: Arm, Right; Blood   Result Value Ref Range    Folate >20.00 4.78 - 24.20 ng/mL   Ferritin     Specimen: Arm, Right; Blood   Result Value Ref Range    Ferritin 138.60 13.00 - 150.00 ng/mL   Vitamin B12    Specimen: Arm, Right; Blood   Result Value Ref Range    Vitamin B-12 716 211 - 946 pg/mL   Results for orders placed or performed in visit on 10/26/22   Renal Function Panel    Specimen: Blood   Result Value Ref Range    Glucose 110 (H) 65 - 99 mg/dL    BUN 19 6 - 20 mg/dL    Creatinine 0.97 0.57 - 1.00 mg/dL    Sodium 139 136 - 145 mmol/L    Potassium 5.4 (H) 3.5 - 5.2 mmol/L    Chloride 102 98 - 107 mmol/L    CO2 26.8 22.0 - 29.0 mmol/L    Calcium 8.7 8.6 - 10.5 mg/dL    Albumin 3.90 3.50 - 5.20 g/dL    Phosphorus 4.2 2.5 - 4.5 mg/dL    Anion Gap 10.2 5.0 - 15.0 mmol/L    BUN/Creatinine Ratio 19.6 7.0 - 25.0    eGFR 67.9 >60.0 mL/min/1.73   Results for orders placed or performed in visit on 10/06/22   Vitamin D 25 Hydroxy    Specimen: Blood   Result Value Ref Range    25 Hydroxy, Vitamin D 41.3 30.0 - 100.0 ng/ml   TSH    Specimen: Blood   Result Value Ref Range    TSH 1.190 0.270 - 4.200 uIU/mL   T4, Free    Specimen: Blood   Result Value Ref Range    Free T4 0.95 0.93 - 1.70 ng/dL   Hemoglobin A1c    Specimen: Blood   Result Value Ref Range    Hemoglobin A1C 7.00 (H) 4.80 - 5.60 %   Lipid Panel    Specimen: Blood   Result Value Ref Range    Total Cholesterol 138 0 - 200 mg/dL    Triglycerides 62 0 - 150 mg/dL    HDL Cholesterol 50 40 - 60 mg/dL    LDL Cholesterol  75 0 - 100 mg/dL    VLDL Cholesterol 13 5 - 40 mg/dL    LDL/HDL Ratio 1.51    Results for orders placed or performed in visit on 10/06/22   Comprehensive Metabolic Panel    Specimen: Blood   Result Value Ref Range    Glucose 194 (H) 65 - 99 mg/dL    BUN 28 (H) 6 - 20 mg/dL    Creatinine 1.34 (H) 0.57 - 1.00 mg/dL    Sodium 139 136 - 145 mmol/L    Potassium 5.1 3.5 - 5.2 mmol/L    Chloride 105 98 - 107 mmol/L    CO2 25.0 22.0 - 29.0 mmol/L    Calcium 9.0 8.6 - 10.5 mg/dL    Total Protein 7.8 6.0 - 8.5 g/dL    Albumin 4.20 3.50 - 5.20 g/dL    ALT  (SGPT) 27 1 - 33 U/L    AST (SGOT) 31 1 - 32 U/L    Alkaline Phosphatase 90 39 - 117 U/L    Total Bilirubin 0.2 0.0 - 1.2 mg/dL    Globulin 3.6 gm/dL    A/G Ratio 1.2 g/dL    BUN/Creatinine Ratio 20.9 7.0 - 25.0    Anion Gap 9.0 5.0 - 15.0 mmol/L    eGFR 46.1 (L) >60.0 mL/min/1.73   Results for orders placed or performed in visit on 08/12/22   CBC Auto Differential    Specimen: Blood   Result Value Ref Range    WBC 12.86 (H) 3.40 - 10.80 10*3/mm3    RBC 4.61 3.77 - 5.28 10*6/mm3    Hemoglobin 12.0 12.0 - 15.9 g/dL    Hematocrit 39.1 34.0 - 46.6 %    MCV 84.8 79.0 - 97.0 fL    MCH 26.0 (L) 26.6 - 33.0 pg    MCHC 30.7 (L) 31.5 - 35.7 g/dL    RDW 15.8 (H) 12.3 - 15.4 %    RDW-SD 48.8 37.0 - 54.0 fl    MPV 12.1 (H) 6.0 - 12.0 fL    Platelets 207 140 - 450 10*3/mm3    Neutrophil % 73.4 42.7 - 76.0 %    Lymphocyte % 18.0 (L) 19.6 - 45.3 %    Monocyte % 4.5 (L) 5.0 - 12.0 %    Eosinophil % 2.7 0.3 - 6.2 %    Basophil % 0.9 0.0 - 1.5 %    Immature Grans % 0.5 0.0 - 0.5 %    Neutrophils, Absolute 9.42 (H) 1.70 - 7.00 10*3/mm3    Lymphocytes, Absolute 2.32 0.70 - 3.10 10*3/mm3    Monocytes, Absolute 0.58 0.10 - 0.90 10*3/mm3    Eosinophils, Absolute 0.35 0.00 - 0.40 10*3/mm3    Basophils, Absolute 0.12 0.00 - 0.20 10*3/mm3    Immature Grans, Absolute 0.07 (H) 0.00 - 0.05 10*3/mm3    nRBC 0.0 0.0 - 0.2 /100 WBC     *Note: Due to a large number of results and/or encounters for the requested time period, some results have not been displayed. A complete set of results can be found in Results Review.         This document has been electronically signed by Ashli Gonzalez MD on March 1, 2023 10:57 CST

## 2023-03-01 NOTE — ANESTHESIA POSTPROCEDURE EVALUATION
Patient: Marta Giraldo    Procedure Summary     Date: 03/01/23 Room / Location: [facility] ENDOSCOPY 2 / [facility] ENDOSCOPY    Anesthesia Start: 1107 Anesthesia Stop: 1115    Procedure: ESOPHAGOGASTRODUODENOSCOPY Diagnosis:       Other dysphagia      (Other dysphagia [R13.19])    Surgeons: Ashli Gonzalez MD Provider: Nikki Roa CRNA    Anesthesia Type: general ASA Status: 3          Anesthesia Type: general    Vitals  No vitals data found for the desired time range.          Post Anesthesia Care and Evaluation    Patient location during evaluation: bedside  Patient participation: complete - patient participated  Level of consciousness: sleepy but conscious  Pain score: 0  Pain management: adequate    Airway patency: patent  Anesthetic complications: No anesthetic complications  PONV Status: none  Cardiovascular status: hemodynamically stable  Respiratory status: spontaneous ventilation and room air  Hydration status: acceptable    Comments: 166/72 73 16 100%

## 2023-03-02 LAB — REF LAB TEST METHOD: NORMAL

## 2023-03-10 ENCOUNTER — OFFICE VISIT (OUTPATIENT)
Dept: GASTROENTEROLOGY | Facility: CLINIC | Age: 58
End: 2023-03-10
Payer: MEDICARE

## 2023-03-10 VITALS
DIASTOLIC BLOOD PRESSURE: 69 MMHG | SYSTOLIC BLOOD PRESSURE: 118 MMHG | HEIGHT: 69 IN | HEART RATE: 79 BPM | BODY MASS INDEX: 33.95 KG/M2

## 2023-03-10 DIAGNOSIS — R13.19 OTHER DYSPHAGIA: Primary | ICD-10-CM

## 2023-03-10 RX ORDER — DICYCLOMINE HCL 20 MG
20 TABLET ORAL EVERY 6 HOURS
Qty: 120 TABLET | Refills: 5 | Status: SHIPPED | OUTPATIENT
Start: 2023-03-10 | End: 2023-04-09

## 2023-03-14 ENCOUNTER — LAB (OUTPATIENT)
Dept: LAB | Facility: HOSPITAL | Age: 58
End: 2023-03-14
Payer: MEDICARE

## 2023-03-14 ENCOUNTER — OFFICE VISIT (OUTPATIENT)
Dept: FAMILY MEDICINE CLINIC | Facility: CLINIC | Age: 58
End: 2023-03-14
Payer: MEDICARE

## 2023-03-14 VITALS
BODY MASS INDEX: 33.97 KG/M2 | HEART RATE: 94 BPM | OXYGEN SATURATION: 98 % | HEIGHT: 69 IN | DIASTOLIC BLOOD PRESSURE: 70 MMHG | TEMPERATURE: 97.3 F | SYSTOLIC BLOOD PRESSURE: 126 MMHG

## 2023-03-14 DIAGNOSIS — S88.119A BELOW KNEE AMPUTATION: ICD-10-CM

## 2023-03-14 DIAGNOSIS — Z12.31 SCREENING MAMMOGRAM, ENCOUNTER FOR: Primary | ICD-10-CM

## 2023-03-14 DIAGNOSIS — E66.9 CLASS 1 OBESITY WITH SERIOUS COMORBIDITY AND BODY MASS INDEX (BMI) OF 33.0 TO 33.9 IN ADULT, UNSPECIFIED OBESITY TYPE: ICD-10-CM

## 2023-03-14 DIAGNOSIS — E78.2 MIXED HYPERLIPIDEMIA: ICD-10-CM

## 2023-03-14 DIAGNOSIS — Z99.3 WHEELCHAIR DEPENDENCE: ICD-10-CM

## 2023-03-14 DIAGNOSIS — R32 URINARY INCONTINENCE, UNSPECIFIED TYPE: ICD-10-CM

## 2023-03-14 DIAGNOSIS — E11.22 CONTROLLED TYPE 2 DIABETES MELLITUS WITH STAGE 2 CHRONIC KIDNEY DISEASE, WITH LONG-TERM CURRENT USE OF INSULIN: ICD-10-CM

## 2023-03-14 DIAGNOSIS — E11.65 UNCONTROLLED TYPE 2 DIABETES MELLITUS WITH HYPERGLYCEMIA: ICD-10-CM

## 2023-03-14 DIAGNOSIS — Z91.81 AT HIGH RISK FOR FALLS: ICD-10-CM

## 2023-03-14 DIAGNOSIS — N39.0 URINARY TRACT INFECTION WITHOUT HEMATURIA, SITE UNSPECIFIED: ICD-10-CM

## 2023-03-14 DIAGNOSIS — M79.642 LEFT HAND PAIN: ICD-10-CM

## 2023-03-14 DIAGNOSIS — E03.9 ACQUIRED HYPOTHYROIDISM: ICD-10-CM

## 2023-03-14 DIAGNOSIS — I10 ESSENTIAL HYPERTENSION: ICD-10-CM

## 2023-03-14 DIAGNOSIS — K21.00 GASTROESOPHAGEAL REFLUX DISEASE WITH ESOPHAGITIS WITHOUT HEMORRHAGE: ICD-10-CM

## 2023-03-14 DIAGNOSIS — E53.8 B12 DEFICIENCY: ICD-10-CM

## 2023-03-14 DIAGNOSIS — Z79.4 CONTROLLED TYPE 2 DIABETES MELLITUS WITH STAGE 2 CHRONIC KIDNEY DISEASE, WITH LONG-TERM CURRENT USE OF INSULIN: ICD-10-CM

## 2023-03-14 DIAGNOSIS — N18.2 CONTROLLED TYPE 2 DIABETES MELLITUS WITH STAGE 2 CHRONIC KIDNEY DISEASE, WITH LONG-TERM CURRENT USE OF INSULIN: ICD-10-CM

## 2023-03-14 DIAGNOSIS — E66.9 CLASS 1 OBESITY WITH SERIOUS COMORBIDITY AND BODY MASS INDEX (BMI) OF 34.0 TO 34.9 IN ADULT, UNSPECIFIED OBESITY TYPE: ICD-10-CM

## 2023-03-14 DIAGNOSIS — N18.2 STAGE 2 CHRONIC KIDNEY DISEASE: ICD-10-CM

## 2023-03-14 DIAGNOSIS — F33.1 MODERATE EPISODE OF RECURRENT MAJOR DEPRESSIVE DISORDER: ICD-10-CM

## 2023-03-14 DIAGNOSIS — D50.9 IRON DEFICIENCY ANEMIA, UNSPECIFIED IRON DEFICIENCY ANEMIA TYPE: ICD-10-CM

## 2023-03-14 DIAGNOSIS — R26.81 GAIT INSTABILITY: ICD-10-CM

## 2023-03-14 DIAGNOSIS — E55.9 VITAMIN D DEFICIENCY: ICD-10-CM

## 2023-03-14 PROCEDURE — 3074F SYST BP LT 130 MM HG: CPT | Performed by: FAMILY MEDICINE

## 2023-03-14 PROCEDURE — 3078F DIAST BP <80 MM HG: CPT | Performed by: FAMILY MEDICINE

## 2023-03-14 PROCEDURE — 99214 OFFICE O/P EST MOD 30 MIN: CPT | Performed by: FAMILY MEDICINE

## 2023-03-14 RX ORDER — DULAGLUTIDE 3 MG/.5ML
3 INJECTION, SOLUTION SUBCUTANEOUS WEEKLY
Qty: 2 ML | Refills: 3 | Status: SHIPPED | OUTPATIENT
Start: 2023-03-14

## 2023-03-14 RX ORDER — AMMONIUM LACTATE 12 G/100G
LOTION TOPICAL
COMMUNITY
Start: 2023-02-09

## 2023-03-14 RX ORDER — NYSTATIN 100000 U/G
CREAM TOPICAL
COMMUNITY
Start: 2023-02-09

## 2023-03-14 RX ORDER — CLOBETASOL PROPIONATE 0.46 MG/ML
SOLUTION TOPICAL
COMMUNITY
Start: 2023-02-10

## 2023-03-14 RX ORDER — KETOCONAZOLE 20 MG/ML
SHAMPOO TOPICAL
COMMUNITY
Start: 2023-02-09

## 2023-03-14 RX ORDER — ZOSTER VACCINE RECOMBINANT, ADJUVANTED 50 MCG/0.5
0.5 KIT INTRAMUSCULAR ONCE
Qty: 1 EACH | Refills: 0 | Status: SHIPPED | OUTPATIENT
Start: 2023-03-14 | End: 2023-03-14

## 2023-03-14 NOTE — PATIENT INSTRUCTIONS
Will get mammogram screening    Get labwork sometime next month. Fasting    Get urine studies    Recheck in 2 months    See Dr. Medina for urinary issues tell Dr. Ledesma have him send an office note to me

## 2023-03-16 ENCOUNTER — TELEPHONE (OUTPATIENT)
Dept: FAMILY MEDICINE CLINIC | Facility: CLINIC | Age: 58
End: 2023-03-16
Payer: MEDICARE

## 2023-03-16 DIAGNOSIS — E11.42 DIABETIC POLYNEUROPATHY ASSOCIATED WITH TYPE 2 DIABETES MELLITUS: Chronic | ICD-10-CM

## 2023-03-16 RX ORDER — DULOXETIN HYDROCHLORIDE 60 MG/1
CAPSULE, DELAYED RELEASE ORAL
Qty: 60 CAPSULE | Refills: 0 | Status: SHIPPED | OUTPATIENT
Start: 2023-03-16

## 2023-03-16 RX ORDER — ERGOCALCIFEROL 1.25 MG/1
CAPSULE ORAL
Qty: 12 CAPSULE | Refills: 0 | Status: SHIPPED | OUTPATIENT
Start: 2023-03-16

## 2023-03-16 RX ORDER — PREGABALIN 300 MG/1
CAPSULE ORAL
Qty: 60 CAPSULE | Refills: 0 | Status: SHIPPED | OUTPATIENT
Start: 2023-03-16

## 2023-03-16 RX ORDER — METHOCARBAMOL 750 MG/1
TABLET, FILM COATED ORAL
Qty: 90 TABLET | Refills: 0 | Status: SHIPPED | OUTPATIENT
Start: 2023-03-16

## 2023-03-16 NOTE — TELEPHONE ENCOUNTER
Pt. Called asking if a paper was received from the [facility] [employer] so the Pt. Can get a wheel chair. The Pt. Stated this needed to be signed and sent back to them.     You can call back at [phone]

## 2023-03-27 ENCOUNTER — OFFICE VISIT (OUTPATIENT)
Dept: SLEEP MEDICINE | Facility: CLINIC | Age: 58
End: 2023-03-27
Payer: MEDICARE

## 2023-03-27 VITALS
WEIGHT: 230 LBS | OXYGEN SATURATION: 95 % | SYSTOLIC BLOOD PRESSURE: 110 MMHG | HEART RATE: 78 BPM | HEIGHT: 69 IN | BODY MASS INDEX: 34.07 KG/M2 | DIASTOLIC BLOOD PRESSURE: 60 MMHG

## 2023-03-27 DIAGNOSIS — Z86.73 HISTORY OF CVA (CEREBROVASCULAR ACCIDENT): ICD-10-CM

## 2023-03-27 DIAGNOSIS — G25.81 RLS (RESTLESS LEGS SYNDROME): ICD-10-CM

## 2023-03-27 DIAGNOSIS — R06.83 SNORING: Primary | ICD-10-CM

## 2023-03-27 DIAGNOSIS — G47.19 EXCESSIVE DAYTIME SLEEPINESS: ICD-10-CM

## 2023-03-27 PROCEDURE — 1159F MED LIST DOCD IN RCRD: CPT | Performed by: NURSE PRACTITIONER

## 2023-03-27 PROCEDURE — 3078F DIAST BP <80 MM HG: CPT | Performed by: NURSE PRACTITIONER

## 2023-03-27 PROCEDURE — 1160F RVW MEDS BY RX/DR IN RCRD: CPT | Performed by: NURSE PRACTITIONER

## 2023-03-27 PROCEDURE — 3074F SYST BP LT 130 MM HG: CPT | Performed by: NURSE PRACTITIONER

## 2023-03-27 PROCEDURE — 99214 OFFICE O/P EST MOD 30 MIN: CPT | Performed by: NURSE PRACTITIONER

## 2023-03-27 NOTE — PROGRESS NOTES
New Patient Sleep Medicine Consultation    Encounter Date: 3/27/2023         Patient's PCP: Gato Crane MD  Referring provider: Gato Crane MD  Reason for consultation chief complaint: snoring, awakening gasping for breath, excessive daytime sleepiness and insomnia    Marta Giraldo is a 58 y.o. female whose bedtime is ~ 2300. She  falls asleep after 30 minutes, and is up 3 times per night. Up to the bathroom. Able to fall back asleep.  She wakes up ~ [id number]. Every day of the week.  She endorses 10-11 hours of sleep. She drinks 1 cups of coffee, 0 teas, and 0 sodas per day. She drinks 0 alcoholic beverages per week.      Marta Giraldo admits to snoring, unrestful sleep, High blod pressure, gasping during sleep, excessive daytime sleepiness, Disturbed or restless sleep, memory loss, choking duing sleep, Up to the bathroom at night, night sweats, restless legs at night and difficulty staying asleep for >5 years. She denies cataplexy, sleep paralysis, or hypnagogic hallucinations. She takes no medicine for sleep. She has no sleepiness with driving.does not drive.  She naps multiple times per day. She has never had a sleep study. She sleeps in a bed alone.     She continues to smoke. Smoking history: smoked <1 ppds from age 29 until current. Advised to quit.     Had CVS in 2000. Residual right arm weakness.       Marital status:    Occupation: disabled, former teacher   Children: 0  FH of sleep disorders: dad brother and sister have sleep apnea       Past Medical History:   Diagnosis Date   • Anemia    • Anxiety    • Arthritis    • Cataract    • Depression    • Diabetes mellitus (HCC)    • Disease of thyroid gland    • Family history of thyroid problem    • GERD (gastroesophageal reflux disease)    • Headache    • History of transfusion    • Hyperlipidemia    • Hypertension    • Neuropathy    • Stroke (HCC) 2019   • Urinary tract infection      Social History     Socioeconomic History    • Marital status:    Tobacco Use   • Smoking status: Some Days     Packs/day: 0.50     Types: Cigarettes   • Smokeless tobacco: Never   Vaping Use   • Vaping Use: Never used   Substance and Sexual Activity   • Alcohol use: Not Currently   • Drug use: Never   • Sexual activity: Defer     Family History   Problem Relation Age of Onset   • Diabetes Other    • Hyperlipidemia Other    • Hypertension Other    • Stroke Other    • Heart disease Other    • Other Other    • Diabetes Mother    • Diabetes Father    • Diabetes Sister    • Heart disease Sister    • Diabetes Brother          Review of Systems:  Constitutional: positive for fatigue  Eyes: negative  Ears, nose, mouth, throat, and face: negative  Respiratory: negative  Cardiovascular: negative  Gastrointestinal: negative  Genitourinary:negative  Integument/breast: negative  Hematologic/lymphatic: negative  Musculoskeletal:positive for gait problem  Neurological: positive for weakness  Behavioral/Psych: positive for sleep disturbance  Endocrine: negative  Allergic/Immunologic: negative Patient advised to discuss any positive ROS with PCP.        [place] Sleepiness Scale Score: 16    How likely are you to doze off or fall asleep in the following situation, in contrast to feeling just tired?     Use the following scale to choose the most appropriate number for each situation:    0 = would never doze  1 = slight chance of dozing   2 = moderate chance of dozing   3 = high chance of dozing    It is important that you answer each question as best you can.      Situation       Chance of Dozing (0-3)    Sitting and reading            ___3____    Watching TV          ___3____    Sitting, inactive in a public place (e.g. a theatre or meeting)   ___1____    As a passenger in a car for an hour without break    ___2____    Lying down to rest in the afternoon, when circumstances permit  ___3____    Sitting and talking to someone      ___1____    Sitting quietly after a  "lunch without alcohol     ___2____    In a car, while stopped for a few minutes in traffic    ___1____         Vitals:    03/27/23 1107   BP: 110/60   Pulse: 78   SpO2: 95%           03/27/23  1107   Weight: 104 kg (230 lb)       Body mass index is 33.95 kg/m². BMI is >= 30 and <35. (Class 1 Obesity). The following options were offered after discussion;: nutrition counseling/recommendations      Neck circumference: 17\"          General: Alert. Cooperative. Well developed. No acute distress. Sitting in wheel chair.              Head:  Normocephalic. Symmetrical. Atraumatic.              Eyes: Sclera clear. No icterus. Normal EOM.             Ears: No deformities. Normal hearing.             Nose: No septal deviation. No drainage.          Throat: No oral lesions. No thrush. Moist mucous membranes. Trachea midline    Tongue is normal    Dentition is upper and lower dentures       Pharynx: Posterior pharyngeal pillars are narrow    Mallampati score of IV (only hard palate visible)    Pharynx is nonerythematous   Chest Wall:  Normal shape. Symmetric expansion with respiration. No tenderness.          Lungs:  Clear to auscultation bilaterally. No wheezes. No rhonchi. No rales. Respirations regular, even and unlabored.            Heart:  Regular rhythm and normal rate. Normal S1 and S2. No murmur.  Extremities:  Moves all extremities well. No edema. RLE prosthetic               Skin: Dry. Intact. No bleeding. No rash.           Neuro: Moves all 4 extremities and cranial nerves grossly intact.  Psychiatric: Normal mood and affect.      Current Outpatient Medications:   •  ammonium lactate (LAC-HYDRIN) 12 % lotion, , Disp: , Rfl:   •  Aspirin Adult Low Strength 81 MG EC tablet, , Disp: , Rfl:   •  atorvastatin (LIPITOR) 80 MG tablet, TAKE 1 TABLET EACH EVENING, Disp: 90 tablet, Rfl: 0  •  B-D UF III MINI PEN NEEDLES 31G X 5 MM misc, Use as needed, Disp: 100 each, Rfl: 3  •  Blood Glucose Monitoring Suppl (Accu-Chek Guide " Me) w/Device kit, , Disp: , Rfl:   •  carvedilol (COREG) 6.25 MG tablet, Take 1 tablet by mouth 2 (Two) Times a Day., Disp: 180 tablet, Rfl: 3  •  chlorhexidine (PERIDEX) 0.12 % solution, , Disp: , Rfl:   •  clobetasol (TEMOVATE) 0.05 % external solution, , Disp: , Rfl:   •  clotrimazole-betamethasone (Lotrisone) 1-0.05 % cream, Apply  topically to the appropriate area as directed 2 (Two) Times a Day., Disp: 1 each, Rfl: 3  •  Continuous Blood Gluc  (FreeStyle Tae 2 [place]) device, USE AS DIRECTED, Disp: 1 each, Rfl: 11  •  Continuous Blood Gluc Sensor (FreeStyle Tae 2 Sensor) misc, 1 each Every 14 (Fourteen) Days. Use every 14 days, Disp: 2 each, Rfl: 11  •  Diclofenac Sodium (VOLTAREN) 1 % gel gel, Apply  topically to the appropriate area as directed 4 (Four) Times a Day., Disp: 300 g, Rfl: 3  •  dicyclomine (BENTYL) 20 MG tablet, Take 1 tablet by mouth Every 6 (Six) Hours for 30 days., Disp: 120 tablet, Rfl: 5  •  docusate sodium (COLACE) 100 MG capsule, Take 1 capsule by mouth 2 (Two) Times a Day As Needed for Constipation., Disp: 60 capsule, Rfl: 2  •  Dulaglutide (Trulicity) 3 MG/0.5ML solution pen-injector, Inject 0.5 mL under the skin into the appropriate area as directed 1 (One) Time Per Week., Disp: 2 mL, Rfl: 3  •  DULoxetine (CYMBALTA) 60 MG capsule, TAKE 1 CAPSULE TWICE DAILY., Disp: 60 capsule, Rfl: 0  •  ferrous sulfate 325 (65 Fe) MG tablet, Take 1 tablet by mouth 2 (Two) Times a Day With Meals., Disp: 60 tablet, Rfl: 3  •  folic acid (FOLVITE) 1 MG tablet, Take 1 tablet by mouth Daily., Disp: 90 tablet, Rfl: 1  •  glucose (DEX4) 4 GM chewable tablet, Chew 4 tablets As Needed for Low Blood Sugar., Disp: 90 tablet, Rfl: 3  •  glucose blood test strip, Test 4 times daily , E10.65, Disp: 120 each, Rfl: 11  •  glucose monitor monitoring kit, 1 each As Needed (to check bg)., Disp: 1 each, Rfl: 0  •  glucose-vitamin C 4-6 GM-MG chewable tablet chewable tablet, TRUEplus Glucose 4 gram chewable  "tablet, Disp: , Rfl:   •  hydrocortisone 2.5 % cream, , Disp: , Rfl:   •  Insulin Aspart (NovoLOG) 100 UNIT/ML injection, Inject 10 Units under the skin into the appropriate area as directed 3 (Three) Times a Day Before Meals., Disp: 10 mL, Rfl: 9  •  Insulin Pen Needle 32G X 8 MM misc, Use at bedtime, Disp: 100 each, Rfl: 3  •  Insulin Syringe-Needle U-100 30G X 1/2\" 1 ML misc, Use three times daily with insulin, Disp: 100 each, Rfl: 3  •  Jardiance 25 MG tablet tablet, TAKE 1 TABLET DAILY., Disp: 90 tablet, Rfl: 0  •  ketoconazole (NIZORAL) 2 % shampoo, , Disp: , Rfl:   •  Lancets misc, Use for E11.65 diabetes to check sugars 4 times a day., Disp: 200 each, Rfl: 3  •  Lancets misc, Test 4 times daily, E10.65, Disp: 120 each, Rfl: 11  •  Lantus SoloStar 100 UNIT/ML injection pen, INJECT 35 UNITS EVERY NIGHT, Disp: 15 mL, Rfl: 0  •  levothyroxine (SYNTHROID, LEVOTHROID) 25 MCG tablet, Take 1 tablet by mouth Daily., Disp: 30 tablet, Rfl: 3  •  lisinopril (PRINIVIL,ZESTRIL) 20 MG tablet, Take 1 tablet by mouth Daily., Disp: 90 tablet, Rfl: 1  •  methocarbamol (ROBAXIN) 750 MG tablet, TAKE 1 TABLET THREE TIMES DAILY., Disp: 90 tablet, Rfl: 0  •  mupirocin (BACTROBAN) 2 % ointment, Apply 1 application topically to the appropriate area as directed 3 (Three) Times a Day., Disp: 22 g, Rfl: 0  •  nystatin (MYCOSTATIN) 461158 UNIT/GM cream, , Disp: , Rfl:   •  omeprazole (priLOSEC) 40 MG capsule, TAKE 1 CAPSULE DAILY., Disp: 90 capsule, Rfl: 0  •  pregabalin (LYRICA) 300 MG capsule, TAKE 1 CAPSULE TWICE DAILY., Disp: 60 capsule, Rfl: 0  •  tolterodine LA (Detrol LA) 2 MG 24 hr capsule, Take 1 capsule by mouth Daily., Disp: 30 capsule, Rfl: 3  •  TRUEplus Insulin Syringe 30G X 5/16\" 0.5 ML misc, , Disp: , Rfl:   •  vilazodone (Viibryd) 20 MG tablet tablet, Take 1 tablet by mouth Daily., Disp: 90 tablet, Rfl: 1  •  vitamin D (ERGOCALCIFEROL) 1.25 MG (91518 UT) capsule capsule, TAKE 1 CAPSULE WEEKLY, Disp: 12 capsule, Rfl: " 0    Lab Results   Component Value Date    WBC 13.48 (H) 01/06/2023    HGB 11.4 (L) 01/06/2023    HCT 36.1 01/06/2023    MCV 88.0 01/06/2023     01/06/2023     Lab Results   Component Value Date    GLUCOSE 110 (H) 10/26/2022    CALCIUM 8.7 10/26/2022     10/26/2022    K 5.4 (H) 10/26/2022    CO2 26.8 10/26/2022     10/26/2022    BUN 19 10/26/2022    CREATININE 0.97 10/26/2022    EGFRIFNONA 57 (L) 01/27/2022    BCR 19.6 10/26/2022    ANIONGAP 10.2 10/26/2022     Lab Results   Component Value Date    INR 1.08 01/27/2022    PROTIME 13.9 01/27/2022     No results found for: CKTOTAL, CKMB, CKMBINDEX, TROPONINI, TROPONINT    No results found for: PHART, ZTK2DNA, PO2ART]    Contraindications to home sleep test: History of cerebrovascular accident and/or TIA    Assessment and Plan:    1. Excessive daytime sleepiness- New to me, additional work-up planned   1. Check PSG  2. Good sleep hygiene   3. Pertinent labs reviewed   4. Drowsy driving tips- do not drive if feeling sleepy   5. RTC in 2 weeks with study results   2.   Snoring- New to me, additional work-up planned    1.   As above   3.   Restless leg syndrome/PLMD   1. States symptoms minimal   2. She is on iron and Lyrica  3. Continue to monitor   4.   History of CVA      I obtained a brief history from the patient, reviewed the medical problems and current medications, and made medical decisions regarding treatment based on that information.   I spent 35 minutes caring for Marta on this date of service. This time includes time spent by me in the following activities: preparing for the visit, obtaining and/or reviewing a separately obtained history, performing a medically appropriate examination and/or evaluation, counseling and educating the patient/family/caregiver, ordering medications, tests, or procedures and documenting information in the medical record. I answered all of her questions she verbalized understanding. Discussion involved sleep  apnea, health impact of sleep apnea, in lab PSG, PAP therapy, titration study and follow-up.           RTC 2 weeks after study for results.             This document has been electronically signed by CONNOR Stoddard on March 27, 2023 11:19 CDT          This document has been electronically signed by CONNOR Stoddard on March 27, 2023         CC: Gato Crane MD Jamora, David C, MD

## 2023-03-27 NOTE — PROGRESS NOTES
Chief Complaint   Patient presents with   • Difficulty Swallowing     Endo follow up       Subjective    Marta Giraldo is a 58 y.o. female.    History of Present Illness  Patient presented to GI clinic for follow-up visit today.  Has intermittent symptoms of dysphagia.  GERD is well controlled.  Denied abdominal pain, nausea or vomiting.  Bowel movements regular.  Weight is stable.  EGD was consistent with esophagitis and gastritis.  Path was unremarkable.       The following portions of the patient's history were reviewed and updated as appropriate:   Past Medical History:   Diagnosis Date   • Anemia    • Anxiety    • Arthritis    • Cataract    • Depression    • Diabetes mellitus (HCC)    • Disease of thyroid gland    • Family history of thyroid problem    • GERD (gastroesophageal reflux disease)    • Headache    • History of transfusion    • Hyperlipidemia    • Hypertension    • Neuropathy    • Stroke (HCC) 2019   • Urinary tract infection      Past Surgical History:   Procedure Laterality Date   • APPENDECTOMY     • BELOW KNEE AMPUTATION     • BLADDER SURGERY     • CARDIAC CATHETERIZATION N/A 01/27/2022    Procedure: Left Heart Cath;  Surgeon: Josias Carpio MD;  Location: [facility] CATH INVASIVE LOCATION;  Service: Cardiology;  Laterality: N/A;   • COLONOSCOPY N/A 09/02/2020    Procedure: COLONOSCOPY;  Surgeon: Ashli Gonzalez MD;  Location: [facility] ENDOSCOPY;  Service: Gastroenterology;  Laterality: N/A;   • ENDOSCOPY N/A 09/02/2020    Procedure: ESOPHAGOGASTRODUODENOSCOPY;  Surgeon: Ashli Gonzalez MD;  Location: [facility] ENDOSCOPY;  Service: Gastroenterology;  Laterality: N/A;   • ENDOSCOPY N/A 3/1/2023    Procedure: ESOPHAGOGASTRODUODENOSCOPY;  Surgeon: Ashli Gonzalez MD;  Location: [facility] ENDOSCOPY;  Service: Gastroenterology;  Laterality: N/A;     Family History   Problem Relation Age of Onset   • Diabetes Other    • Hyperlipidemia Other    • Hypertension Other    • Stroke Other    •  Heart disease Other    • Other Other    • Diabetes Mother    • Diabetes Father    • Diabetes Sister    • Heart disease Sister    • Diabetes Brother      OB History    No obstetric history on file.       Prior to Admission medications    Medication Sig Start Date End Date Taking? Authorizing Provider   Aspirin Adult Low Strength 81 MG EC tablet  8/4/22  Yes David Vizcaino MD   atorvastatin (LIPITOR) 80 MG tablet TAKE 1 TABLET EACH EVENING 2/9/23  Yes Gato Crane MD   B-D UF III MINI PEN NEEDLES 31G X 5 MM misc Use as needed 11/22/21  Yes Shabbir Euceda APRN   Blood Glucose Monitoring Suppl (Accu-Chek Guide Me) w/Device kit  6/28/22  Yes David Vizcaino MD   carvedilol (COREG) 6.25 MG tablet Take 1 tablet by mouth 2 (Two) Times a Day. 2/1/22  Yes Josias Carpio MD   chlorhexidine (PERIDEX) 0.12 % solution  1/18/23  Yes David Vizcaino MD   clotrimazole-betamethasone (Lotrisone) 1-0.05 % cream Apply  topically to the appropriate area as directed 2 (Two) Times a Day. 10/5/20  Yes Morena Sood APRN   Continuous Blood Gluc  (FreeStyle Tae 2 Eden) device USE AS DIRECTED 6/15/21  Yes Shabbir Euceda APRN   Continuous Blood Gluc Sensor (FreeStyle Tae 2 Sensor) misc 1 each Every 14 (Fourteen) Days. Use every 14 days 3/21/22  Yes Shabbir Euceda APRN   docusate sodium (COLACE) 100 MG capsule Take 1 capsule by mouth 2 (Two) Times a Day As Needed for Constipation. 12/14/21  Yes Adalid Bates MD   ferrous sulfate 325 (65 Fe) MG tablet Take 1 tablet by mouth 2 (Two) Times a Day With Meals. 8/12/22  Yes Adalid Bates MD   folic acid (FOLVITE) 1 MG tablet Take 1 tablet by mouth Daily. 8/14/22  Yes Adalid Bates MD   glucose (DEX4) 4 GM chewable tablet Chew 4 tablets As Needed for Low Blood Sugar. 11/16/21  Yes Gato Crane MD   glucose blood test strip Test 4 times daily , E10.65 6/28/22  Yes Shabbir Euceda APRN   glucose monitor monitoring  "kit 1 each As Needed (to check bg). 6/28/22  Yes Shabbir Euceda APRN   glucose-vitamin C 4-6 GM-MG chewable tablet chewable tablet TRUEplus Glucose 4 gram chewable tablet   Yes ProviderDavid MD   Insulin Aspart (NovoLOG) 100 UNIT/ML injection Inject 10 Units under the skin into the appropriate area as directed 3 (Three) Times a Day Before Meals. 7/5/22 7/5/23 Yes Shabbir Euceda APRN   Insulin Pen Needle 32G X 8 MM misc Use at bedtime 11/16/21  Yes Gato Crane MD   Insulin Syringe-Needle U-100 30G X 1/2\" 1 ML misc Use three times daily with insulin 11/16/21  Yes Gato Crane MD   Jardiance 25 MG tablet tablet TAKE 1 TABLET DAILY. 1/5/23  Yes Shabbir Euceda APRN   Lancets misc Use for E11.65 diabetes to check sugars 4 times a day. 4/7/21  Yes Gato Crane MD   Lancets misc Test 4 times daily, E10.65 6/28/22  Yes Shabbir Euceda APRN   Lantus SoloStar 100 UNIT/ML injection pen INJECT 35 UNITS EVERY NIGHT 1/19/23  Yes Shabbir Euceda APRN   levothyroxine (SYNTHROID, LEVOTHROID) 25 MCG tablet Take 1 tablet by mouth Daily. 10/26/22  Yes Gato Crane MD   lisinopril (PRINIVIL,ZESTRIL) 20 MG tablet Take 1 tablet by mouth Daily. 10/26/22  Yes Gato Crane MD   mupirocin (BACTROBAN) 2 % ointment Apply 1 application topically to the appropriate area as directed 3 (Three) Times a Day. 6/30/22  Yes Shabbir Lopez APRN   omeprazole (priLOSEC) 40 MG capsule TAKE 1 CAPSULE DAILY. 2/9/23  Yes Gato Crane MD   tolterodine LA (Detrol LA) 2 MG 24 hr capsule Take 1 capsule by mouth Daily. 2/9/23  Yes Gato Crane MD   TRUEplus Insulin Syringe 30G X 5/16\" 0.5 ML misc  9/28/21  Yes Provider, MD David   vilazodone (Viibryd) 20 MG tablet tablet Take 1 tablet by mouth Daily. 10/26/22  Yes Gato Crane MD   ammonium lactate (LAC-HYDRIN) 12 % lotion  2/9/23   Provider, MD David   clobetasol (TEMOVATE) 0.05 % external solution  2/10/23   Provider, " MD David   Diclofenac Sodium (VOLTAREN) 1 % gel gel Apply  topically to the appropriate area as directed 4 (Four) Times a Day. 3/14/23   Gato Crane MD   dicyclomine (BENTYL) 20 MG tablet Take 1 tablet by mouth Every 6 (Six) Hours for 30 days. 3/10/23 4/9/23  Ashli Gonzalez MD   Dulaglutide (Trulicity) 3 MG/0.5ML solution pen-injector Inject 0.5 mL under the skin into the appropriate area as directed 1 (One) Time Per Week. 3/14/23   Gato Crane MD   DULoxetine (CYMBALTA) 60 MG capsule TAKE 1 CAPSULE TWICE DAILY. 3/16/23   Gato Crane MD   hydrocortisone 2.5 % cream  2/9/23   David Vizcaino MD   ketoconazole (NIZORAL) 2 % shampoo  2/9/23   David Vizcaino MD   methocarbamol (ROBAXIN) 750 MG tablet TAKE 1 TABLET THREE TIMES DAILY. 3/16/23   Gato Crane MD   nystatin (MYCOSTATIN) 958019 UNIT/GM cream  2/9/23   David Vizcaino MD   pregabalin (LYRICA) 300 MG capsule TAKE 1 CAPSULE TWICE DAILY. 3/16/23   Gato Crane MD   vitamin D (ERGOCALCIFEROL) 1.25 MG (14389 UT) capsule capsule TAKE 1 CAPSULE WEEKLY 3/16/23   Gato Crane MD     Allergies   Allergen Reactions   • Compazine [Prochlorperazine Edisylate] Unknown - High Severity   • Penicillins Other (See Comments) and Unknown - High Severity     unknown   • Prochlorperazine Swelling     Social History     Socioeconomic History   • Marital status:    Tobacco Use   • Smoking status: Some Days     Packs/day: 0.50     Types: Cigarettes   • Smokeless tobacco: Never   Vaping Use   • Vaping Use: Never used   Substance and Sexual Activity   • Alcohol use: Not Currently   • Drug use: Never   • Sexual activity: Defer       Review of Systems  Review of Systems   Constitutional: Negative for chills, fatigue, fever and unexpected weight change.   HENT: Positive for trouble swallowing. Negative for congestion, ear discharge, hearing loss, nosebleeds and sore throat.    Eyes: Negative for pain, discharge and redness.  "  Respiratory: Negative for cough, chest tightness, shortness of breath and wheezing.    Cardiovascular: Negative for chest pain and palpitations.   Gastrointestinal: Negative for abdominal distention, abdominal pain, blood in stool, constipation, diarrhea, nausea and vomiting.   Endocrine: Negative for cold intolerance, polydipsia, polyphagia and polyuria.   Genitourinary: Negative for dysuria, flank pain, frequency, hematuria and urgency.   Musculoskeletal: Negative for arthralgias, back pain, joint swelling and myalgias.   Skin: Negative for color change, pallor and rash.   Neurological: Negative for tremors, seizures, syncope, weakness and headaches.   Hematological: Negative for adenopathy. Does not bruise/bleed easily.   Psychiatric/Behavioral: Negative for behavioral problems, confusion, dysphoric mood, hallucinations and suicidal ideas. The patient is not nervous/anxious.         /69   Pulse 79   Ht 175.3 cm (69.02\")   BMI 33.95 kg/m²     Objective    Physical Exam  Constitutional:       Appearance: She is well-developed.   HENT:      Head: Normocephalic and atraumatic.   Eyes:      Conjunctiva/sclera: Conjunctivae normal.      Pupils: Pupils are equal, round, and reactive to light.   Neck:      Thyroid: No thyromegaly.   Cardiovascular:      Rate and Rhythm: Normal rate and regular rhythm.      Heart sounds: Normal heart sounds. No murmur heard.  Pulmonary:      Effort: Pulmonary effort is normal.      Breath sounds: Normal breath sounds. No wheezing.   Abdominal:      General: Bowel sounds are normal. There is no distension.      Palpations: Abdomen is soft. There is no mass.      Tenderness: There is no abdominal tenderness.      Hernia: No hernia is present.   Genitourinary:     Comments: No lesions noted  Musculoskeletal:         General: No tenderness. Normal range of motion.      Cervical back: Normal range of motion and neck supple.   Lymphadenopathy:      Cervical: No cervical adenopathy. "   Skin:     General: Skin is warm and dry.      Findings: No rash.   Neurological:      Mental Status: She is alert and oriented to person, place, and time.      Cranial Nerves: No cranial nerve deficit.   Psychiatric:         Thought Content: Thought content normal.       Admission on 2023, Discharged on 2023   Component Date Value Ref Range Status   • Reference Lab Report 2023    Final                    Value:[facility]  [address] [place] [address]  Phone: [phone] or [phone]  Fax: [phone]  Ran Mendoza M.D., Medical Director    PATIENT NAME                                     LABORATORY NO.  1800   PARMINDER VITAL.                          [id number]  [id number]                                 AGE                    SEX   N              CLIENT REF #  [facility]                   58        1965      F     [id number]      [id number]    [place]                               REQUESTING M.D.           ATTENDING M.D.         COPY TO[address]                         SAM BATES[address]  DATE COLLECTED            DATE RECEIVED          DATE REPORTED  2023    DIAGNOSIS:  A.     ANTRUM, BIOPSY:  Antral type mucosa with reactive changes  No Helicobacter pylori-like                           organisms seen  Negative for dysplasia or malignancy  B.     GE JUNCTION:  Glandular mucosa with reactive changes  Negative for intestinal metaplasia, dysplasia, or malignancy    GJK    CLINICAL HISTORY:  Other dysphagia    SPECIMENS RECEIVED:  A.    ANTRUM, BIOPSY  B.    GE JUNCTION    MICROSCOPIC DESCRIPTION:  Tissue blocks are prepared and slides are examined microscopically on all  specimens. See diagnosis for details.    Professional interpretation rendered by Loc Gutierres M.D.,  ERIC at [facility], [address] [place] [address].    GROSS DESCRIPTION:  A.    Labeled antrum biopsy are 2 portions of tan soft tissue measuring 0.5 x 0.5  x 0.2 cm in aggregate.  Submitted entirely in 1 block.  BW  B.    Labeled EG junction is a 0.4 x 0.2 x 0.2 cm portion of tan soft tissue which  is submitted entirely in 1 block.    REVIEWED, DIAGNOSED AND ELECTRONICALLY  SIGNED BY:    Loc Gutierres M.D., JONATHAN.  CPT CODES:  88305x2       Assessment & Plan    No diagnosis found..   1.  Dysphagia, likely due to dysmotility.  Could also be due to oropharyngeal dysphagia.  Continue PPI.  Add Bentyl.  Speech pathology evaluation and an esophageal manometry if dysphagia persist.  2.  GERD, well controlled.  Continue PPI and antireflux lifestyle.  3.  Obesity, recommend exercise and diet control.  4.  Colorectal cancer screening, repeat colonoscopy in 5 years.    Orders placed during this encounter include:  No orders of the defined types were placed in this encounter.      * Surgery not found *    Review and/or summary of lab tests, radiology, procedures, medications. Review and summary of old records and obtaining of history. The risks and benefits of my recommendations, as well as other treatment options were discussed with the patient today. Questions were answered.    New Medications Ordered This Visit   Medications   • dicyclomine (BENTYL) 20 MG tablet     Sig: Take 1 tablet by mouth Every 6 (Six) Hours for 30 days.     Dispense:  120 tablet     Refill:  5       Follow-up: Return in about 1 month (around 4/10/2023).               Results for orders placed or performed during the hospital encounter of 03/01/23   TISSUE EXAM, [facility])    Specimen: A: Gastric, Antrum; Tissue    B: Esophagus; Tissue   Result Value Ref Range    Reference Lab Report       [facility]  [address] [place] [address]  Phone: [phone] or [phone]  Fax:  [phone]  Ran Mendoza M.D., Medical Director    PATIENT NAME                                     LABORATORY NO.  1800   PARMINDER VITAL.                          [id number]  [id number]                                 AGE                    SEX   N              CLIENT REF #  [facility]                   58        1965      F     [id number]      [id number]    [place]                               REQUESTING M.D.           ATTENDING M.D.         COPY TO[address]                         SAM BATES DAVID  [address]  DATE COLLECTED            DATE RECEIVED          DATE REPORTED  2023    DIAGNOSIS:  A.     ANTRUM, BIOPSY:  Antral type mucosa with reactive changes  No Helicobacter pylori-like  organisms seen  Negative for dysplasia or malignancy  B.     GE JUNCTION:  Glandular mucosa with reactive changes  Negative for intestinal metaplasia, dysplasia, or malignancy    GJK    CLINICAL HISTORY:  Other dysphagia    SPECIMENS RECEIVED:  A.    ANTRUM, BIOPSY  B.    GE JUNCTION    MICROSCOPIC DESCRIPTION:  Tissue blocks are prepared and slides are examined microscopically on all  specimens. See diagnosis for details.    Professional interpretation rendered by Loc Gutierres M.D., F.C.A.P. at [facility] [place], [address] [place] [address].    GROSS DESCRIPTION:  A.    Labeled antrum biopsy are 2 portions of tan soft tissue measuring 0.5 x 0.5  x 0.2 cm in aggregate.  Submitted entirely in 1 block.  BW  B.    Labeled EG junction is a 0.4 x 0.2 x 0.2 cm portion of tan soft tissue which  is submitted entirely in 1 block.    REVIEWED, DIAGNOSED AND ELECTRONICALLY  SIGNED BY:    Loc Gutierres M.D., F.C.A.P.  CPT CODES:  88305x2     Results for orders placed or performed in visit on 23   Urinalysis With Culture If Indicated - Urine, Clean  Catch    Specimen: Urine, Clean Catch   Result Value Ref Range    Color, UA Yellow Yellow, Straw, Dark Yellow, Nilsa    Appearance, UA Clear Clear    pH, UA <=5.0 5.0 - 9.0    Specific Gravity, UA 1.018 1.003 - 1.030    Glucose, UA >=1000 mg/dL (3+) (A) Negative    Ketones, UA Negative Negative    Bilirubin, UA Negative Negative    Blood, UA Negative Negative    Protein, UA Negative Negative    Leuk Esterase, UA Negative Negative    Nitrite, UA Negative Negative    Urobilinogen, UA 0.2 E.U./dL 0.2 - 1.0 E.U./dL   Results for orders placed or performed in visit on 01/06/23   CBC Auto Differential    Specimen: Arm, Right; Blood   Result Value Ref Range    WBC 13.48 (H) 3.40 - 10.80 10*3/mm3    RBC 4.10 3.77 - 5.28 10*6/mm3    Hemoglobin 11.4 (L) 12.0 - 15.9 g/dL    Hematocrit 36.1 34.0 - 46.6 %    MCV 88.0 79.0 - 97.0 fL    MCH 27.8 26.6 - 33.0 pg    MCHC 31.6 31.5 - 35.7 g/dL    RDW 14.3 12.3 - 15.4 %    RDW-SD 45.5 37.0 - 54.0 fl    MPV 11.7 6.0 - 12.0 fL    Platelets 204 140 - 450 10*3/mm3    Neutrophil % 68.7 42.7 - 76.0 %    Lymphocyte % 21.0 19.6 - 45.3 %    Monocyte % 5.3 5.0 - 12.0 %    Eosinophil % 3.2 0.3 - 6.2 %    Basophil % 1.1 0.0 - 1.5 %    Immature Grans % 0.7 (H) 0.0 - 0.5 %    Neutrophils, Absolute 9.27 (H) 1.70 - 7.00 10*3/mm3    Lymphocytes, Absolute 2.83 0.70 - 3.10 10*3/mm3    Monocytes, Absolute 0.71 0.10 - 0.90 10*3/mm3    Eosinophils, Absolute 0.43 (H) 0.00 - 0.40 10*3/mm3    Basophils, Absolute 0.15 0.00 - 0.20 10*3/mm3    Immature Grans, Absolute 0.09 (H) 0.00 - 0.05 10*3/mm3    nRBC 0.0 0.0 - 0.2 /100 WBC   Iron and TIBC    Specimen: Arm, Right; Blood   Result Value Ref Range    Iron 39 37 - 145 mcg/dL    Iron Saturation 14 (L) 20 - 50 %    Transferrin 183 (L) 200 - 360 mg/dL    TIBC 273 (L) 298 - 536 mcg/dL   Folate    Specimen: Arm, Right; Blood   Result Value Ref Range    Folate >20.00 4.78 - 24.20 ng/mL   Ferritin    Specimen: Arm, Right; Blood   Result Value Ref Range    Ferritin  138.60 13.00 - 150.00 ng/mL   Vitamin B12    Specimen: Arm, Right; Blood   Result Value Ref Range    Vitamin B-12 716 211 - 946 pg/mL   Results for orders placed or performed in visit on 10/26/22   Renal Function Panel    Specimen: Blood   Result Value Ref Range    Glucose 110 (H) 65 - 99 mg/dL    BUN 19 6 - 20 mg/dL    Creatinine 0.97 0.57 - 1.00 mg/dL    Sodium 139 136 - 145 mmol/L    Potassium 5.4 (H) 3.5 - 5.2 mmol/L    Chloride 102 98 - 107 mmol/L    CO2 26.8 22.0 - 29.0 mmol/L    Calcium 8.7 8.6 - 10.5 mg/dL    Albumin 3.90 3.50 - 5.20 g/dL    Phosphorus 4.2 2.5 - 4.5 mg/dL    Anion Gap 10.2 5.0 - 15.0 mmol/L    BUN/Creatinine Ratio 19.6 7.0 - 25.0    eGFR 67.9 >60.0 mL/min/1.73   Results for orders placed or performed in visit on 10/06/22   Vitamin D 25 Hydroxy    Specimen: Blood   Result Value Ref Range    25 Hydroxy, Vitamin D 41.3 30.0 - 100.0 ng/ml   TSH    Specimen: Blood   Result Value Ref Range    TSH 1.190 0.270 - 4.200 uIU/mL   T4, Free    Specimen: Blood   Result Value Ref Range    Free T4 0.95 0.93 - 1.70 ng/dL   Hemoglobin A1c    Specimen: Blood   Result Value Ref Range    Hemoglobin A1C 7.00 (H) 4.80 - 5.60 %   Lipid Panel    Specimen: Blood   Result Value Ref Range    Total Cholesterol 138 0 - 200 mg/dL    Triglycerides 62 0 - 150 mg/dL    HDL Cholesterol 50 40 - 60 mg/dL    LDL Cholesterol  75 0 - 100 mg/dL    VLDL Cholesterol 13 5 - 40 mg/dL    LDL/HDL Ratio 1.51    Results for orders placed or performed in visit on 10/06/22   Comprehensive Metabolic Panel    Specimen: Blood   Result Value Ref Range    Glucose 194 (H) 65 - 99 mg/dL    BUN 28 (H) 6 - 20 mg/dL    Creatinine 1.34 (H) 0.57 - 1.00 mg/dL    Sodium 139 136 - 145 mmol/L    Potassium 5.1 3.5 - 5.2 mmol/L    Chloride 105 98 - 107 mmol/L    CO2 25.0 22.0 - 29.0 mmol/L    Calcium 9.0 8.6 - 10.5 mg/dL    Total Protein 7.8 6.0 - 8.5 g/dL    Albumin 4.20 3.50 - 5.20 g/dL    ALT (SGPT) 27 1 - 33 U/L    AST (SGOT) 31 1 - 32 U/L    Alkaline  Phosphatase 90 39 - 117 U/L    Total Bilirubin 0.2 0.0 - 1.2 mg/dL    Globulin 3.6 gm/dL    A/G Ratio 1.2 g/dL    BUN/Creatinine Ratio 20.9 7.0 - 25.0    Anion Gap 9.0 5.0 - 15.0 mmol/L    eGFR 46.1 (L) >60.0 mL/min/1.73   Results for orders placed or performed in visit on 08/12/22   CBC Auto Differential    Specimen: Blood   Result Value Ref Range    WBC 12.86 (H) 3.40 - 10.80 10*3/mm3    RBC 4.61 3.77 - 5.28 10*6/mm3    Hemoglobin 12.0 12.0 - 15.9 g/dL    Hematocrit 39.1 34.0 - 46.6 %    MCV 84.8 79.0 - 97.0 fL    MCH 26.0 (L) 26.6 - 33.0 pg    MCHC 30.7 (L) 31.5 - 35.7 g/dL    RDW 15.8 (H) 12.3 - 15.4 %    RDW-SD 48.8 37.0 - 54.0 fl    MPV 12.1 (H) 6.0 - 12.0 fL    Platelets 207 140 - 450 10*3/mm3    Neutrophil % 73.4 42.7 - 76.0 %    Lymphocyte % 18.0 (L) 19.6 - 45.3 %    Monocyte % 4.5 (L) 5.0 - 12.0 %    Eosinophil % 2.7 0.3 - 6.2 %    Basophil % 0.9 0.0 - 1.5 %    Immature Grans % 0.5 0.0 - 0.5 %    Neutrophils, Absolute 9.42 (H) 1.70 - 7.00 10*3/mm3    Lymphocytes, Absolute 2.32 0.70 - 3.10 10*3/mm3    Monocytes, Absolute 0.58 0.10 - 0.90 10*3/mm3    Eosinophils, Absolute 0.35 0.00 - 0.40 10*3/mm3    Basophils, Absolute 0.12 0.00 - 0.20 10*3/mm3    Immature Grans, Absolute 0.07 (H) 0.00 - 0.05 10*3/mm3    nRBC 0.0 0.0 - 0.2 /100 WBC     *Note: Due to a large number of results and/or encounters for the requested time period, some results have not been displayed. A complete set of results can be found in Results Review.         This document has been electronically signed by Ashli Gonzalez MD on March 27, 2023 13:01 CDT

## 2023-03-29 ENCOUNTER — TELEPHONE (OUTPATIENT)
Dept: ENDOCRINOLOGY | Facility: CLINIC | Age: 58
End: 2023-03-29
Payer: MEDICARE

## 2023-03-29 DIAGNOSIS — E11.65 TYPE 2 DIABETES MELLITUS WITH HYPERGLYCEMIA, WITH LONG-TERM CURRENT USE OF INSULIN: ICD-10-CM

## 2023-03-29 DIAGNOSIS — Z79.4 TYPE 2 DIABETES MELLITUS WITH HYPERGLYCEMIA, WITH LONG-TERM CURRENT USE OF INSULIN: ICD-10-CM

## 2023-03-29 RX ORDER — INSULIN ASPART 100 [IU]/ML
INJECTION, SOLUTION INTRAVENOUS; SUBCUTANEOUS
Qty: 30 ML | Refills: 11 | Status: SHIPPED | OUTPATIENT
Start: 2023-03-29

## 2023-03-29 NOTE — TELEPHONE ENCOUNTER
Patient called and stated she needs a new prescription for Novolog because she has been having to use more lately to cover her sugar. Stated she is using an extra 10 units/day because sugar is staying elevated. Please send new prescription to    Pharmacy: [facility] - [place], [place]    Phone [phone]    Thank you

## 2023-03-30 ENCOUNTER — TELEPHONE (OUTPATIENT)
Dept: FAMILY MEDICINE CLINIC | Facility: CLINIC | Age: 58
End: 2023-03-30
Payer: MEDICARE

## 2023-03-30 ENCOUNTER — DOCUMENTATION (OUTPATIENT)
Dept: ENDOCRINOLOGY | Facility: CLINIC | Age: 58
End: 2023-03-30
Payer: MEDICARE

## 2023-04-03 NOTE — TELEPHONE ENCOUNTER
Patient asked for wheelchair repair to go through her [employer] insurance.  Call back number: [phone]

## 2023-04-07 DIAGNOSIS — Z79.4 TYPE 2 DIABETES MELLITUS WITH HYPERGLYCEMIA, WITH LONG-TERM CURRENT USE OF INSULIN: Primary | ICD-10-CM

## 2023-04-07 DIAGNOSIS — E11.65 TYPE 2 DIABETES MELLITUS WITH HYPERGLYCEMIA, WITH LONG-TERM CURRENT USE OF INSULIN: Primary | ICD-10-CM

## 2023-04-07 NOTE — TELEPHONE ENCOUNTER
ARNALDO FROM [facility] beds at the [facility] CALLED REQUESTING A NEW RX FOR HUMALOG. PATIENT INSURANCE DOES NOT COVER NOVOLOG.    RX SENT

## 2023-04-10 DIAGNOSIS — E11.42 DIABETIC POLYNEUROPATHY ASSOCIATED WITH TYPE 2 DIABETES MELLITUS: Chronic | ICD-10-CM

## 2023-04-10 RX ORDER — PREGABALIN 300 MG/1
CAPSULE ORAL
Qty: 60 CAPSULE | Refills: 0 | Status: SHIPPED | OUTPATIENT
Start: 2023-04-10

## 2023-04-10 RX ORDER — LEVOTHYROXINE SODIUM 0.03 MG/1
TABLET ORAL
Qty: 30 TABLET | Refills: 0 | Status: SHIPPED | OUTPATIENT
Start: 2023-04-10

## 2023-04-20 ENCOUNTER — HOSPITAL ENCOUNTER (OUTPATIENT)
Dept: SLEEP MEDICINE | Facility: HOSPITAL | Age: 58
Discharge: HOME | End: 2023-04-20
Payer: MEDICARE

## 2023-04-20 VITALS — BODY MASS INDEX: 34.07 KG/M2 | HEIGHT: 69 IN | WEIGHT: 230 LBS

## 2023-04-20 DIAGNOSIS — R06.83 SNORING: ICD-10-CM

## 2023-04-20 DIAGNOSIS — G25.81 RLS (RESTLESS LEGS SYNDROME): ICD-10-CM

## 2023-04-20 DIAGNOSIS — G47.19 EXCESSIVE DAYTIME SLEEPINESS: ICD-10-CM

## 2023-04-20 DIAGNOSIS — Z86.73 HISTORY OF CVA (CEREBROVASCULAR ACCIDENT): ICD-10-CM

## 2023-04-20 PROCEDURE — 95810 POLYSOM 6/> YRS 4/> PARAM: CPT

## 2023-04-21 ENCOUNTER — APPOINTMENT (OUTPATIENT)
Dept: ONCOLOGY | Facility: HOSPITAL | Age: 58
End: 2023-04-21
Payer: MEDICARE

## 2023-04-21 ENCOUNTER — LAB (OUTPATIENT)
Dept: ONCOLOGY | Facility: HOSPITAL | Age: 58
End: 2023-04-21
Payer: MEDICARE

## 2023-04-21 ENCOUNTER — OFFICE VISIT (OUTPATIENT)
Dept: ONCOLOGY | Facility: CLINIC | Age: 58
End: 2023-04-21
Payer: MEDICARE

## 2023-04-21 VITALS
DIASTOLIC BLOOD PRESSURE: 51 MMHG | SYSTOLIC BLOOD PRESSURE: 106 MMHG | RESPIRATION RATE: 18 BRPM | OXYGEN SATURATION: 100 % | HEART RATE: 67 BPM

## 2023-04-21 DIAGNOSIS — E53.8 B12 DEFICIENCY: Primary | ICD-10-CM

## 2023-04-21 DIAGNOSIS — D72.829 LEUKOCYTOSIS, UNSPECIFIED TYPE: ICD-10-CM

## 2023-04-21 DIAGNOSIS — E61.1 IRON DEFICIENCY: ICD-10-CM

## 2023-04-21 LAB
BASOPHILS # BLD AUTO: 0.13 10*3/MM3 (ref 0–0.2)
BASOPHILS NFR BLD AUTO: 1 % (ref 0–1.5)
DEPRECATED RDW RBC AUTO: 46.9 FL (ref 37–54)
EOSINOPHIL # BLD AUTO: 0.37 10*3/MM3 (ref 0–0.4)
EOSINOPHIL NFR BLD AUTO: 2.8 % (ref 0.3–6.2)
ERYTHROCYTE [DISTWIDTH] IN BLOOD BY AUTOMATED COUNT: 14.6 % (ref 12.3–15.4)
FERRITIN SERPL-MCNC: 113.3 NG/ML (ref 13–150)
FOLATE SERPL-MCNC: 15.8 NG/ML (ref 4.78–24.2)
HCT VFR BLD AUTO: 38.6 % (ref 34–46.6)
HGB BLD-MCNC: 12 G/DL (ref 12–15.9)
IMM GRANULOCYTES # BLD AUTO: 0.06 10*3/MM3 (ref 0–0.05)
IMM GRANULOCYTES NFR BLD AUTO: 0.4 % (ref 0–0.5)
IRON 24H UR-MRATE: 47 MCG/DL (ref 37–145)
IRON SATN MFR SERPL: 15 % (ref 20–50)
LYMPHOCYTES # BLD AUTO: 2.78 10*3/MM3 (ref 0.7–3.1)
LYMPHOCYTES NFR BLD AUTO: 20.7 % (ref 19.6–45.3)
MCH RBC QN AUTO: 27 PG (ref 26.6–33)
MCHC RBC AUTO-ENTMCNC: 31.1 G/DL (ref 31.5–35.7)
MCV RBC AUTO: 86.9 FL (ref 79–97)
MONOCYTES # BLD AUTO: 0.63 10*3/MM3 (ref 0.1–0.9)
MONOCYTES NFR BLD AUTO: 4.7 % (ref 5–12)
NEUTROPHILS NFR BLD AUTO: 70.4 % (ref 42.7–76)
NEUTROPHILS NFR BLD AUTO: 9.44 10*3/MM3 (ref 1.7–7)
NRBC BLD AUTO-RTO: 0 /100 WBC (ref 0–0.2)
PLATELET # BLD AUTO: 231 10*3/MM3 (ref 140–450)
PMV BLD AUTO: 11.5 FL (ref 6–12)
RBC # BLD AUTO: 4.44 10*6/MM3 (ref 3.77–5.28)
TIBC SERPL-MCNC: 310 MCG/DL (ref 298–536)
TRANSFERRIN SERPL-MCNC: 208 MG/DL (ref 200–360)
VIT B12 BLD-MCNC: 419 PG/ML (ref 211–946)
WBC NRBC COR # BLD: 13.41 10*3/MM3 (ref 3.4–10.8)

## 2023-04-21 RX ORDER — LANOLIN ALCOHOL/MO/W.PET/CERES
1000 CREAM (GRAM) TOPICAL DAILY
Qty: 90 TABLET | Refills: 1 | Status: SHIPPED | OUTPATIENT
Start: 2023-04-21

## 2023-04-21 NOTE — PROGRESS NOTES
DATE OF VISIT: 4/21/2023      REASON FOR VISIT: Leukocytosis, iron deficiency anemia      HISTORY OF PRESENT ILLNESS:   58-year-old female with medical problem consisting of diabetes mellitus with neuropathy, hypothyroidism, hypertension, dyslipidemia, history of left lower extremity amputation, iron deficiency for which patient was initially seen in consultation on April 21, 2021.  Patient is here for follow-up appointment today.  Denies any excessive nausea or vomiting or diarrhea.  Denies any bleeding.  Denies any new lymph node enlargement.              Past Medical History, Past Surgical History, Social History, Family History have been reviewed and are without significant changes except as mentioned.    Review of Systems   A comprehensive 14 point review of systems was performed and was negative except as mentioned in HPI.    Medications:  The current medication list was reviewed in the EMR    ALLERGIES:    Allergies   Allergen Reactions   • Compazine [Prochlorperazine Edisylate] Unknown - High Severity   • Penicillins Other (See Comments) and Unknown - High Severity     unknown   • Prochlorperazine Swelling       Objective      Vitals:    04/21/23 1048   BP: 106/51   Pulse: 67   Resp: 18   SpO2: 100%   Weight: Comment: pt in wheelchair   PainSc:   6         9/9/2022    10:38 AM   Current Status   ECOG score 3       Physical Exam  Pulmonary:      Breath sounds: Normal breath sounds.   Neurological:      Mental Status: She is alert and oriented to person, place, and time.           RECENT LABS:  Glucose   Date Value Ref Range Status   10/26/2022 110 (H) 65 - 99 mg/dL Final     Sodium   Date Value Ref Range Status   10/26/2022 139 136 - 145 mmol/L Final     Potassium   Date Value Ref Range Status   10/26/2022 5.4 (H) 3.5 - 5.2 mmol/L Final     CO2   Date Value Ref Range Status   10/26/2022 26.8 22.0 - 29.0 mmol/L Final     Chloride   Date Value Ref Range Status   10/26/2022 102 98 - 107 mmol/L Final     Anion  Gap   Date Value Ref Range Status   10/26/2022 10.2 5.0 - 15.0 mmol/L Final     Creatinine   Date Value Ref Range Status   10/26/2022 0.97 0.57 - 1.00 mg/dL Final     BUN   Date Value Ref Range Status   10/26/2022 19 6 - 20 mg/dL Final     BUN/Creatinine Ratio   Date Value Ref Range Status   10/26/2022 19.6 7.0 - 25.0 Final     Calcium   Date Value Ref Range Status   10/26/2022 8.7 8.6 - 10.5 mg/dL Final     eGFR Non  Amer   Date Value Ref Range Status   01/27/2022 57 (L) >60 mL/min/1.73 Final     Alkaline Phosphatase   Date Value Ref Range Status   10/06/2022 90 39 - 117 U/L Final     Total Protein   Date Value Ref Range Status   10/06/2022 7.8 6.0 - 8.5 g/dL Final     ALT (SGPT)   Date Value Ref Range Status   10/06/2022 27 1 - 33 U/L Final     AST (SGOT)   Date Value Ref Range Status   10/06/2022 31 1 - 32 U/L Final     Total Bilirubin   Date Value Ref Range Status   10/06/2022 0.2 0.0 - 1.2 mg/dL Final     Albumin   Date Value Ref Range Status   10/26/2022 3.90 3.50 - 5.20 g/dL Final     Globulin   Date Value Ref Range Status   10/06/2022 3.6 gm/dL Final     Lab Results   Component Value Date    WBC 13.41 (H) 04/21/2023    HGB 12.0 04/21/2023    HCT 38.6 04/21/2023    MCV 86.9 04/21/2023     04/21/2023     Lab Results   Component Value Date    NEUTROABS 9.44 (H) 04/21/2023    IRON 47 04/21/2023    IRON 39 01/06/2023    IRON 51 08/12/2022    TIBC 310 04/21/2023    TIBC 273 (L) 01/06/2023    TIBC 320 08/12/2022    LABIRON 15 (L) 04/21/2023    LABIRON 14 (L) 01/06/2023    LABIRON 16 (L) 08/12/2022    FERRITIN 138.60 01/06/2023    FERRITIN 105.00 08/12/2022    FERRITIN 68.90 04/13/2022    HABXLFXW57 716 01/06/2023    TEFMBXUY70 546 08/12/2022    PYSKNJNT81 836 04/13/2022    FOLATE >20.00 01/06/2023    FOLATE 11.10 08/12/2022    FOLATE 17.50 04/13/2022     Lab Results   Component Value Date    REFLABREPO  03/01/2023     [facility]  [address] [place]  [address]  Phone:  [phone] or [phone]  Fax: [phone]  Ran Mendoza M.D., Medical Director    PATIENT NAME                                     LABORATORY NO.  PARMINDER SINGER.                          [id number]  [id number]                                 AGE                    SEX   SSN              CLIENT REF #  [facility]                   58        1965      F     [id number]      [id number]    [place]                               REQUESTING M.D.           ATTENDING M.D.         COPY TO[address]                         SAM BATES CLAIRE  [address]  DATE COLLECTED            DATE RECEIVED          DATE REPORTED  2023    DIAGNOSIS:  A.     ANTRUM, BIOPSY:  Antral type mucosa with reactive changes  No Helicobacter pylori-like organisms seen  Negative for dysplasia or malignancy  B.     GE JUNCTION:  Glandular mucosa with reactive changes  Negative for intestinal metaplasia, dysplasia, or malignancy    GJK    CLINICAL HISTORY:  Other dysphagia    SPECIMENS RECEIVED:  A.    ANTRUM, BIOPSY  B.    GE JUNCTION    MICROSCOPIC DESCRIPTION:  Tissue blocks are prepared and slides are examined microscopically on all  specimens. See diagnosis for details.    Professional interpretation rendered by Loc Gutierres M.D., F.C.A.P. at [facility], [address] [place] [address].    GROSS DESCRIPTION:  A.    Labeled antrum biopsy are 2 portions of tan soft tissue measuring 0.5 x 0.5  x 0.2 cm in aggregate.  Submitted entirely in 1 block.  BW  B.    Labeled EG junction is a 0.4 x 0.2 x 0.2 cm portion of tan soft tissue which  is submitted entirely in 1 block.    REVIEWED, DIAGNOSED AND ELECTRONICALLY  SIGNED BY:    Loc Gutierres M.D., F.C.A.P.  CPT CODES:  88305x2           PATHOLOGY:  * Cannot find OR log *         RADIOLOGY DATA :  No radiology  results for the last 7 days        Assessment & Plan     1.  Leukocytosis:  - Patient has been having ongoing leukocytosis since December 2020  - White blood cell count is 13.41 which is again predominantly increasing neutrophil  - Work-up in the past including peripheral blood flow cytometry in April 2021 was negative for any leukemia or lymphoma.  - We will continue with clinical monitoring for now  - We will have patient return to clinic in 4 months with repeat CBC, iron studies, ferritin, B12 and folate to be done on that day.    2.  Iron deficiency anemia:  - Hemoglobin is 12.0  - Patient is currently on intramuscular B12 injection with ferrous sulfate twice daily and folic acid p.o. daily.  - Patient states because of her wheelchair issue she has not been able to come for B12 injection routinely.  Recommend changing B12 to 1000 mcg p.o. daily until next clinic visit in 3 months we will hold B12 injection until next clinic visit.    3.  Diabetes mellitus with neuropathy    4.  Health maintenance: Patient quit smoking in 2019.  Had a colonoscopy in August 2020    5.  Advance care planning:  - CODE STATUS and resuscitation were discussed with patient.  Patient remains full code.  Has power of  on chart.                 PHQ-9 Total Score: 0   -Patient is not homicidal or suicidal.  No acute intervention required.    Marta Giraldo reports a pain score of 6.  Given her pain assessment as noted, treatment options were discussed and the following options were decided upon as a follow-up plan to address the patient's pain: continuation of current treatment plan for pain.         Adalid Bates MD  4/21/2023  11:08 CDT        Part of this note may be an electronic transcription/translation of spoken language to printed text using the Dragon Dictation System.          CC:

## 2023-04-21 NOTE — NURSING NOTE
2 attempts by me--RAC, LAC; 2 attempts by Margarita V--right hand, left hand.  Unable to draw blood via venipuncture--lab called for assist.  Venipuncture performed by maco.  Kaykay Mcmahon RN  April 21, 2023  0167

## 2023-04-22 RX ORDER — METHOCARBAMOL 750 MG/1
750 TABLET, FILM COATED ORAL 3 TIMES DAILY
Qty: 90 TABLET | Refills: 0 | Status: SHIPPED | OUTPATIENT
Start: 2023-04-22

## 2023-04-22 RX ORDER — DULOXETIN HYDROCHLORIDE 60 MG/1
60 CAPSULE, DELAYED RELEASE ORAL 2 TIMES DAILY
Qty: 60 CAPSULE | Refills: 0 | Status: SHIPPED | OUTPATIENT
Start: 2023-04-22

## 2023-04-24 ENCOUNTER — TELEPHONE (OUTPATIENT)
Dept: FAMILY MEDICINE CLINIC | Facility: CLINIC | Age: 58
End: 2023-04-24
Payer: MEDICARE

## 2023-04-24 ENCOUNTER — TELEPHONE (OUTPATIENT)
Dept: ONCOLOGY | Facility: HOSPITAL | Age: 58
End: 2023-04-24
Payer: MEDICARE

## 2023-04-24 NOTE — TELEPHONE ENCOUNTER
----- Message from Adalid Bates MD sent at 4/22/2023 10:50 AM CDT -----  Please let patient know, she needs to continue taking ferrous sulfate twice daily and folic acid p.o. daily.  She needs to start taking B12 p.o. daily as discussed during clinic visit yesterday.  Prescription for B12 was sent to her pharmacy yesterday.  Thank you

## 2023-04-24 NOTE — TELEPHONE ENCOUNTER
Patient called stating that insurance has denied request for new wheel for wheelchair. Stated that the request was detailed enough. Also stated that if expedited is put on request they can have it done in  72 hours.    Return call is [phone]

## 2023-04-25 NOTE — TELEPHONE ENCOUNTER
Srinivasan, Chelly, RegSched Rep 19 hours ago (4:40 PM)     JR  Patient called stating that insurance has denied request for new wheel for wheelchair. Stated that the request was detailed enough. Also stated that if expedited is put on request they can have it done in  72 hours.     Return call is [phone]         Note          Marta Giraldo [phone]  Chelly Srinivasan RegSched Rep 19 hours ago (4:38 PM)

## 2023-04-26 NOTE — TELEPHONE ENCOUNTER
Patient has been informed that the medical supply store will need to fax over exactly what they are needing in order for her to get her wheelchair repaired. Patient voiced understanding. Patient stated she will call the med supply store and give them Dr. Crane's fax number.   12-Nov-2017 03:36

## 2023-04-26 NOTE — TELEPHONE ENCOUNTER
Patient called stating that insurance told her they have faxed a form over to our office. Patient would like a call once form has been faxed back.  Call back number: [phone]

## 2023-05-04 ENCOUNTER — OFFICE VISIT (OUTPATIENT)
Dept: FAMILY MEDICINE CLINIC | Facility: CLINIC | Age: 58
End: 2023-05-04
Payer: MEDICARE

## 2023-05-04 VITALS
DIASTOLIC BLOOD PRESSURE: 60 MMHG | BODY MASS INDEX: 34.8 KG/M2 | HEART RATE: 78 BPM | TEMPERATURE: 98.2 F | OXYGEN SATURATION: 100 % | WEIGHT: 235 LBS | SYSTOLIC BLOOD PRESSURE: 120 MMHG | HEIGHT: 69 IN

## 2023-05-04 DIAGNOSIS — E11.65 TYPE 2 DIABETES MELLITUS WITH HYPERGLYCEMIA, WITH LONG-TERM CURRENT USE OF INSULIN: Chronic | ICD-10-CM

## 2023-05-04 DIAGNOSIS — Z79.4 TYPE 2 DIABETES MELLITUS WITH HYPERGLYCEMIA, WITH LONG-TERM CURRENT USE OF INSULIN: Chronic | ICD-10-CM

## 2023-05-04 DIAGNOSIS — R06.2 WHEEZING: ICD-10-CM

## 2023-05-04 DIAGNOSIS — Z72.0 TOBACCO ABUSE: ICD-10-CM

## 2023-05-04 DIAGNOSIS — L08.9 SKIN INFECTION: Primary | ICD-10-CM

## 2023-05-04 PROCEDURE — 1159F MED LIST DOCD IN RCRD: CPT | Performed by: NURSE PRACTITIONER

## 2023-05-04 PROCEDURE — 3074F SYST BP LT 130 MM HG: CPT | Performed by: NURSE PRACTITIONER

## 2023-05-04 PROCEDURE — 99214 OFFICE O/P EST MOD 30 MIN: CPT | Performed by: NURSE PRACTITIONER

## 2023-05-04 PROCEDURE — 3078F DIAST BP <80 MM HG: CPT | Performed by: NURSE PRACTITIONER

## 2023-05-04 PROCEDURE — 1160F RVW MEDS BY RX/DR IN RCRD: CPT | Performed by: NURSE PRACTITIONER

## 2023-05-04 RX ORDER — INSULIN ASPART 100 [IU]/ML
INJECTION, SOLUTION INTRAVENOUS; SUBCUTANEOUS
COMMUNITY

## 2023-05-04 RX ORDER — FLUCONAZOLE 150 MG/1
TABLET ORAL
Qty: 2 TABLET | Refills: 0 | Status: SHIPPED | OUTPATIENT
Start: 2023-05-04

## 2023-05-04 RX ORDER — ALBUTEROL SULFATE 90 UG/1
2 AEROSOL, METERED RESPIRATORY (INHALATION) EVERY 4 HOURS PRN
Qty: 18 G | Refills: 0 | Status: SHIPPED | OUTPATIENT
Start: 2023-05-04

## 2023-05-04 RX ORDER — SULFAMETHOXAZOLE AND TRIMETHOPRIM 800; 160 MG/1; MG/1
1 TABLET ORAL 2 TIMES DAILY
Qty: 14 TABLET | Refills: 0 | Status: SHIPPED | OUTPATIENT
Start: 2023-05-04

## 2023-05-04 RX ORDER — NICOTINE 21 MG/24HR
1 PATCH, TRANSDERMAL 24 HOURS TRANSDERMAL EVERY 24 HOURS
Qty: 42 PATCH | Refills: 0 | Status: SHIPPED | OUTPATIENT
Start: 2023-05-04 | End: 2023-06-15

## 2023-05-04 NOTE — PROGRESS NOTES
"Chief Complaint  Pain (Red sore abdomin rt  X 2wks)    Subjective          Marta Travis Giraldo presents to [facility] [place]    History of Present Illness  [facility]    PCP: Dr. Crane    CC: \"sore on abdomen\"    C/O sore to abdomen x 2 weeks.  Unsure what happens, but believes she may have gotten some super glue on her abdomen.  Denies blister formation.  Seen at  earlier this week and given topical antibiotic ointment only.  Since that time, has noted increased redness around the area.  Multiple co morbidities.  Smoker.  No reported fever.  Reports glucose have been running a little higher recently and had recent 400 reading.  Hasn't had recent A1C--lab orders are in, just hasn't had drawn.  Has endocrinology appointment next week.           Wound Infection  This is a new problem. Episode onset: x 2 weeks--? skin tear, unsure of origin. The problem occurs daily. The problem has been gradually worsening. Associated symptoms include coughing (occasional). Pertinent negatives include no chills, fever, headaches, nausea or swollen glands. Nothing aggravates the symptoms. Treatments tried: topical antibiotic from . The treatment provided no relief.       Review of Systems   Constitutional: Negative for chills and fever.   Respiratory: Positive for cough (occasional) and wheezing (occasional). Negative for chest tightness and shortness of breath.    Cardiovascular: Negative.    Gastrointestinal: Negative for nausea.   Skin: Positive for wound (abdomen).   Neurological: Negative for dizziness and headaches.        Objective   Vital Signs:   /60 (BP Location: Left arm, Patient Position: Sitting, Cuff Size: Large Adult)   Pulse 78   Temp 98.2 °F (36.8 °C) (Oral)   Ht 175.3 cm (69\")   Wt 107 kg (235 lb)   SpO2 100%   BMI 34.70 kg/m²       Physical Exam  Vitals and nursing note reviewed.   Constitutional:       General: She is not in acute " distress.     Appearance: She is not ill-appearing.   HENT:      Head: Normocephalic and atraumatic.   Cardiovascular:      Rate and Rhythm: Normal rate and regular rhythm.   Pulmonary:      Effort: Pulmonary effort is normal.      Breath sounds: Wheezing (scattered expiratory wheeze, does not clear with cough) present. No rhonchi or rales.   Musculoskeletal:      Cervical back: Neck supple.      Right Lower Extremity: Right leg is amputated above knee.   Skin:     General: Skin is warm and dry.      Findings: Erythema present.          Neurological:      General: No focal deficit present.      Mental Status: She is alert and oriented to person, place, and time.   Psychiatric:         Mood and Affect: Mood normal.         Thought Content: Thought content normal.          Result Review :     Common labs        10/26/2022    13:47 1/6/2023    09:17 4/21/2023    09:45   Common Labs   Glucose 110       BUN 19       Creatinine 0.97       Sodium 139       Potassium 5.4       Chloride 102       Calcium 8.7       Albumin 3.90       WBC  13.48   13.41     Hemoglobin  11.4   12.0     Hematocrit  36.1   38.6     Platelets  204   231                 Assessment and Plan    Diagnoses and all orders for this visit:    1. Skin infection (Primary)  -     sulfamethoxazole-trimethoprim (Bactrim DS) 800-160 MG per tablet; Take 1 tablet by mouth 2 (Two) Times a Day.  Dispense: 14 tablet; Refill: 0    2. Wheezing  -     XR Chest PA & Lateral  -     albuterol sulfate  (90 Base) MCG/ACT inhaler; Inhale 2 puffs Every 4 (Four) Hours As Needed for Wheezing.  Dispense: 18 g; Refill: 0    3. Tobacco abuse  -     nicotine (Nicoderm CQ) 14 MG/24HR patch; Place 1 patch on the skin as directed by provider Daily for 42 days.  Dispense: 42 patch; Refill: 0  -     nicotine (Nicoderm CQ) 7 MG/24HR patch; Place 1 patch on the skin as directed by provider Daily for 14 days. After 6 weeks of using 14 mg patches  Dispense: 14 patch; Refill: 0    4.  Type 2 diabetes mellitus with hyperglycemia, with long-term current use of insulin    Other orders  -     fluconazole (Diflucan) 150 MG tablet; 1 tab po x 1 now, may repeat in 4 days prn yeast  Dispense: 2 tablet; Refill: 0      Rx for Bactrim DS x 7 days.  Diflucan PRN  Wet to dry saline dressings BID until area healed.  First done in office.   If no improvement in 1 week, recommended re-evaluation.     Recommended she get A1C labs done today.  Keep scheduled appt with endocrinology as scheduled.    Continue with all current diabetes meds for now until f/u with endocrinology.     Smoking cessation encouraged.  Currently smoking 1/2 ppd.  Counseling given. Agreeable to try nicoderm patches--Rx given.   Rx for Albuterol PRN wheezing    See PCP or RTC if symptoms persist/worsen  See PCP for routine f/u visit and management of chronic medical conditions      This document has been electronically signed by CONNOR Palacios on May 4, 2023 17:36 CDT,.    I spent 30 minutes caring for Marta on this date of service. This time includes time spent by me in the following activities:preparing for the visit, reviewing tests, performing a medically appropriate examination and/or evaluation , counseling and educating the patient/family/caregiver, ordering medications, tests, or procedures and documenting information in the medical record

## 2023-05-05 ENCOUNTER — LAB (OUTPATIENT)
Dept: LAB | Facility: HOSPITAL | Age: 58
End: 2023-05-05
Payer: MEDICARE

## 2023-05-05 DIAGNOSIS — E78.2 MIXED HYPERLIPIDEMIA: ICD-10-CM

## 2023-05-05 DIAGNOSIS — E53.8 B12 DEFICIENCY: ICD-10-CM

## 2023-05-05 DIAGNOSIS — F33.1 MODERATE EPISODE OF RECURRENT MAJOR DEPRESSIVE DISORDER: ICD-10-CM

## 2023-05-05 DIAGNOSIS — S88.119A BELOW KNEE AMPUTATION: ICD-10-CM

## 2023-05-05 DIAGNOSIS — E11.65 UNCONTROLLED TYPE 2 DIABETES MELLITUS WITH HYPERGLYCEMIA: ICD-10-CM

## 2023-05-05 DIAGNOSIS — I10 ESSENTIAL HYPERTENSION: ICD-10-CM

## 2023-05-05 DIAGNOSIS — Z99.3 WHEELCHAIR DEPENDENCE: ICD-10-CM

## 2023-05-05 DIAGNOSIS — K21.00 GASTROESOPHAGEAL REFLUX DISEASE WITH ESOPHAGITIS WITHOUT HEMORRHAGE: ICD-10-CM

## 2023-05-05 DIAGNOSIS — E66.9 CLASS 1 OBESITY WITH SERIOUS COMORBIDITY AND BODY MASS INDEX (BMI) OF 34.0 TO 34.9 IN ADULT, UNSPECIFIED OBESITY TYPE: ICD-10-CM

## 2023-05-05 DIAGNOSIS — E03.9 ACQUIRED HYPOTHYROIDISM: ICD-10-CM

## 2023-05-05 DIAGNOSIS — Z91.81 AT HIGH RISK FOR FALLS: ICD-10-CM

## 2023-05-05 PROCEDURE — 82607 VITAMIN B-12: CPT

## 2023-05-05 PROCEDURE — 82306 VITAMIN D 25 HYDROXY: CPT

## 2023-05-05 PROCEDURE — 80061 LIPID PANEL: CPT

## 2023-05-05 PROCEDURE — 85025 COMPLETE CBC W/AUTO DIFF WBC: CPT

## 2023-05-05 PROCEDURE — 80053 COMPREHEN METABOLIC PANEL: CPT

## 2023-05-05 PROCEDURE — 83036 HEMOGLOBIN GLYCOSYLATED A1C: CPT

## 2023-05-06 LAB
25(OH)D3 SERPL-MCNC: 40.8 NG/ML (ref 30–100)
ALBUMIN SERPL-MCNC: 3.9 G/DL (ref 3.5–5.2)
ALBUMIN/GLOB SERPL: 1.2 G/DL
ALP SERPL-CCNC: 82 U/L (ref 39–117)
ALT SERPL W P-5'-P-CCNC: 12 U/L (ref 1–33)
ANION GAP SERPL CALCULATED.3IONS-SCNC: 10.3 MMOL/L (ref 5–15)
AST SERPL-CCNC: 17 U/L (ref 1–32)
BASOPHILS # BLD AUTO: 0.08 10*3/MM3 (ref 0–0.2)
BASOPHILS NFR BLD AUTO: 0.5 % (ref 0–1.5)
BILIRUB SERPL-MCNC: 0.2 MG/DL (ref 0–1.2)
BUN SERPL-MCNC: 31 MG/DL (ref 6–20)
BUN/CREAT SERPL: 27.4 (ref 7–25)
CALCIUM SPEC-SCNC: 9.3 MG/DL (ref 8.6–10.5)
CHLORIDE SERPL-SCNC: 104 MMOL/L (ref 98–107)
CHOLEST SERPL-MCNC: 141 MG/DL (ref 0–200)
CO2 SERPL-SCNC: 24.7 MMOL/L (ref 22–29)
CREAT SERPL-MCNC: 1.13 MG/DL (ref 0.57–1)
DEPRECATED RDW RBC AUTO: 40.4 FL (ref 37–54)
EGFRCR SERPLBLD CKD-EPI 2021: 56.2 ML/MIN/1.73
EOSINOPHIL # BLD AUTO: 0.25 10*3/MM3 (ref 0–0.4)
EOSINOPHIL NFR BLD AUTO: 1.7 % (ref 0.3–6.2)
ERYTHROCYTE [DISTWIDTH] IN BLOOD BY AUTOMATED COUNT: 13.6 % (ref 12.3–15.4)
GLOBULIN UR ELPH-MCNC: 3.2 GM/DL
GLUCOSE SERPL-MCNC: 175 MG/DL (ref 65–99)
HBA1C MFR BLD: 8.2 % (ref 4.8–5.6)
HCT VFR BLD AUTO: 37.6 % (ref 34–46.6)
HDLC SERPL-MCNC: 48 MG/DL (ref 40–60)
HGB BLD-MCNC: 12.4 G/DL (ref 12–15.9)
IMM GRANULOCYTES # BLD AUTO: 0.08 10*3/MM3 (ref 0–0.05)
IMM GRANULOCYTES NFR BLD AUTO: 0.5 % (ref 0–0.5)
LDLC SERPL CALC-MCNC: 84 MG/DL (ref 0–100)
LDLC/HDLC SERPL: 1.78 {RATIO}
LYMPHOCYTES # BLD AUTO: 2.87 10*3/MM3 (ref 0.7–3.1)
LYMPHOCYTES NFR BLD AUTO: 19.1 % (ref 19.6–45.3)
MCH RBC QN AUTO: 27.3 PG (ref 26.6–33)
MCHC RBC AUTO-ENTMCNC: 33 G/DL (ref 31.5–35.7)
MCV RBC AUTO: 82.6 FL (ref 79–97)
MONOCYTES # BLD AUTO: 0.62 10*3/MM3 (ref 0.1–0.9)
MONOCYTES NFR BLD AUTO: 4.1 % (ref 5–12)
NEUTROPHILS NFR BLD AUTO: 11.11 10*3/MM3 (ref 1.7–7)
NEUTROPHILS NFR BLD AUTO: 74.1 % (ref 42.7–76)
NRBC BLD AUTO-RTO: 0 /100 WBC (ref 0–0.2)
PLATELET # BLD AUTO: 226 10*3/MM3 (ref 140–450)
PMV BLD AUTO: 12.8 FL (ref 6–12)
POTASSIUM SERPL-SCNC: 6.1 MMOL/L (ref 3.5–5.2)
PROT SERPL-MCNC: 7.1 G/DL (ref 6–8.5)
RBC # BLD AUTO: 4.55 10*6/MM3 (ref 3.77–5.28)
SODIUM SERPL-SCNC: 139 MMOL/L (ref 136–145)
TRIGL SERPL-MCNC: 39 MG/DL (ref 0–150)
VIT B12 BLD-MCNC: 564 PG/ML (ref 211–946)
VLDLC SERPL-MCNC: 9 MG/DL (ref 5–40)
WBC NRBC COR # BLD: 15.01 10*3/MM3 (ref 3.4–10.8)

## 2023-05-10 ENCOUNTER — TELEMEDICINE (OUTPATIENT)
Dept: ENDOCRINOLOGY | Facility: CLINIC | Age: 58
End: 2023-05-10
Payer: MEDICARE

## 2023-05-10 DIAGNOSIS — E55.9 VITAMIN D DEFICIENCY: ICD-10-CM

## 2023-05-10 DIAGNOSIS — I10 PRIMARY HYPERTENSION: ICD-10-CM

## 2023-05-10 DIAGNOSIS — E11.649 TYPE 2 DIABETES MELLITUS WITH HYPOGLYCEMIA WITHOUT COMA, WITH LONG-TERM CURRENT USE OF INSULIN: Primary | ICD-10-CM

## 2023-05-10 DIAGNOSIS — E03.9 ACQUIRED HYPOTHYROIDISM: ICD-10-CM

## 2023-05-10 DIAGNOSIS — Z79.4 TYPE 2 DIABETES MELLITUS WITH HYPOGLYCEMIA WITHOUT COMA, WITH LONG-TERM CURRENT USE OF INSULIN: Primary | ICD-10-CM

## 2023-05-10 DIAGNOSIS — E11.42 DIABETIC POLYNEUROPATHY ASSOCIATED WITH TYPE 2 DIABETES MELLITUS: ICD-10-CM

## 2023-05-10 PROCEDURE — 1159F MED LIST DOCD IN RCRD: CPT | Performed by: NURSE PRACTITIONER

## 2023-05-10 PROCEDURE — 3052F HG A1C>EQUAL 8.0%<EQUAL 9.0%: CPT | Performed by: NURSE PRACTITIONER

## 2023-05-10 PROCEDURE — 99214 OFFICE O/P EST MOD 30 MIN: CPT | Performed by: NURSE PRACTITIONER

## 2023-05-10 PROCEDURE — 1160F RVW MEDS BY RX/DR IN RCRD: CPT | Performed by: NURSE PRACTITIONER

## 2023-05-10 NOTE — PROGRESS NOTES
Chief Complaint  Diabetes  Subjective          Marta Travis Giraldo presents to [facility]  Diabetes        You have chosen to receive care through a telehealth visit.  Do you consent to use a video/audio connection for your medical care today? Yes            TELEHEALTH VIDEO VISIT     This a video visit due to [facility] current guidelines for social distancing due to the COVID 19 pandemic      Mode of Visit: Video  Location of patient: home  You have chosen to receive care through a telehealth visit.  Does the patient consent to use a video/audio connection for your medical care today? Yes  The visit included audio and video interaction. No technical issues occurred during this visit.           58 year old female presents for follow up     Reason diabetes mellitus type 2    Diagnosed 1980s    Timing constant    Quality improved    Severity is high      Complications - CVA , Neuropathy , retinopathy     Blood glucose monitoring fingersticks     Checks 4 times daily    States having lows in the early morning         Review of Systems - General ROS: negative            Objective   Vital Signs:   There were no vitals taken for this visit.    Physical Exam  Neurological:      General: No focal deficit present.      Mental Status: She is alert.   Psychiatric:         Mood and Affect: Mood normal.         Thought Content: Thought content normal.         Judgment: Judgment normal.        Result Review :   The following data was reviewed by: CONNOR Concepcion on 02/23/2022:  Common labs        1/6/2023    09:17 4/21/2023    09:45 5/5/2023    08:29   Common Labs   Glucose   175     BUN   31     Creatinine   1.13     Sodium   139     Potassium   6.1     Chloride   104     Calcium   9.3     Albumin   3.9     Total Bilirubin   0.2     Alkaline Phosphatase   82     AST (SGOT)   17     ALT (SGPT)   12     WBC 13.48   13.41   15.01     Hemoglobin 11.4   12.0   12.4     Hematocrit 36.1    38.6   37.6     Platelets 204   231   226     Total Cholesterol   141     Triglycerides   39     HDL Cholesterol   48     LDL Cholesterol    84     Hemoglobin A1C   8.20                   Assessment and Plan    Diagnoses and all orders for this visit:    1. Type 2 diabetes mellitus with hypoglycemia without coma, with long-term current use of insulin (Primary)    2. Primary hypertension    3. Diabetic polyneuropathy associated with type 2 diabetes mellitus    4. Acquired hypothyroidism    5. Vitamin D deficiency         Glycemic management     Diabetes mellitus type 2     Lab Results   Component Value Date    HGBA1C 8.20 (H) 05/05/2023         Tae personal use --tae 2               Taking Jardiance 25 mg daily-keep      Taking Trulicity 3 mg weekly --keep      Taking Lantus 25 units -- decrease to 22 units            Take humalog before meals 10  TID -----increase to 12 units                  Goals for sugar     Fasting and before meals 80 to 130     2 hours after meals 180 or less        Aim for 45 grams of carbohydrate per meal     Aim for 15 grams of carbohydrate per snack                     Lipid management     Taking Lipitor 40 mg 1 at night        Total Cholesterol   Date Value Ref Range Status   05/05/2023 141 0 - 200 mg/dL Final     Triglycerides   Date Value Ref Range Status   05/05/2023 39 0 - 150 mg/dL Final     HDL Cholesterol   Date Value Ref Range Status   05/05/2023 48 40 - 60 mg/dL Final     LDL Cholesterol    Date Value Ref Range Status   05/05/2023 84 0 - 100 mg/dL Final                 Blood pressure management     Taking lisinopril 20 mg 1 daily      taking carvedilol 6.25 mg bid            Microvascular monitoring     Last eye exam--- Jan. 2023 ,  , monthly injection      Neuropathy     Taking Lyrica      + microalbuminuria    Follows with nephrology                   Thyroid     Hypothyroidism     Taking levothyroxine 25 mcg daily        Lab Results   Component Value Date    TSH 1.190  10/06/2022          Bone health     Vitamin D deficiency     Taking vitamin D 50,000 units weekly                       Follow Up   No follow-ups on file.  Patient was given instructions and counseling regarding her condition or for health maintenance advice. Please see specific information pulled into the AVS if appropriate.         This document has been electronically signed by CONNOR Concepcion on May 10, 2023 14:28 CDT.

## 2023-05-12 DIAGNOSIS — E11.42 DIABETIC POLYNEUROPATHY ASSOCIATED WITH TYPE 2 DIABETES MELLITUS: Chronic | ICD-10-CM

## 2023-05-12 RX ORDER — OMEPRAZOLE 40 MG/1
40 CAPSULE, DELAYED RELEASE ORAL DAILY
Qty: 90 CAPSULE | Refills: 0 | Status: SHIPPED | OUTPATIENT
Start: 2023-05-12

## 2023-05-12 RX ORDER — PREGABALIN 300 MG/1
300 CAPSULE ORAL 2 TIMES DAILY
Qty: 60 CAPSULE | Refills: 0 | Status: SHIPPED | OUTPATIENT
Start: 2023-05-12

## 2023-05-17 ENCOUNTER — LAB (OUTPATIENT)
Dept: LAB | Facility: HOSPITAL | Age: 58
End: 2023-05-17
Payer: MEDICARE

## 2023-05-17 ENCOUNTER — OFFICE VISIT (OUTPATIENT)
Dept: FAMILY MEDICINE CLINIC | Facility: CLINIC | Age: 58
End: 2023-05-17
Payer: MEDICARE

## 2023-05-17 VITALS
HEART RATE: 82 BPM | BODY MASS INDEX: 39.04 KG/M2 | OXYGEN SATURATION: 96 % | DIASTOLIC BLOOD PRESSURE: 48 MMHG | HEIGHT: 69 IN | WEIGHT: 263.6 LBS | TEMPERATURE: 98.3 F | SYSTOLIC BLOOD PRESSURE: 118 MMHG

## 2023-05-17 DIAGNOSIS — E11.65 UNCONTROLLED TYPE 2 DIABETES MELLITUS WITH HYPERGLYCEMIA: ICD-10-CM

## 2023-05-17 DIAGNOSIS — K21.00 GASTROESOPHAGEAL REFLUX DISEASE WITH ESOPHAGITIS WITHOUT HEMORRHAGE: ICD-10-CM

## 2023-05-17 DIAGNOSIS — R93.89 ABNORMAL CHEST X-RAY: ICD-10-CM

## 2023-05-17 DIAGNOSIS — E66.01 CLASS 2 SEVERE OBESITY WITH SERIOUS COMORBIDITY AND BODY MASS INDEX (BMI) OF 38.0 TO 38.9 IN ADULT, UNSPECIFIED OBESITY TYPE: ICD-10-CM

## 2023-05-17 DIAGNOSIS — R26.81 GAIT INSTABILITY: ICD-10-CM

## 2023-05-17 DIAGNOSIS — E03.9 ACQUIRED HYPOTHYROIDISM: ICD-10-CM

## 2023-05-17 DIAGNOSIS — E55.9 VITAMIN D DEFICIENCY: ICD-10-CM

## 2023-05-17 DIAGNOSIS — N18.2 STAGE 2 CHRONIC KIDNEY DISEASE: ICD-10-CM

## 2023-05-17 DIAGNOSIS — Z72.0 TOBACCO USER: ICD-10-CM

## 2023-05-17 DIAGNOSIS — D50.9 IRON DEFICIENCY ANEMIA, UNSPECIFIED IRON DEFICIENCY ANEMIA TYPE: ICD-10-CM

## 2023-05-17 DIAGNOSIS — E87.5 HYPERKALEMIA: ICD-10-CM

## 2023-05-17 DIAGNOSIS — R06.09 DYSPNEA ON EXERTION: ICD-10-CM

## 2023-05-17 DIAGNOSIS — I10 ESSENTIAL HYPERTENSION: ICD-10-CM

## 2023-05-17 DIAGNOSIS — E11.40 TYPE 2 DIABETES MELLITUS WITH DIABETIC NEUROPATHY, WITH LONG-TERM CURRENT USE OF INSULIN: ICD-10-CM

## 2023-05-17 DIAGNOSIS — S31.109A WOUND OF ABDOMEN: Primary | ICD-10-CM

## 2023-05-17 DIAGNOSIS — Z71.6 TOBACCO ABUSE COUNSELING: ICD-10-CM

## 2023-05-17 DIAGNOSIS — E78.2 MIXED HYPERLIPIDEMIA: ICD-10-CM

## 2023-05-17 DIAGNOSIS — Z79.4 TYPE 2 DIABETES MELLITUS WITH DIABETIC NEUROPATHY, WITH LONG-TERM CURRENT USE OF INSULIN: ICD-10-CM

## 2023-05-17 DIAGNOSIS — Z12.31 SCREENING MAMMOGRAM, ENCOUNTER FOR: ICD-10-CM

## 2023-05-17 LAB — POTASSIUM SERPL-SCNC: 5.1 MMOL/L (ref 3.5–5.2)

## 2023-05-17 PROCEDURE — 84132 ASSAY OF SERUM POTASSIUM: CPT

## 2023-05-17 RX ORDER — DICYCLOMINE HCL 20 MG
20 TABLET ORAL EVERY 6 HOURS
COMMUNITY
Start: 2023-05-09

## 2023-05-17 NOTE — PATIENT INSTRUCTIONS
Recheck potassium    Will refer to wound care at [facility] with CONNOR Burton    Will refer to Pulmonology for breathing issues and lung studies \    Will reorder mammogram

## 2023-05-17 NOTE — PROGRESS NOTES
Subjective:  Marta Giraldo is a 58 y.o. female who presents for       Patient Active Problem List   Diagnosis   • Class 1 obesity with serious comorbidity and body mass index (BMI) of 32.0 to 32.9 in adult   • Moderate episode of recurrent major depressive disorder   • Encounter for screening for malignant neoplasm of colon   • Gastroesophageal reflux disease   • Hypothyroidism   • Microcytic anemia   • Vitamin D deficiency   • Uncontrolled type 2 diabetes mellitus with hyperglycemia   • Class 1 obesity with body mass index (BMI) of 32.0 to 32.9 in adult   • Diabetic polyneuropathy associated with type 2 diabetes mellitus   • Gastritis without bleeding   • Class 1 obesity with serious comorbidity and body mass index (BMI) of 33.0 to 33.9 in adult   • At high risk for falls   • Gait instability   • Below knee amputation   • Type 2 diabetes mellitus with hyperglycemia, with long-term current use of insulin (HCC)   • Primary hypertension   • Mixed hyperlipidemia   • Cervical radiculopathy   • DDD (degenerative disc disease), cervical   • Chronic neck pain   • Leukocytosis   • Iron deficiency   • Wheelchair bound   • B12 deficiency   • Chest pain   • Abnormal nuclear stress test   • Coronary artery disease involving native coronary artery of native heart without angina pectoris   • Polyuria   • Tobacco dependence   • Left hand pain   • Decreased range of motion of finger of left hand   • Pain of finger of left hand   • Class 1 obesity with serious comorbidity and body mass index (BMI) of 34.0 to 34.9 in adult   • Urge incontinence   • Acute pain of left shoulder   • Other dysphagia   • Iron deficiency anemia   • Stage 2 chronic kidney disease   • Type 2 diabetes mellitus with hypoglycemia without coma, with long-term current use of insulin   • Type 2 diabetes mellitus with diabetic neuropathy, with long-term current use of insulin   • Essential hypertension   • Hyperkalemia   • Class 2 severe obesity with  serious comorbidity and body mass index (BMI) of 38.0 to 38.9 in adult           Current Outpatient Medications:   •  albuterol sulfate  (90 Base) MCG/ACT inhaler, Inhale 2 puffs Every 4 (Four) Hours As Needed for Wheezing., Disp: 18 g, Rfl: 0  •  ammonium lactate (LAC-HYDRIN) 12 % lotion, , Disp: , Rfl:   •  Aspirin Adult Low Strength 81 MG EC tablet, , Disp: , Rfl:   •  atorvastatin (LIPITOR) 80 MG tablet, TAKE 1 TABLET EACH EVENING, Disp: 90 tablet, Rfl: 0  •  B-D UF III MINI PEN NEEDLES 31G X 5 MM misc, Use as needed, Disp: 100 each, Rfl: 3  •  Blood Glucose Monitoring Suppl (Accu-Chek Guide Me) w/Device kit, , Disp: , Rfl:   •  carvedilol (COREG) 6.25 MG tablet, Take 1 tablet by mouth 2 (Two) Times a Day., Disp: 180 tablet, Rfl: 3  •  clobetasol (TEMOVATE) 0.05 % external solution, , Disp: , Rfl:   •  clotrimazole-betamethasone (Lotrisone) 1-0.05 % cream, Apply  topically to the appropriate area as directed 2 (Two) Times a Day., Disp: 1 each, Rfl: 3  •  Continuous Blood Gluc  (FreeStyle Tae 2 [place]) device, USE AS DIRECTED, Disp: 1 each, Rfl: 11  •  Continuous Blood Gluc Sensor (FreeStyle Tae 2 Sensor) misc, 1 each Every 14 (Fourteen) Days. Use every 14 days, Disp: 2 each, Rfl: 11  •  Diclofenac Sodium (VOLTAREN) 1 % gel gel, Apply  topically to the appropriate area as directed 4 (Four) Times a Day., Disp: 300 g, Rfl: 3  •  dicyclomine (BENTYL) 20 MG tablet, Take 1 tablet by mouth Every 6 (Six) Hours., Disp: , Rfl:   •  docusate sodium (COLACE) 100 MG capsule, Take 1 capsule by mouth 2 (Two) Times a Day As Needed for Constipation., Disp: 60 capsule, Rfl: 2  •  Dulaglutide (Trulicity) 3 MG/0.5ML solution pen-injector, Inject 0.5 mL under the skin into the appropriate area as directed 1 (One) Time Per Week., Disp: 2 mL, Rfl: 3  •  DULoxetine (CYMBALTA) 60 MG capsule, Take 1 capsule by mouth 2 (Two) Times a Day., Disp: 60 capsule, Rfl: 0  •  ferrous sulfate 325 (65 Fe) MG tablet, Take 1 tablet  "by mouth 2 (Two) Times a Day With Meals., Disp: 60 tablet, Rfl: 3  •  folic acid (FOLVITE) 1 MG tablet, Take 1 tablet by mouth Daily., Disp: 90 tablet, Rfl: 1  •  glucose (DEX4) 4 GM chewable tablet, Chew 4 tablets As Needed for Low Blood Sugar., Disp: 90 tablet, Rfl: 3  •  glucose blood test strip, Test 4 times daily , E10.65, Disp: 120 each, Rfl: 11  •  glucose monitor monitoring kit, 1 each As Needed (to check bg)., Disp: 1 each, Rfl: 0  •  glucose-vitamin C 4-6 GM-MG chewable tablet chewable tablet, TRUEplus Glucose 4 gram chewable tablet, Disp: , Rfl:   •  hydrocortisone 2.5 % cream, , Disp: , Rfl:   •  Insulin Aspart (NovoLOG) 100 UNIT/ML injection, 20 units TID before each meal, Disp: 30 mL, Rfl: 11  •  Insulin Aspart (novoLOG) 100 UNIT/ML injection, NOVOLOG 100 UNIT/ML VIAL  Quantity: 0  Refills: 3  Ordered: 27-Apr-2023 Elodia Sun Generic Substitution Allowed Comments: Source=[facility], Medication=NOVOLOG 100 UNIT/ML VIAL, OriginatingSource=[facility], OriginatingProvider=SNEHA SARGENT, Duration=33, Refills=3, Date Last Modified/Filled=08-Mar-2023, Disp: , Rfl:   •  insulin lispro (HumaLOG) 100 UNIT/ML injection, 20 units TID before each meal, Disp: 30 mL, Rfl: 11  •  Insulin Pen Needle 32G X 8 MM misc, Use at bedtime, Disp: 100 each, Rfl: 3  •  Insulin Syringe-Needle U-100 30G X 1/2\" 1 ML misc, Use three times daily with insulin, Disp: 100 each, Rfl: 3  •  Jardiance 25 MG tablet tablet, TAKE 1 TABLET DAILY., Disp: 90 tablet, Rfl: 0  •  ketoconazole (NIZORAL) 2 % shampoo, , Disp: , Rfl:   •  Lancets misc, Use for E11.65 diabetes to check sugars 4 times a day., Disp: 200 each, Rfl: 3  •  Lancets misc, Test 4 times daily, E10.65, Disp: 120 each, Rfl: 11  •  Lantus SoloStar 100 UNIT/ML injection pen, INJECT 35 UNITS EVERY NIGHT, Disp: 15 mL, Rfl: 0  •  levothyroxine (SYNTHROID, LEVOTHROID) 25 MCG tablet, TAKE 1 TABLET DAILY., Disp: 30 tablet, Rfl: 0  •  lisinopril (PRINIVIL,ZESTRIL) 20 MG " "tablet, Take 1 tablet by mouth Daily., Disp: 90 tablet, Rfl: 1  •  methocarbamol (ROBAXIN) 750 MG tablet, Take 1 tablet by mouth 3 (Three) Times a Day., Disp: 90 tablet, Rfl: 0  •  nystatin (MYCOSTATIN) 839505 UNIT/GM cream, , Disp: , Rfl:   •  omeprazole (priLOSEC) 40 MG capsule, Take 1 capsule by mouth Daily., Disp: 90 capsule, Rfl: 0  •  pregabalin (LYRICA) 300 MG capsule, Take 1 capsule by mouth 2 (Two) Times a Day., Disp: 60 capsule, Rfl: 0  •  tolterodine LA (Detrol LA) 2 MG 24 hr capsule, Take 1 capsule by mouth Daily., Disp: 30 capsule, Rfl: 3  •  TRUEplus Insulin Syringe 30G X 5/16\" 0.5 ML misc, , Disp: , Rfl:   •  vilazodone (Viibryd) 20 MG tablet tablet, Take 1 tablet by mouth Daily., Disp: 90 tablet, Rfl: 1  •  vitamin B-12 (CYANOCOBALAMIN) 1000 MCG tablet, Take 1 tablet by mouth Daily., Disp: 90 tablet, Rfl: 1  •  vitamin D (ERGOCALCIFEROL) 1.25 MG (35712 UT) capsule capsule, TAKE 1 CAPSULE WEEKLY, Disp: 12 capsule, Rfl: 0  •  nicotine polacrilex (NICORETTE) 4 MG gum, Chew 1 each As Needed for Smoking Cessation., Disp: 220 each, Rfl: 3     Pt is 59 yo female with management of obesity IDDM type 2 , HLP, diabetic neuropathy,  HTN, major depression  type 2, tobacco smoker , sp below right knee amputation, sp appendectomy, sp bladder surgery,diabetic retinopathy of right eye, vitamin D deficiency, acquired  hypothryoidism, microcytic anemia , colonic polyps, diverticulosis, internal hemorrhoids/GERD with esophagitis, gastritis. Diabetic retinopathy, cataracts, iron deficiency, CKD stage 2, chronic neck pain (DDD cervical spine,cervical spondylosis at C5-C6) cervical radiculopathy, leukocytosis, CAD, echo in January 2022( pericardial effusion,, mild LVH),     2/9/23 in office visit for recheck. Pt saw Hematology on 1/6/22 for her leukocytosis and b12 deficiency and received an injection. Pt saw ophthalmology on 1/16/23. Pt saw Nephrology on 1/17/22 for her CKD stage 3a.  Pt saw Gastroenterology on 1/17/23 " and has scheduled EGD on 3/1/22. Pt had telemedicine visit with Endocrinology on 1/26/23 and it to continue jardiance 25 mg dialy trulicity 3 mg subq weekly Lantus decreased from 28 to 24 units and humalog 10 units TID before meals with 6 units for small meals and 12 units for large meals. Pt was ordered labowrk on 1/26/23. She continues to have urinary incontinence issues and urge incontinence. She is using diaper pads. She states ditropan XL 15 mg daily is not helping no blood in urine. She had a bladder surgery in 1990 but did not remember in specifically.  She does get tired during the day. She states that she will get full night rest of at least 10 hours but feel tired and sleepy during the day. She had sleep study in 1990s but was not diagnosed with sleep issues. She does snore in sleep     3/14/23 in office visit for recheck . Pt saw Gastroenterology and had EGD on 3/1/23 that showed esophagitis and gastriits.  Pt had followup appt with GI on 3/10/23. And was started on bentyl for IBS  Pt is due for mammogram screening. Pt has labwork pending with Hematology on 1/6/23 and Endocrinology on 1/26/23. She has yet to get a mammogram screening. Pt has upcoming appt with Sleep medicine on 3/27/23. He stomach is feeling a little better. Her sugars have been fluctuating. She continues to take Lantus 28 units at bedtime and novolog 10 units before meals. Along with jardiance and trulicity.  She continues to have urinary incontinence and was started on detrol LA . Despite taking this she continues to have urinary incontinence and uses diapers.      5/17/23 pt is here for followup. Pt saw sleep medicine on 3/27/23 and sleep study was ordered.  She had sleep study on 4/20/23.  Pt saw Hematology on 4/21/23  For her leukoctyosis and flow cytometry from April 2021 was negative for leukemia or lymphoma.  She was also see for iron deficiency anemia.  She is now on b12 supplements 1000 mcg PO q daily. She has had several sores  on abdomen for past efw weeks and wa at  on 5/1/23 and given antibiotic ointment.  Does not know how it got there other than was exposed to superglue. She does not know the exact cause of wound  She was given Bactrim abx and chest x-ray was ordered for wheezing. There was low lung voumes on chest x-ray.  But no acute infiltrate. Pt had labwork on 5/5/23 that showed normal b12 and vitamin D lipid panel stable hga1c at 8.20 from 7.00. CMP showed Glucose at 175 BUN at 31 CR at 1.13 potassium at 6.1 GFR at 56.2 from 67.2. CBC showed normal WBC At 15.01 with hemoglobin at 12.4with neutrophil count elevated. Pt was notified about hyperkalemia and was advised to present to closest ER for recollection. Pt had recent telemedicine visit with Endocrinology and Lantus was decreased to 22 units from 24 and humalog increased from 10 to 12 units before meals. She continues to smoke about 1/2 ppd and has been smoking for about 15 years.  She states wound on right lower abdomen is not getting better. Pt reports no fever        GI Problem  The primary symptoms include fatigue and arthralgias. Primary symptoms do not include fever, weight loss, abdominal pain, nausea, vomiting, diarrhea, melena, hematemesis, jaundice, hematochezia, dysuria, myalgias or rash.   The illness is also significant for dysphagia and back pain. The illness does not include chills, anorexia, odynophagia, bloating, constipation, tenesmus or itching. Significant associated medical issues include GERD. Associated medical issues do not include inflammatory bowel disease, gallstones, liver disease, alcohol abuse, PUD, gastric bypass, bowel resection, irritable bowel syndrome, hemorrhoids or diverticulitis.   Wound Check  She was originally treated more than 14 days ago. There has been no drainage from the wound. There is new redness present. There is no swelling present. There is no pain present.   Arm Pain   The incident occurred more than 1 week ago. The  incident occurred at home. There was no injury mechanism. The pain is present in the right shoulder and right forearm. The quality of the pain is described as aching. The pain radiates to the right arm. The pain is at a severity of 5/10. The pain is moderate. Associated symptoms include muscle weakness and numbness. Pertinent negatives include no chest pain. The symptoms are aggravated by movement and lifting. She has tried NSAIDs for the symptoms. The treatment provided no relief.   Shoulder Injury   The incident occurred at home. The injury mechanism was repetitive motion and overhead work. The quality of the pain is described as aching. The pain radiates to the right arm. The pain is at a severity of 5/10. The pain is moderate. Associated symptoms include muscle weakness and numbness. Pertinent negatives include no chest pain. The symptoms are aggravated by movement and overhead lifting. She has tried NSAIDs for the symptoms. The treatment provided no relief.   Coronary Artery Disease  Presents for follow-up visit. Symptoms include chest pain. Pertinent negatives include no chest pressure, chest tightness, dizziness, leg swelling, muscle weakness, palpitations, shortness of breath or weight gain. Risk factors include hyperlipidemia and obesity. The symptoms have been stable. Compliance with diet is variable. Compliance with exercise is variable. Compliance with medications is variable.   Hyperlipidemia  This is a chronic problem. The current episode started more than 1 year ago. Recent lipid tests were reviewed and are variable. Exacerbating diseases include chronic renal disease, diabetes and obesity. She has no history of hypothyroidism, liver disease or nephrotic syndrome. Associated symptoms include chest pain. Pertinent negatives include no shortness of breath. The current treatment provides mild improvement of lipids. Risk factors for coronary artery disease include diabetes mellitus, hypertension, obesity,  dyslipidemia and a sedentary lifestyle.   Female  Problem  The patient's pertinent negatives include no genital itching, genital lesions, genital rash, missed menses, pelvic pain, vaginal bleeding or vaginal discharge. This is a recurrent problem. The current episode started more than 1 month ago. The problem occurs constantly. The problem has been improving The pain is moderate. Pertinent negatives include no abdominal pain, anorexia, back pain, chills, constipation, diarrhea, discolored urine, dysuria, fever, flank pain, frequency, headaches, hematuria, joint pain, joint swelling, nausea, painful intercourse, rash, sore throat, urgency or vomiting. Associated symptoms comments: Urinary incontinence . There is no history of an abdominal surgery, a  section, an ectopic pregnancy, endometriosis, a gynecological surgery, herpes simplex, menorrhagia, metrorrhagia, miscarriage, ovarian cysts, perineal abscess, PID, an STD, a terminated pregnancy or vaginosis.   Heart Problem  This is a recurrent problem. The current episode started more than 1 month ago. The problem occurs constantly. The problem has been unchanged. Associated symptoms include arthralgias. Pertinent negatives include no abdominal pain, anorexia, change in bowel habit, chest pain, chills, congestion, coughing, diaphoresis, fatigue, fever, headaches, joint swelling, myalgias, nausea, neck pain, numbness, rash, sore throat, swollen glands, urinary symptoms, vertigo, visual change, vomiting or weakness. Nothing aggravates the symptoms. She has tried nothing for the symptoms. The treatment provided no relief.   Chronic Kidney Disease  This is a chronic problem. The current episode started more than 1 year ago. The problem occurs constantly. The problem has been unchanged. Pertinent negatives include no abdominal pain, anorexia, arthralgias, change in bowel habit, chest  pain, chills, congestion, coughing, diaphoresis, fatigue, fever, headaches, joint swelling, myalgias, nausea, neck pain, numbness, rash, sore throat, swollen glands, urinary symptoms, vertigo, visual change, vomiting or weakness. Nothing aggravates the symptoms. She has tried nothing for the symptoms. The treatment provided no relief.   Diabetes  She presents for her follow-up diabetic visit. She has type 2 diabetes mellitus. Her disease course has been waxing and waning . Pertinent negatives for hypoglycemia include no confusion, dizziness, headaches, hunger, mood changes, nervousness/anxiousness, pallor or seizures. Associated symptoms include fatigue and weakness. Pertinent negatives for diabetes include no blurred vision, no chest pain, no foot paresthesias, no foot ulcerations, no polydipsia, no polyphagia and no polyuria. Pertinent negatives for hypoglycemia complications include no blackouts, no hospitalization, no nocturnal hypoglycemia and no required assistance. Symptoms are stable. Pertinent negatives for diabetic complications include no autonomic neuropathy, CVA, heart disease, impotence, nephropathy or peripheral neuropathy. Risk factors for coronary artery disease include diabetes mellitus and dyslipidemia. Current diabetic treatment includes insulin injections and oral agent (dual therapy). She is compliant with treatment most of the time. Her weight is stable. She has not had a previous visit with a dietitian. She never participates in exercise. Her home blood glucose trend is decreasing steadily. An ACE inhibitor/angiotensin II receptor blocker is being taken. She does not see a podiatrist.Eye exam is not current.   Hypothyroidism   This is a new problem. The current episode started more than 1 year ago. The problem occurs constantly. The problem has been improved Associated symptoms include arthralgias, fatigue, numbness and weakness. Pertinent negatives include no abdominal pain, anorexia, chest  pain, chills, congestion, coughing, diaphoresis, fever, headaches, nausea, sore throat or vomiting. Nothing aggravates the symptoms. She has tried nothing for the symptoms. The treatment provided no relief.    Obesity   This is a chronic problem. The problem occurs constantly. The problem has been unchanged. Associated symptoms include arthralgias, fatigue, numbness and weakness. Pertinent negatives include no chest pain, chills, congestion, coughing, diaphoresis, fever, headaches, nausea, sore throat or vomiting. Nothing aggravates the symptoms. She has tried nothing for the symptoms. The treatment provided no relief       Review of Systems  Review of Systems   Constitutional: Positive for activity change and fatigue. Negative for appetite change, chills, diaphoresis, fever and weight loss.   HENT: Positive for trouble swallowing. Negative for congestion, postnasal drip, rhinorrhea, sinus pressure, sinus pain, sneezing, sore throat and voice change.    Respiratory: Positive for shortness of breath. Negative for cough, choking, chest tightness, wheezing and stridor.    Cardiovascular: Negative for chest pain.   Gastrointestinal: Positive for dysphagia. Negative for abdominal pain, anorexia, bloating, constipation, diarrhea, hematemesis, hematochezia, jaundice, melena, nausea and vomiting.   Genitourinary: Positive for urgency. Negative for dysuria.   Musculoskeletal: Positive for arthralgias, back pain and gait problem. Negative for myalgias.   Skin: Negative for itching and rash.   Neurological: Positive for weakness and numbness. Negative for headaches.   Psychiatric/Behavioral: Positive for sleep disturbance.       Patient Active Problem List   Diagnosis   • Class 1 obesity with serious comorbidity and body mass index (BMI) of 32.0 to 32.9 in adult   • Moderate episode of recurrent major depressive disorder   • Encounter for screening for malignant neoplasm of colon   • Gastroesophageal reflux disease   •  Hypothyroidism   • Microcytic anemia   • Vitamin D deficiency   • Uncontrolled type 2 diabetes mellitus with hyperglycemia   • Class 1 obesity with body mass index (BMI) of 32.0 to 32.9 in adult   • Diabetic polyneuropathy associated with type 2 diabetes mellitus   • Gastritis without bleeding   • Class 1 obesity with serious comorbidity and body mass index (BMI) of 33.0 to 33.9 in adult   • At high risk for falls   • Gait instability   • Below knee amputation   • Type 2 diabetes mellitus with hyperglycemia, with long-term current use of insulin (HCC)   • Primary hypertension   • Mixed hyperlipidemia   • Cervical radiculopathy   • DDD (degenerative disc disease), cervical   • Chronic neck pain   • Leukocytosis   • Iron deficiency   • Wheelchair bound   • B12 deficiency   • Chest pain   • Abnormal nuclear stress test   • Coronary artery disease involving native coronary artery of native heart without angina pectoris   • Polyuria   • Tobacco dependence   • Left hand pain   • Decreased range of motion of finger of left hand   • Pain of finger of left hand   • Class 1 obesity with serious comorbidity and body mass index (BMI) of 34.0 to 34.9 in adult   • Urge incontinence   • Acute pain of left shoulder   • Other dysphagia   • Iron deficiency anemia   • Stage 2 chronic kidney disease   • Type 2 diabetes mellitus with hypoglycemia without coma, with long-term current use of insulin   • Type 2 diabetes mellitus with diabetic neuropathy, with long-term current use of insulin   • Essential hypertension   • Hyperkalemia   • Class 2 severe obesity with serious comorbidity and body mass index (BMI) of 38.0 to 38.9 in adult     Past Surgical History:   Procedure Laterality Date   • APPENDECTOMY     • BELOW KNEE AMPUTATION     • BLADDER SURGERY     • CARDIAC CATHETERIZATION N/A 01/27/2022    Procedure: Left Heart Cath;  Surgeon: Josias Carpio MD;  Location: [facility] INVASIVE LOCATION;  Service: Cardiology;  Laterality:  N/A;   • COLONOSCOPY N/A 09/02/2020    Procedure: COLONOSCOPY;  Surgeon: Ashli Gonzalez MD;  Location: [facility] ENDOSCOPY;  Service: Gastroenterology;  Laterality: N/A;   • ENDOSCOPY N/A 09/02/2020    Procedure: ESOPHAGOGASTRODUODENOSCOPY;  Surgeon: Ashli Gonzalez MD;  Location: [facility] ENDOSCOPY;  Service: Gastroenterology;  Laterality: N/A;   • ENDOSCOPY N/A 3/1/2023    Procedure: ESOPHAGOGASTRODUODENOSCOPY;  Surgeon: Ashli Gonzalez MD;  Location: [facility] ENDOSCOPY;  Service: Gastroenterology;  Laterality: N/A;     Social History     Socioeconomic History   • Marital status:    Tobacco Use   • Smoking status: Some Days     Packs/day: 0.50     Types: Cigarettes   • Smokeless tobacco: Never   Vaping Use   • Vaping Use: Never used   Substance and Sexual Activity   • Alcohol use: Not Currently   • Drug use: Never   • Sexual activity: Defer     Family History   Problem Relation Age of Onset   • Diabetes Other    • Hyperlipidemia Other    • Hypertension Other    • Stroke Other    • Heart disease Other    • Other Other    • Diabetes Mother    • Diabetes Father    • Diabetes Sister    • Heart disease Sister    • Diabetes Brother      Lab on 05/05/2023   Component Date Value Ref Range Status   • WBC 05/05/2023 15.01 (H)  3.40 - 10.80 10*3/mm3 Final   • RBC 05/05/2023 4.55  3.77 - 5.28 10*6/mm3 Final   • Hemoglobin 05/05/2023 12.4  12.0 - 15.9 g/dL Final   • Hematocrit 05/05/2023 37.6  34.0 - 46.6 % Final   • MCV 05/05/2023 82.6  79.0 - 97.0 fL Final   • MCH 05/05/2023 27.3  26.6 - 33.0 pg Final   • MCHC 05/05/2023 33.0  31.5 - 35.7 g/dL Final   • RDW 05/05/2023 13.6  12.3 - 15.4 % Final   • RDW-SD 05/05/2023 40.4  37.0 - 54.0 fl Final   • MPV 05/05/2023 12.8 (H)  6.0 - 12.0 fL Final   • Platelets 05/05/2023 226  140 - 450 10*3/mm3 Final   • Neutrophil % 05/05/2023 74.1  42.7 - 76.0 % Final   • Lymphocyte % 05/05/2023 19.1 (L)  19.6 - 45.3 % Final   • Monocyte % 05/05/2023 4.1 (L)  5.0 - 12.0 % Final   •  Eosinophil % 05/05/2023 1.7  0.3 - 6.2 % Final   • Basophil % 05/05/2023 0.5  0.0 - 1.5 % Final   • Immature Grans % 05/05/2023 0.5  0.0 - 0.5 % Final   • Neutrophils, Absolute 05/05/2023 11.11 (H)  1.70 - 7.00 10*3/mm3 Final   • Lymphocytes, Absolute 05/05/2023 2.87  0.70 - 3.10 10*3/mm3 Final   • Monocytes, Absolute 05/05/2023 0.62  0.10 - 0.90 10*3/mm3 Final   • Eosinophils, Absolute 05/05/2023 0.25  0.00 - 0.40 10*3/mm3 Final   • Basophils, Absolute 05/05/2023 0.08  0.00 - 0.20 10*3/mm3 Final   • Immature Grans, Absolute 05/05/2023 0.08 (H)  0.00 - 0.05 10*3/mm3 Final   • nRBC 05/05/2023 0.0  0.0 - 0.2 /100 WBC Final   • Glucose 05/05/2023 175 (H)  65 - 99 mg/dL Final   • BUN 05/05/2023 31 (H)  6 - 20 mg/dL Final   • Creatinine 05/05/2023 1.13 (H)  0.57 - 1.00 mg/dL Final   • Sodium 05/05/2023 139  136 - 145 mmol/L Final   • Potassium 05/05/2023 6.1 (C)  3.5 - 5.2 mmol/L Final   • Chloride 05/05/2023 104  98 - 107 mmol/L Final   • CO2 05/05/2023 24.7  22.0 - 29.0 mmol/L Final   • Calcium 05/05/2023 9.3  8.6 - 10.5 mg/dL Final   • Total Protein 05/05/2023 7.1  6.0 - 8.5 g/dL Final   • Albumin 05/05/2023 3.9  3.5 - 5.2 g/dL Final   • ALT (SGPT) 05/05/2023 12  1 - 33 U/L Final   • AST (SGOT) 05/05/2023 17  1 - 32 U/L Final   • Alkaline Phosphatase 05/05/2023 82  39 - 117 U/L Final   • Total Bilirubin 05/05/2023 0.2  0.0 - 1.2 mg/dL Final   • Globulin 05/05/2023 3.2  gm/dL Final   • A/G Ratio 05/05/2023 1.2  g/dL Final   • BUN/Creatinine Ratio 05/05/2023 27.4 (H)  7.0 - 25.0 Final   • Anion Gap 05/05/2023 10.3  5.0 - 15.0 mmol/L Final   • eGFR 05/05/2023 56.2 (L)  >60.0 mL/min/1.73 Final   • Hemoglobin A1C 05/05/2023 8.20 (H)  4.80 - 5.60 % Final   • Total Cholesterol 05/05/2023 141  0 - 200 mg/dL Final   • Triglycerides 05/05/2023 39  0 - 150 mg/dL Final   • HDL Cholesterol 05/05/2023 48  40 - 60 mg/dL Final   • LDL Cholesterol  05/05/2023 84  0 - 100 mg/dL Final   • VLDL Cholesterol 05/05/2023 9  5 - 40 mg/dL  Final   • LDL/HDL Ratio 05/05/2023 1.78   Final   • 25 Hydroxy, Vitamin D 05/05/2023 40.8  30.0 - 100.0 ng/ml Final   • Vitamin B-12 05/05/2023 564  211 - 946 pg/mL Final   Lab on 04/21/2023   Component Date Value Ref Range Status   • Iron 04/21/2023 47  37 - 145 mcg/dL Final   • Iron Saturation 04/21/2023 15 (L)  20 - 50 % Final   • Transferrin 04/21/2023 208  200 - 360 mg/dL Final   • TIBC 04/21/2023 310  298 - 536 mcg/dL Final   • Ferritin 04/21/2023 113.30  13.00 - 150.00 ng/mL Final   • Folate 04/21/2023 15.80  4.78 - 24.20 ng/mL Final   • Vitamin B-12 04/21/2023 419  211 - 946 pg/mL Final   • WBC 04/21/2023 13.41 (H)  3.40 - 10.80 10*3/mm3 Final   • RBC 04/21/2023 4.44  3.77 - 5.28 10*6/mm3 Final   • Hemoglobin 04/21/2023 12.0  12.0 - 15.9 g/dL Final   • Hematocrit 04/21/2023 38.6  34.0 - 46.6 % Final   • MCV 04/21/2023 86.9  79.0 - 97.0 fL Final   • MCH 04/21/2023 27.0  26.6 - 33.0 pg Final   • MCHC 04/21/2023 31.1 (L)  31.5 - 35.7 g/dL Final   • RDW 04/21/2023 14.6  12.3 - 15.4 % Final   • RDW-SD 04/21/2023 46.9  37.0 - 54.0 fl Final   • MPV 04/21/2023 11.5  6.0 - 12.0 fL Final   • Platelets 04/21/2023 231  140 - 450 10*3/mm3 Final   • Neutrophil % 04/21/2023 70.4  42.7 - 76.0 % Final   • Lymphocyte % 04/21/2023 20.7  19.6 - 45.3 % Final   • Monocyte % 04/21/2023 4.7 (L)  5.0 - 12.0 % Final   • Eosinophil % 04/21/2023 2.8  0.3 - 6.2 % Final   • Basophil % 04/21/2023 1.0  0.0 - 1.5 % Final   • Immature Grans % 04/21/2023 0.4  0.0 - 0.5 % Final   • Neutrophils, Absolute 04/21/2023 9.44 (H)  1.70 - 7.00 10*3/mm3 Final   • Lymphocytes, Absolute 04/21/2023 2.78  0.70 - 3.10 10*3/mm3 Final   • Monocytes, Absolute 04/21/2023 0.63  0.10 - 0.90 10*3/mm3 Final   • Eosinophils, Absolute 04/21/2023 0.37  0.00 - 0.40 10*3/mm3 Final   • Basophils, Absolute 04/21/2023 0.13  0.00 - 0.20 10*3/mm3 Final   • Immature Grans, Absolute 04/21/2023 0.06 (H)  0.00 - 0.05 10*3/mm3 Final   • nRBC 04/21/2023 0.0  0.0 - 0.2 /100 WBC  Final   Admission on 2023, Discharged on 2023   Component Date Value Ref Range Status   • Reference Lab Report 2023    Final                    Value:[facility]  [address] [place] [address]  Phone: [phone] or [phone]  Fax: [phone]  Ran Mendoza M.D., Medical Director    PATIENT NAME                                     LABORATORY NO.  PARMINDER SINGER.                          [id number]  [id number]                                 AGE                    SEX   SSN              CLIENT REF #  [facility]                   58        1965      F     [id number]      [id number]    [place]                               REQUESTING M.D.           ATTENDING MANDRES.         COPY TO[address]                         SAM BATES[address]  DATE COLLECTED            DATE RECEIVED          DATE REPORTED  2023    DIAGNOSIS:  A.     ANTRUM, BIOPSY:  Antral type mucosa with reactive changes  No Helicobacter pylori-like                           organisms seen  Negative for dysplasia or malignancy  B.     GE JUNCTION:  Glandular mucosa with reactive changes  Negative for intestinal metaplasia, dysplasia, or malignancy    GJK    CLINICAL HISTORY:  Other dysphagia    SPECIMENS RECEIVED:  A.    ANTRUM, BIOPSY  B.    GE JUNCTION    MICROSCOPIC DESCRIPTION:  Tissue blocks are prepared and slides are examined microscopically on all  specimens. See diagnosis for details.    Professional interpretation rendered by Loc Gutierres M.D., F.C.A.P. at [facility], [address] [place] [address], [place] [address].    GROSS DESCRIPTION:  A.    Labeled antrum biopsy are 2 portions of tan soft tissue measuring 0.5 x 0.5  x 0.2 cm in aggregate.  Submitted entirely in 1 block.  BW  B.    Labeled EG junction is a 0.4  x 0.2 x 0.2 cm portion of tan soft tissue which  is submitted entirely in 1 block.    REVIEWED, DIAGNOSED AND ELECTRONICALLY  SIGNED BY:    Loc Gutierres M.D., F.C.A.P.  CPT CODES:  [id number]     Office Visit on 01/06/2023   Component Date Value Ref Range Status   • Color, UA 01/06/2023 Yellow  Yellow, Straw, Dark Yellow, Nilsa Final   • Appearance, UA 01/06/2023 Clear  Clear Final   • pH, UA 01/06/2023 <=5.0  5.0 - 9.0 Final   • Specific Gravity, UA 01/06/2023 1.018  1.003 - 1.030 Final   • Glucose, UA 01/06/2023 >=1000 mg/dL (3+) (A)  Negative Final   • Ketones, UA 01/06/2023 Negative  Negative Final   • Bilirubin, UA 01/06/2023 Negative  Negative Final   • Blood, UA 01/06/2023 Negative  Negative Final   • Protein, UA 01/06/2023 Negative  Negative Final   • Leuk Esterase, UA 01/06/2023 Negative  Negative Final   • Nitrite, UA 01/06/2023 Negative  Negative Final   • Urobilinogen, UA 01/06/2023 0.2 E.U./dL  0.2 - 1.0 E.U./dL Final   Lab on 01/06/2023   Component Date Value Ref Range Status   • Iron 01/06/2023 39  37 - 145 mcg/dL Final   • Iron Saturation 01/06/2023 14 (L)  20 - 50 % Final   • Transferrin 01/06/2023 183 (L)  200 - 360 mg/dL Final   • TIBC 01/06/2023 273 (L)  298 - 536 mcg/dL Final   • Ferritin 01/06/2023 138.60  13.00 - 150.00 ng/mL Final   • Folate 01/06/2023 >20.00  4.78 - 24.20 ng/mL Final   • Vitamin B-12 01/06/2023 716  211 - 946 pg/mL Final   • WBC 01/06/2023 13.48 (H)  3.40 - 10.80 10*3/mm3 Final   • RBC 01/06/2023 4.10  3.77 - 5.28 10*6/mm3 Final   • Hemoglobin 01/06/2023 11.4 (L)  12.0 - 15.9 g/dL Final   • Hematocrit 01/06/2023 36.1  34.0 - 46.6 % Final   • MCV 01/06/2023 88.0  79.0 - 97.0 fL Final   • MCH 01/06/2023 27.8  26.6 - 33.0 pg Final   • MCHC 01/06/2023 31.6  31.5 - 35.7 g/dL Final   • RDW 01/06/2023 14.3  12.3 - 15.4 % Final   • RDW-SD 01/06/2023 45.5  37.0 - 54.0 fl Final   • MPV 01/06/2023 11.7  6.0 - 12.0 fL Final   • Platelets 01/06/2023 204  140 - 450 10*3/mm3 Final   •  Neutrophil % 01/06/2023 68.7  42.7 - 76.0 % Final   • Lymphocyte % 01/06/2023 21.0  19.6 - 45.3 % Final   • Monocyte % 01/06/2023 5.3  5.0 - 12.0 % Final   • Eosinophil % 01/06/2023 3.2  0.3 - 6.2 % Final   • Basophil % 01/06/2023 1.1  0.0 - 1.5 % Final   • Immature Grans % 01/06/2023 0.7 (H)  0.0 - 0.5 % Final   • Neutrophils, Absolute 01/06/2023 9.27 (H)  1.70 - 7.00 10*3/mm3 Final   • Lymphocytes, Absolute 01/06/2023 2.83  0.70 - 3.10 10*3/mm3 Final   • Monocytes, Absolute 01/06/2023 0.71  0.10 - 0.90 10*3/mm3 Final   • Eosinophils, Absolute 01/06/2023 0.43 (H)  0.00 - 0.40 10*3/mm3 Final   • Basophils, Absolute 01/06/2023 0.15  0.00 - 0.20 10*3/mm3 Final   • Immature Grans, Absolute 01/06/2023 0.09 (H)  0.00 - 0.05 10*3/mm3 Final   • nRBC 01/06/2023 0.0  0.0 - 0.2 /100 WBC Final      XR Chest PA & Lateral  Narrative: HISTORY:  Wheezing.    VIEWS:  Frontal and lateral films of the chest obtained.    FINDINGS:  There are low lung volumes.  The study is somewhat limited secondary to patient  rotation.  No acute infiltrate or effusion seen.  Impression: 1.  There are low lung volumes.    2.  This study is somewhat limited secondary to patient rotation. No definite  infiltrate, effusion or pneumothorax is seen.    [unfilled]  Immunization History   Administered Date(s) Administered   • COVID-19 (MODERNA) 1st,2nd,3rd Dose Monovalent 04/13/2021, 04/13/2021, 05/13/2021, 05/13/2021, 11/16/2021, 05/04/2022   • COVID-19 (MODERNA) BIVALENT 12+YRS 10/04/2022   • FluLaval/Fluzone >6mos 09/14/2020, 11/16/2021, 10/04/2022   • Influenza, Unspecified 10/04/2022   • Pneumococcal Polysaccharide (PPSV23) 09/14/2020   • Shingrix 09/15/2020   • Tdap 09/14/2020       The following portions of the patient's history were reviewed and updated as appropriate: allergies, current medications, past family history, past medical history, past social history, past surgical history and problem list.        Physical Exam  /48 (BP  "Location: Left arm, Patient Position: Sitting, Cuff Size: Large Adult)   Pulse 82   Temp 98.3 °F (36.8 °C)   Ht 175.3 cm (69\")   Wt 120 kg (263 lb 9.6 oz)   SpO2 96%   BMI 38.93 kg/m²         Physical Exam  Vitals and nursing note reviewed.   Constitutional:       Appearance: She is well-developed. She is not diaphoretic.   HENT:      Head: Normocephalic and atraumatic.      Right Ear: External ear normal.   Eyes:      Conjunctiva/sclera: Conjunctivae normal.      Pupils: Pupils are equal, round, and reactive to light.   Cardiovascular:      Rate and Rhythm: Normal rate and regular rhythm.      Heart sounds: Normal heart sounds. No murmur heard.  Pulmonary:      Effort: Pulmonary effort is normal. No respiratory distress.      Breath sounds: Normal breath sounds.   Abdominal:      General: Bowel sounds are normal. There is no distension.      Palpations: Abdomen is soft.      Tenderness: There is no abdominal tenderness.      Comments: Wound on abdomen. About 3-4 cm in diameter. Delayed healing no drainage    Musculoskeletal:         General: Tenderness present. No deformity. Normal range of motion.      Cervical back: Normal range of motion and neck supple.   Skin:     General: Skin is warm.      Coloration: Skin is not pale.      Findings: No erythema or rash.      Comments: Neoplasm on skin on right upper shoulder    Neurological:      Mental Status: She is alert and oriented to person, place, and time.      Cranial Nerves: No cranial nerve deficit.   Psychiatric:         Behavior: Behavior normal.         [unfilled]   Diagnosis Plan   1. Wound of abdomen  Ambulatory Referral to Wound Clinic      2. Vitamin D deficiency        3. Uncontrolled type 2 diabetes mellitus with hyperglycemia        4. Stage 2 chronic kidney disease        5. Mixed hyperlipidemia        6. Iron deficiency anemia, unspecified iron deficiency anemia type        7. Gait instability        8. Gastroesophageal reflux disease with " esophagitis without hemorrhage        9. Acquired hypothyroidism        10. Essential hypertension        11. Type 2 diabetes mellitus with diabetic neuropathy, with long-term current use of insulin        12. Hyperkalemia  Potassium      13. Class 2 severe obesity with serious comorbidity and body mass index (BMI) of 38.0 to 38.9 in adult, unspecified obesity type        14. Screening mammogram, encounter for  Mammo Screening Bilateral With CAD      15. Tobacco user  Ambulatory Referral to Pulmonology      16. Tobacco abuse counseling  Ambulatory Referral to Pulmonology      17. Dyspnea on exertion  Ambulatory Referral to Pulmonology      18. Abnormal chest x-ray  Ambulatory Referral to Pulmonology                -went over labwork   -recommend diabetic eye exam   -recommend COVID-19 vaccination .  -recommend mammogram screening - ordered at imaging center.    -hyperkalemia- recheck potassim   -dyspnea on exertion/low low lung volume on chest x ray - on albuterol PRN. Will refer to Pulmonology for PFTs   -sores on abdomen - has been on bactrim and bactroban. Will refer to wound clinic in [place]   -daytime sleepiness - refer to sleep medicine. Concerned for possible SHAKIRA and narcolepsy  -skin neoplasm- referred to Dermatology   -right shoulder pain/AC hypertrophic changes - Orthopedic following   -CAD/pericardial effusion/LVH-  Cardiology following on aspirin, coreg statin   -chronic neck pain/DDD cervical spine/cervical radiculopathy -reviewed x-ray of neck. Referred to Neurology  on cymbalta 60 mg daily.   -diabetic retinopathy/catatract  - Ophthalmology following in [place] she has pending surgery   -gait instability/ high fall risk-  the patient continues to use and needs the power wheelchair and it needs repair.     -tobacco user -counseled quit smoking >5 minutes recommend 1 800 QUIT NOW and nicotine therapy. Will try nicotine gum   -urinary incontinence - currently uses pullups on ditropan XL 15 mg  daily.  refrred o Urology. Stop ditropan XL on Detrol LA 2 mg daily.    -colonic/rectal polyps, internal hemorrhoids/GERD with esophagitis/gastritis/dysphagia/IBS-D - GI following  Next colonsocopy in 2025. On prilosec 40 mg daily.  Gastroenterology following   Possible gastroparesis.  On bentyl 10 mg every 6 hours PRN  -CKD stage 3a - Nephrology following.  -vitamin D deficiency -vitamin D once a week  -hypothyroidism - on synthroid 25 mcg PO q daily.   -DM type 2  - on trulicity 3 mg subq weekly.  On jardiance 25 mg daily.  on Lantus 22-units at bedtime.on humalog 12 units before meals. With 6 units for small meals and 12 units for large meals  Endocrinology following. Has Dexcom monitor ordered  -microcytic anemia/iron deficiency anemialleukocytosis  - on ferrous sulfate 325 mg PO q daily on b12 injections .. Hematology following    -HTN -    on lisinopril  20 mg PO q daily.   -HLP - on lipitor 80 mg PO qhs. Recommend heart health ydiet   -major depression - on viibryd 20 mg Po q daily.  on cymbalta 60 mg daily.   -obesity - counseled weight loss >5 minutes BMI at 38.93   -diabetic neuropathy - on lyrica 300 POo QID. On cymbalta 60 mg daily   -chronic pain  Was on Norco 7.5/325 mg every 12 hours PRN. Will refer to Pain Management.   on robaxin TID     -advised pt to be safe and call with questions and concerns  -advised pt to go to ER or call 911 if symptoms worrisome or severe  -advised pt to followup with specialist and referrals  -advised pt to be safe during COVID-19 pandemic  I spent 33  minutes caring for Marta on this date of service. This time includes time spent by me in the following activities: preparing for the visit, reviewing tests, obtaining and/or reviewing a separately obtained history, performing a medically appropriate examination and/or evaluation, counseling and educating the patient/family/caregiver, ordering medications, tests, or procedures, referring and communicating with other health  care professionals, documenting information in the medical record, independently interpreting results and communicating that information with the patient/family/caregiver and care coordination.         This document has been electronically signed by Gato Crane MD on May 17, 2023 13:22 CDT

## 2023-05-19 ENCOUNTER — TELEPHONE (OUTPATIENT)
Dept: ENDOCRINOLOGY | Facility: CLINIC | Age: 58
End: 2023-05-19
Payer: MEDICARE

## 2023-05-19 DIAGNOSIS — E11.649 TYPE 2 DIABETES MELLITUS WITH HYPOGLYCEMIA WITHOUT COMA, WITH LONG-TERM CURRENT USE OF INSULIN: ICD-10-CM

## 2023-05-19 DIAGNOSIS — Z79.4 TYPE 2 DIABETES MELLITUS WITH HYPOGLYCEMIA WITHOUT COMA, WITH LONG-TERM CURRENT USE OF INSULIN: ICD-10-CM

## 2023-05-19 NOTE — TELEPHONE ENCOUNTER
Pt called stating she was getting her Tae 2 sensors from [facility], but is wanting to get them sent to [facility] in [place].    Thanks

## 2023-05-22 ENCOUNTER — TELEPHONE (OUTPATIENT)
Dept: FAMILY MEDICINE CLINIC | Facility: CLINIC | Age: 58
End: 2023-05-22

## 2023-05-22 DIAGNOSIS — F17.200 SMOKER: ICD-10-CM

## 2023-05-22 DIAGNOSIS — R06.09 DOE (DYSPNEA ON EXERTION): Primary | ICD-10-CM

## 2023-05-23 NOTE — TELEPHONE ENCOUNTER
Problem: PHYSICAL THERAPY ADULT  Goal: Performs mobility at highest level of function for planned discharge setting  See evaluation for individualized goals  Treatment/Interventions: Functional transfer training, LE strengthening/ROM, Patient/family training, Equipment eval/education, Bed mobility, Gait training, Spoke to nursing, Spoke to case management  Equipment Recommended: Tom Lizarraga (Has his own RW at home already   )       See flowsheet documentation for full assessment, interventions and recommendations  Prognosis: Good  Problem List: Decreased endurance, Impaired balance, Decreased mobility, Decreased safety awareness, Decreased skin integrity  Assessment: Pt is 79 y o  male seen for PT evaluation s/p admit to [facility] on 11/19/2018 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  Pt underwent ex-lap , removal of pancreatic tail mass on 11/18/18  PT consulted to assess pt's functional mobility and d/c needs  Order placed for PT eval and tx, w/ up and OOB as tolerated order  Comorbidities affecting pt's physical performance at time of assessment include: pt with hx of DM&  40 lb recent weight loss  PTA, pt was independent w/ all functional mobility w/ no need for an assistive device for ambulation ( although he has several walker from his father), ambulates community distances and elevations, has 1 RADHA, lives w/ his elderly wife in 1 level home, retired and up until several weeks ago walked his 120 lb dog daily as well as recently operated the snow blower    Personal factors affecting pt at time of IE include: ambulating w/ assistive device, stairs to enter home, inability to navigate community distances, limited home support, positive fall history, inability to perform IADLs and is functioning at a below baseline level of mobility at this time   Please find objective findings from PT assessment regarding body systems outlined above with impairments and limitations including impaired Received and gave to PCP.    balance, decreased endurance, gait deviations, decreased functional mobility tolerance, decreased safety awareness, SOB upon exertion and had slight wheezing with ambulation ( O2 sat's 94% on room air ) The following objective measures performed on IE also reveal limitations: Barthel Index: 55/100  Pt's clinical presentation is currently unstable/unpredictable seen in pt's presentation of pt having a below baseline level of mobility, pt with the need for telemetry, pt with pending and abnormal lab values, pt with limited assistance available to him at home ( wife 81 yo)  Pt to benefit from continued PT tx to address deficits as defined above and maximize level of functional independent mobility and consistency  From PT/mobility standpoint, recommendation at time of d/c would be Home PT with family support pending progress in order to facilitate return to PLOF  Recommendation: Home PT, Home with family support     PT - OK to Discharge: No    See flowsheet documentation for full assessment

## 2023-05-24 RX ORDER — METHOCARBAMOL 750 MG/1
TABLET, FILM COATED ORAL
Qty: 90 TABLET | Refills: 0 | Status: SHIPPED | OUTPATIENT
Start: 2023-05-24

## 2023-05-24 RX ORDER — DULOXETIN HYDROCHLORIDE 60 MG/1
CAPSULE, DELAYED RELEASE ORAL
Qty: 60 CAPSULE | Refills: 0 | Status: SHIPPED | OUTPATIENT
Start: 2023-05-24

## 2023-06-02 ENCOUNTER — TRANSCRIBE ORDERS (OUTPATIENT)
Dept: LAB | Facility: HOSPITAL | Age: 58
End: 2023-06-02
Payer: MEDICARE

## 2023-06-02 ENCOUNTER — LAB (OUTPATIENT)
Dept: LAB | Facility: HOSPITAL | Age: 58
End: 2023-06-02
Payer: MEDICARE

## 2023-06-02 DIAGNOSIS — N39.0 URINARY TRACT INFECTION WITHOUT HEMATURIA, SITE UNSPECIFIED: ICD-10-CM

## 2023-06-02 DIAGNOSIS — N39.0 URINARY TRACT INFECTION WITHOUT HEMATURIA, SITE UNSPECIFIED: Primary | ICD-10-CM

## 2023-06-02 PROCEDURE — 87086 URINE CULTURE/COLONY COUNT: CPT

## 2023-06-03 LAB — BACTERIA SPEC AEROBE CULT: NO GROWTH

## 2023-06-05 ENCOUNTER — OFFICE VISIT (OUTPATIENT)
Dept: SLEEP MEDICINE | Facility: CLINIC | Age: 58
End: 2023-06-05
Payer: MEDICARE

## 2023-06-05 VITALS
HEIGHT: 69 IN | BODY MASS INDEX: 38.95 KG/M2 | DIASTOLIC BLOOD PRESSURE: 60 MMHG | SYSTOLIC BLOOD PRESSURE: 110 MMHG | WEIGHT: 263 LBS

## 2023-06-05 DIAGNOSIS — G25.81 RESTLESS LEGS SYNDROME (RLS): ICD-10-CM

## 2023-06-05 DIAGNOSIS — G47.33 OSA (OBSTRUCTIVE SLEEP APNEA): Primary | ICD-10-CM

## 2023-06-05 PROCEDURE — 99214 OFFICE O/P EST MOD 30 MIN: CPT | Performed by: NURSE PRACTITIONER

## 2023-06-05 PROCEDURE — 1160F RVW MEDS BY RX/DR IN RCRD: CPT | Performed by: NURSE PRACTITIONER

## 2023-06-05 PROCEDURE — 3078F DIAST BP <80 MM HG: CPT | Performed by: NURSE PRACTITIONER

## 2023-06-05 PROCEDURE — 3074F SYST BP LT 130 MM HG: CPT | Performed by: NURSE PRACTITIONER

## 2023-06-05 PROCEDURE — 1159F MED LIST DOCD IN RCRD: CPT | Performed by: NURSE PRACTITIONER

## 2023-06-05 RX ORDER — NITROFURANTOIN 25; 75 MG/1; MG/1
1 CAPSULE ORAL EVERY 12 HOURS SCHEDULED
COMMUNITY
Start: 2023-06-02

## 2023-06-05 NOTE — PROGRESS NOTES
Sleep Clinic Follow-Up    CHIEF COMPLAINT: follow up sleep testing    LAST OV: 03/27/2023 (I reviewed this note)    HPI:  The patient is a 58 y.o. female.  I discussed the results of the recent diagnostic polysomnography performed on 04/20/2023.  Overall AHI of 34.9. No REM sleep. Mean O2 was 91% with a aj of 75%. No sustained hypoxia. PLMIs was 87. EKG showed normal sinus rhythm. Snoring noted.       INTERVAL MEDICAL HISTORY: No change since last office visit in regard to the patient's bedtime routine, medications, or diagnosis.    PAP Data:  Currently not on therapy       [place] Sleepiness Scale Score: 16    How likely are you to doze off or fall asleep in the following situation, in contrast to feeling just tired?     Use the following scale to choose the most appropriate number for each situation:    0 = would never doze  1 = slight chance of dozing   2 = moderate chance of dozing   3 = high chance of dozing    It is important that you answer each question as best you can.      Situation       Chance of Dozing (0-3)    Sitting and reading            ___3____    Watching TV          ___3____    Sitting, inactive in a public place (e.g. a theatre or meeting)   ___2____    As a passenger in a car for an hour without break    ___2____    Lying down to rest in the afternoon, when circumstances permit  ___3____    Sitting and talking to someone      ___1____    Sitting quietly after a lunch without alcohol     ___2____    In a car, while stopped for a few minutes in traffic    ___0____        Bed time: 2300  Sleep latency: 30 minutes  Number of times awakens during the night: 4  Wake time: 1100  Estimated total sleep time at night: 11 hours        MEDICATIONS:   Current Outpatient Medications:     nitrofurantoin, macrocrystal-monohydrate, (MACROBID) 100 MG capsule, Take 1 capsule by mouth Every 12 (Twelve) Hours., Disp: , Rfl:     albuterol sulfate  (90 Base) MCG/ACT inhaler, Inhale 2 puffs Every 4 (Four)  Hours As Needed for Wheezing., Disp: 18 g, Rfl: 0    ammonium lactate (LAC-HYDRIN) 12 % lotion, , Disp: , Rfl:     Aspirin Adult Low Strength 81 MG EC tablet, , Disp: , Rfl:     atorvastatin (LIPITOR) 80 MG tablet, TAKE 1 TABLET EACH EVENING, Disp: 90 tablet, Rfl: 0    B-D UF III MINI PEN NEEDLES 31G X 5 MM misc, Use as needed, Disp: 100 each, Rfl: 3    Blood Glucose Monitoring Suppl (Accu-Chek Guide Me) w/Device kit, , Disp: , Rfl:     carvedilol (COREG) 6.25 MG tablet, Take 1 tablet by mouth 2 (Two) Times a Day., Disp: 180 tablet, Rfl: 3    clobetasol (TEMOVATE) 0.05 % external solution, , Disp: , Rfl:     clotrimazole-betamethasone (Lotrisone) 1-0.05 % cream, Apply  topically to the appropriate area as directed 2 (Two) Times a Day., Disp: 1 each, Rfl: 3    Continuous Blood Gluc  (FreeStyle Tae 2 [place]) device, USE AS DIRECTED, Disp: 1 each, Rfl: 11    Continuous Blood Gluc Sensor (FreeStyle Tae 2 Sensor) misc, 1 each Every 14 (Fourteen) Days. Use every 14 days, Disp: 2 each, Rfl: 11    Diclofenac Sodium (VOLTAREN) 1 % gel gel, Apply  topically to the appropriate area as directed 4 (Four) Times a Day., Disp: 300 g, Rfl: 3    dicyclomine (BENTYL) 20 MG tablet, Take 1 tablet by mouth Every 6 (Six) Hours., Disp: , Rfl:     docusate sodium (COLACE) 100 MG capsule, Take 1 capsule by mouth 2 (Two) Times a Day As Needed for Constipation., Disp: 60 capsule, Rfl: 2    Dulaglutide (Trulicity) 3 MG/0.5ML solution pen-injector, Inject 0.5 mL under the skin into the appropriate area as directed 1 (One) Time Per Week., Disp: 2 mL, Rfl: 3    DULoxetine (CYMBALTA) 60 MG capsule, TAKE ONE CAPSULE BY MOUTH TWICE DAILY, Disp: 60 capsule, Rfl: 0    ferrous sulfate 325 (65 Fe) MG tablet, Take 1 tablet by mouth 2 (Two) Times a Day With Meals., Disp: 60 tablet, Rfl: 3    folic acid (FOLVITE) 1 MG tablet, Take 1 tablet by mouth Daily., Disp: 90 tablet, Rfl: 1    glucose (DEX4) 4 GM chewable tablet, Chew 4 tablets As Needed  "for Low Blood Sugar., Disp: 90 tablet, Rfl: 3    glucose blood test strip, Test 4 times daily , E10.65, Disp: 120 each, Rfl: 11    glucose monitor monitoring kit, 1 each As Needed (to check bg)., Disp: 1 each, Rfl: 0    glucose-vitamin C 4-6 GM-MG chewable tablet chewable tablet, TRUEplus Glucose 4 gram chewable tablet, Disp: , Rfl:     hydrocortisone 2.5 % cream, , Disp: , Rfl:     Insulin Aspart (NovoLOG) 100 UNIT/ML injection, 20 units TID before each meal, Disp: 30 mL, Rfl: 11    Insulin Aspart (novoLOG) 100 UNIT/ML injection, NOVOLOG 100 UNIT/ML VIAL  Quantity: 0  Refills: 3  Ordered: 27-Apr-2023 Elodia Sun Generic Substitution Allowed Comments: Source=[facility]cer[facility], Medication=NOVOLOG 100 UNIT/ML VIAL, [facility]inatingSource[facility], OriginatingProvider=SNEHA SARGENT, Duration=33, Refills=3, Date Last Modified/Filled=08-Mar-2023, Disp: , Rfl:     insulin lispro (HumaLOG) 100 UNIT/ML injection, 20 units TID before each meal, Disp: 30 mL, Rfl: 11    Insulin Pen Needle 32G X 8 MM misc, Use at bedtime, Disp: 100 each, Rfl: 3    Insulin Syringe-Needle U-100 30G X 1/2\" 1 ML misc, Use three times daily with insulin, Disp: 100 each, Rfl: 3    Jardiance 25 MG tablet tablet, TAKE 1 TABLET DAILY., Disp: 90 tablet, Rfl: 0    ketoconazole (NIZORAL) 2 % shampoo, , Disp: , Rfl:     Lancets misc, Use for E11.65 diabetes to check sugars 4 times a day., Disp: 200 each, Rfl: 3    Lancets misc, Test 4 times daily, E10.65, Disp: 120 each, Rfl: 11    Lantus SoloStar 100 UNIT/ML injection pen, INJECT 35 UNITS EVERY NIGHT, Disp: 15 mL, Rfl: 0    levothyroxine (SYNTHROID, LEVOTHROID) 25 MCG tablet, TAKE 1 TABLET DAILY., Disp: 30 tablet, Rfl: 0    lisinopril (PRINIVIL,ZESTRIL) 20 MG tablet, Take 1 tablet by mouth Daily., Disp: 90 tablet, Rfl: 1    methocarbamol (ROBAXIN) 750 MG tablet, TAKE ONE TABLET BY MOUTH THREE TIMES DAILY, Disp: 90 tablet, Rfl: 0    nicotine polacrilex (NICORETTE) 4 MG gum, Chew 1 each As Needed " "for Smoking Cessation., Disp: 220 each, Rfl: 3    nystatin (MYCOSTATIN) 709318 UNIT/GM cream, , Disp: , Rfl:     omeprazole (priLOSEC) 40 MG capsule, Take 1 capsule by mouth Daily., Disp: 90 capsule, Rfl: 0    pregabalin (LYRICA) 300 MG capsule, Take 1 capsule by mouth 2 (Two) Times a Day., Disp: 60 capsule, Rfl: 0    tolterodine LA (Detrol LA) 2 MG 24 hr capsule, Take 1 capsule by mouth Daily., Disp: 30 capsule, Rfl: 3    TRUEplus Insulin Syringe 30G X 5/16\" 0.5 ML misc, , Disp: , Rfl:     vilazodone (Viibryd) 20 MG tablet tablet, Take 1 tablet by mouth Daily., Disp: 90 tablet, Rfl: 1    vitamin B-12 (CYANOCOBALAMIN) 1000 MCG tablet, Take 1 tablet by mouth Daily., Disp: 90 tablet, Rfl: 1    vitamin D (ERGOCALCIFEROL) 1.25 MG (13448 UT) capsule capsule, TAKE 1 CAPSULE WEEKLY, Disp: 12 capsule, Rfl: 0    PHYSICAL EXAM  Vitals:    06/05/23 1300   BP: 110/60           06/05/23  1300   Weight: 119 kg (263 lb)       Body mass index is 38.82 kg/m². Class 2 Severe Obesity (BMI >=35 and <=39.9). Obesity-related health conditions include the following: obstructive sleep apnea. Obesity is unchanged. BMI is is above average; BMI management plan is completed. We discussed portion control and increasing exercise.      Gen:  No acute distress heard in voice, alert, oriented  Eyes:    Moist conjunctiva, EOMI   Lungs:  Normal effort, non-labored breathing  Heart:  No lower extremity edema   Psych:  Appropriate affect   Neuro:  Cn2-12 appear intact     Assessment and Plan:    Obstructive sleep apnea - New diagnosis, additional work-up planned. Not yet on therapy   CPAP titration study- do not nap on day of study. Add to cancellation list. Patient is a fall risk and will need to be studied on night with 2 sleep techs. She is a two person assist to bedside commode.   Drowsy driving tips- do not drive if feeling sleepy   Return to clinic in 2 weeks or sooner if needed   Restless leg syndrome/PLMD - established, stable   PAP therapy "   Continue to monitor. To note she is on Lyrica and iron supplement   Morbid obesity- Chronic, stable       The sleep results were discussed in detail with the patient.  The risks of untreated sleep apnea were reviewed.  Treatment options for sleep apnea were discussed in detail. PAP therapy recommended. She is agreeable to PAP therapy with PAP titration study. I counseled patient on CPAP titration study, sleep hygiene, including regular sleep wake schedule and stimulus control therapy, the importance of weight reduction, safe driving and abstaining from smoking and alcohol consumption, as well as other sedatives.     An in lab PAP titration study has been ordered. The patient was educated regarding the recent safety recall of [employer] CPAP/BiPAP machines, and the continued usage of these machines at [facility]. The patient was informed of the safety precautions being used to decrease potential risks of machine usage during the titration study, including: in-line filter usage, and the implementation of single-use, disposable masks and tubing. While these steps do not completely eliminate the risk of potential exposure to particulates and emissions from the machine's sound absorbing foam, the health risks of untreated or poorly controlled sleep apnea outweigh the potential risk of particulate or emission exposure. The patient has made a well-informed decision to proceed with scheduling testing and has also been given additional printed safety information for their own viewing purposes.        All of the patient's questions were answered. She states understanding and agreement with my assessment and plan as above.     I obtained a brief history from the patient, reviewed the medical problems and current medications, and made medical decisions regarding treatment based on that information. Total visit time 30 min, with total of 17 minutes spent counseling patient regarding: PAP  therapy, PAP compliance, PAP maintenance, Weight loss, Lifestyle modifications, Sleep hygiene, Study results, and Further testing.       RTC 2 weeks after testing   She agrees to return sooner on a prn basis if sx change.           This document has been electronically signed by CONNOR Stoddard on June 5, 2023 13:04 CDT            CC: Gato Crane MD          No ref. provider found

## 2023-06-14 ENCOUNTER — TELEPHONE (OUTPATIENT)
Dept: ENDOCRINOLOGY | Facility: CLINIC | Age: 58
End: 2023-06-14
Payer: MEDICARE

## 2023-06-14 DIAGNOSIS — E11.42 DIABETIC POLYNEUROPATHY ASSOCIATED WITH TYPE 2 DIABETES MELLITUS: Chronic | ICD-10-CM

## 2023-06-14 RX ORDER — LEVOTHYROXINE SODIUM 0.03 MG/1
25 TABLET ORAL DAILY
Qty: 30 TABLET | Refills: 0 | Status: SHIPPED | OUTPATIENT
Start: 2023-06-14

## 2023-06-14 RX ORDER — PREGABALIN 300 MG/1
300 CAPSULE ORAL 2 TIMES DAILY
Qty: 60 CAPSULE | Refills: 0 | Status: SHIPPED | OUTPATIENT
Start: 2023-06-14

## 2023-06-14 RX ORDER — INSULIN GLARGINE 100 [IU]/ML
35 INJECTION, SOLUTION SUBCUTANEOUS DAILY
Qty: 15 ML | Refills: 3 | Status: SHIPPED | OUTPATIENT
Start: 2023-06-14

## 2023-06-14 RX ORDER — ATORVASTATIN CALCIUM 80 MG/1
80 TABLET, FILM COATED ORAL EVERY EVENING
Qty: 90 TABLET | Refills: 0 | Status: SHIPPED | OUTPATIENT
Start: 2023-06-14

## 2023-06-14 RX ORDER — LISINOPRIL 20 MG/1
20 TABLET ORAL DAILY
Qty: 90 TABLET | Refills: 0 | Status: SHIPPED | OUTPATIENT
Start: 2023-06-14

## 2023-06-14 RX ORDER — LANOLIN ALCOHOL/MO/W.PET/CERES
1000 CREAM (GRAM) TOPICAL DAILY
Qty: 90 TABLET | Refills: 0 | Status: SHIPPED | OUTPATIENT
Start: 2023-06-14

## 2023-06-14 NOTE — TELEPHONE ENCOUNTER
Pt called requesting a new RX for Lantus to be sent to [facility] in [place].    Please advise.    Thanks

## 2023-06-15 ENCOUNTER — OFFICE VISIT (OUTPATIENT)
Dept: PULMONOLOGY | Facility: CLINIC | Age: 58
End: 2023-06-15
Payer: MEDICARE

## 2023-06-15 VITALS
OXYGEN SATURATION: 96 % | WEIGHT: 263 LBS | BODY MASS INDEX: 38.95 KG/M2 | HEIGHT: 69 IN | SYSTOLIC BLOOD PRESSURE: 132 MMHG | HEART RATE: 89 BPM | DIASTOLIC BLOOD PRESSURE: 76 MMHG

## 2023-06-15 DIAGNOSIS — R06.09 DOE (DYSPNEA ON EXERTION): Primary | ICD-10-CM

## 2023-06-15 DIAGNOSIS — F17.210 CIGARETTE SMOKER: ICD-10-CM

## 2023-06-15 DIAGNOSIS — R06.2 WHEEZING: ICD-10-CM

## 2023-06-15 RX ORDER — ALBUTEROL SULFATE 90 UG/1
2 AEROSOL, METERED RESPIRATORY (INHALATION) EVERY 4 HOURS PRN
Qty: 6.7 G | Refills: 11 | Status: SHIPPED | OUTPATIENT
Start: 2023-06-15

## 2023-06-15 NOTE — PROGRESS NOTES
Pulmonary Office Visit    aGto Crane MD    Thank you for asking me to see Marta Travis Giraldo for   Chief Complaint   Patient presents with    PATRICK     Chief Complaint        History of Present Illness  Ms. Giraldo is a 58 year old patient referred from PCP for dyspnea. She is a current smoker of 1/2 PPD, most days, for the last 20 years (cumulative). She has quit several times and averaged out to a 10 pack year history.  She has an albuterol inhaler she will use for intermittent wheezing. She has had a CXR with low lung volumes secondary to her SHAKIRA and obesity. Awaiting sleep lab titration study, AHI was 35. There is a CT chest with contrast from 9/2021.     She has coughing spells. She will cough mostly in the morning. Non productive. She will get choked on food and cough. She has reflux and had an EDG in March. She has SHAKIRA. Both of these will contribute to a cough.     Her shortness of breath is intermittent, most days of the week. She has had an albuterol inhaler that she would use without improvement. She describes these as spells, that would pass with rest. Sometimes she felt like she couldn't get any air and might pass out when coughing.   Her story is hard to discern as she does not have many specific examples of her breathing or cough.     No history of COPD or lung cancer in family.     Triggers/Pets/Occupation: She is from [place]. No travel. No pets in the house. She has carpet in the bedroom, she has a fan in her bedroom. She is a retired teacher.   Prior medication use: albuterol  Rescue inhaler use: Not much now.   ER visits/hospitalization/exacerbations at PCP/UC: none    Tobacco use history:  Social History     Socioeconomic History    Marital status:    Tobacco Use    Smoking status: Some Days     Packs/day: 0.50     Years: 20.00     Pack years: 10.00     Types: Cigarettes     Start date: 1995    Smokeless tobacco: Never   Vaping Use    Vaping Use: Never used   Substance and  Sexual Activity    Alcohol use: Not Currently    Drug use: Never    Sexual activity: Defer         Review of Systems: History obtained from chart review and the patient.  Review of Systems    As described in the HPI. Otherwise, remainder of ROS (14 systems) were negative.    Patient Active Problem List   Diagnosis    Class 1 obesity with serious comorbidity and body mass index (BMI) of 32.0 to 32.9 in adult    Moderate episode of recurrent major depressive disorder    Encounter for screening for malignant neoplasm of colon    Gastroesophageal reflux disease    Hypothyroidism    Microcytic anemia    Vitamin D deficiency    Uncontrolled type 2 diabetes mellitus with hyperglycemia    Class 1 obesity with body mass index (BMI) of 32.0 to 32.9 in adult    Diabetic polyneuropathy associated with type 2 diabetes mellitus    Gastritis without bleeding    Class 1 obesity with serious comorbidity and body mass index (BMI) of 33.0 to 33.9 in adult    At high risk for falls    Gait instability    Below knee amputation    Type 2 diabetes mellitus with hyperglycemia, with long-term current use of insulin    Primary hypertension    Mixed hyperlipidemia    Cervical radiculopathy    DDD (degenerative disc disease), cervical    Chronic neck pain    Leukocytosis    Iron deficiency    Wheelchair bound    B12 deficiency    Chest pain    Abnormal nuclear stress test    Coronary artery disease involving native coronary artery of native heart without angina pectoris    Polyuria    Tobacco dependence    Left hand pain    Decreased range of motion of finger of left hand    Pain of finger of left hand    Class 1 obesity with serious comorbidity and body mass index (BMI) of 34.0 to 34.9 in adult    Urge incontinence    Acute pain of left shoulder    Other dysphagia    Iron deficiency anemia    Stage 2 chronic kidney disease    Type 2 diabetes mellitus with hypoglycemia without coma, with long-term current use of insulin    Type 2 diabetes  mellitus with diabetic neuropathy, with long-term current use of insulin    Essential hypertension    Hyperkalemia    Class 2 severe obesity with serious comorbidity and body mass index (BMI) of 38.0 to 38.9 in adult    PATRICK (dyspnea on exertion)    Cigarette smoker         Current Outpatient Medications:     albuterol sulfate  (90 Base) MCG/ACT inhaler, Inhale 2 puffs Every 4 (Four) Hours As Needed for Wheezing., Disp: 6.7 g, Rfl: 11    ammonium lactate (LAC-HYDRIN) 12 % lotion, , Disp: , Rfl:     Aspirin Adult Low Strength 81 MG EC tablet, , Disp: , Rfl:     atorvastatin (LIPITOR) 80 MG tablet, Take 1 tablet by mouth Every Evening., Disp: 90 tablet, Rfl: 0    B-D UF III MINI PEN NEEDLES 31G X 5 MM misc, Use as needed, Disp: 100 each, Rfl: 3    Blood Glucose Monitoring Suppl (Accu-Chek Guide Me) w/Device kit, , Disp: , Rfl:     carvedilol (COREG) 6.25 MG tablet, Take 1 tablet by mouth 2 (Two) Times a Day., Disp: 180 tablet, Rfl: 3    clobetasol (TEMOVATE) 0.05 % external solution, , Disp: , Rfl:     clotrimazole-betamethasone (Lotrisone) 1-0.05 % cream, Apply  topically to the appropriate area as directed 2 (Two) Times a Day., Disp: 1 each, Rfl: 3    Continuous Blood Gluc  (FreeStyle Tae 2 [place]) device, USE AS DIRECTED, Disp: 1 each, Rfl: 11    Continuous Blood Gluc Sensor (FreeStyle Tae 2 Sensor) misc, 1 each Every 14 (Fourteen) Days. Use every 14 days, Disp: 2 each, Rfl: 11    Diclofenac Sodium (VOLTAREN) 1 % gel gel, Apply  topically to the appropriate area as directed 4 (Four) Times a Day., Disp: 300 g, Rfl: 3    dicyclomine (BENTYL) 20 MG tablet, Take 1 tablet by mouth Every 6 (Six) Hours., Disp: , Rfl:     docusate sodium (COLACE) 100 MG capsule, Take 1 capsule by mouth 2 (Two) Times a Day As Needed for Constipation., Disp: 60 capsule, Rfl: 2    Dulaglutide (Trulicity) 3 MG/0.5ML solution pen-injector, Inject 0.5 mL under the skin into the appropriate area as directed 1 (One) Time Per  "Week., Disp: 2 mL, Rfl: 3    DULoxetine (CYMBALTA) 60 MG capsule, TAKE ONE CAPSULE BY MOUTH TWICE DAILY, Disp: 60 capsule, Rfl: 0    ferrous sulfate 325 (65 Fe) MG tablet, Take 1 tablet by mouth 2 (Two) Times a Day With Meals., Disp: 60 tablet, Rfl: 3    folic acid (FOLVITE) 1 MG tablet, Take 1 tablet by mouth Daily., Disp: 90 tablet, Rfl: 1    glucose (DEX4) 4 GM chewable tablet, Chew 4 tablets As Needed for Low Blood Sugar., Disp: 90 tablet, Rfl: 3    glucose blood test strip, Test 4 times daily , E10.65, Disp: 120 each, Rfl: 11    glucose monitor monitoring kit, 1 each As Needed (to check bg)., Disp: 1 each, Rfl: 0    glucose-vitamin C 4-6 GM-MG chewable tablet chewable tablet, TRUEplus Glucose 4 gram chewable tablet, Disp: , Rfl:     hydrocortisone 2.5 % cream, , Disp: , Rfl:     Insulin Aspart (NovoLOG) 100 UNIT/ML injection, 20 units TID before each meal, Disp: 30 mL, Rfl: 11    Insulin Aspart (novoLOG) 100 UNIT/ML injection, NOVOLOG 100 UNIT/ML VIAL  Quantity: 0  Refills: 3  Ordered: 27-Apr-2023 Elodia Sun Generic Substitution Allowed Comments: [facility], Medication=NOVOLOG 100 UNIT/ML VIAL, OriginatingSource=[facility], OriginatingProvider=SNEHA SARGENT, Duration=33, Refills=3, Date Last Modified/Filled=08-Mar-2023, Disp: , Rfl:     Insulin Glargine (Lantus SoloStar) 100 UNIT/ML injection pen, Inject 35 Units under the skin into the appropriate area as directed Daily., Disp: 15 mL, Rfl: 3    insulin lispro (HumaLOG) 100 UNIT/ML injection, 20 units TID before each meal, Disp: 30 mL, Rfl: 11    Insulin Pen Needle 32G X 8 MM misc, Use at bedtime, Disp: 100 each, Rfl: 3    Insulin Syringe-Needle U-100 30G X 1/2\" 1 ML misc, Use three times daily with insulin, Disp: 100 each, Rfl: 3    Jardiance 25 MG tablet tablet, TAKE 1 TABLET DAILY., Disp: 90 tablet, Rfl: 0    ketoconazole (NIZORAL) 2 % shampoo, , Disp: , Rfl:     Lancets misc, Use for E11.65 diabetes to check sugars 4 times a " "day., Disp: 200 each, Rfl: 3    Lancets misc, Test 4 times daily, E10.65, Disp: 120 each, Rfl: 11    levothyroxine (SYNTHROID, LEVOTHROID) 25 MCG tablet, Take 1 tablet by mouth Daily., Disp: 30 tablet, Rfl: 0    lisinopril (PRINIVIL,ZESTRIL) 20 MG tablet, Take 1 tablet by mouth Daily., Disp: 90 tablet, Rfl: 0    methocarbamol (ROBAXIN) 750 MG tablet, TAKE ONE TABLET BY MOUTH THREE TIMES DAILY, Disp: 90 tablet, Rfl: 0    nicotine polacrilex (NICORETTE) 4 MG gum, Chew 1 each As Needed for Smoking Cessation., Disp: 220 each, Rfl: 3    nitrofurantoin, macrocrystal-monohydrate, (MACROBID) 100 MG capsule, Take 1 capsule by mouth Every 12 (Twelve) Hours., Disp: , Rfl:     nystatin (MYCOSTATIN) 525852 UNIT/GM cream, , Disp: , Rfl:     omeprazole (priLOSEC) 40 MG capsule, Take 1 capsule by mouth Daily., Disp: 90 capsule, Rfl: 0    pregabalin (LYRICA) 300 MG capsule, Take 1 capsule by mouth 2 (Two) Times a Day., Disp: 60 capsule, Rfl: 0    tolterodine LA (Detrol LA) 2 MG 24 hr capsule, Take 1 capsule by mouth Daily., Disp: 30 capsule, Rfl: 3    TRUEplus Insulin Syringe 30G X 5/16\" 0.5 ML misc, , Disp: , Rfl:     vilazodone (Viibryd) 20 MG tablet tablet, Take 1 tablet by mouth Daily., Disp: 90 tablet, Rfl: 1    vitamin B-12 (CYANOCOBALAMIN) 1000 MCG tablet, Take 1 tablet by mouth Daily., Disp: 90 tablet, Rfl: 0    vitamin D (ERGOCALCIFEROL) 1.25 MG (38023 UT) capsule capsule, TAKE 1 CAPSULE WEEKLY, Disp: 12 capsule, Rfl: 0    Allergies   Allergen Reactions    Compazine [Prochlorperazine Edisylate] Unknown - High Severity    Penicillins Other (See Comments) and Unknown - High Severity     unknown    Prochlorperazine Swelling       Past Medical History:   Diagnosis Date    Anemia     Anxiety     Arthritis     Cataract     Depression     Diabetes mellitus     Disease of thyroid gland     Family history of thyroid problem     GERD (gastroesophageal reflux disease)     Headache     History of transfusion     Hyperlipidemia     " "Hypertension     Neuropathy     Stroke 2019    Urinary tract infection      Past Surgical History:   Procedure Laterality Date    APPENDECTOMY      BELOW KNEE AMPUTATION      BLADDER SURGERY      CARDIAC CATHETERIZATION N/A 01/27/2022    Procedure: Left Heart Cath;  Surgeon: Josias Carpio MD;  Location: [facility] CATH INVASIVE LOCATION;  Service: Cardiology;  Laterality: N/A;    COLONOSCOPY N/A 09/02/2020    Procedure: COLONOSCOPY;  Surgeon: Ashli Gonzalez MD;  Location: [facility] ENDOSCOPY;  Service: Gastroenterology;  Laterality: N/A;    ENDOSCOPY N/A 09/02/2020    Procedure: ESOPHAGOGASTRODUODENOSCOPY;  Surgeon: Ashli Gonzalez MD;  Location: [facility] ENDOSCOPY;  Service: Gastroenterology;  Laterality: N/A;    ENDOSCOPY N/A 3/1/2023    Procedure: ESOPHAGOGASTRODUODENOSCOPY;  Surgeon: Ashli Gonzalez MD;  Location: [facility] ENDOSCOPY;  Service: Gastroenterology;  Laterality: N/A;     Social History     Socioeconomic History    Marital status:    Tobacco Use    Smoking status: Some Days     Packs/day: 0.50     Years: 20.00     Pack years: 10.00     Types: Cigarettes     Start date: 1995    Smokeless tobacco: Never   Vaping Use    Vaping Use: Never used   Substance and Sexual Activity    Alcohol use: Not Currently    Drug use: Never    Sexual activity: Defer     Family History   Problem Relation Age of Onset    Diabetes Other     Hyperlipidemia Other     Hypertension Other     Stroke Other     Heart disease Other     Other Other     Diabetes Mother     Diabetes Father     Diabetes Sister     Heart disease Sister     Diabetes Brother        Advance Care Planning   ACP discussion was declined by the patient. Patient does not have an advance directive, declines further assistance.          Objective     /76 (BP Location: Left arm, Patient Position: Sitting, Cuff Size: Adult)   Pulse 89   Ht 175.3 cm (69\")   Wt 119 kg (263 lb)   SpO2 96%   BMI 38.84 kg/m²       Physical Exam  Cardiovascular:     "  Rate and Rhythm: Normal rate and regular rhythm.      Heart sounds: Normal heart sounds.   Pulmonary:      Effort: Pulmonary effort is normal.      Breath sounds: Normal breath sounds.   Neurological:      Mental Status: She is alert and oriented to person, place, and time.   Psychiatric:         Mood and Affect: Mood normal.         Behavior: Behavior normal.         Thought Content: Thought content normal.         Judgment: Judgment normal.       PFTs          PFTs: Done on: 6/15/23 (independently reviewed by myself, interpreted by physician)  [place] is unreliable    TLC 59  SVC: 47        Radiology (independently reviewed by me, interpreted by physcian)    No radiology results for the last 30 days.         Assessment     Discussion/ Recommendations:    Diagnosis Plan   1. PATRICK (dyspnea on exertion)  Story is not suspicious for asthma. Suspicious for restriction or intermittent extrathoracic airway obstruction.     PFT yoana is unreliable due to poor effort. So we did lung volumes, which do show some restriction. This likely correlates with untreated SHAKIRA and obesity.     Will order CT chest wo.     Can continue PRN albuterol if she needs to.         2. Cigarette smoker  10 pack year history, does not qualify for LDCT.   Unable to quit at this time, considers her a light smoker. Does not think her symptoms are correlated with smoking.           Follow up: 1 month for CT results.     I spent 45 minutes caring for Marta on this date of service. This time includes time spent by me in the following activities: preparing for the visit, reviewing tests, obtaining and/or reviewing a separately obtained history, performing a medically appropriate examination and/or evaluation, counseling and educating the patient/family/caregiver, ordering medications, tests, or procedures, referring and communicating with other health care professionals, documenting information in the medical record, independently interpreting results and  communicating that information with the patient/family/caregiver, and care coordination            This document has been electronically signed by CONNOR Mejia on Karen 15, 2023 14:01 CDT

## 2023-07-12 ENCOUNTER — TELEPHONE (OUTPATIENT)
Dept: FAMILY MEDICINE CLINIC | Facility: CLINIC | Age: 58
End: 2023-07-12

## 2023-07-12 NOTE — TELEPHONE ENCOUNTER
Patient called wanting to know if a prescription has been sent for diapers,wipes and pads.    Please return call at [phone]

## 2023-07-21 ENCOUNTER — TELEPHONE (OUTPATIENT)
Dept: FAMILY MEDICINE CLINIC | Facility: CLINIC | Age: 58
End: 2023-07-21

## 2023-07-21 ENCOUNTER — OFFICE VISIT (OUTPATIENT)
Dept: ONCOLOGY | Facility: CLINIC | Age: 58
End: 2023-07-21
Payer: MEDICARE

## 2023-07-21 VITALS
HEART RATE: 85 BPM | OXYGEN SATURATION: 95 % | SYSTOLIC BLOOD PRESSURE: 125 MMHG | RESPIRATION RATE: 18 BRPM | DIASTOLIC BLOOD PRESSURE: 64 MMHG

## 2023-07-21 DIAGNOSIS — E61.1 IRON DEFICIENCY: Chronic | ICD-10-CM

## 2023-07-21 DIAGNOSIS — D72.829 LEUKOCYTOSIS, UNSPECIFIED TYPE: Primary | Chronic | ICD-10-CM

## 2023-07-21 DIAGNOSIS — E53.8 B12 DEFICIENCY: ICD-10-CM

## 2023-07-21 RX ORDER — LANOLIN ALCOHOL/MO/W.PET/CERES
1000 CREAM (GRAM) TOPICAL DAILY
Qty: 90 TABLET | Refills: 0 | Status: SHIPPED | OUTPATIENT
Start: 2023-07-21

## 2023-07-21 NOTE — TELEPHONE ENCOUNTER
Patient would like to know if a letter can be written for her dog to be her emotional support dog.     Please return call at [phone]

## 2023-07-24 NOTE — PROGRESS NOTES
DATE OF VISIT: 7/21/2023      REASON FOR VISIT:   Leukocytosis, iron deficiency anemia        HISTORY OF PRESENT ILLNESS:   58-year-old female with medical problem consisting of diabetes mellitus with neuropathy, hypothyroidism, hypertension, dyslipidemia, history of left lower extremity amputation, iron deficiency for which patient was initially seen in consultation on April 21, 2021.  Patient is here for follow-up appointment today.  Denies any excessive nausea or vomiting or diarrhea.  Denies any bleeding.  Denies any new lymph node enlargement.          Past Medical History, Past Surgical History, Social History, Family History have been reviewed and are without significant changes except as mentioned.    Review of Systems   A comprehensive 14 point review of systems was performed and was negative except as mentioned.    Medications:  The current medication list was reviewed in the EMR    ALLERGIES:    Allergies   Allergen Reactions    Compazine [Prochlorperazine Edisylate] Unknown - High Severity    Penicillins Other (See Comments) and Unknown - High Severity     unknown    Prochlorperazine Swelling       Objective      Vitals:    07/21/23 1314   BP: 125/64   Pulse: 85   Resp: 18   SpO2: 95%   PainSc:   5         9/9/2022    10:38 AM   Current Status   ECOG score 3       Physical Exam  General: alert and oriented no acute distress  Lungs;normal breath sounds  Card;RRR  Ext; no edema    RECENT LABS:  Glucose   Date Value Ref Range Status   05/05/2023 175 (H) 65 - 99 mg/dL Final     Sodium   Date Value Ref Range Status   05/05/2023 139 136 - 145 mmol/L Final     Potassium   Date Value Ref Range Status   05/17/2023 5.1 3.5 - 5.2 mmol/L Final     CO2   Date Value Ref Range Status   05/05/2023 24.7 22.0 - 29.0 mmol/L Final     Chloride   Date Value Ref Range Status   05/05/2023 104 98 - 107 mmol/L Final     Anion Gap   Date Value Ref Range Status   05/05/2023 10.3 5.0 - 15.0 mmol/L Final     Creatinine   Date Value  Ref Range Status   05/05/2023 1.13 (H) 0.57 - 1.00 mg/dL Final     BUN   Date Value Ref Range Status   05/05/2023 31 (H) 6 - 20 mg/dL Final     BUN/Creatinine Ratio   Date Value Ref Range Status   05/05/2023 27.4 (H) 7.0 - 25.0 Final     Calcium   Date Value Ref Range Status   05/05/2023 9.3 8.6 - 10.5 mg/dL Final     eGFR Non  Amer   Date Value Ref Range Status   01/27/2022 57 (L) >60 mL/min/1.73 Final     Alkaline Phosphatase   Date Value Ref Range Status   05/05/2023 82 39 - 117 U/L Final     Total Protein   Date Value Ref Range Status   05/05/2023 7.1 6.0 - 8.5 g/dL Final     ALT (SGPT)   Date Value Ref Range Status   05/05/2023 12 1 - 33 U/L Final     AST (SGOT)   Date Value Ref Range Status   05/05/2023 17 1 - 32 U/L Final     Total Bilirubin   Date Value Ref Range Status   05/05/2023 0.2 0.0 - 1.2 mg/dL Final     Albumin   Date Value Ref Range Status   05/05/2023 3.9 3.5 - 5.2 g/dL Final     Globulin   Date Value Ref Range Status   05/05/2023 3.2 gm/dL Final     Lab Results   Component Value Date    WBC 15.20 (H) 07/21/2023    HGB 12.2 07/21/2023    HCT 38.8 07/21/2023    MCV 86.8 07/21/2023     07/21/2023     Lab Results   Component Value Date    NEUTROABS 11.20 (H) 07/21/2023    IRON 69 07/21/2023    IRON 47 04/21/2023    IRON 39 01/06/2023    TIBC 297 (L) 07/21/2023    TIBC 310 04/21/2023    TIBC 273 (L) 01/06/2023    LABIRON 23 07/21/2023    LABIRON 15 (L) 04/21/2023    LABIRON 14 (L) 01/06/2023    FERRITIN 143.30 07/21/2023    FERRITIN 113.30 04/21/2023    FERRITIN 138.60 01/06/2023    UGCAWLEI97 1,007 (H) 07/21/2023    VUTQMSSI25 564 05/05/2023    EKPLDJST43 419 04/21/2023    FOLATE 7.90 07/21/2023    FOLATE 15.80 04/21/2023    FOLATE >20.00 01/06/2023     Lab Results   Component Value Date    REFLABREPO  03/01/2023     [facility]  [address] [place] [address]  Phone: [phone] or [phone]  Fax: [phone]  Ran Mendoza M.D., Medical  Director    PATIENT NAME                                     LABORATORY NO.  [id number]   PARMINDER VITAL.                          [id number]  [id number]                                 AGE                    SEX   SSN              CLIENT REF #  [facility]                   58        1965      F     [id number]      [id number]    [place]                               REQUESTING M.D.           ATTENDING M.D.         COPY TO.  [address] [place] [address]                         SAM BATES DAVID  [address]  DATE COLLECTED            DATE RECEIVED          DATE REPORTED  2023    DIAGNOSIS:  A.     ANTRUM, BIOPSY:  Antral type mucosa with reactive changes  No Helicobacter pylori-like organisms seen  Negative for dysplasia or malignancy  B.     GE JUNCTION:  Glandular mucosa with reactive changes  Negative for intestinal metaplasia, dysplasia, or malignancy    HAMMADK    CLINICAL HISTORY:  Other dysphagia    SPECIMENS RECEIVED:  A.    ANTRUM, BIOPSY  B.    GE JUNCTION    MICROSCOPIC DESCRIPTION:  Tissue blocks are prepared and slides are examined microscopically on all  specimens. See diagnosis for details.    Professional interpretation rendered by Loc Gutierres M.D., F.C.A.P. at [facility], [place], [address] [place] [address].    GROSS DESCRIPTION:  A.    Labeled antrum biopsy are 2 portions of tan soft tissue measuring 0.5 x 0.5  x 0.2 cm in aggregate.  Submitted entirely in 1 block.  BW  B.    Labeled EG junction is a 0.4 x 0.2 x 0.2 cm portion of tan soft tissue which  is submitted entirely in 1 block.    REVIEWED, DIAGNOSED AND ELECTRONICALLY  SIGNED BY:    Loc Gutierres M.D., F.C.A.P.  CPT CODES:  88305x2           PATHOLOGY:  * Cannot find OR log *         RADIOLOGY DATA :  No radiology results for the last 7 days        Assessment & Plan     1.  Leukocytosis:  -  Patient has been having ongoing leukocytosis since December 2020  - Labs today show WBC is stable at 15,000 with predominate neutrophils.  - Work-up in the past including peripheral blood flow cytometry in April 2021 was negative for any leukemia or lymphoma.  - We will continue with clinical monitoring for now  - We will have patient return to clinic in 4 months with repeat CBC, iron studies, ferritin, B12 and folate to be done on that day.     2.  Iron deficiency anemia:  - Hemoglobin is 12.2  - patient was switched from IM B-12 to oral at last visit; B-12 level today is 1002; recommend she continue with daily B-12 tablet.   -Repeat levels again in 3 mos..     3.  Diabetes mellitus with neuropathy     4.  Health maintenance: Patient quit smoking in 2019.  Had a colonoscopy in August 2020     5. Pt is full code and has POA on her chart.              PHQ-9 Total Score: 0 pt is not suicidal or homicidal    Marta Giraldo reports a pain score of 5.  Given her pain assessment as noted, treatment options were discussed and the following options were decided upon as a follow-up plan to address the patient's pain: continuation of current treatment plan for pain.         Noris Lopez, APRN  7/24/2023  09:54 CDT        Part of this note may be an electronic transcription/translation of spoken language to printed text using the Dragon Dictation System.          CC:

## 2023-08-02 ENCOUNTER — OFFICE VISIT (OUTPATIENT)
Dept: FAMILY MEDICINE CLINIC | Facility: CLINIC | Age: 58
End: 2023-08-02
Payer: MEDICARE

## 2023-08-02 VITALS
BODY MASS INDEX: 38.98 KG/M2 | OXYGEN SATURATION: 98 % | HEIGHT: 69 IN | HEART RATE: 94 BPM | SYSTOLIC BLOOD PRESSURE: 142 MMHG | DIASTOLIC BLOOD PRESSURE: 68 MMHG | WEIGHT: 263.2 LBS | TEMPERATURE: 98.6 F

## 2023-08-02 DIAGNOSIS — E03.9 ACQUIRED HYPOTHYROIDISM: ICD-10-CM

## 2023-08-02 DIAGNOSIS — D50.9 IRON DEFICIENCY ANEMIA, UNSPECIFIED IRON DEFICIENCY ANEMIA TYPE: ICD-10-CM

## 2023-08-02 DIAGNOSIS — E55.9 VITAMIN D DEFICIENCY: ICD-10-CM

## 2023-08-02 DIAGNOSIS — Z91.81 HISTORY OF BAD FALL: Primary | ICD-10-CM

## 2023-08-02 DIAGNOSIS — N18.2 STAGE 2 CHRONIC KIDNEY DISEASE: ICD-10-CM

## 2023-08-02 DIAGNOSIS — E61.1 IRON DEFICIENCY: Chronic | ICD-10-CM

## 2023-08-02 DIAGNOSIS — E78.2 MIXED HYPERLIPIDEMIA: ICD-10-CM

## 2023-08-02 DIAGNOSIS — I10 ESSENTIAL HYPERTENSION: ICD-10-CM

## 2023-08-02 DIAGNOSIS — Z91.81 AT HIGH RISK FOR FALLS: ICD-10-CM

## 2023-08-02 DIAGNOSIS — R79.9 ABNORMAL FINDING OF BLOOD CHEMISTRY, UNSPECIFIED: ICD-10-CM

## 2023-08-02 RX ORDER — METHOCARBAMOL 750 MG/1
750 TABLET, FILM COATED ORAL 3 TIMES DAILY
Qty: 90 TABLET | Refills: 1 | Status: SHIPPED | OUTPATIENT
Start: 2023-08-02

## 2023-08-02 RX ORDER — TOLTERODINE 2 MG/1
2 CAPSULE, EXTENDED RELEASE ORAL DAILY
Qty: 30 CAPSULE | Refills: 3 | Status: SHIPPED | OUTPATIENT
Start: 2023-08-02

## 2023-08-02 RX ORDER — LEVOTHYROXINE SODIUM 0.03 MG/1
25 TABLET ORAL DAILY
Qty: 30 TABLET | Refills: 2 | Status: SHIPPED | OUTPATIENT
Start: 2023-08-02

## 2023-08-02 RX ORDER — OMEPRAZOLE 40 MG/1
40 CAPSULE, DELAYED RELEASE ORAL DAILY
Qty: 90 CAPSULE | Refills: 0 | Status: SHIPPED | OUTPATIENT
Start: 2023-08-02

## 2023-08-02 RX ORDER — ACETAMINOPHEN AND CODEINE PHOSPHATE 300; 30 MG/1; MG/1
1 TABLET ORAL EVERY 12 HOURS
Qty: 6 TABLET | Refills: 0 | Status: SHIPPED | OUTPATIENT
Start: 2023-08-02 | End: 2023-08-05

## 2023-08-02 RX ORDER — VILAZODONE HYDROCHLORIDE 20 MG/1
20 TABLET ORAL DAILY
Qty: 90 TABLET | Refills: 1 | Status: SHIPPED | OUTPATIENT
Start: 2023-08-02

## 2023-08-02 RX ORDER — ATORVASTATIN CALCIUM 80 MG/1
80 TABLET, FILM COATED ORAL EVERY EVENING
Qty: 90 TABLET | Refills: 1 | Status: SHIPPED | OUTPATIENT
Start: 2023-08-02

## 2023-08-02 RX ORDER — IMIPRAMINE HYDROCHLORIDE 10 MG/1
1 TABLET, FILM COATED ORAL DAILY
COMMUNITY
Start: 2023-07-14

## 2023-08-02 RX ORDER — ERGOCALCIFEROL 1.25 MG/1
50000 CAPSULE ORAL WEEKLY
Qty: 12 CAPSULE | Refills: 1 | Status: SHIPPED | OUTPATIENT
Start: 2023-08-02

## 2023-08-02 RX ORDER — LISINOPRIL 20 MG/1
20 TABLET ORAL DAILY
Qty: 90 TABLET | Refills: 0 | Status: SHIPPED | OUTPATIENT
Start: 2023-08-02

## 2023-08-03 ENCOUNTER — TELEPHONE (OUTPATIENT)
Dept: FAMILY MEDICINE CLINIC | Facility: CLINIC | Age: 58
End: 2023-08-03
Payer: MEDICARE

## 2023-08-04 ENCOUNTER — TELEPHONE (OUTPATIENT)
Dept: FAMILY MEDICINE CLINIC | Facility: CLINIC | Age: 58
End: 2023-08-04
Payer: MEDICARE

## 2023-08-04 ENCOUNTER — HOSPITAL ENCOUNTER (OUTPATIENT)
Dept: SLEEP MEDICINE | Facility: HOSPITAL | Age: 58
Discharge: HOME | End: 2023-08-04
Payer: MEDICARE

## 2023-08-04 VITALS — HEIGHT: 69 IN | WEIGHT: 263 LBS | BODY MASS INDEX: 38.95 KG/M2

## 2023-08-04 DIAGNOSIS — G47.33 OSA (OBSTRUCTIVE SLEEP APNEA): ICD-10-CM

## 2023-08-04 PROCEDURE — 95811 POLYSOM 6/>YRS CPAP 4/> PARM: CPT

## 2023-08-10 ENCOUNTER — TELEPHONE (OUTPATIENT)
Dept: FAMILY MEDICINE CLINIC | Facility: CLINIC | Age: 58
End: 2023-08-10

## 2023-08-10 NOTE — TELEPHONE ENCOUNTER
Pt lost the prescription for her arya toilet with rails. She is requesting a new prescription and for us to fax it to [facility].

## 2023-08-10 NOTE — TELEPHONE ENCOUNTER
Patient would like a call back in regards to a high toilet with rails and her artificial leg.  Call back number: [phone]

## 2023-08-10 NOTE — TELEPHONE ENCOUNTER
Patient called stating her prosthetic leg script needs to be faxed to [phone].  Call back number: [phone]

## 2023-08-11 PROBLEM — Z89.619 HISTORY OF LEG AMPUTATION: Status: ACTIVE | Noted: 2023-08-11

## 2023-08-11 NOTE — TELEPHONE ENCOUNTER
Gato Figueroa MD17 hours ago (2:41 PM)     TM  Pt lost the prescription for her arya toilet with rails. She is requesting a new prescription and for us to fax it to [facility].          Note

## 2023-08-16 DIAGNOSIS — E11.42 DIABETIC POLYNEUROPATHY ASSOCIATED WITH TYPE 2 DIABETES MELLITUS: Chronic | ICD-10-CM

## 2023-08-16 RX ORDER — PREGABALIN 300 MG/1
300 CAPSULE ORAL 2 TIMES DAILY
Qty: 60 CAPSULE | Refills: 0 | Status: SHIPPED | OUTPATIENT
Start: 2023-08-16

## 2023-08-21 RX ORDER — DULOXETIN HYDROCHLORIDE 60 MG/1
CAPSULE, DELAYED RELEASE ORAL
Qty: 60 CAPSULE | Refills: 3 | Status: SHIPPED | OUTPATIENT
Start: 2023-08-21

## 2023-08-22 ENCOUNTER — TELEMEDICINE (OUTPATIENT)
Dept: ENDOCRINOLOGY | Facility: CLINIC | Age: 58
End: 2023-08-22
Payer: MEDICARE

## 2023-08-22 DIAGNOSIS — E55.9 VITAMIN D DEFICIENCY: ICD-10-CM

## 2023-08-22 DIAGNOSIS — E11.43 DIABETIC AUTONOMIC NEUROPATHY ASSOCIATED WITH TYPE 2 DIABETES MELLITUS: ICD-10-CM

## 2023-08-22 DIAGNOSIS — E78.2 MIXED HYPERLIPIDEMIA: ICD-10-CM

## 2023-08-22 DIAGNOSIS — E03.9 ACQUIRED HYPOTHYROIDISM: ICD-10-CM

## 2023-08-22 DIAGNOSIS — Z79.4 TYPE 2 DIABETES MELLITUS WITH HYPERGLYCEMIA, WITH LONG-TERM CURRENT USE OF INSULIN: Primary | ICD-10-CM

## 2023-08-22 DIAGNOSIS — E11.65 TYPE 2 DIABETES MELLITUS WITH HYPERGLYCEMIA, WITH LONG-TERM CURRENT USE OF INSULIN: Primary | ICD-10-CM

## 2023-08-22 DIAGNOSIS — I10 PRIMARY HYPERTENSION: ICD-10-CM

## 2023-08-22 PROCEDURE — 99214 OFFICE O/P EST MOD 30 MIN: CPT | Performed by: NURSE PRACTITIONER

## 2023-08-22 PROCEDURE — 1160F RVW MEDS BY RX/DR IN RCRD: CPT | Performed by: NURSE PRACTITIONER

## 2023-08-22 PROCEDURE — 1159F MED LIST DOCD IN RCRD: CPT | Performed by: NURSE PRACTITIONER

## 2023-08-22 PROCEDURE — 3052F HG A1C>EQUAL 8.0%<EQUAL 9.0%: CPT | Performed by: NURSE PRACTITIONER

## 2023-08-22 NOTE — PROGRESS NOTES
Chief Complaint  Diabetes  Subjective          Marta Giraldo presents to [facility] ENDOCRINOLOGY  Diabetes      You have chosen to receive care through a telehealth visit.  Do you consent to use a video/audio connection for your medical care today? Yes            TELEHEALTH VIDEO VISIT     This a video visit due to [facility] current guidelines for social distancing due to the COVID 19 pandemic      Mode of Visit: Video  Location of patient: home  You have chosen to receive care through a telehealth visit.  Does the patient consent to use a video/audio connection for your medical care today? Yes  The visit included audio and video interaction. No technical issues occurred during this visit.         58 year old female presents for follow up    Reason diabetes mellitus type 2     Diagnosed in 1980s     Timing constant     Quality not controlled     Severity high       Complications - CVA , Neuropathy , retinopathy     Blood glucose monitoring fingersticks     Checks 4 times daily    This am 140     No lows     100 up to 200         Review of Systems - General ROS: negative            Objective   Vital Signs:   There were no vitals taken for this visit.    Physical Exam  Neurological:      General: No focal deficit present.      Mental Status: She is alert.   Psychiatric:         Mood and Affect: Mood normal.         Thought Content: Thought content normal.         Judgment: Judgment normal.      Result Review :   The following data was reviewed by: CONNOR Concepcion on 02/23/2022:  Common labs          5/5/2023    08:29 5/17/2023    13:28 7/21/2023    13:05   Common Labs   Glucose 175      BUN 31      Creatinine 1.13      Sodium 139      Potassium 6.1  5.1     Chloride 104      Calcium 9.3      Albumin 3.9      Total Bilirubin 0.2      Alkaline Phosphatase 82      AST (SGOT) 17      ALT (SGPT) 12      WBC 15.01   15.20    Hemoglobin 12.4   12.2    Hematocrit 37.6   38.8     Platelets 226   213    Total Cholesterol 141      Triglycerides 39      HDL Cholesterol 48      LDL Cholesterol  84      Hemoglobin A1C 8.20                  Assessment and Plan    Diagnoses and all orders for this visit:    1. Type 2 diabetes mellitus with hyperglycemia, with long-term current use of insulin (Primary)  -     CBC & Differential  -     Comprehensive Metabolic Panel  -     Hemoglobin A1c  -     Lipid Panel  -     Microalbumin / Creatinine Urine Ratio - Urine, Clean Catch  -     TSH  -     Vitamin B12  -     Vitamin D,25-Hydroxy    2. Acquired hypothyroidism  -     CBC & Differential  -     Comprehensive Metabolic Panel  -     Hemoglobin A1c  -     Lipid Panel  -     Microalbumin / Creatinine Urine Ratio - Urine, Clean Catch  -     TSH  -     Vitamin B12  -     Vitamin D,25-Hydroxy    3. Vitamin D deficiency  -     CBC & Differential  -     Comprehensive Metabolic Panel  -     Hemoglobin A1c  -     Lipid Panel  -     Microalbumin / Creatinine Urine Ratio - Urine, Clean Catch  -     TSH  -     Vitamin B12  -     Vitamin D,25-Hydroxy    4. Primary hypertension  -     CBC & Differential  -     Comprehensive Metabolic Panel  -     Hemoglobin A1c  -     Lipid Panel  -     Microalbumin / Creatinine Urine Ratio - Urine, Clean Catch  -     TSH  -     Vitamin B12  -     Vitamin D,25-Hydroxy    5. Mixed hyperlipidemia  -     CBC & Differential  -     Comprehensive Metabolic Panel  -     Hemoglobin A1c  -     Lipid Panel  -     Microalbumin / Creatinine Urine Ratio - Urine, Clean Catch  -     TSH  -     Vitamin B12  -     Vitamin D,25-Hydroxy    6. Diabetic autonomic neuropathy associated with type 2 diabetes mellitus           Glycemic management     Diabetes mellitus type 2     Lab Results   Component Value Date    HGBA1C 8.20 (H) 05/05/2023         Tae personal use --tae 2               Taking Jardiance 25 mg daily-     Taking Trulicity 3 mg weekly -     Taking Lantus 25 units -- increase to 28 units             Take humalog before meals 10  TID --increase up to 12 units for high carb meals                  Goals for sugar     Fasting and before meals 80 to 130     2 hours after meals 180 or less        Aim for 45 grams of carbohydrate per meal     Aim for 15 grams of carbohydrate per snack                     Lipid management     Taking Lipitor 40 mg 1 at night        Total Cholesterol   Date Value Ref Range Status   05/05/2023 141 0 - 200 mg/dL Final     Triglycerides   Date Value Ref Range Status   05/05/2023 39 0 - 150 mg/dL Final     HDL Cholesterol   Date Value Ref Range Status   05/05/2023 48 40 - 60 mg/dL Final     LDL Cholesterol    Date Value Ref Range Status   05/05/2023 84 0 - 100 mg/dL Final                 Blood pressure management     Taking lisinopril 20 mg 1 daily      taking carvedilol 6.25 mg bid            Microvascular monitoring     Last eye exam--- Jan. 2023 ,  , monthly injection      Neuropathy     Taking Lyrica      + microalbuminuria    Follows with nephrology                   Thyroid     Hypothyroidism     Taking levothyroxine 25 mcg daily        Lab Results   Component Value Date    TSH 1.190 10/06/2022          Bone health     Vitamin D deficiency     Taking vitamin D 50,000 units weekly                       Follow Up   No follow-ups on file.  Patient was given instructions and counseling regarding her condition or for health maintenance advice. Please see specific information pulled into the AVS if appropriate.         This document has been electronically signed by CONNOR Concepcion on August 22, 2023 13:57 CDT.

## 2023-08-23 ENCOUNTER — OFFICE VISIT (OUTPATIENT)
Dept: ORTHOPEDIC SURGERY | Facility: CLINIC | Age: 58
End: 2023-08-23
Payer: MEDICARE

## 2023-08-23 VITALS — HEIGHT: 69 IN | BODY MASS INDEX: 38.95 KG/M2 | WEIGHT: 263 LBS

## 2023-08-23 DIAGNOSIS — M25.522 LEFT ELBOW PAIN: Primary | ICD-10-CM

## 2023-08-23 DIAGNOSIS — S46.912A STRAIN OF LEFT ELBOW, INITIAL ENCOUNTER: ICD-10-CM

## 2023-08-23 NOTE — PROGRESS NOTES
Marta Giraldo is a 58 y.o. female   Primary provider:  Gato Crane MD       Chief Complaint   Patient presents with    Left Elbow - Pain       HISTORY OF PRESENT ILLNESS:    Mrs. Giraldo is a 58-year-old female who presents today with complaints of left elbow pain that has been present for 3 weeks.  Patient reports falling 3 weeks ago and this is when pain started.  Pain is in her lateral elbow.  Pain is described as moderate and intermittent.  Pain is an aching pain is associated with occasional clicking.  She has tried Tylenol which does help some with symptoms.  On intake form she checked numbness and tingling, she states this is neuropathy and not a new symptom.  No burning tingling or numbness in fingers or hands today.  She is sent for x-rays.    Pain  This is a new problem. The current episode started 1 to 4 weeks ago. The problem occurs intermittently. The problem has been gradually worsening. Associated symptoms include arthralgias and joint swelling. The symptoms are aggravated by exertion. She has tried acetaminophen for the symptoms. The treatment provided mild relief.      CONCURRENT MEDICAL HISTORY:    Past Medical History:   Diagnosis Date    Anemia     Anxiety     Arthritis     Cataract     Depression     Diabetes mellitus     Disease of thyroid gland     Family history of thyroid problem     GERD (gastroesophageal reflux disease)     Headache     History of transfusion     Hyperlipidemia     Hypertension     Neuropathy     Stroke 2019    Urinary tract infection        Allergies   Allergen Reactions    Compazine [Prochlorperazine Edisylate] Unknown - High Severity    Penicillins Other (See Comments) and Unknown - High Severity     unknown    Prochlorperazine Swelling         Current Outpatient Medications:     albuterol sulfate  (90 Base) MCG/ACT inhaler, Inhale 2 puffs Every 4 (Four) Hours As Needed for Wheezing., Disp: 6.7 g, Rfl: 11    ammonium lactate (LAC-HYDRIN) 12 % lotion,  , Disp: , Rfl:     Aspirin Adult Low Strength 81 MG EC tablet, , Disp: , Rfl:     atorvastatin (LIPITOR) 80 MG tablet, Take 1 tablet by mouth Every Evening., Disp: 90 tablet, Rfl: 1    B-D UF III MINI PEN NEEDLES 31G X 5 MM misc, Use as needed, Disp: 100 each, Rfl: 3    Blood Glucose Monitoring Suppl (Accu-Chek Guide Me) w/Device kit, , Disp: , Rfl:     carvedilol (COREG) 6.25 MG tablet, Take 1 tablet by mouth 2 (Two) Times a Day., Disp: 180 tablet, Rfl: 3    clobetasol (TEMOVATE) 0.05 % external solution, , Disp: , Rfl:     clotrimazole-betamethasone (Lotrisone) 1-0.05 % cream, Apply  topically to the appropriate area as directed 2 (Two) Times a Day., Disp: 1 each, Rfl: 3    Continuous Blood Gluc  (FreeStyle Tae 2 [place]) device, USE AS DIRECTED, Disp: 1 each, Rfl: 11    Continuous Blood Gluc Sensor (FreeStyle Tae 2 Sensor) misc, 1 each Every 14 (Fourteen) Days. Use every 14 days, Disp: 2 each, Rfl: 11    Diclofenac Sodium (VOLTAREN) 1 % gel gel, APPLY TO THE AFFECTED AREA(S) FOUR TIMES DAILY AS DIRECTED, Disp: 300 g, Rfl: 2    dicyclomine (BENTYL) 20 MG tablet, Take 1 tablet by mouth Every 6 (Six) Hours., Disp: , Rfl:     docusate sodium (COLACE) 100 MG capsule, Take 1 capsule by mouth 2 (Two) Times a Day As Needed for Constipation., Disp: 60 capsule, Rfl: 2    Dulaglutide (Trulicity) 3 MG/0.5ML solution pen-injector, Inject 0.5 mL under the skin into the appropriate area as directed 1 (One) Time Per Week., Disp: 2 mL, Rfl: 3    DULoxetine (CYMBALTA) 60 MG capsule, TAKE ONE CAPSULE BY MOUTH TWICE DAILY, Disp: 60 capsule, Rfl: 3    folic acid (FOLVITE) 1 MG tablet, Take 1 tablet by mouth Daily., Disp: 90 tablet, Rfl: 1    glucose (DEX4) 4 GM chewable tablet, Chew 4 tablets As Needed for Low Blood Sugar., Disp: 90 tablet, Rfl: 3    glucose blood test strip, Test 4 times daily , E10.65, Disp: 120 each, Rfl: 11    glucose monitor monitoring kit, 1 each As Needed (to check bg)., Disp: 1 each, Rfl: 0     "glucose-vitamin C 4-6 GM-MG chewable tablet chewable tablet, TRUEplus Glucose 4 gram chewable tablet, Disp: , Rfl:     hydrocortisone 2.5 % cream, , Disp: , Rfl:     imipramine (TOFRANIL) 10 MG tablet, Take 1 tablet by mouth Daily., Disp: , Rfl:     Insulin Aspart (NovoLOG) 100 UNIT/ML injection, 20 units TID before each meal, Disp: 30 mL, Rfl: 11    Insulin Aspart (novoLOG) 100 UNIT/ML injection, , Disp: , Rfl:     Insulin Glargine (Lantus SoloStar) 100 UNIT/ML injection pen, Inject 35 Units under the skin into the appropriate area as directed Daily., Disp: 15 mL, Rfl: 3    insulin lispro (HumaLOG) 100 UNIT/ML injection, 20 units TID before each meal, Disp: 30 mL, Rfl: 11    Insulin Pen Needle 32G X 8 MM misc, Use at bedtime, Disp: 100 each, Rfl: 3    Insulin Syringe-Needle U-100 30G X 1/2\" 1 ML misc, Use three times daily with insulin, Disp: 100 each, Rfl: 3    Jardiance 25 MG tablet tablet, TAKE 1 TABLET DAILY., Disp: 90 tablet, Rfl: 0    ketoconazole (NIZORAL) 2 % shampoo, , Disp: , Rfl:     Lancets misc, Use for E11.65 diabetes to check sugars 4 times a day., Disp: 200 each, Rfl: 3    Lancets misc, Test 4 times daily, E10.65, Disp: 120 each, Rfl: 11    levothyroxine (SYNTHROID, LEVOTHROID) 25 MCG tablet, Take 1 tablet by mouth Daily., Disp: 30 tablet, Rfl: 2    lisinopril (PRINIVIL,ZESTRIL) 20 MG tablet, Take 1 tablet by mouth Daily., Disp: 90 tablet, Rfl: 0    methocarbamol (ROBAXIN) 750 MG tablet, Take 1 tablet by mouth 3 (Three) Times a Day., Disp: 90 tablet, Rfl: 1    nicotine polacrilex (NICORETTE) 4 MG gum, Chew 1 each As Needed for Smoking Cessation., Disp: 220 each, Rfl: 3    nitrofurantoin, macrocrystal-monohydrate, (MACROBID) 100 MG capsule, Take 1 capsule by mouth Every 12 (Twelve) Hours., Disp: , Rfl:     nystatin (MYCOSTATIN) 723875 UNIT/GM cream, , Disp: , Rfl:     omeprazole (priLOSEC) 40 MG capsule, Take 1 capsule by mouth Daily., Disp: 90 capsule, Rfl: 0    pregabalin (LYRICA) 300 MG capsule, " "Take 1 capsule by mouth 2 (Two) Times a Day., Disp: 60 capsule, Rfl: 0    tolterodine LA (Detrol LA) 2 MG 24 hr capsule, Take 1 capsule by mouth Daily., Disp: 30 capsule, Rfl: 3    TRUEplus Insulin Syringe 30G X 5/16\" 0.5 ML misc, , Disp: , Rfl:     vilazodone (Viibryd) 20 MG tablet tablet, Take 1 tablet by mouth Daily., Disp: 90 tablet, Rfl: 1    vitamin B-12 (CYANOCOBALAMIN) 1000 MCG tablet, Take 1 tablet by mouth Daily., Disp: 90 tablet, Rfl: 0    vitamin D (ERGOCALCIFEROL) 1.25 MG (57465 UT) capsule capsule, Take 1 capsule by mouth 1 (One) Time Per Week., Disp: 12 capsule, Rfl: 1    Past Surgical History:   Procedure Laterality Date    APPENDECTOMY      BELOW KNEE AMPUTATION      BLADDER SURGERY      CARDIAC CATHETERIZATION N/A 01/27/2022    Procedure: Left Heart Cath;  Surgeon: Josias Carpio MD;  Location: [facility] CATH INVASIVE LOCATION;  Service: Cardiology;  Laterality: N/A;    COLONOSCOPY N/A 09/02/2020    Procedure: COLONOSCOPY;  Surgeon: Ashli Gonzalez MD;  Location: [facility] ENDOSCOPY;  Service: Gastroenterology;  Laterality: N/A;    ENDOSCOPY N/A 09/02/2020    Procedure: ESOPHAGOGASTRODUODENOSCOPY;  Surgeon: Ashli Gonzalez MD;  Location: [facility] ENDOSCOPY;  Service: Gastroenterology;  Laterality: N/A;    ENDOSCOPY N/A 3/1/2023    Procedure: ESOPHAGOGASTRODUODENOSCOPY;  Surgeon: Ashli Gonzalez MD;  Location: [facility] ENDOSCOPY;  Service: Gastroenterology;  Laterality: N/A;       Family History   Problem Relation Age of Onset    Diabetes Other     Hyperlipidemia Other     Hypertension Other     Stroke Other     Heart disease Other     Other Other     Diabetes Mother     Diabetes Father     Diabetes Sister     Heart disease Sister     Diabetes Brother         Social History     Socioeconomic History    Marital status:    Tobacco Use    Smoking status: Some Days     Packs/day: 0.50     Years: 20.00     Pack years: 10.00     Types: Cigarettes     Start date: 1995     Passive exposure: " "Current    Smokeless tobacco: Never   Vaping Use    Vaping Use: Never used   Substance and Sexual Activity    Alcohol use: Not Currently    Drug use: Never    Sexual activity: Defer        Review of Systems   Constitutional: Negative.    HENT:  Positive for hearing loss.    Eyes: Negative.    Respiratory:  Positive for shortness of breath.    Cardiovascular: Negative.    Gastrointestinal: Negative.    Endocrine: Negative.    Genitourinary:  Positive for difficulty urinating.   Musculoskeletal:  Positive for arthralgias and joint swelling.   Skin: Negative.    Allergic/Immunologic: Negative.    Neurological: Negative.    Hematological: Negative.    Psychiatric/Behavioral:  Positive for dysphoric mood and sleep disturbance.         Anxiety/depression     PHYSICAL EXAMINATION:       Ht 175.3 cm (69\")   Wt 119 kg (263 lb)   BMI 38.84 kg/mý     Physical Exam  Vitals and nursing note reviewed.   Constitutional:       General: She is not in acute distress.     Appearance: She is well-developed. She is not toxic-appearing or diaphoretic.   HENT:      Head: Normocephalic.   Eyes:      General: No scleral icterus.  Pulmonary:      Effort: Pulmonary effort is normal. No respiratory distress.   Skin:     General: Skin is warm and dry.   Neurological:      Mental Status: She is alert and oriented to person, place, and time.   Psychiatric:         Behavior: Behavior normal.         Thought Content: Thought content normal.         Judgment: Judgment normal.       GAIT:     []  Normal  []  Antalgic    Assistive device: []  None  []  Walker     []  Crutches  []  Cane     []  Wheelchair  []  Stretcher    Ortho Exam      Left elbow:    Maximum point of tenderness is just distal to lateral epicondyle.  Pain can be reproduced with extension of wrist and lifting palm down.  Normal range of motion.            ASSESSMENT:    Diagnoses and all orders for this visit:    Left elbow pain  -     XR Elbow 3+ View Left    Strain of left elbow, " initial encounter          PLAN      X-rays reviewed, no acute bony abnormality identified.  Patient has had recent decrease in her GFR, her most recent hemoglobin A1c is 8.2.  Exam today is consistent with strain of common extensor tendon.  After discussing available treatment options including risk, benefits, alternatives, patient treated with home exercise program.  Patient instructed to perform RICE therapy and activity modification as needed.  Patient is to return in 4 to 6 weeks for recheck, if not improving we will likely proceed with either formal physical therapy or localized tendon injection.  Signs and symptoms to report including when to seek care explained.  Patient verbalized understanding.    Time spent of a minimum of 30 minutes including the face to face evaluation, reviewing of medical history and prior medial records, reviewing of diagnostic studies, ordering additional tests, documentation, patient education and coordination of care.       EMR Dragon/Transciption Disclaimer: Some of this note may be an electronic transcription/translation of spoken language to printed text.  The electronic translation of spoken language may permit erroneous, or at times, nonsensical words or phrases to be inadvertently transcribed. Although I have reviewed the note for such errors, some may still exist.           This document has been electronically signed by Glo RYAN on August 23, 2023 15:01 CDT

## 2023-08-28 ENCOUNTER — OFFICE VISIT (OUTPATIENT)
Dept: SLEEP MEDICINE | Facility: CLINIC | Age: 58
End: 2023-08-28
Payer: MEDICARE

## 2023-08-28 VITALS
BODY MASS INDEX: 38.95 KG/M2 | SYSTOLIC BLOOD PRESSURE: 110 MMHG | HEART RATE: 74 BPM | WEIGHT: 263 LBS | DIASTOLIC BLOOD PRESSURE: 60 MMHG | HEIGHT: 69 IN | OXYGEN SATURATION: 95 %

## 2023-08-28 DIAGNOSIS — G25.81 RESTLESS LEGS SYNDROME (RLS): ICD-10-CM

## 2023-08-28 DIAGNOSIS — G47.33 OSA (OBSTRUCTIVE SLEEP APNEA): Primary | ICD-10-CM

## 2023-08-28 PROCEDURE — 3078F DIAST BP <80 MM HG: CPT | Performed by: NURSE PRACTITIONER

## 2023-08-28 PROCEDURE — 1160F RVW MEDS BY RX/DR IN RCRD: CPT | Performed by: NURSE PRACTITIONER

## 2023-08-28 PROCEDURE — 1159F MED LIST DOCD IN RCRD: CPT | Performed by: NURSE PRACTITIONER

## 2023-08-28 PROCEDURE — 3074F SYST BP LT 130 MM HG: CPT | Performed by: NURSE PRACTITIONER

## 2023-08-28 PROCEDURE — 99214 OFFICE O/P EST MOD 30 MIN: CPT | Performed by: NURSE PRACTITIONER

## 2023-08-28 RX ORDER — ROPINIROLE 0.5 MG/1
TABLET, FILM COATED ORAL
Qty: 35 TABLET | Refills: 0 | Status: SHIPPED | OUTPATIENT
Start: 2023-08-28

## 2023-08-30 ENCOUNTER — TELEPHONE (OUTPATIENT)
Dept: ENDOCRINOLOGY | Facility: CLINIC | Age: 58
End: 2023-08-30
Payer: MEDICARE

## 2023-08-30 DIAGNOSIS — Z79.4 TYPE 2 DIABETES MELLITUS WITH HYPERGLYCEMIA, WITH LONG-TERM CURRENT USE OF INSULIN: Primary | ICD-10-CM

## 2023-08-30 DIAGNOSIS — E11.65 TYPE 2 DIABETES MELLITUS WITH HYPERGLYCEMIA, WITH LONG-TERM CURRENT USE OF INSULIN: Primary | ICD-10-CM

## 2023-08-30 NOTE — TELEPHONE ENCOUNTER
Pt called and asked for new RX for the needle things you screw on the end of the insulin pens Pt also asked for refill of  Januvia. Please send both to [facility] in [place].    Thank you

## 2023-09-06 ENCOUNTER — TELEPHONE (OUTPATIENT)
Dept: FAMILY MEDICINE CLINIC | Facility: CLINIC | Age: 58
End: 2023-09-06

## 2023-09-06 NOTE — TELEPHONE ENCOUNTER
Pt would like a phone call back regarding referral for her leg. Pt states she was referred to somewhere in [place] but I was unable to find that.Pt states she needs a call back asa so that she can arrange transportation with [facility]. Call back [phone]

## 2023-09-08 ENCOUNTER — TELEPHONE (OUTPATIENT)
Dept: FAMILY MEDICINE CLINIC | Facility: CLINIC | Age: 58
End: 2023-09-08

## 2023-09-08 RX ORDER — OMEPRAZOLE 40 MG/1
CAPSULE, DELAYED RELEASE ORAL
Qty: 90 CAPSULE | Refills: 0 | Status: SHIPPED | OUTPATIENT
Start: 2023-09-08

## 2023-09-08 NOTE — TELEPHONE ENCOUNTER
Patient's sister (POA), June, called asking if Health orders could be faxed to [facility].  Call back number: [phone]  Fax Number: [phone]

## 2023-09-10 DIAGNOSIS — T14.8XXA OPEN WOUND OF SKIN: ICD-10-CM

## 2023-09-10 DIAGNOSIS — Z89.619 HISTORY OF LEG AMPUTATION: ICD-10-CM

## 2023-09-10 DIAGNOSIS — Z02.2 ENCOUNTER FOR EXAMINATION FOR ADMISSION TO NURSING HOME: Primary | ICD-10-CM

## 2023-09-10 DIAGNOSIS — Z99.3 WHEELCHAIR DEPENDENT: ICD-10-CM

## 2023-09-10 DIAGNOSIS — R26.81 GAIT INSTABILITY: ICD-10-CM

## 2023-09-10 DIAGNOSIS — Z79.4 CONTROLLED TYPE 2 DIABETES MELLITUS WITH COMPLICATION, WITH LONG-TERM CURRENT USE OF INSULIN: ICD-10-CM

## 2023-09-10 DIAGNOSIS — E11.8 CONTROLLED TYPE 2 DIABETES MELLITUS WITH COMPLICATION, WITH LONG-TERM CURRENT USE OF INSULIN: ICD-10-CM

## 2023-09-20 ENCOUNTER — OFFICE VISIT (OUTPATIENT)
Dept: ORTHOPEDIC SURGERY | Facility: CLINIC | Age: 58
End: 2023-09-20
Payer: MEDICARE

## 2023-09-20 VITALS — BODY MASS INDEX: 38.95 KG/M2 | WEIGHT: 263 LBS | HEIGHT: 69 IN

## 2023-09-20 DIAGNOSIS — M25.522 LEFT ELBOW PAIN: Primary | ICD-10-CM

## 2023-09-20 DIAGNOSIS — S46.912D STRAIN OF LEFT ELBOW, SUBSEQUENT ENCOUNTER: ICD-10-CM

## 2023-09-20 PROCEDURE — 20605 DRAIN/INJ JOINT/BURSA W/O US: CPT | Performed by: NURSE PRACTITIONER

## 2023-09-20 PROCEDURE — 1160F RVW MEDS BY RX/DR IN RCRD: CPT | Performed by: NURSE PRACTITIONER

## 2023-09-20 PROCEDURE — 1159F MED LIST DOCD IN RCRD: CPT | Performed by: NURSE PRACTITIONER

## 2023-09-20 PROCEDURE — 99214 OFFICE O/P EST MOD 30 MIN: CPT | Performed by: NURSE PRACTITIONER

## 2023-09-20 RX ORDER — TRIAMCINOLONE ACETONIDE 40 MG/ML
20 INJECTION, SUSPENSION INTRA-ARTICULAR; INTRAMUSCULAR
Status: COMPLETED | OUTPATIENT
Start: 2023-09-20 | End: 2023-09-20

## 2023-09-20 RX ORDER — LIDOCAINE HYDROCHLORIDE 10 MG/ML
0.5 INJECTION, SOLUTION INFILTRATION; PERINEURAL
Status: COMPLETED | OUTPATIENT
Start: 2023-09-20 | End: 2023-09-20

## 2023-09-20 RX ADMIN — TRIAMCINOLONE ACETONIDE 20 MG: 40 INJECTION, SUSPENSION INTRA-ARTICULAR; INTRAMUSCULAR at 13:39

## 2023-09-20 RX ADMIN — LIDOCAINE HYDROCHLORIDE 0.5 ML: 10 INJECTION, SOLUTION INFILTRATION; PERINEURAL at 13:39

## 2023-09-20 NOTE — PROGRESS NOTES
"Marta Travis Giraldo is a 58 y.o. female returns for     Chief Complaint   Patient presents with    Left Elbow - Follow-up       HISTORY OF PRESENT ILLNESS:      Mrs. Giraldo is a 58-year-old female who presents today to follow-up on left elbow pain that has now been present for approximately 7 weeks.  Elbow pain is now also radiating up into her upper arm in her biceps area.  Pain continues to be in lateral elbow.  Pain is aching.  She has a decreased GFR, at worst 46.  A1c is 8.2.  She has tried RICE therapy, Tylenol, HEP without relief of symptoms.     CONCURRENT MEDICAL HISTORY:    The following portions of the patient's history were reviewed and updated as appropriate: allergies, current medications, past family history, past medical history, past social history, past surgical history and problem list.     ROS  No fevers or chills.  No chest pain or shortness of air.  No GI or  disturbances.    PHYSICAL EXAMINATION:       Ht 175.3 cm (69\")   Wt 119 kg (263 lb)   BMI 38.84 kg/m²     Physical Exam  Vitals and nursing note reviewed.   Constitutional:       General: She is not in acute distress.     Appearance: She is well-developed. She is not toxic-appearing.   HENT:      Head: Normocephalic.   Pulmonary:      Effort: Pulmonary effort is normal. No respiratory distress.   Skin:     General: Skin is warm and dry.   Neurological:      Mental Status: She is alert and oriented to person, place, and time.   Psychiatric:         Behavior: Behavior normal.         Thought Content: Thought content normal.         Judgment: Judgment normal.       GAIT:     []  Normal  []  Antalgic    Assistive device: []  None  []  Walker     []  Crutches  []  Cane     []  Wheelchair  []  Stretcher    Ortho Exam    Left elbow:     Maximum point of tenderness is just distal to lateral epicondyle.  Pain can be reproduced with extension of wrist and lifting palm down.  Normal range of motion.  No change in exam.      XR Elbow 3+ View " Left    Result Date: 8/23/2023  Narrative: Comparison: None FINDINGS: No acute fracture or dislocation.  A small osteochondroma is noted in the distal humerus.  Joint spaces are maintained.  Soft tissues appear grossly unremarkable.            ASSESSMENT:    Diagnoses and all orders for this visit:    Left elbow pain    Strain of left elbow, subsequent encounter    Other orders  -     Medium Joint Arthrocentesis: L elbow          PLAN  Medium Joint Arthrocentesis: L elbow  Date/Time: 9/20/2023 1:39 PM  Consent given by: patient  Site marked: site marked  Timeout: Immediately prior to procedure a time out was called to verify the correct patient, procedure, equipment, support staff and site/side marked as required   Supporting Documentation  Indications: pain   Procedure Details  Location: elbow - L elbow  Preparation: Patient was prepped and draped in the usual sterile fashion  Needle size: 25 G  Approach: anterolateral  Medications administered: 0.5 mL lidocaine 1 %; 20 mg triamcinolone acetonide 40 MG/ML  Patient tolerance: patient tolerated the procedure well with no immediate complications        After discussing risk, benefits, alternatives, patient given lateral epicondylitis injection.  Patient also given written prescription for Tylenol 3 after reviewing internal epic PDMP.  Patient is currently staying in a nursing home due to other issues, patient given doctor's order to start formal PT for generalized strengthening conditioning and range of motion.  Patient is to return in 6 weeks for recheck, if not improving we will likely proceed with MRI.    EMR Dragon/Transciption Disclaimer: Some of this note may be an electronic transcription/translation of spoken language to printed text.  The electronic translation of spoken language may permit erroneous, or at times, nonsensical words or phrases to be inadvertently transcribed. Although I have reviewed the note for such errors, some may still exist.         This  document has been electronically signed by Glo RYAN on September 20, 2023 14:11 CDT

## 2023-09-22 RX ORDER — METHOCARBAMOL 750 MG/1
TABLET, FILM COATED ORAL
Qty: 90 TABLET | Refills: 1 | Status: SHIPPED | OUTPATIENT
Start: 2023-09-22

## 2025-01-23 ENCOUNTER — TELEPHONE (OUTPATIENT)
Dept: NEUROSURGERY | Age: 60
End: 2025-01-23

## 2025-01-23 NOTE — TELEPHONE ENCOUNTER
1st attempt to contact patient. I left a voicemail instructing patient to call back at [phone] to schedule their new patient appointment     Spinal stenosis, cervical region  M25.78 (ICD-10-CM) - Osteophyte, vertebrae  M54.12 (ICD-10-CM) - Radiculopathy, cervical region     MRI REPORT IN MEDIA

## 2025-01-23 NOTE — TELEPHONE ENCOUNTER
[facility] New Patient Questionnaire    Diagnosis/Reason for Referral?    Spinal stenosis, cervical region  M25.78 (ICD-10-CM) - Osteophyte, vertebrae  M54.12 (ICD-10-CM) - Radiculopathy, cervical region     2. Who is completing questionnaire?      Patient  Caregiver Family      3. Has the patient had any previous spinal/brain surgeries?  NO      A. If yes, what is the name of the facility in which the surgery was performed?       B. Procedure/Surgery performed?       C. Who was the surgeon?       D. When was the surgery?    MM/YY       E. Did the patient improve after the surgery?        4. Is this a second opinion?   If yes, Dr. Cornell would like to review patient first before making the appointment.      5. Have MRI Images been obtain within the last year?     Yes  No      XR  CT     If yes, where was the imaging performed?     If yes, what part of the body?      Lumbar  Cervical  Thoracic  Brain     If yes, when was it obtained?      12/19/24    Note: if the scan was performed at a facility other than [facility], the disc will need to be brought to the appointment or we need to reach out to obtain the disc.     A. Was the patient instructed to provide the disc?      Yes   No      8. Has the patient had a NCV/EMG within the last year?      Yes  No     If yes, where was it performed and date?      MM/YY  Location:      9. Has the patient been to Physical Therapy?      Yes  No     If yes, what location, how long attended, and last visit?    Location:        Therapy Lasted:    Date of Last Visit:      10. Has the patient been to Pain Management?     Yes  No     If yes, what location and last visit     Location:   Last Visit:   Is it helping?

## 2025-02-17 ENCOUNTER — TELEPHONE (OUTPATIENT)
Dept: NEUROSURGERY | Age: 60
End: 2025-02-17

## 2025-04-15 ENCOUNTER — OFFICE VISIT (OUTPATIENT)
Dept: NEUROSURGERY | Age: 60
End: 2025-04-15
Payer: MEDICARE

## 2025-04-15 VITALS
WEIGHT: 260 LBS | BODY MASS INDEX: 38.51 KG/M2 | DIASTOLIC BLOOD PRESSURE: 68 MMHG | HEIGHT: 69 IN | HEART RATE: 60 BPM | SYSTOLIC BLOOD PRESSURE: 120 MMHG

## 2025-04-15 DIAGNOSIS — M54.12 CERVICAL RADICULOPATHY: Primary | ICD-10-CM

## 2025-04-15 PROCEDURE — 4004F PT TOBACCO SCREEN RCVD TLK: CPT | Performed by: NEUROLOGICAL SURGERY

## 2025-04-15 PROCEDURE — 99204 OFFICE O/P NEW MOD 45 MIN: CPT | Performed by: NEUROLOGICAL SURGERY

## 2025-04-15 PROCEDURE — G8427 DOCREV CUR MEDS BY ELIG CLIN: HCPCS | Performed by: NEUROLOGICAL SURGERY

## 2025-04-15 PROCEDURE — 3017F COLORECTAL CA SCREEN DOC REV: CPT | Performed by: NEUROLOGICAL SURGERY

## 2025-04-15 PROCEDURE — G8417 CALC BMI ABV UP PARAM F/U: HCPCS | Performed by: NEUROLOGICAL SURGERY

## 2025-04-15 RX ORDER — FOLIC ACID 1 MG/1
TABLET ORAL
COMMUNITY
Start: 2025-01-24

## 2025-04-15 RX ORDER — KETOCONAZOLE 20 MG/ML
SHAMPOO, SUSPENSION TOPICAL
COMMUNITY
Start: 2025-03-03

## 2025-04-15 RX ORDER — INSULIN ASPART 100 [IU]/ML
INJECTION, SOLUTION INTRAVENOUS; SUBCUTANEOUS
COMMUNITY

## 2025-04-15 RX ORDER — IPRATROPIUM BROMIDE AND ALBUTEROL SULFATE 2.5; .5 MG/3ML; MG/3ML
SOLUTION RESPIRATORY (INHALATION)
COMMUNITY
Start: 2025-02-18

## 2025-04-15 RX ORDER — ATORVASTATIN CALCIUM 80 MG/1
TABLET, FILM COATED ORAL
COMMUNITY
Start: 2025-04-02

## 2025-04-15 RX ORDER — CARVEDILOL 6.25 MG/1
TABLET ORAL
COMMUNITY
Start: 2025-03-14

## 2025-04-15 RX ORDER — ACYCLOVIR 800 MG/1
TABLET ORAL
COMMUNITY
Start: 2025-01-23

## 2025-04-15 RX ORDER — LISINOPRIL 40 MG/1
TABLET ORAL
COMMUNITY
Start: 2025-03-13

## 2025-04-15 RX ORDER — TIOTROPIUM BROMIDE INHALATION SPRAY 3.12 UG/1
SPRAY, METERED RESPIRATORY (INHALATION)
COMMUNITY
Start: 2025-03-03

## 2025-04-15 RX ORDER — FLUCONAZOLE 150 MG/1
TABLET ORAL
COMMUNITY
Start: 2025-03-03

## 2025-04-15 RX ORDER — ASPIRIN 81 MG/1
TABLET ORAL
COMMUNITY
Start: 2025-03-31

## 2025-04-15 RX ORDER — EMPAGLIFLOZIN 25 MG/1
TABLET, FILM COATED ORAL
COMMUNITY
Start: 2025-01-22

## 2025-04-15 RX ORDER — PREGABALIN 300 MG/1
CAPSULE ORAL
COMMUNITY
Start: 2025-04-07

## 2025-04-15 RX ORDER — PENTOSAN POLYSULFATE SODIUM 100 MG/1
CAPSULE, GELATIN COATED ORAL
COMMUNITY
Start: 2025-01-28

## 2025-04-15 RX ORDER — DULOXETIN HYDROCHLORIDE 60 MG/1
CAPSULE, DELAYED RELEASE ORAL
COMMUNITY
Start: 2025-04-13

## 2025-04-15 RX ORDER — TRAZODONE HYDROCHLORIDE 50 MG/1
TABLET ORAL
COMMUNITY
Start: 2025-04-10

## 2025-04-15 RX ORDER — VILAZODONE HYDROCHLORIDE 10 MG/1
TABLET ORAL
COMMUNITY
Start: 2025-03-29

## 2025-04-15 RX ORDER — INSULIN GLARGINE 100 [IU]/ML
INJECTION, SOLUTION SUBCUTANEOUS
COMMUNITY
Start: 2025-04-04

## 2025-04-15 RX ORDER — ACETAMINOPHEN AND CODEINE PHOSPHATE 300; 30 MG/1; MG/1
TABLET ORAL
COMMUNITY
Start: 2025-04-14

## 2025-04-15 RX ORDER — LINACLOTIDE 145 UG/1
CAPSULE, GELATIN COATED ORAL
COMMUNITY
Start: 2025-03-29

## 2025-04-15 RX ORDER — PANTOPRAZOLE SODIUM 40 MG/1
TABLET, DELAYED RELEASE ORAL
COMMUNITY
Start: 2025-03-28

## 2025-04-15 RX ORDER — LEVOTHYROXINE SODIUM 25 UG/1
TABLET ORAL
COMMUNITY
Start: 2025-03-03

## 2025-04-15 RX ORDER — FERROUS SULFATE 325(65) MG
TABLET ORAL
COMMUNITY
Start: 2025-03-04

## 2025-04-15 ASSESSMENT — ENCOUNTER SYMPTOMS
EYES NEGATIVE: 1
GASTROINTESTINAL NEGATIVE: 1
RESPIRATORY NEGATIVE: 1
BACK PAIN: 1

## 2025-04-15 NOTE — PROGRESS NOTES
Review of Systems   Constitutional: Negative.    HENT: Negative.     Eyes: Negative.    Respiratory: Negative.     Cardiovascular: Negative.    Gastrointestinal: Negative.    Genitourinary: Negative.    Musculoskeletal:  Positive for back pain, joint pain, myalgias and neck pain.   Skin: Negative.    Neurological:  Positive for tingling and weakness.   Endo/Heme/Allergies: Negative.    Psychiatric/Behavioral: Negative.        
effort without use of accessory muscles  Musculoskeletal - no significant wasting of muscles noted, no bony deformities, symmetric bulk  Extremities- no clubbing, cyanosis or edema  Skin - warm, dry, and intact.  No rash,erythema, or pallor.  Psychiatric - mood, affect, and behavior appear normal.       NEUROLOGIC EXAM:    MENTAL STATUS: Alert, oriented, thought content appropriate    CRANIAL NERVES: PERRL, EOMI, symmetric facies, tongue midline    MOTOR:     Right Upper Extremity:    Deltoid: 5/5  Biceps: 5/5  Triceps: 5/5  Wrist Extension: 5/5  Finger Abduction: 5/5      Left Upper Extremity:    Deltoid: 5/5  Biceps: 5/5  Triceps: 5/5  Wrist Extension: 5/5  Finger Abduction: 5/5      Right Lower Extremity:    Hip Flexion: 5/5  Knee Extension: unable  Ankle Plantarflexion: amputee  Ankle Dorsiflexion: amputee        Left Lower Extremity:    Hip Flexion: 5/5  Knee Extension: 5/5  Ankle Plantarflexion: 5/5  Ankle Dorsiflexion: 5/5  EHL: 5/5      SOMATOSENSORY:     Right Upper Extremity: decreased to light touch in a glove distribution  Left Upper Extremity: decreased to light touch in a glove distribution  Right Lower Extremity: decreased to light touch in a stocking distribution  Left Lower Extremity: decreased to light touch in a stocking distribution      REFLEXES:    Biceps: 1+  Patella: 1+    Dumont's: Negative      COORDINATION and GAIT:    Gait: Wheelchair bound            IMAGING:    My interpretation of imaging studies:     MRI cervical spine reviewed.  The study, particularly the axial images, are of very poor quality and insufficient for diagnostic interpretation.  Alignment appears anatomic without listhesis.  There is degenerative disc disease that is most prominent at C5/6 with a posterior disc/osteophyte that distorts the ventral cord and cases mild-moderate spinal stenosis.  The axial images may suggest foraminal stenosis, but they are of insufficient quality for accurate

## 2025-04-18 ENCOUNTER — HOSPITAL ENCOUNTER (OUTPATIENT)
Dept: MRI IMAGING | Age: 60
Discharge: HOME | End: 2025-04-18
Attending: NEUROLOGICAL SURGERY
Payer: MEDICARE

## 2025-04-18 DIAGNOSIS — M54.12 CERVICAL RADICULOPATHY: ICD-10-CM

## 2025-04-18 PROCEDURE — 72141 MRI NECK SPINE W/O DYE: CPT

## 2025-05-21 ENCOUNTER — OFFICE VISIT (OUTPATIENT)
Dept: NEUROSURGERY | Age: 60
End: 2025-05-21
Payer: MEDICARE

## 2025-05-21 VITALS
DIASTOLIC BLOOD PRESSURE: 68 MMHG | WEIGHT: 259.92 LBS | HEIGHT: 69 IN | BODY MASS INDEX: 38.5 KG/M2 | HEART RATE: 70 BPM | SYSTOLIC BLOOD PRESSURE: 120 MMHG

## 2025-05-21 DIAGNOSIS — M54.12 CERVICAL RADICULOPATHY: Primary | ICD-10-CM

## 2025-05-21 PROCEDURE — 4004F PT TOBACCO SCREEN RCVD TLK: CPT | Performed by: NEUROLOGICAL SURGERY

## 2025-05-21 PROCEDURE — G8417 CALC BMI ABV UP PARAM F/U: HCPCS | Performed by: NEUROLOGICAL SURGERY

## 2025-05-21 PROCEDURE — 3017F COLORECTAL CA SCREEN DOC REV: CPT | Performed by: NEUROLOGICAL SURGERY

## 2025-05-21 PROCEDURE — G8427 DOCREV CUR MEDS BY ELIG CLIN: HCPCS | Performed by: NEUROLOGICAL SURGERY

## 2025-05-21 PROCEDURE — 99214 OFFICE O/P EST MOD 30 MIN: CPT | Performed by: NEUROLOGICAL SURGERY

## 2025-05-21 ASSESSMENT — ENCOUNTER SYMPTOMS
RESPIRATORY NEGATIVE: 1
GASTROINTESTINAL NEGATIVE: 1
BACK PAIN: 1
EYES NEGATIVE: 1

## 2025-05-21 NOTE — PROGRESS NOTES
NEUROSURGERY FOLLOW UP      Chief Complaint:   Chief Complaint   Patient presents with    Follow-up     Patient is here to follow up after imaging.     Results     MRI C 4/18/25         Interval Update:    Patient returns for planned follow-up and MRI review.  She is participating in physical therapy for her neck pain and reports improvement in both her neck and arm pain.       HPI:     The patient is a 60 y.o. female who presents for neurosurgical evaluation of neck pain and left arm pain.  She complains of posterior neck pain as well as pain in her left upper arm and forearm.  She states the pain does not radiate from her neck down her arm.  She has chronic numbness in her hands and feet from diabetic neuropathy.  She has also undergone a RLE BKA due to complications from diabetes.  She currently resides in a nursing home and is wheelchair bound.  She cannot recall her most recent HgbA1c.  She has not attempted any physical therapy for her neck, she is not established with pain management and she has no prior history of spinal surgery.       ROS:    Constitutional: Negative.    HENT: Negative.     Eyes: Negative.    Respiratory: Negative.     Cardiovascular: Negative.    Gastrointestinal: Negative.    Genitourinary: Negative.    Musculoskeletal:  Positive for back pain, joint pain, myalgias and neck pain.   Skin: Negative.    Neurological:  Positive for dizziness, tingling, weakness and headaches.   Endo/Heme/Allergies: Negative.    Psychiatric/Behavioral: Negative.      Review of systems was obtained by the medical assistant and reviewed by myself.    Objective:    /68   Pulse 70   Ht 1.753 m (5' 9.02\")   Wt 117.9 kg (259 lb 14.8 oz)   BMI 38.37 kg/m²         Physical Exam:    General: alert, cooperative, no distress  Cardiorespiratory: unlabored breathing      Neurologic Exam:    Mental Status: Alert, oriented, thought content appropriate  Cranial Nerves: PERRL, EOMI, symmetric facies, tongue

## 2025-05-21 NOTE — PROGRESS NOTES
Review of Systems   Constitutional: Negative.    HENT: Negative.     Eyes: Negative.    Respiratory: Negative.     Cardiovascular: Negative.    Gastrointestinal: Negative.    Genitourinary: Negative.    Musculoskeletal:  Positive for back pain, joint pain, myalgias and neck pain.   Skin: Negative.    Neurological:  Positive for dizziness, tingling, weakness and headaches.   Endo/Heme/Allergies: Negative.    Psychiatric/Behavioral: Negative.

## 2025-08-07 ENCOUNTER — TELEPHONE (OUTPATIENT)
Dept: NEUROSURGERY | Age: 60
End: 2025-08-07

## 2025-08-11 ENCOUNTER — OFFICE VISIT (OUTPATIENT)
Dept: NEUROSURGERY | Age: 60
End: 2025-08-11
Payer: MEDICARE

## 2025-08-11 VITALS
SYSTOLIC BLOOD PRESSURE: 112 MMHG | DIASTOLIC BLOOD PRESSURE: 60 MMHG | HEART RATE: 80 BPM | HEIGHT: 69 IN | WEIGHT: 259.92 LBS | BODY MASS INDEX: 38.5 KG/M2

## 2025-08-11 DIAGNOSIS — M54.12 CERVICAL RADICULOPATHY: Primary | ICD-10-CM

## 2025-08-11 PROCEDURE — G8427 DOCREV CUR MEDS BY ELIG CLIN: HCPCS | Performed by: NEUROLOGICAL SURGERY

## 2025-08-11 PROCEDURE — 1036F TOBACCO NON-USER: CPT | Performed by: NEUROLOGICAL SURGERY

## 2025-08-11 PROCEDURE — 3017F COLORECTAL CA SCREEN DOC REV: CPT | Performed by: NEUROLOGICAL SURGERY

## 2025-08-11 PROCEDURE — 99213 OFFICE O/P EST LOW 20 MIN: CPT | Performed by: NEUROLOGICAL SURGERY

## 2025-08-11 PROCEDURE — G8417 CALC BMI ABV UP PARAM F/U: HCPCS | Performed by: NEUROLOGICAL SURGERY

## 2025-08-11 RX ORDER — ONDANSETRON 8 MG/1
TABLET, FILM COATED ORAL
COMMUNITY

## 2025-08-11 RX ORDER — LINEZOLID 600 MG/1
TABLET, FILM COATED ORAL
COMMUNITY
Start: 2025-06-27

## 2025-08-11 RX ORDER — DULAGLUTIDE 4.5 MG/.5ML
INJECTION, SOLUTION SUBCUTANEOUS
COMMUNITY

## 2025-08-11 RX ORDER — VARENICLINE TARTRATE 1 MG/1
TABLET, FILM COATED ORAL
COMMUNITY
Start: 2025-07-25

## 2025-08-11 RX ORDER — METHOCARBAMOL 750 MG/1
750 TABLET, FILM COATED ORAL 3 TIMES DAILY
COMMUNITY

## 2025-08-11 ASSESSMENT — ENCOUNTER SYMPTOMS
GASTROINTESTINAL NEGATIVE: 1
BACK PAIN: 1
EYES NEGATIVE: 1
RESPIRATORY NEGATIVE: 1

## 2025-08-12 ENCOUNTER — TELEPHONE (OUTPATIENT)
Dept: PAIN MANAGEMENT | Age: 60
End: 2025-08-12

## (undated) DEVICE — A2000 MULTI-USE SYRINGE KIT, P/N 701277-003KIT CONTENTS: 100ML CONTRAST RESERVOIR AND TUBING WITH CONTRAST SPIKE AND CLAMP: Brand: A2000 MULTI-USE SYRINGE KIT

## (undated) DEVICE — BITEBLOCK ENDO W/STRAP 60F A/ LF DISP

## (undated) DEVICE — ST CVR PROB PULLUP ULTRASND 5X48IN

## (undated) DEVICE — CANN SMPL SOFTECH BIFLO ETCO2 A/M 7FT

## (undated) DEVICE — SINGLE-USE BIOPSY FORCEPS: Brand: RADIAL JAW 4

## (undated) DEVICE — MODEL BT2000 P/N 700287-012KIT CONTENTS: MANIFOLD WITH SALINE AND CONTRAST PORTS, SALINE TUBING WITH SPIKE AND HAND SYRINGE, TRANSDUCER: Brand: BT2000 AUTOMATED MANIFOLD KIT

## (undated) DEVICE — ANGIOGRAPHIC CATHETER: Brand: EXPO™

## (undated) DEVICE — TR BAND RADIAL ARTERY COMPRESSION DEVICE: Brand: TR BAND

## (undated) DEVICE — GW PERIPH GUIDERIGHT STD/EXCHNG/J/TIP SS 0.035IN 5X260CM

## (undated) DEVICE — RADIFOCUS OPTITORQUE ANGIOGRAPHIC CATHETER: Brand: OPTITORQUE

## (undated) DEVICE — PK CATH LAB 60

## (undated) DEVICE — GLIDESHEATH SLENDER STAINLESS STEEL KIT: Brand: GLIDESHEATH SLENDER

## (undated) DEVICE — ELECTRODE,RT,STRESS,FOAM,50PK: Brand: MEDLINE